# Patient Record
Sex: FEMALE | Race: BLACK OR AFRICAN AMERICAN | NOT HISPANIC OR LATINO | Employment: OTHER | URBAN - METROPOLITAN AREA
[De-identification: names, ages, dates, MRNs, and addresses within clinical notes are randomized per-mention and may not be internally consistent; named-entity substitution may affect disease eponyms.]

---

## 2017-01-03 RX ORDER — CLOPIDOGREL BISULFATE 75 MG/1
TABLET ORAL
Qty: 30 TABLET | Refills: 11 | Status: SHIPPED | OUTPATIENT
Start: 2017-01-03 | End: 2017-02-01 | Stop reason: SDUPTHER

## 2017-01-03 RX ORDER — LOVASTATIN 40 MG/1
TABLET ORAL
Qty: 90 TABLET | Refills: 3 | Status: SHIPPED | OUTPATIENT
Start: 2017-01-03 | End: 2018-01-05 | Stop reason: SDUPTHER

## 2017-01-06 ENCOUNTER — TELEPHONE (OUTPATIENT)
Dept: FAMILY MEDICINE | Facility: CLINIC | Age: 82
End: 2017-01-06

## 2017-01-06 NOTE — TELEPHONE ENCOUNTER
Phoned patient, she denies feeling bad, anxiety, lightheadedness, tightness/ pain in chest, neck, back or arms nor shortness of breath. Educated the patient if she experiences any of those symptoms she should go to ED, she verbalized understanding. patient states her blood pressure has not been high but is today because she did not sleep well last nigh, denies pain anywhere. Informed pt her last appt was 5/2016, she made an appt for next Friday 1/13/17.

## 2017-01-06 NOTE — TELEPHONE ENCOUNTER
----- Message from Kacy Vang sent at 1/6/2017 11:07 AM CST -----  Corry the NP with [facility] called to say is doing health assessment on this patient and her b/p is  201/102 ==initially    200/96 and hour later   Wants doctor to be aware  Reach patient [phone]   Cell   [phone]

## 2017-01-13 ENCOUNTER — OFFICE VISIT (OUTPATIENT)
Dept: FAMILY MEDICINE | Facility: CLINIC | Age: 82
End: 2017-01-13
Payer: MEDICARE

## 2017-01-13 ENCOUNTER — TELEPHONE (OUTPATIENT)
Dept: FAMILY MEDICINE | Facility: CLINIC | Age: 82
End: 2017-01-13

## 2017-01-13 ENCOUNTER — OFFICE VISIT (OUTPATIENT)
Dept: NEPHROLOGY | Facility: CLINIC | Age: 82
End: 2017-01-13
Payer: MEDICARE

## 2017-01-13 VITALS
WEIGHT: 175.25 LBS | BODY MASS INDEX: 32.25 KG/M2 | SYSTOLIC BLOOD PRESSURE: 130 MMHG | DIASTOLIC BLOOD PRESSURE: 80 MMHG | HEIGHT: 62 IN | HEART RATE: 64 BPM | OXYGEN SATURATION: 97 %

## 2017-01-13 VITALS
HEART RATE: 83 BPM | WEIGHT: 176.56 LBS | SYSTOLIC BLOOD PRESSURE: 140 MMHG | BODY MASS INDEX: 32.49 KG/M2 | HEIGHT: 62 IN | DIASTOLIC BLOOD PRESSURE: 80 MMHG | OXYGEN SATURATION: 99 %

## 2017-01-13 DIAGNOSIS — I25.10 CORONARY ARTERY DISEASE INVOLVING NATIVE CORONARY ARTERY WITHOUT ANGINA PECTORIS, UNSPECIFIED WHETHER NATIVE OR TRANSPLANTED HEART: ICD-10-CM

## 2017-01-13 DIAGNOSIS — E11.21 TYPE 2 DIABETES MELLITUS WITH DIABETIC NEPHROPATHY, WITHOUT LONG-TERM CURRENT USE OF INSULIN: ICD-10-CM

## 2017-01-13 DIAGNOSIS — M10.9 INTERVAL GOUT: ICD-10-CM

## 2017-01-13 DIAGNOSIS — E11.40 TYPE 2 DIABETES MELLITUS WITH DIABETIC NEUROPATHY, WITHOUT LONG-TERM CURRENT USE OF INSULIN: ICD-10-CM

## 2017-01-13 DIAGNOSIS — N18.30 CHRONIC KIDNEY DISEASE, STAGE III (MODERATE): Primary | ICD-10-CM

## 2017-01-13 DIAGNOSIS — I10 ESSENTIAL HYPERTENSION: ICD-10-CM

## 2017-01-13 DIAGNOSIS — I73.9 PAD (PERIPHERAL ARTERY DISEASE): ICD-10-CM

## 2017-01-13 DIAGNOSIS — Z29.9 PREVENTIVE MEASURE: Primary | ICD-10-CM

## 2017-01-13 DIAGNOSIS — N18.30 CHRONIC KIDNEY DISEASE, STAGE III (MODERATE): ICD-10-CM

## 2017-01-13 DIAGNOSIS — E78.5 HYPERLIPIDEMIA, UNSPECIFIED HYPERLIPIDEMIA TYPE: ICD-10-CM

## 2017-01-13 PROCEDURE — 3075F SYST BP GE 130 - 139MM HG: CPT | Mod: S$GLB,,,

## 2017-01-13 PROCEDURE — 1160F RVW MEDS BY RX/DR IN RCRD: CPT | Mod: S$GLB,,, | Performed by: INTERNAL MEDICINE

## 2017-01-13 PROCEDURE — 99999 PR PBB SHADOW E&M-EST. PATIENT-LVL IV: CPT | Mod: PBBFAC,,,

## 2017-01-13 PROCEDURE — 1159F MED LIST DOCD IN RCRD: CPT | Mod: S$GLB,,, | Performed by: INTERNAL MEDICINE

## 2017-01-13 PROCEDURE — 3079F DIAST BP 80-89 MM HG: CPT | Mod: S$GLB,,,

## 2017-01-13 PROCEDURE — 1126F AMNT PAIN NOTED NONE PRSNT: CPT | Mod: S$GLB,,, | Performed by: INTERNAL MEDICINE

## 2017-01-13 PROCEDURE — 99213 OFFICE O/P EST LOW 20 MIN: CPT | Mod: S$GLB,,,

## 2017-01-13 PROCEDURE — 99214 OFFICE O/P EST MOD 30 MIN: CPT | Mod: S$GLB,,, | Performed by: INTERNAL MEDICINE

## 2017-01-13 PROCEDURE — 3079F DIAST BP 80-89 MM HG: CPT | Mod: S$GLB,,, | Performed by: INTERNAL MEDICINE

## 2017-01-13 PROCEDURE — 90670 PCV13 VACCINE IM: CPT | Mod: S$GLB,,,

## 2017-01-13 PROCEDURE — 3074F SYST BP LT 130 MM HG: CPT | Mod: S$GLB,,, | Performed by: INTERNAL MEDICINE

## 2017-01-13 PROCEDURE — 99999 PR PBB SHADOW E&M-EST. PATIENT-LVL IV: CPT | Mod: PBBFAC,,, | Performed by: INTERNAL MEDICINE

## 2017-01-13 PROCEDURE — 1157F ADVNC CARE PLAN IN RCRD: CPT | Mod: S$GLB,,, | Performed by: INTERNAL MEDICINE

## 2017-01-13 PROCEDURE — G0009 ADMIN PNEUMOCOCCAL VACCINE: HCPCS | Mod: S$GLB,,,

## 2017-01-13 PROCEDURE — 1126F AMNT PAIN NOTED NONE PRSNT: CPT | Mod: S$GLB,,,

## 2017-01-13 PROCEDURE — 99499 UNLISTED E&M SERVICE: CPT | Mod: S$PBB,,,

## 2017-01-13 PROCEDURE — 1157F ADVNC CARE PLAN IN RCRD: CPT | Mod: S$GLB,,,

## 2017-01-13 PROCEDURE — 1160F RVW MEDS BY RX/DR IN RCRD: CPT | Mod: S$GLB,,,

## 2017-01-13 PROCEDURE — 90662 IIV NO PRSV INCREASED AG IM: CPT | Mod: S$GLB,,,

## 2017-01-13 PROCEDURE — G0008 ADMIN INFLUENZA VIRUS VAC: HCPCS | Mod: S$GLB,,,

## 2017-01-13 PROCEDURE — 1159F MED LIST DOCD IN RCRD: CPT | Mod: S$GLB,,,

## 2017-01-13 PROCEDURE — 99499 UNLISTED E&M SERVICE: CPT | Mod: S$PBB,,, | Performed by: INTERNAL MEDICINE

## 2017-01-13 RX ORDER — TRAZODONE HYDROCHLORIDE 150 MG/1
150 TABLET ORAL NIGHTLY
Qty: 90 TABLET | Refills: 3 | Status: SHIPPED | OUTPATIENT
Start: 2017-01-13 | End: 2017-06-08 | Stop reason: SINTOL

## 2017-01-13 RX ORDER — METOPROLOL SUCCINATE 100 MG/1
100 TABLET, EXTENDED RELEASE ORAL DAILY
Qty: 90 TABLET | Refills: 3 | Status: SHIPPED | OUTPATIENT
Start: 2017-01-13 | End: 2018-03-05 | Stop reason: SDUPTHER

## 2017-01-13 RX ORDER — LOSARTAN POTASSIUM 25 MG/1
25 TABLET ORAL DAILY
Qty: 30 TABLET | Refills: 8 | Status: SHIPPED | OUTPATIENT
Start: 2017-01-13 | End: 2017-02-01 | Stop reason: SDUPTHER

## 2017-01-13 NOTE — PROGRESS NOTES
Subjective:       Patient ID: Petra Zazueta is a 81 y.o. female.    Chief Complaint: Hypertension    HPI The patient presents for medical management of her chronic medical problems including hypertension, diabetes, CKD, gout and CAD. She claims compliance with her medications. She is not regularly monitoring her glucoses. She is due for an eye exam.     Review of Systems   Constitutional: Negative.    Respiratory: Negative.  Negative for shortness of breath.    Cardiovascular: Negative.  Negative for chest pain.   Psychiatric/Behavioral: Negative.    All other systems reviewed and are negative.      Objective:      Vitals:    01/13/17 0926   BP: 130/80   Pulse: 64     Physical Exam   Constitutional: She is oriented to person, place, and time. She appears well-developed. She is active.  Non-toxic appearance. She does not have a sickly appearance. She does not appear ill. No distress.   Obese   HENT:   Head: Normocephalic and atraumatic.   Right Ear: External ear normal.   Left Ear: External ear normal.   Nose: Nose normal.   Hearing normal.    Eyes: Conjunctivae are normal. Pupils are equal, round, and reactive to light.   Neck: Normal range of motion. Neck supple. No thyroid mass and no thyromegaly present.   Cardiovascular: Normal rate, regular rhythm, normal heart sounds and intact distal pulses.  Exam reveals no gallop and no friction rub.    No murmur heard.  Pulses:       Dorsalis pedis pulses are 2+ on the right side, and 2+ on the left side.        Posterior tibial pulses are 2+ on the right side, and 2+ on the left side.   Pulmonary/Chest: Effort normal and breath sounds normal. No respiratory distress. She exhibits no tenderness.   Musculoskeletal: Normal range of motion. She exhibits no edema.   Lymphadenopathy:     She has no cervical adenopathy.   Neurological: She is alert and oriented to person, place, and time. She has normal strength. Coordination and gait normal.   Skin: Skin is warm and dry. She is  not diaphoretic. No pallor.   Psychiatric: She has a normal mood and affect. Her speech is normal and behavior is normal. Judgment and thought content normal. Cognition and memory are normal.   Vitals reviewed.      Assessment:       1. Preventive measure    2. Essential hypertension    3. Type 2 diabetes mellitus with diabetic neuropathy, without long-term current use of insulin    4. Coronary artery disease involving native coronary artery without angina pectoris, unspecified whether native or transplanted heart    5. Chronic kidney disease, stage III (moderate)    6. Interval gout    7. Type 2 diabetes mellitus with diabetic nephropathy, without long-term current use of insulin        Plan:       Preventive measure  -     Pneumococcal Conjugate Vaccine (13 Valent) (IM)    Essential hypertension  -     CBC auto differential; Future; Expected date: 1/13/17  -     Comprehensive metabolic panel; Future; Expected date: 1/13/17  -     Lipid panel; Future; Expected date: 1/13/17  -     TSH; Future; Expected date: 1/13/17  -     Hemoglobin A1c; Future; Expected date: 1/13/17    Type 2 diabetes mellitus with diabetic neuropathy, without long-term current use of insulin    Coronary artery disease involving native coronary artery without angina pectoris, unspecified whether native or transplanted heart    Chronic kidney disease, stage III (moderate)    Interval gout    Type 2 diabetes mellitus with diabetic nephropathy, without long-term current use of insulin    Other orders  -     metoprolol succinate (TOPROL-XL) 100 MG 24 hr tablet; Take 1 tablet (100 mg total) by mouth once daily.  Dispense: 90 tablet; Refill: 3  -     trazodone (DESYREL) 150 MG tablet; Take 1 tablet (150 mg total) by mouth every evening.  Dispense: 90 tablet; Refill: 3  -     Influenza - High Dose (65+) (PF) (IM)      Return in about 6 months (around 7/13/2017).

## 2017-01-13 NOTE — MR AVS SNAPSHOT
[facility]  [address]  [facility] [address]  Phone: [phone]  Fax: [phone]                  Petra Zazueta   2017 2:30 PM   Office Visit    Description:  Female : 1935   Provider:  Gm Martinez MD   Department:  [facility]           Diagnoses this Visit        Comments    Chronic kidney disease, stage III (moderate)    -  Primary     Coronary artery disease involving native coronary artery without angina pectoris, unspecified whether native or transplanted heart         Essential hypertension         Hyperlipidemia, unspecified hyperlipidemia type         PAD (peripheral artery disease)                To Do List           Goals (5 Years of Data)     None       These Medications        Disp Refills Start End    losartan (COZAAR) 25 MG tablet 30 tablet 8 2017    Take 1 tablet (25 mg total) by mouth once daily. - Oral    Pharmacy: [facility]/pharmacy #[id number] - [address] AT [facility] Ph #: [phone]         [facility] On Call     [facility] On Call Nurse Care Line -  Assistance  Registered nurses in the [facility] On Call Center provide clinical advisement, health education, appointment booking, and other advisory services.  Call for this free service at [phone].             Medications           Message regarding Medications     Verify the changes and/or additions to your medication regime listed below are the same as discussed with your clinician today.  If any of these changes or additions are incorrect, please notify your healthcare provider.        START taking these NEW medications        Refills    losartan (COZAAR) 25 MG tablet 8    Sig: Take 1 tablet (25 mg total) by mouth once daily.    Class: Normal    Route: Oral           Verify that the below list of medications is an accurate representation of the medications you are currently taking.  If none reported, the list may be blank. If  "incorrect, please contact your healthcare provider. Carry this list with you in case of emergency.           Current Medications     allopurinol (ZYLOPRIM) 100 MG tablet TAKE 1 TABLET (100 MG TOTAL) BY MOUTH ONCE DAILY.    aspirin (ECOTRIN) 81 MG EC tablet Take 81 mg by mouth once daily.      cilostazol (PLETAL) 100 MG Tab Take 100 mg by mouth 2 (two) times daily.    clopidogrel (PLAVIX) 75 mg tablet TAKE 1 TABLET (75 MG TOTAL) BY MOUTH ONCE DAILY.    cyanocobalamin (VITAMIN B-12) 1000 MCG tablet Take 1 tablet (1,000 mcg total) by mouth once daily.    ferrous sulfate 325 mg (65 mg iron) Tab tablet Take 1 tablet (325 mg total) by mouth daily with breakfast.    furosemide (LASIX) 40 MG tablet 40 mg.    gabapentin (NEURONTIN) 300 MG capsule TAKE 2 CAPSULES (600 MG TOTAL) BY MOUTH EVERY EVENING.    glipiZIDE (GLUCOTROL) 2.5 MG TR24 Take 2 tablets (5 mg total) by mouth daily with breakfast.    glipizide-metformin (METAGLIP) 2.5-500 mg per tablet TAKE 1 TABLET BY MOUTH 2 (TWO) TIMES DAILY BEFORE MEALS.    JANUVIA 100 mg Tab TAKE 1 TABLET BY MOUTH EVERY DAY    latanoprost 0.005 % ophthalmic solution     lovastatin (MEVACOR) 40 MG tablet TAKE 1 TABLET BY MOUTH EVERY EVENING    metoprolol succinate (TOPROL-XL) 100 MG 24 hr tablet Take 1 tablet (100 mg total) by mouth once daily.    spironolactone (ALDACTONE) 25 MG tablet Take 25 mg by mouth 2 (two) times daily.    losartan (COZAAR) 25 MG tablet Take 1 tablet (25 mg total) by mouth once daily.    trazodone (DESYREL) 150 MG tablet Take 1 tablet (150 mg total) by mouth every evening.           Clinical Reference Information           Vital Signs - Last Recorded  Most recent update: 1/13/2017  2:14 PM by Alexa Velarde MA    BP Pulse Ht Wt SpO2 BMI    (!) 140/80 83 5' 2" (1.575 m) 80.1 kg (176 lb 9.4 oz) 99% 32.3 kg/m2      Blood Pressure          Most Recent Value    BP  (!)  140/80      Allergies as of 1/13/2017     Codeine      Immunizations Administered on Date of " Encounter - 1/13/2017     Name Date Dose VIS Date Route    Influenza - High Dose 1/13/2017 0.5 mL 8/7/2015 Intramuscular    Pneumococcal Conjugate - 13 Valent 1/13/2017 0.5 mL 11/5/2015 Intramuscular      Orders Placed During Today's Visit     Future Labs/Procedures Expected by Expires    CBC auto differential  1/13/2017 3/14/2018    Protein / creatinine ratio, urine  1/13/2017 1/13/2018    PTH, intact  1/13/2017 3/14/2018    Renal function panel  1/13/2017 3/14/2018    Renal function panel  1/13/2017 3/14/2018    Urinalysis  1/13/2017 1/13/2018      Instructions    Please see me back in my clinic in 5 month with blood work.   Please get lab check for high potassium 2 wks after starting the Losartan

## 2017-01-13 NOTE — PROGRESS NOTES
Subjective:       Patient ID: Petra Zazueta is a 81 y.o. Black or  female who presents for follow-up evaluation of CKD.     No chief complaint on file.    HPI   81 year old female presents today for follow up of CKD. Improved serum creatinine from 2.06 => 1.6. She did not take BP medications this am. CABG one year ago. Takes tylenol only for pain. Appears younger than stated age.   No complains    Review of Systems   Constitutional: Negative for activity change, appetite change, chills, diaphoresis, fatigue, fever and unexpected weight change.   HENT: Negative for congestion, facial swelling and trouble swallowing.    Eyes: Negative for pain, discharge, redness and visual disturbance.   Respiratory: Negative for cough, shortness of breath and wheezing.    Cardiovascular: Negative for chest pain, palpitations and leg swelling.   Gastrointestinal: Negative for abdominal distention, abdominal pain, constipation and diarrhea.   Endocrine: Negative for cold intolerance and heat intolerance.   Genitourinary: Negative for decreased urine volume, difficulty urinating, dysuria, flank pain, frequency and urgency.   Musculoskeletal: Positive for arthralgias. Negative for joint swelling and myalgias.   Skin: Negative for color change.   Allergic/Immunologic: Negative for immunocompromised state.   Neurological: Negative for dizziness, tremors, syncope, speech difficulty, weakness, light-headedness and numbness.   Hematological: Negative for adenopathy.   Psychiatric/Behavioral: Negative for agitation, confusion, decreased concentration and dysphoric mood.       Objective:      Physical Exam   Constitutional: She is oriented to person, place, and time. She appears well-developed and well-nourished.   HENT:   Head: Normocephalic and atraumatic.   Eyes: Conjunctivae and EOM are normal. Pupils are equal, round, and reactive to light.   Neck: Neck supple.   Cardiovascular: Normal rate, regular rhythm and intact  distal pulses.    Murmur (systolic) heard.  Pulmonary/Chest: Effort normal and breath sounds normal.   Abdominal: Soft. Bowel sounds are normal.   Musculoskeletal: Normal range of motion. She exhibits edema (left lower extremity).   Neurological: She is alert and oriented to person, place, and time. She has normal reflexes.   Skin: Skin is warm and dry.   Psychiatric: She has a normal mood and affect. Her behavior is normal.   Nursing note and vitals reviewed.      Assessment:       1. Chronic kidney disease, stage III (moderate)    2. Coronary artery disease involving native coronary artery without angina pectoris, unspecified whether native or transplanted heart    3. Essential hypertension    4. Hyperlipidemia, unspecified hyperlipidemia type    5. PAD (peripheral artery disease)        Plan:       1. CKD 3: Likely secondary to vascular disease, long standing HTN and diabetic nephropathy (no significant proteinuria).    Improved/stable.   Avoids NSAIDs.    - continue statin  - re-start ARB for renal protection with follow up of renal function in 2 wks.     Lab Results   Component Value Date    CREATININE 1.29 01/13/2017     Protein Creatinine Ratios:     Prot/Creat Ratio, Ur   Date Value Ref Range Status   06/17/2016 0.10 0.00 - 0.20 Final     ·   ·   Acid-Base:   Lab Results   Component Value Date     01/13/2017    K 3.9 01/13/2017    CO2 28 01/13/2017     2. HTN: Low salt diet. Continue medications as directed.     3. Renal osteodystrophy: Monitor. Repeat.  Lab Results   Component Value Date    .0 (H) 06/17/2016    CALCIUM 10.0 01/13/2017    PHOS 3.8 06/17/2016       4. Anemia: At goal for CKD.   Lab Results   Component Value Date    HGB 12.6 01/13/2017        5. DM: Well controlled but high glucose level today. Patient educated. Follow up with primary care.   Lab Results   Component Value Date    HGBA1C 6.4 (H) 06/07/2013       6. Lipid management: On Statin.         Follow up in 4 months with  labs.  Labcheck in 2 wks for hyperkalemia

## 2017-01-13 NOTE — PATIENT INSTRUCTIONS
Please see me back in my clinic in 5 month with blood work.   Please get lab check for high potassium 2 wks after starting the Losartan

## 2017-01-13 NOTE — MR AVS SNAPSHOT
[facility]  [address] [address]  Phone: [phone]  Fax: [phone]                  Petra Zazueta   2017 9:20 AM   Office Visit    Description:  Female : 1935   Provider:  Jose Juan Nath Jr., MD   Department:  [facility]           Reason for Visit     Hypertension           Diagnoses this Visit        Comments    Preventive measure    -  Primary     Essential hypertension         Type 2 diabetes mellitus with diabetic neuropathy, without long-term current use of insulin                To Do List           Future Appointments        Provider Department Dept Phone    2017 2:30 PM Gm Martinez MD [facility] - Nephrology [phone]      Goals (5 Years of Data)     None      Follow-Up and Disposition     Return in about 6 months (around 2017).       These Medications        Disp Refills Start End    metoprolol succinate (TOPROL-XL) 100 MG 24 hr tablet 90 tablet 3 2017     Take 1 tablet (100 mg total) by mouth once daily. - Oral    Pharmacy: [facility]/pharmacy #[id number] [address] AT [facility] Ph #: [phone]       trazodone (DESYREL) 150 MG tablet 90 tablet 3 2017    Take 1 tablet (150 mg total) by mouth every evening. - Oral    Pharmacy: [facility]/pharmacy #[id number] [address] AT [facility] Ph #: [phone]         [facility] On Call     [facility] On Call Nurse Care Line -  Assistance  Registered nurses in the [facility] On Call Center provide clinical advisement, health education, appointment booking, and other advisory services.  Call for this free service at [phone].             Medications           Message regarding Medications     Verify the changes and/or additions to your medication regime listed below are the same as discussed with your clinician today.  If any of these changes or additions are incorrect, please notify your  healthcare provider.        START taking these NEW medications        Refills    trazodone (DESYREL) 150 MG tablet 3    Sig: Take 1 tablet (150 mg total) by mouth every evening.    Class: Normal    Route: Oral      CHANGE how you are taking these medications     Start Taking Instead of    metoprolol succinate (TOPROL-XL) 100 MG 24 hr tablet metoprolol succinate (TOPROL-XL) 50 MG 24 hr tablet    Dosage:  Take 1 tablet (100 mg total) by mouth once daily. Dosage:  Take 1 tablet (50 mg total) by mouth once daily.    Reason for Change:  Reorder       STOP taking these medications     cloNIDine (CATAPRES) 0.1 MG tablet Take 1 tablet (0.1 mg total) by mouth every evening.    irbesartan (AVAPRO) 150 MG tablet            Verify that the below list of medications is an accurate representation of the medications you are currently taking.  If none reported, the list may be blank. If incorrect, please contact your healthcare provider. Carry this list with you in case of emergency.           Current Medications     allopurinol (ZYLOPRIM) 100 MG tablet TAKE 1 TABLET (100 MG TOTAL) BY MOUTH ONCE DAILY.    aspirin (ECOTRIN) 81 MG EC tablet Take 81 mg by mouth once daily.      cilostazol (PLETAL) 100 MG Tab Take 100 mg by mouth 2 (two) times daily.    clopidogrel (PLAVIX) 75 mg tablet TAKE 1 TABLET (75 MG TOTAL) BY MOUTH ONCE DAILY.    cyanocobalamin (VITAMIN B-12) 1000 MCG tablet Take 1 tablet (1,000 mcg total) by mouth once daily.    ferrous sulfate 325 mg (65 mg iron) Tab tablet Take 1 tablet (325 mg total) by mouth daily with breakfast.    furosemide (LASIX) 40 MG tablet 40 mg.    gabapentin (NEURONTIN) 300 MG capsule TAKE 2 CAPSULES (600 MG TOTAL) BY MOUTH EVERY EVENING.    glipiZIDE (GLUCOTROL) 2.5 MG TR24 Take 2 tablets (5 mg total) by mouth daily with breakfast.    glipizide-metformin (METAGLIP) 2.5-500 mg per tablet TAKE 1 TABLET BY MOUTH 2 (TWO) TIMES DAILY BEFORE MEALS.    JANUVIA 100 mg Tab TAKE 1 TABLET BY MOUTH EVERY  "DAY    latanoprost 0.005 % ophthalmic solution     lovastatin (MEVACOR) 40 MG tablet TAKE 1 TABLET BY MOUTH EVERY EVENING    metoprolol succinate (TOPROL-XL) 100 MG 24 hr tablet Take 1 tablet (100 mg total) by mouth once daily.    spironolactone (ALDACTONE) 25 MG tablet Take 25 mg by mouth 2 (two) times daily.    trazodone (DESYREL) 150 MG tablet Take 1 tablet (150 mg total) by mouth every evening.           Clinical Reference Information           Vital Signs - Last Recorded  Most recent update: 1/13/2017  9:30 AM by Majo Brady MA    BP Pulse Ht Wt SpO2 BMI    130/80 (BP Location: Left arm, Patient Position: Sitting, BP Method: Manual) 64 5' 2" (1.575 m) 79.5 kg (175 lb 4.3 oz) 97% 32.06 kg/m2      Blood Pressure          Most Recent Value    BP  130/80      Allergies as of 1/13/2017     Codeine      Immunizations Administered on Date of Encounter - 1/13/2017     Name Date Dose VIS Date Route    Pneumococcal Conjugate - 13 Valent  Incomplete 0.5 mL 11/5/2015 Intramuscular      Orders Placed During Today's Visit      Normal Orders This Visit    Pneumococcal Conjugate Vaccine (13 Valent) (IM)     Future Labs/Procedures Expected by Expires    CBC auto differential  1/13/2017 1/13/2018    Comprehensive metabolic panel  1/13/2017 1/13/2018    Hemoglobin A1c  1/13/2017 1/13/2018    Lipid panel  1/13/2017 1/13/2018    TSH  1/13/2017 1/13/2018      [facility] Sign-Up     Activating your [facility] account is as easy as 1-2-3!     1) Visit [email or web address], select Sign Up Now, enter this activation code and your date of birth, then select Next.  [email or web address]  Expires: 2/27/2017 10:20 AM      2) Create a username and password to use when you visit [facility] in the future and select a security question in case you lose your password and select Next.    3) Enter your e-mail address and click Sign Up!    Additional Information  If you have questions, please e-mail [email or web address] or call [phone] to talk to our " [facility] staff. Remember, [facility] is NOT to be used for urgent needs. For medical emergencies, dial 911.

## 2017-01-18 ENCOUNTER — OFFICE VISIT (OUTPATIENT)
Dept: FAMILY MEDICINE | Facility: CLINIC | Age: 82
End: 2017-01-18
Payer: MEDICARE

## 2017-01-18 VITALS
SYSTOLIC BLOOD PRESSURE: 122 MMHG | HEART RATE: 69 BPM | OXYGEN SATURATION: 94 % | HEIGHT: 62 IN | BODY MASS INDEX: 32.22 KG/M2 | DIASTOLIC BLOOD PRESSURE: 60 MMHG | WEIGHT: 175.06 LBS

## 2017-01-18 DIAGNOSIS — E11.65 TYPE 2 DIABETES MELLITUS WITH HYPERGLYCEMIA, WITHOUT LONG-TERM CURRENT USE OF INSULIN: Primary | ICD-10-CM

## 2017-01-18 PROCEDURE — 99499 UNLISTED E&M SERVICE: CPT | Mod: S$PBB,,,

## 2017-01-18 PROCEDURE — 99213 OFFICE O/P EST LOW 20 MIN: CPT | Mod: S$GLB,,,

## 2017-01-18 PROCEDURE — 1126F AMNT PAIN NOTED NONE PRSNT: CPT | Mod: S$GLB,,,

## 2017-01-18 PROCEDURE — 99999 PR PBB SHADOW E&M-EST. PATIENT-LVL III: CPT | Mod: PBBFAC,,,

## 2017-01-18 PROCEDURE — 1160F RVW MEDS BY RX/DR IN RCRD: CPT | Mod: S$GLB,,,

## 2017-01-18 PROCEDURE — 3078F DIAST BP <80 MM HG: CPT | Mod: S$GLB,,,

## 2017-01-18 PROCEDURE — 3074F SYST BP LT 130 MM HG: CPT | Mod: S$GLB,,,

## 2017-01-18 PROCEDURE — 1159F MED LIST DOCD IN RCRD: CPT | Mod: S$GLB,,,

## 2017-01-18 PROCEDURE — 1157F ADVNC CARE PLAN IN RCRD: CPT | Mod: S$GLB,,,

## 2017-01-18 RX ORDER — PIOGLITAZONEHYDROCHLORIDE 30 MG/1
30 TABLET ORAL DAILY
Qty: 90 TABLET | Refills: 3 | Status: SHIPPED | OUTPATIENT
Start: 2017-01-18 | End: 2018-03-24 | Stop reason: SDUPTHER

## 2017-01-18 NOTE — PROGRESS NOTES
Subjective:       Patient ID: Petra Zazueta is a 81 y.o. female.    Chief Complaint: Dizziness    HPI Her glucoses have been out of control based on a recent Hgb A1C of 11.6 and recent glucose of 388. She has always had good control. She was taken off of metformin due to CKD. She came to the ER at [facility] where she was administered insulin and released. She has been having excessive thirst and feels a little off balance.     Review of Systems   Constitutional: Negative.    Respiratory: Negative.  Negative for shortness of breath.    Cardiovascular: Negative.  Negative for chest pain.   Psychiatric/Behavioral: Negative.    All other systems reviewed and are negative.      Objective:      Vitals:    01/18/17 1335   BP: 122/60   Pulse: 69     Physical Exam   Constitutional: She is oriented to person, place, and time. She appears well-developed and well-nourished. She is active.  Non-toxic appearance. She does not have a sickly appearance. She does not appear ill. No distress.   HENT:   Head: Normocephalic and atraumatic.   Right Ear: External ear normal.   Left Ear: External ear normal.   Nose: Nose normal.   Hearing normal.    Eyes: Conjunctivae are normal. Pupils are equal, round, and reactive to light.   Neck: Normal range of motion. Neck supple. No thyroid mass and no thyromegaly present.   Cardiovascular: Normal rate, regular rhythm, normal heart sounds and intact distal pulses.  Exam reveals no gallop and no friction rub.    No murmur heard.  Pulses:       Dorsalis pedis pulses are 2+ on the right side, and 2+ on the left side.        Posterior tibial pulses are 2+ on the right side, and 2+ on the left side.   Pulmonary/Chest: Effort normal and breath sounds normal. No respiratory distress. She exhibits no tenderness.   Musculoskeletal: Normal range of motion. She exhibits no edema.   Lymphadenopathy:     She has no cervical adenopathy.   Neurological: She is alert and oriented to person,  place, and time. She has normal strength. Coordination and gait normal.   Skin: Skin is warm and dry. She is not diaphoretic. No pallor.   Psychiatric: She has a normal mood and affect. Her speech is normal and behavior is normal. Judgment and thought content normal. Cognition and memory are normal.   Vitals reviewed.      Assessment:       1. Type 2 diabetes mellitus with hyperglycemia, without long-term current use of insulin        Plan:       Type 2 diabetes mellitus with hyperglycemia, without long-term current use of insulin    Other orders  -     pioglitazone (ACTOS) 30 MG tablet; Take 1 tablet (30 mg total) by mouth once daily.  Dispense: 90 tablet; Refill: 3      Return in about 4 weeks (around 2/15/2017).

## 2017-01-18 NOTE — MR AVS SNAPSHOT
[facility]  [address] [place] [address]  Phone: [phone]  Fax: [phone]                  Petra Zazueta   2017 2:00 PM   Office Visit    Description:  Female : 1935   Provider:  Jose Juan Nath Jr., MD   Department:  [facility]           Reason for Visit     Dizziness           Diagnoses this Visit        Comments    Type 2 diabetes mellitus with hyperglycemia, without long-term current use of insulin    -  Primary            To Do List           Future Appointments        Provider Department Dept Phone    2017 2:00 PM Jose Juan Nath Jr., MD [facility] [phone]    2017 11:00 AM LAB, [facility] [phone]      Goals (5 Years of Data)     None      Follow-Up and Disposition     Return in about 4 weeks (around 2/15/2017).    Follow-up and Disposition History       These Medications        Disp Refills Start End    pioglitazone (ACTOS) 30 MG tablet 90 tablet 3 2017    Take 1 tablet (30 mg total) by mouth once daily. - Oral    Pharmacy: [facility]/pharmacy #[id number] - [address] AT [facility] Ph #: [phone]         [facility] On [facility]     [facility] On Call Nurse Care Line -  Assistance  Registered nurses in the [facility] Center provide clinical advisement, health education, appointment booking, and other advisory services.  Call for this free service at [phone].             Medications           Message regarding Medications     Verify the changes and/or additions to your medication regime listed below are the same as discussed with your clinician today.  If any of these changes or additions are incorrect, please notify your healthcare provider.        START taking these NEW medications        Refills    pioglitazone (ACTOS) 30 MG tablet 3    Sig: Take 1 tablet (30 mg total) by mouth once daily.    Class: Normal    Route:  Oral      STOP taking these medications     glipizide-metformin (METAGLIP) 2.5-500 mg per tablet TAKE 1 TABLET BY MOUTH 2 (TWO) TIMES DAILY BEFORE MEALS.           Verify that the below list of medications is an accurate representation of the medications you are currently taking.  If none reported, the list may be blank. If incorrect, please contact your healthcare provider. Carry this list with you in case of emergency.           Current Medications     allopurinol (ZYLOPRIM) 100 MG tablet TAKE 1 TABLET (100 MG TOTAL) BY MOUTH ONCE DAILY.    aspirin (ECOTRIN) 81 MG EC tablet Take 81 mg by mouth once daily.      cilostazol (PLETAL) 100 MG Tab Take 100 mg by mouth 2 (two) times daily.    clopidogrel (PLAVIX) 75 mg tablet TAKE 1 TABLET (75 MG TOTAL) BY MOUTH ONCE DAILY.    cyanocobalamin (VITAMIN B-12) 1000 MCG tablet Take 1 tablet (1,000 mcg total) by mouth once daily.    ferrous sulfate 325 mg (65 mg iron) Tab tablet Take 1 tablet (325 mg total) by mouth daily with breakfast.    furosemide (LASIX) 40 MG tablet 40 mg.    gabapentin (NEURONTIN) 300 MG capsule TAKE 2 CAPSULES (600 MG TOTAL) BY MOUTH EVERY EVENING.    glipiZIDE (GLUCOTROL) 2.5 MG TR24 Take 2 tablets (5 mg total) by mouth daily with breakfast.    JANUVIA 100 mg Tab TAKE 1 TABLET BY MOUTH EVERY DAY    latanoprost 0.005 % ophthalmic solution     losartan (COZAAR) 25 MG tablet Take 1 tablet (25 mg total) by mouth once daily.    lovastatin (MEVACOR) 40 MG tablet TAKE 1 TABLET BY MOUTH EVERY EVENING    metoprolol succinate (TOPROL-XL) 100 MG 24 hr tablet Take 1 tablet (100 mg total) by mouth once daily.    spironolactone (ALDACTONE) 25 MG tablet Take 25 mg by mouth 2 (two) times daily.    trazodone (DESYREL) 150 MG tablet Take 1 tablet (150 mg total) by mouth every evening.    pioglitazone (ACTOS) 30 MG tablet Take 1 tablet (30 mg total) by mouth once daily.           Clinical Reference Information           Vital Signs - Last Recorded  Most recent update:  "1/18/2017  1:40 PM by Majo Brady MA    BP Pulse Ht Wt SpO2 BMI    122/60 (BP Location: Right arm, Patient Position: Sitting, BP Method: Manual) 69 5' 2" (1.575 m) 79.4 kg (175 lb 0.7 oz) (!) 94% 32.02 kg/m2      Blood Pressure          Most Recent Value    BP  122/60      Allergies as of 1/18/2017     Codeine      Immunizations Administered on Date of Encounter - 1/18/2017     None      "

## 2017-02-01 DIAGNOSIS — I10 ESSENTIAL HYPERTENSION: Primary | ICD-10-CM

## 2017-02-01 RX ORDER — CLOPIDOGREL BISULFATE 75 MG/1
75 TABLET ORAL DAILY
Qty: 30 TABLET | Refills: 11 | Status: ON HOLD | OUTPATIENT
Start: 2017-02-01 | End: 2017-05-18 | Stop reason: HOSPADM

## 2017-02-01 NOTE — TELEPHONE ENCOUNTER
----- Message from Alexia Rodas sent at 2/1/2017 10:43 AM CST -----  [phone]  Need 90 day supply of clopidogrel 75 mg. [facility] .[phone]

## 2017-02-03 RX ORDER — LOSARTAN POTASSIUM 25 MG/1
25 TABLET ORAL DAILY
Qty: 30 TABLET | Refills: 8 | Status: SHIPPED | OUTPATIENT
Start: 2017-02-03 | End: 2017-03-03 | Stop reason: SDUPTHER

## 2017-02-16 ENCOUNTER — TELEPHONE (OUTPATIENT)
Dept: FAMILY MEDICINE | Facility: CLINIC | Age: 82
End: 2017-02-16

## 2017-02-16 NOTE — TELEPHONE ENCOUNTER
----- Message from Kacy Vang sent at 2/16/2017 11:45 AM CST -----  [employer] called     [place]    [phone]  Is taking 100 mg of januvia once a day and because of current kidney function it is recommended that she takes on more than 50 mg a day   And GFR - 34.8   Call on this matter

## 2017-02-20 ENCOUNTER — OFFICE VISIT (OUTPATIENT)
Dept: FAMILY MEDICINE | Facility: CLINIC | Age: 82
End: 2017-02-20
Payer: MEDICARE

## 2017-02-20 VITALS
OXYGEN SATURATION: 98 % | DIASTOLIC BLOOD PRESSURE: 64 MMHG | BODY MASS INDEX: 32.99 KG/M2 | HEIGHT: 62 IN | SYSTOLIC BLOOD PRESSURE: 120 MMHG | HEART RATE: 57 BPM | WEIGHT: 179.25 LBS

## 2017-02-20 DIAGNOSIS — E11.40 TYPE 2 DIABETES MELLITUS WITH DIABETIC NEUROPATHY, WITHOUT LONG-TERM CURRENT USE OF INSULIN: ICD-10-CM
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
## 2017-02-20 DIAGNOSIS — N18.30 CHRONIC KIDNEY DISEASE, STAGE III (MODERATE): ICD-10-CM

## 2017-02-20 PROCEDURE — 1157F ADVNC CARE PLAN IN RCRD: CPT | Mod: S$GLB,,,

## 2017-02-20 PROCEDURE — 99499 UNLISTED E&M SERVICE: CPT | Mod: S$PBB,,,

## 2017-02-20 PROCEDURE — 1125F AMNT PAIN NOTED PAIN PRSNT: CPT | Mod: S$GLB,,,

## 2017-02-20 PROCEDURE — 3078F DIAST BP <80 MM HG: CPT | Mod: S$GLB,,,

## 2017-02-20 PROCEDURE — 99999 PR PBB SHADOW E&M-EST. PATIENT-LVL IV: CPT | Mod: PBBFAC,,,

## 2017-02-20 PROCEDURE — 1159F MED LIST DOCD IN RCRD: CPT | Mod: S$GLB,,,

## 2017-02-20 PROCEDURE — 3074F SYST BP LT 130 MM HG: CPT | Mod: S$GLB,,,

## 2017-02-20 PROCEDURE — 99214 OFFICE O/P EST MOD 30 MIN: CPT | Mod: S$GLB,,,

## 2017-02-20 NOTE — PROGRESS NOTES
Subjective:       Patient ID: Petra Zazueta is a 82 y.o. female.    Chief Complaint: Hypertension; Diabetes; and Leg Pain    HPI The patient presents for medical management of her chronic medical problems including diabetes mellitus. Her glucoses have been poorly controlled based on a Hgb A1C of 11.6 a month ago. She has been monitoring her glucoses and it was 110 today. Drinks a lot of water. She is compliant with her medications. She denies hypoglycemia.       She fell in the tub 3 days ago. She soaked in the tub and her right leg gave out and she fell back. She didn't hit her head.     Review of Systems   Constitutional: Negative.    Respiratory: Negative.  Negative for shortness of breath.    Cardiovascular: Negative.  Negative for chest pain.   Psychiatric/Behavioral: Negative.    All other systems reviewed and are negative.      Objective:      Vitals:    02/20/17 1340   BP: 120/64   Pulse: (!) 57     Physical Exam   Constitutional: She is oriented to person, place, and time. She appears well-developed and well-nourished. She is active.  Non-toxic appearance. She does not have a sickly appearance. She does not appear ill. No distress.   Obese    HENT:   Head: Normocephalic and atraumatic.   Right Ear: External ear normal.   Left Ear: External ear normal.   Nose: Nose normal.   Hearing normal.    Eyes: Conjunctivae are normal. Pupils are equal, round, and reactive to light.   Neck: Normal range of motion. Neck supple. No thyroid mass and no thyromegaly present.   Cardiovascular: Normal rate, regular rhythm, normal heart sounds and intact distal pulses.  Exam reveals no gallop and no friction rub.    No murmur heard.  Pulses:       Dorsalis pedis pulses are 2+ on the right side, and 2+ on the left side.        Posterior tibial pulses are 2+ on the right side, and 2+ on the left side.   Pulmonary/Chest: Effort normal and breath sounds normal. No respiratory distress. She exhibits no tenderness.   Musculoskeletal:  Normal range of motion. She exhibits no edema.   Lymphadenopathy:     She has no cervical adenopathy.   Neurological: She is alert and oriented to person, place, and time. She has normal strength. Coordination and gait normal.   Skin: Skin is warm and dry. She is not diaphoretic. No pallor.   Psychiatric: She has a normal mood and affect. Her speech is normal and behavior is normal. Judgment and thought content normal. Cognition and memory are normal.   Vitals reviewed.      Assessment:       1. Fall, initial encounter    2. Type 2 diabetes mellitus with diabetic neuropathy, without long-term current use of insulin    3. Chronic kidney disease, stage III (moderate)        Plan:       Fall, initial encounter  -     Ambulatory Referral to Physical/Occupational Therapy    Type 2 diabetes mellitus with diabetic neuropathy, without long-term current use of insulin  -     Ambulatory referral to Podiatry  -     Ambulatory referral to Ophthalmology  -     Hemoglobin A1c; Future; Expected date: 3/20/17    Chronic kidney disease, stage III (moderate)      Return in about 3 months (around 5/20/2017).

## 2017-02-20 NOTE — MR AVS SNAPSHOT
[facility]  [address] [place] [address]  Phone: [phone]  Fax: [phone]                  Petra Zazueta   2017 1:40 PM   Office Visit    Description:  Female : 1935   Provider:  Jose Juan Nath Jr., MD   Department:  [facility]           Reason for Visit     Hypertension     Diabetes     Leg Pain           Diagnoses this Visit        Comments    Fall, initial encounter    -  Primary     Type 2 diabetes mellitus with diabetic neuropathy, without long-term current use of insulin         Chronic kidney disease, stage III (moderate)                To Do List           Goals (5 Years of Data)     None      Follow-Up and Disposition     Return in about 3 months (around 2017).    Follow-up and Disposition History      [facility] On Call     [facility] On Call Nurse Care Line -  Assistance  Registered nurses in the [facility] On Call Center provide clinical advisement, health education, appointment booking, and other advisory services.  Call for this free service at [phone].             Medications           Message regarding Medications     Verify the changes and/or additions to your medication regime listed below are the same as discussed with your clinician today.  If any of these changes or additions are incorrect, please notify your healthcare provider.             Verify that the below list of medications is an accurate representation of the medications you are currently taking.  If none reported, the list may be blank. If incorrect, please contact your healthcare provider. Carry this list with you in case of emergency.           Current Medications     allopurinol (ZYLOPRIM) 100 MG tablet TAKE 1 TABLET (100 MG TOTAL) BY MOUTH ONCE DAILY.    aspirin (ECOTRIN) 81 MG EC tablet Take 81 mg by mouth once daily.      cilostazol (PLETAL) 100 MG Tab Take 100 mg by mouth 2 (two) times daily.    clopidogrel (PLAVIX) 75 mg tablet Take 1 tablet (75 mg  "total) by mouth once daily.    cyanocobalamin (VITAMIN B-12) 1000 MCG tablet Take 1 tablet (1,000 mcg total) by mouth once daily.    ferrous sulfate 325 mg (65 mg iron) Tab tablet Take 1 tablet (325 mg total) by mouth daily with breakfast.    furosemide (LASIX) 40 MG tablet 40 mg.    gabapentin (NEURONTIN) 300 MG capsule TAKE 2 CAPSULES (600 MG TOTAL) BY MOUTH EVERY EVENING.    glipiZIDE (GLUCOTROL) 2.5 MG TR24 Take 2 tablets (5 mg total) by mouth daily with breakfast.    latanoprost 0.005 % ophthalmic solution     losartan (COZAAR) 25 MG tablet Take 1 tablet (25 mg total) by mouth once daily.    lovastatin (MEVACOR) 40 MG tablet TAKE 1 TABLET BY MOUTH EVERY EVENING    metoprolol succinate (TOPROL-XL) 100 MG 24 hr tablet Take 1 tablet (100 mg total) by mouth once daily.    pioglitazone (ACTOS) 30 MG tablet Take 1 tablet (30 mg total) by mouth once daily.    SITagliptan (JANUVIA) 50 MG Tab Take 1 tablet (50 mg total) by mouth once daily.    spironolactone (ALDACTONE) 25 MG tablet Take 25 mg by mouth 2 (two) times daily.    trazodone (DESYREL) 150 MG tablet Take 1 tablet (150 mg total) by mouth every evening.           Clinical Reference Information           Your Vitals Were     BP Pulse Height Weight SpO2 BMI    120/64 (BP Location: Right arm, Patient Position: Sitting, BP Method: Manual) 57 5' 2" (1.575 m) 81.3 kg (179 lb 3.7 oz) 98% 32.78 kg/m2      Blood Pressure          Most Recent Value    BP  120/64      Allergies as of 2/20/2017     Codeine      Immunizations Administered on Date of Encounter - 2/20/2017     None      Orders Placed During Today's Visit      Normal Orders This Visit    Ambulatory referral to Ophthalmology     Ambulatory Referral to Physical/Occupational Therapy     Ambulatory referral to Podiatry     Future Labs/Procedures Expected by Expires    Hemoglobin A1c  3/20/2017 2/20/2018      Language Assistance Services     ATTENTION: Language assistance services are available, free of charge. " Please call [phone].      ATENCIÓN: Si habla español, tiene a manuel disposición servicios gratuitos de asistencia lingüística. Llame al [phone].     CHÚ Ý: N?u b?n nói Ti?ng Vi?t, có các d?ch v? h? tr? ngôn ng? mi?n phí dành cho b?n. G?i s? [phone].         [facility] complies with applicable Federal civil rights laws and does not discriminate on the basis of race, color, national origin, age, disability, or sex.

## 2017-02-23 RX ORDER — TRAMADOL HYDROCHLORIDE 50 MG/1
TABLET ORAL
Qty: 60 TABLET | OUTPATIENT
Start: 2017-02-23

## 2017-03-03 ENCOUNTER — TELEPHONE (OUTPATIENT)
Dept: FAMILY MEDICINE | Facility: CLINIC | Age: 82
End: 2017-03-03

## 2017-03-03 DIAGNOSIS — I10 ESSENTIAL HYPERTENSION: ICD-10-CM

## 2017-03-03 RX ORDER — LOSARTAN POTASSIUM 25 MG/1
25 TABLET ORAL DAILY
Qty: 90 TABLET | Refills: 3 | Status: ON HOLD | OUTPATIENT
Start: 2017-03-03 | End: 2017-05-18 | Stop reason: HOSPADM

## 2017-03-03 NOTE — TELEPHONE ENCOUNTER
----- Message from Alexia Rodas sent at 3/3/2017  9:45 AM CST -----  Contact: [facility] [phone]  REFILLS:   Patient is requesting a medication refill.   RX name:90 day supply of losartan  Strength: 25 mg  Directions: 1 tablet a day  Pharmacy name: [facility].  Pharmacy phone #: [phone]

## 2017-03-06 ENCOUNTER — OFFICE VISIT (OUTPATIENT)
Dept: FAMILY MEDICINE | Facility: CLINIC | Age: 82
End: 2017-03-06
Payer: MEDICARE

## 2017-03-06 VITALS
RESPIRATION RATE: 16 BRPM | DIASTOLIC BLOOD PRESSURE: 86 MMHG | WEIGHT: 178.81 LBS | SYSTOLIC BLOOD PRESSURE: 180 MMHG | BODY MASS INDEX: 32.7 KG/M2 | OXYGEN SATURATION: 92 % | HEART RATE: 62 BPM

## 2017-03-06 DIAGNOSIS — Z91.81 HISTORY OF RECENT FALL: Primary | ICD-10-CM

## 2017-03-06 DIAGNOSIS — R07.81 RIB PAIN ON RIGHT SIDE: ICD-10-CM

## 2017-03-06 PROCEDURE — 1160F RVW MEDS BY RX/DR IN RCRD: CPT | Mod: S$GLB,,, | Performed by: FAMILY MEDICINE

## 2017-03-06 PROCEDURE — 1125F AMNT PAIN NOTED PAIN PRSNT: CPT | Mod: S$GLB,,, | Performed by: FAMILY MEDICINE

## 2017-03-06 PROCEDURE — 3079F DIAST BP 80-89 MM HG: CPT | Mod: S$GLB,,, | Performed by: FAMILY MEDICINE

## 2017-03-06 PROCEDURE — 99213 OFFICE O/P EST LOW 20 MIN: CPT | Mod: S$GLB,,, | Performed by: FAMILY MEDICINE

## 2017-03-06 PROCEDURE — 1159F MED LIST DOCD IN RCRD: CPT | Mod: S$GLB,,, | Performed by: FAMILY MEDICINE

## 2017-03-06 PROCEDURE — 3077F SYST BP >= 140 MM HG: CPT | Mod: S$GLB,,, | Performed by: FAMILY MEDICINE

## 2017-03-06 PROCEDURE — 1157F ADVNC CARE PLAN IN RCRD: CPT | Mod: S$GLB,,, | Performed by: FAMILY MEDICINE

## 2017-03-06 PROCEDURE — 99999 PR PBB SHADOW E&M-EST. PATIENT-LVL III: CPT | Mod: PBBFAC,,, | Performed by: FAMILY MEDICINE

## 2017-03-06 RX ORDER — CLONIDINE HYDROCHLORIDE 0.1 MG/1
TABLET ORAL
Refills: 11 | COMMUNITY
Start: 2017-02-16 | End: 2017-03-13

## 2017-03-06 RX ORDER — HYDROCODONE BITARTRATE AND ACETAMINOPHEN 7.5; 325 MG/1; MG/1
1 TABLET ORAL
Qty: 15 TABLET | Refills: 0 | Status: SHIPPED | OUTPATIENT
Start: 2017-03-06 | End: 2017-04-04

## 2017-03-06 RX ORDER — BENZONATATE 200 MG/1
200 CAPSULE ORAL 3 TIMES DAILY PRN
Qty: 30 CAPSULE | Refills: 0 | Status: SHIPPED | OUTPATIENT
Start: 2017-03-06 | End: 2017-03-16

## 2017-03-06 NOTE — MR AVS SNAPSHOT
[facility]  [address] [place] [address]  Phone: [phone]  Fax: [phone]                  Ptera Zazueta   3/6/2017 11:20 AM   Office Visit    Description:  Female : 1935   Provider:  Ambar Guevara MD   Department:  [facility]           Reason for Visit     Fall     Right Rib Pain     Chest Congestion           Diagnoses this Visit        Comments    History of recent fall    -  Primary     Rib pain on right side                To Do List           Future Appointments        Provider Department Dept Phone    3/21/2017 11:00 AM Riki Hussein Jr., DPM [facility] [phone]      Goals (5 Years of Data)     None      Follow-Up and Disposition     Return if symptoms worsen or fail to improve.       These Medications        Disp Refills Start End    hydrocodone-acetaminophen 7.5-325mg (NORCO) 7.5-325 mg per tablet 15 tablet 0 3/6/2017 2017    Take 1 tablet by mouth every 24 hours as needed for Pain. - Oral    Pharmacy: [facility]/pharmacy #[id number] - [address] AT [facility] Ph #: [phone]       benzonatate (TESSALON) 200 MG capsule 30 capsule 0 3/6/2017 3/16/2017    Take 1 capsule (200 mg total) by mouth 3 (three) times daily as needed for Cough. - Oral    Pharmacy: [facility]/pharmacy #[id number] [address] Ph #: [phone]         [facility]     [facility] On Call Nurse Care Line -  Assistance  Registered nurses in the [facility] On Call Center provide clinical advisement, health education, appointment booking, and other advisory services.  Call for this free service at [phone].             Medications           Message regarding Medications     Verify the changes and/or additions to your medication regime listed below are the same as discussed with your clinician today.  If any of these changes or additions are incorrect, please  notify your healthcare provider.        START taking these NEW medications        Refills    hydrocodone-acetaminophen 7.5-325mg (NORCO) 7.5-325 mg per tablet 0    Sig: Take 1 tablet by mouth every 24 hours as needed for Pain.    Class: Print    Route: Oral    benzonatate (TESSALON) 200 MG capsule 0    Sig: Take 1 capsule (200 mg total) by mouth 3 (three) times daily as needed for Cough.    Class: Normal    Route: Oral      STOP taking these medications     hydrocodone-acetaminophen 5-325mg (NORCO) 5-325 mg per tablet Take 1 tablet by mouth every 4 (four) hours as needed for Pain.           Verify that the below list of medications is an accurate representation of the medications you are currently taking.  If none reported, the list may be blank. If incorrect, please contact your healthcare provider. Carry this list with you in case of emergency.           Current Medications     allopurinol (ZYLOPRIM) 100 MG tablet TAKE 1 TABLET (100 MG TOTAL) BY MOUTH ONCE DAILY.    aspirin (ECOTRIN) 81 MG EC tablet Take 81 mg by mouth once daily.      benzonatate (TESSALON) 200 MG capsule Take 1 capsule (200 mg total) by mouth 3 (three) times daily as needed for Cough.    cilostazol (PLETAL) 100 MG Tab Take 100 mg by mouth 2 (two) times daily.    cloNIDine (CATAPRES) 0.1 MG tablet TAKE 1 TABLET (0.1 MG TOTAL) BY MOUTH EVERY EVENING.    clopidogrel (PLAVIX) 75 mg tablet Take 1 tablet (75 mg total) by mouth once daily.    cyanocobalamin (VITAMIN B-12) 1000 MCG tablet Take 1 tablet (1,000 mcg total) by mouth once daily.    ferrous sulfate 325 mg (65 mg iron) Tab tablet Take 1 tablet (325 mg total) by mouth daily with breakfast.    furosemide (LASIX) 40 MG tablet 40 mg.    gabapentin (NEURONTIN) 300 MG capsule TAKE 2 CAPSULES (600 MG TOTAL) BY MOUTH EVERY EVENING.    glipiZIDE (GLUCOTROL) 2.5 MG TR24 Take 2 tablets (5 mg total) by mouth daily with breakfast.    hydrocodone-acetaminophen 7.5-325mg (NORCO) 7.5-325 mg per tablet Take 1  tablet by mouth every 24 hours as needed for Pain.    latanoprost 0.005 % ophthalmic solution     losartan (COZAAR) 25 MG tablet Take 1 tablet (25 mg total) by mouth once daily.    lovastatin (MEVACOR) 40 MG tablet TAKE 1 TABLET BY MOUTH EVERY EVENING    metoprolol succinate (TOPROL-XL) 100 MG 24 hr tablet Take 1 tablet (100 mg total) by mouth once daily.    pioglitazone (ACTOS) 30 MG tablet Take 1 tablet (30 mg total) by mouth once daily.    SITagliptan (JANUVIA) 50 MG Tab Take 1 tablet (50 mg total) by mouth once daily.    spironolactone (ALDACTONE) 25 MG tablet Take 25 mg by mouth 2 (two) times daily.    trazodone (DESYREL) 150 MG tablet Take 1 tablet (150 mg total) by mouth every evening.           Clinical Reference Information           Your Vitals Were     BP Pulse Resp Weight SpO2 BMI    180/86 (BP Location: Left arm, Patient Position: Sitting, BP Method: Manual) 62 16 81.1 kg (178 lb 12.7 oz) 92% 32.7 kg/m2      Blood Pressure          Most Recent Value    BP  (!)  180/86      Allergies as of 3/6/2017     Codeine      Immunizations Administered on Date of Encounter - 3/6/2017     None      Language Assistance Services     ATTENTION: Language assistance services are available, free of charge. Please call [phone].      ATENCIÓN: Si braedenla brissa, tiene a manuel disposición servicios gratuitos de asistencia lingüística. Llame al [phone].     AMANDA Ý: N?u b?n nói Ti?ng Vi?t, có các d?ch v? h? tr? ngôn ng? mi?n phí dành cho b?n. G?i s? [phone].         [facility] complies with applicable Federal civil rights laws and does not discriminate on the basis of race, color, national origin, age, disability, or sex.

## 2017-03-06 NOTE — PROGRESS NOTES
Subjective:       Patient ID: Petra Zazueta is a 82 y.o. female.    Chief Complaint: Fall (last wednesday); Right Rib Pain; and Chest Congestion (since wednesday)    HPI 82 year old female here for f/u on fall causing right rib pain and cough with clear sputum. Patient went to ED and did not have evidence of fracture. She has been taking percocet without relief. She has been applying ice which has helped with pain. Patient has noticed coughing without wheezing/SOB. No fevers. No falls since ED visit.     Review of Systems   Constitutional: Negative for chills and fever.   Respiratory: Positive for cough. Negative for chest tightness and shortness of breath.    Cardiovascular: Positive for chest pain. Negative for leg swelling.   Gastrointestinal: Negative for abdominal pain, diarrhea, nausea and vomiting.   Psychiatric/Behavioral: Negative for agitation and behavioral problems.       Objective:      Vitals:    03/06/17 1130   BP: (!) 180/86   Pulse: 62   Resp: 16     Physical Exam   Constitutional: She is oriented to person, place, and time. She appears well-developed and well-nourished. No distress.   Cardiovascular: Normal rate and regular rhythm.    Pulmonary/Chest: She has no wheezes.   Below average effort due to right sided rib pain   Abdominal: Soft. There is no tenderness.   Neurological: She is alert and oriented to person, place, and time.   Psychiatric: She has a normal mood and affect. Her behavior is normal. Judgment and thought content normal.   Vitals reviewed.      Assessment:       1. History of recent fall    2. Rib pain on right side        Plan:         1. History of fall resulting in right sided rib pain: no fracture, xrays reviewed. Advised on applying ice to affected area and using incentive spirometer(patient has at home) regularly. Will increase norco to 7.5 mg, i have advised patient on side effects. Tessalon prn cough. Patient advised to take precautions around home as she is a fall risk.   BP likely elevated 2/2 to pain.   RTC if symptoms worsen.     History of recent fall    Rib pain on right side    Other orders  -     hydrocodone-acetaminophen 7.5-325mg (NORCO) 7.5-325 mg per tablet; Take 1 tablet by mouth every 24 hours as needed for Pain.  Dispense: 15 tablet; Refill: 0  -     benzonatate (TESSALON) 200 MG capsule; Take 1 capsule (200 mg total) by mouth 3 (three) times daily as needed for Cough.  Dispense: 30 capsule; Refill: 0      Return if symptoms worsen or fail to improve.

## 2017-03-13 RX ORDER — CLONIDINE HYDROCHLORIDE 0.1 MG/1
TABLET ORAL
Qty: 30 TABLET | Refills: 11 | Status: ON HOLD | OUTPATIENT
Start: 2017-03-13 | End: 2017-05-18 | Stop reason: HOSPADM

## 2017-04-05 RX ORDER — GABAPENTIN 300 MG/1
CAPSULE ORAL
Qty: 180 CAPSULE | Refills: 3 | Status: SHIPPED | OUTPATIENT
Start: 2017-04-05 | End: 2018-04-16 | Stop reason: SDUPTHER

## 2017-04-06 ENCOUNTER — OFFICE VISIT (OUTPATIENT)
Dept: PODIATRY | Facility: CLINIC | Age: 82
End: 2017-04-06
Payer: MEDICARE

## 2017-04-06 VITALS
RESPIRATION RATE: 18 BRPM | HEART RATE: 63 BPM | DIASTOLIC BLOOD PRESSURE: 76 MMHG | SYSTOLIC BLOOD PRESSURE: 153 MMHG | BODY MASS INDEX: 33.13 KG/M2 | HEIGHT: 62 IN | WEIGHT: 180 LBS

## 2017-04-06 DIAGNOSIS — B35.1 ONYCHOMYCOSIS DUE TO DERMATOPHYTE: ICD-10-CM

## 2017-04-06 DIAGNOSIS — L84 CORN OR CALLUS: ICD-10-CM

## 2017-04-06 DIAGNOSIS — E11.49 TYPE II DIABETES MELLITUS WITH NEUROLOGICAL MANIFESTATIONS: Primary | ICD-10-CM

## 2017-04-06 DIAGNOSIS — E11.9 COMPREHENSIVE DIABETIC FOOT EXAMINATION, TYPE 2 DM, ENCOUNTER FOR: ICD-10-CM

## 2017-04-06 PROCEDURE — 3078F DIAST BP <80 MM HG: CPT | Mod: S$GLB,,, | Performed by: PODIATRIST

## 2017-04-06 PROCEDURE — 99203 OFFICE O/P NEW LOW 30 MIN: CPT | Mod: 25,S$GLB,, | Performed by: PODIATRIST

## 2017-04-06 PROCEDURE — 1160F RVW MEDS BY RX/DR IN RCRD: CPT | Mod: S$GLB,,, | Performed by: PODIATRIST

## 2017-04-06 PROCEDURE — 11721 DEBRIDE NAIL 6 OR MORE: CPT | Mod: Q8,S$GLB,, | Performed by: PODIATRIST

## 2017-04-06 PROCEDURE — 1157F ADVNC CARE PLAN IN RCRD: CPT | Mod: S$GLB,,, | Performed by: PODIATRIST

## 2017-04-06 PROCEDURE — 1159F MED LIST DOCD IN RCRD: CPT | Mod: S$GLB,,, | Performed by: PODIATRIST

## 2017-04-06 PROCEDURE — 3077F SYST BP >= 140 MM HG: CPT | Mod: S$GLB,,, | Performed by: PODIATRIST

## 2017-04-06 PROCEDURE — 1126F AMNT PAIN NOTED NONE PRSNT: CPT | Mod: S$GLB,,, | Performed by: PODIATRIST

## 2017-04-06 NOTE — MR AVS SNAPSHOT
[facility]  [address], [address]   [place] [address]  Phone: [phone]  Fax: [phone]                  Petra Zazueta   2017 1:30 PM   Office Visit    Description:  Female : 1935   Provider:  Riki Hussein Jr., DPM   Department:  [facility]           Reason for Visit     Diabetes Mellitus     Diabetic Foot Exam           Diagnoses this Visit        Comments    Type II diabetes mellitus with neurological manifestations    -  Primary     Onychomycosis due to dermatophyte         Corn or callus         Comprehensive diabetic foot examination, type 2 DM, encounter for                To Do List           Future Appointments        Provider Department Dept Phone    2017 10:00 AM Riki Hussein Jr., DPM [facility] [phone]      Goals (5 Years of Data)     None      Follow-Up and Disposition     Return in about 6 months (around 10/6/2017).      [facility]     [facility] On Call Nurse Care Line -  Assistance  Unless otherwise directed by your provider, please contact [facility], our nurse care line that is available for  assistance.     Registered nurses in the [facility] provide: appointment scheduling, clinical advisement, health education, and other advisory services.  Call: [phone] (toll free)               Medications           Message regarding Medications     Verify the changes and/or additions to your medication regime listed below are the same as discussed with your clinician today.  If any of these changes or additions are incorrect, please notify your healthcare provider.             Verify that the below list of medications is an accurate representation of the medications you are currently taking.  If none reported, the list may be blank. If incorrect, please contact your healthcare provider. Carry this list with you in case of emergency.           Current Medications      "allopurinol (ZYLOPRIM) 100 MG tablet TAKE 1 TABLET (100 MG TOTAL) BY MOUTH ONCE DAILY.    aspirin (ECOTRIN) 81 MG EC tablet Take 81 mg by mouth once daily.      cilostazol (PLETAL) 100 MG Tab Take 100 mg by mouth 2 (two) times daily.    cloNIDine (CATAPRES) 0.1 MG tablet TAKE 1 TABLET (0.1 MG TOTAL) BY MOUTH EVERY EVENING.    clopidogrel (PLAVIX) 75 mg tablet Take 1 tablet (75 mg total) by mouth once daily.    cyanocobalamin (VITAMIN B-12) 1000 MCG tablet Take 1 tablet (1,000 mcg total) by mouth once daily.    ferrous sulfate 325 mg (65 mg iron) Tab tablet Take 1 tablet (325 mg total) by mouth daily with breakfast.    furosemide (LASIX) 40 MG tablet 40 mg.    gabapentin (NEURONTIN) 300 MG capsule TAKE 2 CAPSULES (600 MG TOTAL) BY MOUTH EVERY EVENING.    glipiZIDE (GLUCOTROL) 2.5 MG TR24 Take 2 tablets (5 mg total) by mouth daily with breakfast.    latanoprost 0.005 % ophthalmic solution     lovastatin (MEVACOR) 40 MG tablet TAKE 1 TABLET BY MOUTH EVERY EVENING    metoprolol succinate (TOPROL-XL) 100 MG 24 hr tablet Take 1 tablet (100 mg total) by mouth once daily.    pioglitazone (ACTOS) 30 MG tablet Take 1 tablet (30 mg total) by mouth once daily.    SITagliptan (JANUVIA) 50 MG Tab Take 1 tablet (50 mg total) by mouth once daily.    spironolactone (ALDACTONE) 25 MG tablet Take 25 mg by mouth 2 (two) times daily.    trazodone (DESYREL) 150 MG tablet Take 1 tablet (150 mg total) by mouth every evening.    losartan (COZAAR) 25 MG tablet Take 1 tablet (25 mg total) by mouth once daily.           Clinical Reference Information           Your Vitals Were     BP Pulse Resp Height Weight BMI    153/76 (BP Location: Right arm, Patient Position: Sitting, BP Method: Automatic) 63 18 5' 2" (1.575 m) 81.6 kg (180 lb) 32.92 kg/m2      Blood Pressure          Most Recent Value    BP  (!)  153/76      Allergies as of 4/6/2017     Codeine      Immunizations Administered on Date of Encounter - 4/6/2017     None      Instructions  "     Diabetes: Inspecting Your Feet    Diabetes increases your chances of developing foot problems. So inspect your feet every day. This helps you find small skin irritations before they become serious ulcers or infections. If you have trouble seeing the bottoms of your feet, use a mirror or ask a family member or friend to help.  How to check your feet  Below are tips to help you look for foot problems. Try to check your feet at the same time each day, such as when you get out of bed in the morning:  · Check the top of each foot. The tops of toes, back of the heel, and outer edge of the foot can get a lot of rubbing from poor-fitting shoes.  · Check the bottom of each foot. Daily wear and tear often leads to problems at pressure spots.  · Check the toes and nails. Fungal infections often occur between toes. Toenail problems can also be a sign of fungal infections or lead to breaks in the skin.  · Check your shoes, too. Loose objects inside a shoe can injure the foot. Use your hand to feel inside your shoes for things like laxmi, loose stitching, or rough areas that could irritate your skin.  Warning signs  Look for any color changes in the foot. Redness with streaks can signal a severe infection, which needs immediate medical attention. Tell your healthcare provider right away if you have any of these problems:  · Swelling, sometimes with color changes, may be a sign of poor blood flow or infection. Symptoms include tenderness and an increase in the size of your foot.  · Warm or hot areas on your feet may be signs of infection. A foot that is cold may not be getting enough blood.  · Sensations such as burning, tingling, or pins and needles can be signs of a problem. Also check for areas that may be numb.  · Hot spots are caused by friction or pressure. Look for hot spots in areas that get a lot of rubbing. Hot spots can turn into blisters, calluses, or sores.  · Cracks and sores are caused by dry or irritated  skin. They are a sign that the skin is breaking down, which can lead to infection.  · Toenail problems to watch for include nails growing into the skin (ingrown toenail) and causing redness or pain. Thick, yellow, or discolored nails can signal a fungal infection.  · Drainage and odor can develop from untreated sores and ulcers. Call your healthcare provider right away if you notice white or yellow drainage, bleeding, or unpleasant odor.   Date Last Reviewed: 6/1/2016 © 2000-2016 [employer]. [address] [place] [address]. All rights reserved. This information is not intended as a substitute for professional medical care. Always follow your healthcare professional's instructions.        Long-Term Complications of Diabetes    Diabetes can cause health problems over time. These are called complications. They are more likely to happen if your blood sugar is often too high. Over time, high blood sugar can damage blood vessels in your body. It is important to keep your blood sugar in your target range. This can help prevent or delay complications from diabetes.  Possible complications  Complications of diabetes include:  · Eye problems, including damage to the blood vessels in the eyes (retinopathy), pressure in the eye (glaucoma), and clouding of the eyes lens (a cataract). Eye problems can eventually lead to irreversible blindness.   · Tooth and gum problems (periodontal disease), causing loss of teeth and bone  · Blood vessel (vascular) disease leading to circulation problems, heart attack or stroke, or a need for amputation of a limb   · Problems with sexual function leading to erectile dysfunction in men and sexual discomfort in women   · Kidney disease (nephropathy) can eventually lead to kidney failure, which may require dialysis or kidney transplant   · Nerve problems (neuropathy), causing pain or loss of feeling in your feet and other parts of your body, potentially leading to an  amputation of a limb   · High blood pressure (hypertension), putting strain on your heart and blood vessels  · Serious infections, possibly leading to loss of toes, feet, or limbs  How to avoid complications  The serious consequences of these complications may be avoidable for most people with diabetes by managing your blood glucose, blood pressure, and cholesterol levels. This can help you feel better and stay healthy. You can manage diabetes by tracking your blood sugar. You can also eat healthy and exercise to avoid gaining weight. And you should take medicine if directed by your healthcare provider.  Date Last Reviewed: 5/1/2016 © 2000-2016 [employer]. [address] [place] [address], [place] [address]. All rights reserved. This information is not intended as a substitute for professional medical care. Always follow your healthcare professional's instructions.      Your Diabetes Foot Care Program    Every day you depend on your feet to keep you moving. But when you have diabetes, your feet need special care. Even a small foot problem can become very serious. So dont take your feet for granted. By working with your diabetes healthcare team, you can learn how to protect your feet and keep them healthy.  Evaluating your feet  An evaluation helps your healthcare provider check the condition of your feet. The evaluation includes a review of your diabetes history and overall health. It may also include a foot exam, X-rays, or other tests. These can help show problems beneath the skin that you cant see or feel.  Medical history  You will be asked about your overall health and any history of foot problems. Youll also discuss your diabetes history, such as whether your blood sugar level has changed over time. It also includes questions about sensations of pain, tingling, pins and needles, or numbness. Your healthcare provider will also want to know if you have high blood pressure and heart disease, or if you  smoke. Be sure to mention any medicines (including over-the-counter), supplements, or herbal remedies you take.  Foot exam  A foot exam checks the condition of different parts of your foot. First, your skin and nails are examined for any signs of infection. Blood flow is checked by feeling for the pulses in each foot. You may also have tests to study the nerves in the foot. These include using a small filament (wire) to see how sensitive your feet are. In certain cases, you will be asked to walk a short distance to check for bone, joint, and muscle problems.  Diagnostic tests  If needed, your healthcare provider will suggest certain tests to learn more about your feet. These include:  · Doppler tests to measure blood flow in the feet and lower leg.  · X-rays, which can show bone or joint problems.  · Other imaging tests, such as an MRI (magnetic resonance imaging), bone scan, and CT (computed tomography) scan. These can help show bone infections.  · Other tests, such as vascular tests, which study the blood flow in your feet and legs. You may also have nerve studies to learn how sensitive your feet are.  Creating a foot care program  Based on the evaluation, your healthcare provider will create a foot care program for you. Your program may be as simple as starting a daily self-care routine and changing the types of shoes your wear. It may also involve treating minor foot problems, such as a corn or blister. In some cases, surgery will be needed to treat an infection or mechanical problems, such as hammer toes.  Preventing problems  When you have diabetes, its easier to prevent problems than to treat them later on. So see your healthcare team for regular checkups and foot care. Your healthcare team can also help you learn more about caring for your feet at home. For example, you may be told to avoid walking barefoot. Or you may be told that special footwear is needed to protect your feet.  Have regular  checkups  Foot problems can develop quickly. So be sure to follow your healthcare teams schedule for regular checkups. During office visits, take off your shoes and socks as soon as you get in the exam room. Ask your healthcare provider to examine your feet for problems. This will make it easier to find and treat small skin irritations before they get worse. Regular checkups can also help keep track of the blood flow and feeling in your feet. If you have neuropathy (lack of feeling in your feet), you will need to have checkups more often.  Learn about self-care  The more you know about diabetes and your feet, the easier it will be to prevent problems. Members of your healthcare team can teach you how to inspect your feet and teach you to look for warning signs. They can also give you other foot care tips. During office visits, be sure to ask any questions you have.  Date Last Reviewed: 7/1/2016 © 2000-2016 [employer]. [address] [place] [address]. All rights reserved. This information is not intended as a substitute for professional medical care. Always follow your healthcare professional's instructions.               Language Assistance Services     ATTENTION: Language assistance services are available, free of charge. Please call [phone].      ATENCIÓN: Si braedenla brissa, tiene a manuel disposición servicios gratuitos de asistencia lingüística. Llame al [phone].     [facility] Ý: N?u b?n nói Ti?ng Vi?t, có các d?ch v? h? tr? ngôn ng? mi?n phí dành cho b?n. G?i s? [phone].         [facility] complies with applicable Federal civil rights laws and does not discriminate on the basis of race, color, national origin, age, disability, or sex.

## 2017-04-06 NOTE — PATIENT INSTRUCTIONS
Diabetes: Inspecting Your Feet    Diabetes increases your chances of developing foot problems. So inspect your feet every day. This helps you find small skin irritations before they become serious ulcers or infections. If you have trouble seeing the bottoms of your feet, use a mirror or ask a family member or friend to help.  How to check your feet  Below are tips to help you look for foot problems. Try to check your feet at the same time each day, such as when you get out of bed in the morning:  · Check the top of each foot. The tops of toes, back of the heel, and outer edge of the foot can get a lot of rubbing from poor-fitting shoes.  · Check the bottom of each foot. Daily wear and tear often leads to problems at pressure spots.  · Check the toes and nails. Fungal infections often occur between toes. Toenail problems can also be a sign of fungal infections or lead to breaks in the skin.  · Check your shoes, too. Loose objects inside a shoe can injure the foot. Use your hand to feel inside your shoes for things like laxmi, loose stitching, or rough areas that could irritate your skin.  Warning signs  Look for any color changes in the foot. Redness with streaks can signal a severe infection, which needs immediate medical attention. Tell your healthcare provider right away if you have any of these problems:  · Swelling, sometimes with color changes, may be a sign of poor blood flow or infection. Symptoms include tenderness and an increase in the size of your foot.  · Warm or hot areas on your feet may be signs of infection. A foot that is cold may not be getting enough blood.  · Sensations such as burning, tingling, or pins and needles can be signs of a problem. Also check for areas that may be numb.  · Hot spots are caused by friction or pressure. Look for hot spots in areas that get a lot of rubbing. Hot spots can turn into blisters, calluses, or sores.  · Cracks and sores are caused by dry or irritated  skin. They are a sign that the skin is breaking down, which can lead to infection.  · Toenail problems to watch for include nails growing into the skin (ingrown toenail) and causing redness or pain. Thick, yellow, or discolored nails can signal a fungal infection.  · Drainage and odor can develop from untreated sores and ulcers. Call your healthcare provider right away if you notice white or yellow drainage, bleeding, or unpleasant odor.   Date Last Reviewed: 6/1/2016 © 2000-2016 [employer]. [address] [place] [address]. All rights reserved. This information is not intended as a substitute for professional medical care. Always follow your healthcare professional's instructions.        Long-Term Complications of Diabetes    Diabetes can cause health problems over time. These are called complications. They are more likely to happen if your blood sugar is often too high. Over time, high blood sugar can damage blood vessels in your body. It is important to keep your blood sugar in your target range. This can help prevent or delay complications from diabetes.  Possible complications  Complications of diabetes include:  · Eye problems, including damage to the blood vessels in the eyes (retinopathy), pressure in the eye (glaucoma), and clouding of the eyes lens (a cataract). Eye problems can eventually lead to irreversible blindness.   · Tooth and gum problems (periodontal disease), causing loss of teeth and bone  · Blood vessel (vascular) disease leading to circulation problems, heart attack or stroke, or a need for amputation of a limb   · Problems with sexual function leading to erectile dysfunction in men and sexual discomfort in women   · Kidney disease (nephropathy) can eventually lead to kidney failure, which may require dialysis or kidney transplant   · Nerve problems (neuropathy), causing pain or loss of feeling in your feet and other parts of your body, potentially leading to an  amputation of a limb   · High blood pressure (hypertension), putting strain on your heart and blood vessels  · Serious infections, possibly leading to loss of toes, feet, or limbs  How to avoid complications  The serious consequences of these complications may be avoidable for most people with diabetes by managing your blood glucose, blood pressure, and cholesterol levels. This can help you feel better and stay healthy. You can manage diabetes by tracking your blood sugar. You can also eat healthy and exercise to avoid gaining weight. And you should take medicine if directed by your healthcare provider.  Date Last Reviewed: 5/1/2016 © 2000-2016 "[employer]". [address] [place] [address], [place] [address]. All rights reserved. This information is not intended as a substitute for professional medical care. Always follow your healthcare professional's instructions.      Your Diabetes Foot Care Program    Every day you depend on your feet to keep you moving. But when you have diabetes, your feet need special care. Even a small foot problem can become very serious. So dont take your feet for granted. By working with your diabetes healthcare team, you can learn how to protect your feet and keep them healthy.  Evaluating your feet  An evaluation helps your healthcare provider check the condition of your feet. The evaluation includes a review of your diabetes history and overall health. It may also include a foot exam, X-rays, or other tests. These can help show problems beneath the skin that you cant see or feel.  Medical history  You will be asked about your overall health and any history of foot problems. Youll also discuss your diabetes history, such as whether your blood sugar level has changed over time. It also includes questions about sensations of pain, tingling, pins and needles, or numbness. Your healthcare provider will also want to know if you have high blood pressure and heart disease, or if you  smoke. Be sure to mention any medicines (including over-the-counter), supplements, or herbal remedies you take.  Foot exam  A foot exam checks the condition of different parts of your foot. First, your skin and nails are examined for any signs of infection. Blood flow is checked by feeling for the pulses in each foot. You may also have tests to study the nerves in the foot. These include using a small filament (wire) to see how sensitive your feet are. In certain cases, you will be asked to walk a short distance to check for bone, joint, and muscle problems.  Diagnostic tests  If needed, your healthcare provider will suggest certain tests to learn more about your feet. These include:  · Doppler tests to measure blood flow in the feet and lower leg.  · X-rays, which can show bone or joint problems.  · Other imaging tests, such as an MRI (magnetic resonance imaging), bone scan, and CT (computed tomography) scan. These can help show bone infections.  · Other tests, such as vascular tests, which study the blood flow in your feet and legs. You may also have nerve studies to learn how sensitive your feet are.  Creating a foot care program  Based on the evaluation, your healthcare provider will create a foot care program for you. Your program may be as simple as starting a daily self-care routine and changing the types of shoes your wear. It may also involve treating minor foot problems, such as a corn or blister. In some cases, surgery will be needed to treat an infection or mechanical problems, such as hammer toes.  Preventing problems  When you have diabetes, its easier to prevent problems than to treat them later on. So see your healthcare team for regular checkups and foot care. Your healthcare team can also help you learn more about caring for your feet at home. For example, you may be told to avoid walking barefoot. Or you may be told that special footwear is needed to protect your feet.  Have regular  checkups  Foot problems can develop quickly. So be sure to follow your healthcare teams schedule for regular checkups. During office visits, take off your shoes and socks as soon as you get in the exam room. Ask your healthcare provider to examine your feet for problems. This will make it easier to find and treat small skin irritations before they get worse. Regular checkups can also help keep track of the blood flow and feeling in your feet. If you have neuropathy (lack of feeling in your feet), you will need to have checkups more often.  Learn about self-care  The more you know about diabetes and your feet, the easier it will be to prevent problems. Members of your healthcare team can teach you how to inspect your feet and teach you to look for warning signs. They can also give you other foot care tips. During office visits, be sure to ask any questions you have.  Date Last Reviewed: 7/1/2016  © 3691-3175 [employer]. [address] [place] [address], [place] [address]. All rights reserved. This information is not intended as a substitute for professional medical care. Always follow your healthcare professional's instructions.

## 2017-04-06 NOTE — PROGRESS NOTES
Subjective:    Patient ID: Petra Zazueta is a 82 y.o. female.    Chief Complaint: Diabetes Mellitus (pcp Dr. Nath last appt 3/6/17) and Diabetic Foot Exam      HPI:   HPI  Pt presents for DM foot exam  Pt reports painfull nails  Pt has no other complaints  This patient has documented high risk feet requiring routine maintenance secondary to diabetes mellitis and those secondary complications of diabetes, as mentioned.    PCP: Jose Juan Nath MD    Date Last Seen by PCP: 3/6/17  Current shoe gear:  Affected Foot: Tennis shoes  Last Podiatry Enc: Visit date not found  Last Enc w/ Me: Visit date not found     Hemoglobin A1C   Date Value Ref Range Status   01/13/2017 11.6 (H) 4.5 - 6.2 % Final     Comment:     According to ADA guidelines, hemoglobin A1C <7.0% represents  optimal control in non-pregnant diabetic patients.  Different  metrics may apply to specific populations.   Standards of Medical Care in Diabetes - 2016.  For the purpose of screening for the presence of diabetes:  <5.7%     Consistent with the absence of diabetes  5.7-6.4%  Consistent with increasing risk for diabetes   (prediabetes)  >or=6.5%  Consistent with diabetes  Currently no consensus exists for use of hemoglobin A1C  for diagnosis of diabetes for children.     06/07/2013 6.4 (H) 4.0 - 6.2 % Final     Past Medical History:   Diagnosis Date    Anemia     Arthritis     Diabetes mellitus     Diabetes mellitus type II     Dyslipidemia     Hypertension     PAD (peripheral artery disease)      Past Surgical History:   Procedure Laterality Date    CORONARY ANGIOPLASTY      RCA 4/2011 EJ    GALLBLADDER SURGERY      HYSTERECTOMY      PERIPHERAL ARTERIAL STENT GRAFT      Left lower extremity RCA      Social History     Social History    Marital status:      Spouse name: N/A    Number of children: N/A    Years of education: N/A     Occupational History    Not on file.     Social History Main Topics    Smoking status: Former  Smoker     Quit date: 12/26/2005    Smokeless tobacco: Not on file    Alcohol use No    Drug use: No    Sexual activity: Not on file     Other Topics Concern    Not on file     Social History Narrative    Lives alone in [place]. Has 2 sons. Quit drinking years ago. She cares for her houseplants.          Current Outpatient Prescriptions:     allopurinol (ZYLOPRIM) 100 MG tablet, TAKE 1 TABLET (100 MG TOTAL) BY MOUTH ONCE DAILY., Disp: 30 tablet, Rfl: 5    aspirin (ECOTRIN) 81 MG EC tablet, Take 81 mg by mouth once daily.  , Disp: , Rfl:     cilostazol (PLETAL) 100 MG Tab, Take 100 mg by mouth 2 (two) times daily., Disp: , Rfl: 3    cloNIDine (CATAPRES) 0.1 MG tablet, TAKE 1 TABLET (0.1 MG TOTAL) BY MOUTH EVERY EVENING., Disp: 30 tablet, Rfl: 11    clopidogrel (PLAVIX) 75 mg tablet, Take 1 tablet (75 mg total) by mouth once daily., Disp: 30 tablet, Rfl: 11    cyanocobalamin (VITAMIN B-12) 1000 MCG tablet, Take 1 tablet (1,000 mcg total) by mouth once daily., Disp: 90 tablet, Rfl: 4    ferrous sulfate 325 mg (65 mg iron) Tab tablet, Take 1 tablet (325 mg total) by mouth daily with breakfast., Disp: 30 tablet, Rfl: 11    furosemide (LASIX) 40 MG tablet, 40 mg., Disp: , Rfl:     gabapentin (NEURONTIN) 300 MG capsule, TAKE 2 CAPSULES (600 MG TOTAL) BY MOUTH EVERY EVENING., Disp: 180 capsule, Rfl: 3    glipiZIDE (GLUCOTROL) 2.5 MG TR24, Take 2 tablets (5 mg total) by mouth daily with breakfast., Disp: 180 tablet, Rfl: 3    latanoprost 0.005 % ophthalmic solution, , Disp: , Rfl:     lovastatin (MEVACOR) 40 MG tablet, TAKE 1 TABLET BY MOUTH EVERY EVENING, Disp: 90 tablet, Rfl: 3    metoprolol succinate (TOPROL-XL) 100 MG 24 hr tablet, Take 1 tablet (100 mg total) by mouth once daily., Disp: 90 tablet, Rfl: 3    pioglitazone (ACTOS) 30 MG tablet, Take 1 tablet (30 mg total) by mouth once daily., Disp: 90 tablet, Rfl: 3    SITagliptan (JANUVIA) 50 MG Tab, Take 1 tablet (50 mg total) by mouth once daily.,  "Disp: 90 tablet, Rfl: 3    spironolactone (ALDACTONE) 25 MG tablet, Take 25 mg by mouth 2 (two) times daily., Disp: , Rfl: 3    trazodone (DESYREL) 150 MG tablet, Take 1 tablet (150 mg total) by mouth every evening., Disp: 90 tablet, Rfl: 3    losartan (COZAAR) 25 MG tablet, Take 1 tablet (25 mg total) by mouth once daily., Disp: 90 tablet, Rfl: 3  Review of patient's allergies indicates:   Allergen Reactions    Codeine        BP (!) 153/76 (BP Location: Right arm, Patient Position: Sitting, BP Method: Automatic)  Pulse 63  Resp 18  Ht 5' 2" (1.575 m)  Wt 81.6 kg (180 lb)  BMI 32.92 kg/m2    ROS  ROS Constitutional:  General Appearance: well nourished  Vascular: negative for cramps, edema and bruising  Musculoskeletal: negative for joint paint and joint edema  Skin: negative for rashes and lesions  Neurological: positive for burning, tingling, numbness  Gastrointestinal: negative for stomach pain, nausea and vomiting        Objective:        General    Nursing note and vitals reviewed.  Constitutional: She is oriented to person, place, and time. She appears well-developed.   Neurological: She is alert and oriented to person, place, and time.   Psychiatric: She has a normal mood and affect. Her behavior is normal.             Physical Exam   Constitutional: She is oriented to person, place, and time. She appears well-developed.   Feet:   Right Foot:   Protective Sensation: 10 sites tested. 7 sites sensed.   Skin Integrity: Positive for callus.   Left Foot:   Protective Sensation: 10 sites tested. 7 sites sensed.   Skin Integrity: Positive for callus.   Neurological: She is alert and oriented to person, place, and time.   Skin: Skin is warm and dry.   Psychiatric: She has a normal mood and affect. Her behavior is normal.   Nursing note and vitals reviewed.    Ortho Exam  V: pt 1/4 dp 1/4 b/l. edema present bilaterally, varicosities present bilaterally  N:  JULIANO  [place] Neuropathy Screening:   Left Right "   Normal Appearance Yes-0 Yes-0   Ulceration no-0 no-0   Achilles Reflex normal-0 normal-0   Vibratory Sensation normal-0 normal-0   10 Gram MF reduced-0.5 reduced-0.5   Total .5 /5 .5 /5     1/10     O: lateral deviation of hallux b/l, no open lesions, POP of nails  D: elongated, thickened, dystrophic nails with subungal [id number]          Assessment:     Imaging / Labs:    No results found.        1. Type II diabetes mellitus with neurological manifestations    2. Onychomycosis due to dermatophyte    3. Corn or callus    4. Comprehensive diabetic foot examination, type 2 DM, encounter for          Plan:       Orders Placed This Encounter    Foot Care     Routine Foot Care  Date/Time: 4/6/2017 11:05 PM  Performed by: NAPOLEON POP JR.  Authorized by: NAPOLEON POP JR.     Consent Done?:  Yes (Verbal)  Hyperkeratotic Skin Lesions?: No      Nail Care Type:  Debride  Location(s): All  (Left 1st Toe, Left 3rd Toe, Left 2nd Toe, Left 4th Toe, Left 5th Toe, Right 1st Toe, Right 2nd Toe, Right 3rd Toe, Right 4th Toe and Right 5th Toe)  Patient tolerance:  Patient tolerated the procedure well with no immediate complications        Petra was seen today for diabetes mellitus and diabetic foot exam.    Diagnoses and all orders for this visit:    Type II diabetes mellitus with neurological manifestations  -     Foot Care    Onychomycosis due to dermatophyte    Corn or callus    Comprehensive diabetic foot examination, type 2 DM, encounter for        Petra Carlita is a 82 y.o. female presenting w/   1. Type II diabetes mellitus with neurological manifestations    2. Onychomycosis due to dermatophyte    3. Corn or callus    4. Comprehensive diabetic foot examination, type 2 DM, encounter for      ADA Risk Classification: 1 - LOPS with or without deformity: rtc 3-6 months       -pt seen, evaluated, and managed  -dx discussed in detail. All questions/concerns addressed  -all tx options discussed. All alternatives, risks,  benefits of all txs discussed  -The patient was educated regarding the above diagnosis. We discussed conservative care options regarding shoe wear and/or padding.  -rxs dispensed: none  -px as above  -educated pt on DM footcare    Pt to f/u in 3-6 months as scheduled      Return in about 6 months (around 10/6/2017).

## 2017-04-06 NOTE — LETTER
April 10, 2017      Jose Juan Nath Jr., MD  [address]  [facility] [address]           [facility]  [address]   [facility] [address]  Phone: [phone]  Fax: [phone]          Patient: Petra Zazueta   MR Number: [id number]   YOB: 1935   Date of Visit: 4/6/2017       Dear Dr. Jose Juan Nath Jr.:    Thank you for referring Petra Zazueta to me for evaluation. Attached you will find relevant portions of my assessment and plan of care.    If you have questions, please do not hesitate to call me. I look forward to following Petra Zazueta along with you.    Sincerely,    Riki Hussein Jr., DPJOHNNY    Enclosure  CC:  No Recipients    If you would like to receive this communication electronically, please contact [email or web address] or [phone] to request more information on [employer] access.    For providers and/or their staff who would like to refer a patient to [facility], please contact us through our one-stop-shop provider referral line, [facility], at [phone].    If you feel you have received this communication in error or would no longer like to receive these types of communications, please e-mail [email or web address]

## 2017-05-16 PROBLEM — I21.4 NSTEMI (NON-ST ELEVATED MYOCARDIAL INFARCTION): Status: ACTIVE | Noted: 2017-05-16

## 2017-05-17 PROBLEM — I21.4 NSTEMI (NON-ST ELEVATED MYOCARDIAL INFARCTION): Status: ACTIVE | Noted: 2017-05-17

## 2017-05-17 PROBLEM — I21.4 NSTEMI (NON-ST ELEVATED MYOCARDIAL INFARCTION): Status: RESOLVED | Noted: 2017-05-16 | Resolved: 2017-05-17

## 2017-05-17 PROBLEM — R79.89 ELEVATED TROPONIN: Status: ACTIVE | Noted: 2017-05-17

## 2017-05-18 PROBLEM — I21.4 NSTEMI (NON-ST ELEVATED MYOCARDIAL INFARCTION): Status: RESOLVED | Noted: 2017-05-17 | Resolved: 2017-05-18

## 2017-06-08 ENCOUNTER — OFFICE VISIT (OUTPATIENT)
Dept: FAMILY MEDICINE | Facility: CLINIC | Age: 82
End: 2017-06-08
Payer: MEDICARE

## 2017-06-08 VITALS
WEIGHT: 178.56 LBS | HEIGHT: 62 IN | HEART RATE: 50 BPM | OXYGEN SATURATION: 97 % | BODY MASS INDEX: 32.86 KG/M2 | SYSTOLIC BLOOD PRESSURE: 120 MMHG | DIASTOLIC BLOOD PRESSURE: 62 MMHG

## 2017-06-08 DIAGNOSIS — I25.10 CORONARY ARTERY DISEASE INVOLVING NATIVE CORONARY ARTERY OF NATIVE HEART WITHOUT ANGINA PECTORIS: ICD-10-CM

## 2017-06-08 DIAGNOSIS — E11.21 TYPE 2 DIABETES MELLITUS WITH DIABETIC NEPHROPATHY, WITHOUT LONG-TERM CURRENT USE OF INSULIN: Primary | ICD-10-CM

## 2017-06-08 DIAGNOSIS — N18.30 CHRONIC KIDNEY DISEASE, STAGE III (MODERATE): ICD-10-CM

## 2017-06-08 DIAGNOSIS — Z91.81 HISTORY OF RECENT FALL: ICD-10-CM

## 2017-06-08 PROBLEM — R79.89 ELEVATED TROPONIN: Status: RESOLVED | Noted: 2017-05-17 | Resolved: 2017-06-08

## 2017-06-08 PROBLEM — R07.81 RIB PAIN ON RIGHT SIDE: Status: RESOLVED | Noted: 2017-03-06 | Resolved: 2017-06-08

## 2017-06-08 PROCEDURE — 1159F MED LIST DOCD IN RCRD: CPT | Mod: S$GLB,,,

## 2017-06-08 PROCEDURE — 1125F AMNT PAIN NOTED PAIN PRSNT: CPT | Mod: S$GLB,,,

## 2017-06-08 PROCEDURE — 99499 UNLISTED E&M SERVICE: CPT | Mod: S$PBB,,,

## 2017-06-08 PROCEDURE — 99999 PR PBB SHADOW E&M-EST. PATIENT-LVL IV: CPT | Mod: PBBFAC,,,

## 2017-06-08 PROCEDURE — 99214 OFFICE O/P EST MOD 30 MIN: CPT | Mod: S$GLB,,,

## 2017-06-08 RX ORDER — LOSARTAN POTASSIUM 25 MG/1
TABLET ORAL
COMMUNITY
Start: 2017-05-31 | End: 2018-01-11 | Stop reason: SDUPTHER

## 2017-06-08 NOTE — PROGRESS NOTES
Subjective:       Patient ID: Petra Zazueta is a 82 y.o. female.    Chief Complaint: Follow-up    HPI The patient presents with complaints of pain in her left knee since falling down back step of her home. She went to the ER at [place] where she had X-Rays and wrapped her leg. She was diagnosed with a sprain. No pain in her knee prior to falling. It is improving. No head injury. She is aware that she is unable to take NSAID's and has been taking tylenol for the pain. Medial pain. Tender. Worse with weight bearing. Some swelling. Worse with flexion. Non-Radiating. No other injury. It is tolerable to moderate.       Since her last visit she was hospitalized at [facility] and had an angiogram that showed non-critical coronary stenosis.        Her glucose yesterday was in the 130's. She is compliant with her medication and testing.     Review of Systems   Constitutional: Negative.    Respiratory: Negative.  Negative for shortness of breath.    Cardiovascular: Negative.  Negative for chest pain.   Psychiatric/Behavioral: Negative.    All other systems reviewed and are negative.      Objective:      Vitals:    06/08/17 0826   BP: 120/62   Pulse: (!) 50     Physical Exam   Constitutional: She is oriented to person, place, and time. She appears well-developed and well-nourished. She is active.  Non-toxic appearance. She does not have a sickly appearance. She does not appear ill. No distress.   Obese    HENT:   Head: Normocephalic and atraumatic.   Right Ear: External ear normal.   Left Ear: External ear normal.   Nose: Nose normal.   Hearing normal.    Eyes: Conjunctivae are normal. Pupils are equal, round, and reactive to light.   Neck: Normal range of motion. Neck supple. No thyroid mass and no thyromegaly present.   Cardiovascular: Normal rate, regular rhythm, normal heart sounds and intact distal pulses.  Exam reveals no gallop and no friction rub.    No murmur heard.  Pulses:       Dorsalis pedis  pulses are 2+ on the right side, and 2+ on the left side.        Posterior tibial pulses are 2+ on the right side, and 2+ on the left side.   Pulmonary/Chest: Effort normal and breath sounds normal. No respiratory distress. She exhibits no tenderness.   Musculoskeletal: Normal range of motion. She exhibits no edema.   Lymphadenopathy:     She has no cervical adenopathy.   Neurological: She is alert and oriented to person, place, and time. She has normal strength. Coordination and gait normal.   Skin: Skin is warm and dry. She is not diaphoretic. No pallor.   Psychiatric: She has a normal mood and affect. Her speech is normal and behavior is normal. Judgment and thought content normal. Cognition and memory are normal.   Vitals reviewed.      Assessment:       1. Type 2 diabetes mellitus with diabetic nephropathy, without long-term current use of insulin    2. History of recent fall    3. Coronary artery disease involving native coronary artery of native heart without angina pectoris    4. Chronic kidney disease, stage III (moderate)        Plan:       Type 2 diabetes mellitus with diabetic nephropathy, without long-term current use of insulin  -     Hemoglobin A1c; Future; Expected date: 06/08/2017    History of recent fall    Coronary artery disease involving native coronary artery of native heart without angina pectoris    Chronic kidney disease, stage III (moderate)      Return if symptoms worsen or fail to improve.

## 2017-06-09 ENCOUNTER — TELEPHONE (OUTPATIENT)
Dept: FAMILY MEDICINE | Facility: CLINIC | Age: 82
End: 2017-06-09

## 2017-06-12 RX ORDER — ALLOPURINOL 100 MG/1
TABLET ORAL
Qty: 30 TABLET | Refills: 5 | Status: SHIPPED | OUTPATIENT
Start: 2017-06-12 | End: 2017-12-29 | Stop reason: SDUPTHER

## 2017-06-13 RX ORDER — FERROUS SULFATE 325(65) MG
325 TABLET ORAL
Qty: 30 TABLET | Refills: 11 | Status: SHIPPED | OUTPATIENT
Start: 2017-06-13 | End: 2018-06-26 | Stop reason: SDUPTHER

## 2017-09-20 RX ORDER — SITAGLIPTIN 100 MG/1
TABLET, FILM COATED ORAL
Qty: 90 TABLET | Refills: 3 | Status: SHIPPED | OUTPATIENT
Start: 2017-09-20 | End: 2018-05-30 | Stop reason: SDUPTHER

## 2017-09-21 ENCOUNTER — OFFICE VISIT (OUTPATIENT)
Dept: PODIATRY | Facility: CLINIC | Age: 82
End: 2017-09-21
Payer: MEDICARE

## 2017-09-21 VITALS
SYSTOLIC BLOOD PRESSURE: 146 MMHG | HEIGHT: 62 IN | HEART RATE: 58 BPM | WEIGHT: 176 LBS | BODY MASS INDEX: 32.39 KG/M2 | DIASTOLIC BLOOD PRESSURE: 73 MMHG | RESPIRATION RATE: 18 BRPM

## 2017-09-21 DIAGNOSIS — E11.49 TYPE II DIABETES MELLITUS WITH NEUROLOGICAL MANIFESTATIONS: Primary | ICD-10-CM

## 2017-09-21 DIAGNOSIS — M20.41 HAMMER TOES OF BOTH FEET: ICD-10-CM

## 2017-09-21 DIAGNOSIS — L84 CORN OR CALLUS: ICD-10-CM

## 2017-09-21 DIAGNOSIS — M20.42 HAMMER TOES OF BOTH FEET: ICD-10-CM

## 2017-09-21 DIAGNOSIS — N18.30 CHRONIC KIDNEY DISEASE, STAGE III (MODERATE): ICD-10-CM

## 2017-09-21 DIAGNOSIS — B35.1 ONYCHOMYCOSIS DUE TO DERMATOPHYTE: ICD-10-CM

## 2017-09-21 DIAGNOSIS — E66.9 OBESITY, UNSPECIFIED OBESITY SEVERITY, UNSPECIFIED OBESITY TYPE: ICD-10-CM

## 2017-09-21 PROCEDURE — 1126F AMNT PAIN NOTED NONE PRSNT: CPT | Mod: S$GLB,,, | Performed by: PODIATRIST

## 2017-09-21 PROCEDURE — 11721 DEBRIDE NAIL 6 OR MORE: CPT | Mod: Q9,S$GLB,, | Performed by: PODIATRIST

## 2017-09-21 PROCEDURE — 3008F BODY MASS INDEX DOCD: CPT | Mod: S$GLB,,, | Performed by: PODIATRIST

## 2017-09-21 PROCEDURE — 3077F SYST BP >= 140 MM HG: CPT | Mod: S$GLB,,, | Performed by: PODIATRIST

## 2017-09-21 PROCEDURE — 99213 OFFICE O/P EST LOW 20 MIN: CPT | Mod: 25,S$GLB,, | Performed by: PODIATRIST

## 2017-09-21 PROCEDURE — 1159F MED LIST DOCD IN RCRD: CPT | Mod: S$GLB,,, | Performed by: PODIATRIST

## 2017-09-21 PROCEDURE — 3078F DIAST BP <80 MM HG: CPT | Mod: S$GLB,,, | Performed by: PODIATRIST

## 2017-09-21 RX ORDER — BUMETANIDE 0.5 MG/1
TABLET ORAL
COMMUNITY
Start: 2017-08-23 | End: 2018-01-11 | Stop reason: SDUPTHER

## 2017-09-21 RX ORDER — CLONIDINE HYDROCHLORIDE 0.1 MG/1
TABLET ORAL
Refills: 8 | COMMUNITY
Start: 2017-06-27 | End: 2017-12-26

## 2017-09-21 NOTE — PATIENT INSTRUCTIONS
Diabetes: Inspecting Your Feet    Diabetes increases your chances of developing foot problems. So inspect your feet every day. This helps you find small skin irritations before they become serious ulcers or infections. If you have trouble seeing the bottoms of your feet, use a mirror or ask a family member or friend to help.  How to check your feet  Below are tips to help you look for foot problems. Try to check your feet at the same time each day, such as when you get out of bed in the morning:  · Check the top of each foot. The tops of toes, back of the heel, and outer edge of the foot can get a lot of rubbing from poor-fitting shoes.  · Check the bottom of each foot. Daily wear and tear often leads to problems at pressure spots.  · Check the toes and nails. Fungal infections often occur between toes. Toenail problems can also be a sign of fungal infections or lead to breaks in the skin.  · Check your shoes, too. Loose objects inside a shoe can injure the foot. Use your hand to feel inside your shoes for things like laxmi, loose stitching, or rough areas that could irritate your skin.  Warning signs  Look for any color changes in the foot. Redness with streaks can signal a severe infection, which needs immediate medical attention. Tell your healthcare provider right away if you have any of these problems:  · Swelling, sometimes with color changes, may be a sign of poor blood flow or infection. Symptoms include tenderness and an increase in the size of your foot.  · Warm or hot areas on your feet may be signs of infection. A foot that is cold may not be getting enough blood.  · Sensations such as burning, tingling, or pins and needles can be signs of a problem. Also check for areas that may be numb.  · Hot spots are caused by friction or pressure. Look for hot spots in areas that get a lot of rubbing. Hot spots can turn into blisters, calluses, or sores.  · Cracks and sores are caused by dry or irritated  skin. They are a sign that the skin is breaking down, which can lead to infection.  · Toenail problems to watch for include nails growing into the skin (ingrown toenail) and causing redness or pain. Thick, yellow, or discolored nails can signal a fungal infection.  · Drainage and odor can develop from untreated sores and ulcers. Call your healthcare provider right away if you notice white or yellow drainage, bleeding, or unpleasant odor.   Date Last Reviewed: 6/1/2016 © 2000-2016 [employer]. [address] [place] [address]. All rights reserved. This information is not intended as a substitute for professional medical care. Always follow your healthcare professional's instructions.        Long-Term Complications of Diabetes    Diabetes can cause health problems over time. These are called complications. They are more likely to happen if your blood sugar is often too high. Over time, high blood sugar can damage blood vessels in your body. It is important to keep your blood sugar in your target range. This can help prevent or delay complications from diabetes.  Possible complications  Complications of diabetes include:  · Eye problems, including damage to the blood vessels in the eyes (retinopathy), pressure in the eye (glaucoma), and clouding of the eyes lens (a cataract). Eye problems can eventually lead to irreversible blindness.   · Tooth and gum problems (periodontal disease), causing loss of teeth and bone  · Blood vessel (vascular) disease leading to circulation problems, heart attack or stroke, or a need for amputation of a limb   · Problems with sexual function leading to erectile dysfunction in men and sexual discomfort in women   · Kidney disease (nephropathy) can eventually lead to kidney failure, which may require dialysis or kidney transplant   · Nerve problems (neuropathy), causing pain or loss of feeling in your feet and other parts of your body, potentially leading to an  amputation of a limb   · High blood pressure (hypertension), putting strain on your heart and blood vessels  · Serious infections, possibly leading to loss of toes, feet, or limbs  How to avoid complications  The serious consequences of these complications may be avoidable for most people with diabetes by managing your blood glucose, blood pressure, and cholesterol levels. This can help you feel better and stay healthy. You can manage diabetes by tracking your blood sugar. You can also eat healthy and exercise to avoid gaining weight. And you should take medicine if directed by your healthcare provider.  Date Last Reviewed: 5/1/2016 © 2000-2016 [employer]. [address] [place] [address], [place] [address]. All rights reserved. This information is not intended as a substitute for professional medical care. Always follow your healthcare professional's instructions.      Your Diabetes Foot Care Program    Every day you depend on your feet to keep you moving. But when you have diabetes, your feet need special care. Even a small foot problem can become very serious. So dont take your feet for granted. By working with your diabetes healthcare team, you can learn how to protect your feet and keep them healthy.  Evaluating your feet  An evaluation helps your healthcare provider check the condition of your feet. The evaluation includes a review of your diabetes history and overall health. It may also include a foot exam, X-rays, or other tests. These can help show problems beneath the skin that you cant see or feel.  Medical history  You will be asked about your overall health and any history of foot problems. Youll also discuss your diabetes history, such as whether your blood sugar level has changed over time. It also includes questions about sensations of pain, tingling, pins and needles, or numbness. Your healthcare provider will also want to know if you have high blood pressure and heart disease, or if you  smoke. Be sure to mention any medicines (including over-the-counter), supplements, or herbal remedies you take.  Foot exam  A foot exam checks the condition of different parts of your foot. First, your skin and nails are examined for any signs of infection. Blood flow is checked by feeling for the pulses in each foot. You may also have tests to study the nerves in the foot. These include using a small filament (wire) to see how sensitive your feet are. In certain cases, you will be asked to walk a short distance to check for bone, joint, and muscle problems.  Diagnostic tests  If needed, your healthcare provider will suggest certain tests to learn more about your feet. These include:  · Doppler tests to measure blood flow in the feet and lower leg.  · X-rays, which can show bone or joint problems.  · Other imaging tests, such as an MRI (magnetic resonance imaging), bone scan, and CT (computed tomography) scan. These can help show bone infections.  · Other tests, such as vascular tests, which study the blood flow in your feet and legs. You may also have nerve studies to learn how sensitive your feet are.  Creating a foot care program  Based on the evaluation, your healthcare provider will create a foot care program for you. Your program may be as simple as starting a daily self-care routine and changing the types of shoes your wear. It may also involve treating minor foot problems, such as a corn or blister. In some cases, surgery will be needed to treat an infection or mechanical problems, such as hammer toes.  Preventing problems  When you have diabetes, its easier to prevent problems than to treat them later on. So see your healthcare team for regular checkups and foot care. Your healthcare team can also help you learn more about caring for your feet at home. For example, you may be told to avoid walking barefoot. Or you may be told that special footwear is needed to protect your feet.  Have regular  checkups  Foot problems can develop quickly. So be sure to follow your healthcare teams schedule for regular checkups. During office visits, take off your shoes and socks as soon as you get in the exam room. Ask your healthcare provider to examine your feet for problems. This will make it easier to find and treat small skin irritations before they get worse. Regular checkups can also help keep track of the blood flow and feeling in your feet. If you have neuropathy (lack of feeling in your feet), you will need to have checkups more often.  Learn about self-care  The more you know about diabetes and your feet, the easier it will be to prevent problems. Members of your healthcare team can teach you how to inspect your feet and teach you to look for warning signs. They can also give you other foot care tips. During office visits, be sure to ask any questions you have.  Date Last Reviewed: 7/1/2016 © 2000-2016 [employer]. [address] [place] [address]. All rights reserved. This information is not intended as a substitute for professional medical care. Always follow your healthcare professional's instructions.        Eating the Right Number of Calories (6100-6980 Guidelines)  Calories are a measure of the energy you get from food. If you eat more calories than you use, you will gain weight. If you eat fewer calories than you use, you will lose weight. Below are tables that give the number of calories needed each day. Look for your gender, age, and activity level. If you stick to this number, you should neither gain nor lose weight. Note that this is an estimated number of calories.* Your exact number may differ.  Women  Age in years Low activity level (calories/day) Moderate activity level (calories/day) High activity level (calories/day)   19 to 30 1,800-2,000 2,000-2,200 2,400   31 to 50 1,800 2,000 2,200   51 and older 1,600 1,800 2,000-2,200      Men  Age in years Low activity level   (calories/day) Moderate activity level (calories/day) High activity level (calories/day)   19 to 30 2,400-2,600 2,600-2,800 3,000   31 to 50 2,200-2,400 2,400-2,600 2,800-3,000   51 and older 2,000-2,200 2,200-2,400 2,400-2,800   Activity levels defined  · Low. Only light physical activity such as that done during typical daily life.  · Moderate. Light physical activity done during typical daily life AND physical activity equal to walking about 1.5 to 3 miles a day at 3 to 4 miles per hour.  · High. Light physical activity done during typical daily life AND physical activity equal to walking more than 3 miles a day at 3 to 4 miles per hour.  *From Dietary Guidelines for Americans, 5718-3772, U.S. Department of Health and Human Services.  Date Last Reviewed: 6/1/2015  © 5477-9744 [employer]. [address] [place] [address], [place] [address]. All rights reserved. This information is not intended as a substitute for professional medical care. Always follow your healthcare professional's instructions.

## 2017-09-21 NOTE — LETTER
September 21, 2017      Jose Juan Nath Jr., MD  [address]  [facility] [address]           [facility]  [address], [address]   [facility] [address]  Phone: [phone]  Fax: [phone]          Patient: Petra Zazueta   MR Number: [id number]   YOB: 1935   Date of Visit: 9/21/2017       Dear Dr. Jose Juan Nath Jr.:    Thank you for referring Petra Zazueta to me for evaluation. Attached you will find relevant portions of my assessment and plan of care.    If you have questions, please do not hesitate to call me. I look forward to following Petra Zazueta along with you.    Sincerely,    Riki Hussein Jr., DPJOHNNY    Enclosure  CC:  No Recipients    If you would like to receive this communication electronically, please contact [email or web address] or [phone] to request more information on [employer] Link access.    For providers and/or their staff who would like to refer a patient to [facility], please contact us through our one-stop-shop provider referral line, [facility], at [phone].    If you feel you have received this communication in error or would no longer like to receive these types of communications, please e-mail [email or web address]

## 2017-09-21 NOTE — PROGRESS NOTES
Subjective:    Patient ID: Petra Zazueta is a 82 y.o. female.    Chief Complaint: Diabetic Foot Exam (pcp Dr. Nath last appt 6/8/17)      HPI:   HPI  Pt presents for DM foot exam  Pt reports painfull nails  Pt has no other complaints  This patient has documented high risk feet requiring routine maintenance secondary to diabetes mellitis and those secondary complications of diabetes, as mentioned.    PCP: Jose Juan Nath MD    Date Last Seen by PCP: see above  Current shoe gear:  Affected Foot: Tennis shoes    Last Podiatry Enc: 4/6/2017  Last Enc w/ Me: 4/6/2017      Hemoglobin A1C   Date Value Ref Range Status   01/13/2017 11.6 (H) 4.5 - 6.2 % Final     Comment:     According to ADA guidelines, hemoglobin A1C <7.0% represents  optimal control in non-pregnant diabetic patients.  Different  metrics may apply to specific populations.   Standards of Medical Care in Diabetes - 2016.  For the purpose of screening for the presence of diabetes:  <5.7%     Consistent with the absence of diabetes  5.7-6.4%  Consistent with increasing risk for diabetes   (prediabetes)  >or=6.5%  Consistent with diabetes  Currently no consensus exists for use of hemoglobin A1C  for diagnosis of diabetes for children.     06/07/2013 6.4 (H) 4.0 - 6.2 % Final     Past Medical History:   Diagnosis Date    Anemia     Arthritis     Diabetes mellitus     Diabetes mellitus type II     Dyslipidemia     Hypertension     PAD (peripheral artery disease)      Past Surgical History:   Procedure Laterality Date    CORONARY ANGIOPLASTY      RCA 4/2011 EJ    GALLBLADDER SURGERY      HYSTERECTOMY      PERIPHERAL ARTERIAL STENT GRAFT      Left lower extremity RCA      Social History     Social History    Marital status:      Spouse name: N/A    Number of children: N/A    Years of education: N/A     Occupational History    Not on file.     Social History Main Topics    Smoking status: Former Smoker     Quit date: 12/26/2005     Smokeless tobacco: Not on file    Alcohol use No    Drug use: No    Sexual activity: Not on file     Other Topics Concern    Not on file     Social History Narrative    Lives alone in [place]. Has 2 sons. Quit drinking years ago. She cares for her houseplants.          Current Outpatient Prescriptions:     allopurinol (ZYLOPRIM) 100 MG tablet, TAKE 1 TABLET (100 MG TOTAL) BY MOUTH ONCE DAILY., Disp: 30 tablet, Rfl: 5    amlodipine (NORVASC) 10 MG tablet, Take 1 tablet (10 mg total) by mouth once daily., Disp: 30 tablet, Rfl: 6    aspirin (ECOTRIN) 81 MG EC tablet, Take 81 mg by mouth once daily.  , Disp: , Rfl:     bumetanide (BUMEX) 0.5 MG Tab, , Disp: , Rfl:     bumetanide (BUMEX) 1 MG tablet, Take 1 tablet (1 mg total) by mouth once daily., Disp: 30 tablet, Rfl: 6    cilostazol (PLETAL) 100 MG Tab, Take 100 mg by mouth 2 (two) times daily., Disp: , Rfl: 3    cloNIDine (CATAPRES) 0.1 MG tablet, TK 1 T PO QPM, Disp: , Rfl: 8    cyanocobalamin (VITAMIN B-12) 1000 MCG tablet, Take 1 tablet (1,000 mcg total) by mouth once daily., Disp: 90 tablet, Rfl: 4    ferrous sulfate 325 mg (65 mg iron) Tab tablet, TAKE 1 TABLET (325 MG TOTAL) BY MOUTH DAILY WITH BREAKFAST., Disp: 30 tablet, Rfl: 11    fish oil-omega-3 fatty acids 300-1,000 mg capsule, Take 500 mg by mouth 2 (two) times daily., Disp: , Rfl:     gabapentin (NEURONTIN) 300 MG capsule, TAKE 2 CAPSULES (600 MG TOTAL) BY MOUTH EVERY EVENING., Disp: 180 capsule, Rfl: 3    JANUVIA 100 mg Tab, TAKE 1 TABLET BY MOUTH EVERY DAY, Disp: 90 tablet, Rfl: 3    latanoprost 0.005 % ophthalmic solution, Place 1 drop into both eyes every evening. , Disp: , Rfl:     losartan (COZAAR) 25 MG tablet, , Disp: , Rfl:     lovastatin (MEVACOR) 40 MG tablet, TAKE 1 TABLET BY MOUTH EVERY EVENING, Disp: 90 tablet, Rfl: 3    metoprolol succinate (TOPROL-XL) 100 MG 24 hr tablet, Take 1 tablet (100 mg total) by mouth once daily., Disp: 90 tablet, Rfl: 3    pioglitazone  "(ACTOS) 30 MG tablet, Take 1 tablet (30 mg total) by mouth once daily., Disp: 90 tablet, Rfl: 3    SITagliptan (JANUVIA) 50 MG Tab, Take 1 tablet (50 mg total) by mouth once daily., Disp: 90 tablet, Rfl: 3    spironolactone (ALDACTONE) 25 MG tablet, Take 25 mg by mouth 2 (two) times daily., Disp: , Rfl: 3  Review of patient's allergies indicates:   Allergen Reactions    Codeine        BP (!) 146/73 (BP Location: Right arm, Patient Position: Sitting, BP Method: Large (Automatic))   Pulse (!) 58   Resp 18   Ht 5' 2" (1.575 m)   Wt 79.8 kg (176 lb)   LMP  (LMP Unknown)   BMI 32.19 kg/m²     ROS  ROS Constitutional:  General Appearance: well nourished  Vascular: negative for cramps, edema and bruising  Musculoskeletal: negative for joint paint and joint edema  Skin: negative for rashes and lesions  Neurological: positive for burning, tingling, numbness  Gastrointestinal: negative for stomach pain, nausea and vomiting        Objective:        General    Nursing note and vitals reviewed.  Constitutional: She is oriented to person, place, and time. She appears well-developed.   Neurological: She is alert and oriented to person, place, and time.   Psychiatric: She has a normal mood and affect. Her behavior is normal.             Physical Exam   Constitutional: She is oriented to person, place, and time. She appears well-developed.   Feet:   Right Foot:   Protective Sensation: 10 sites tested. 7 sites sensed.   Skin Integrity: Positive for callus.   Left Foot:   Protective Sensation: 10 sites tested. 7 sites sensed.   Skin Integrity: Positive for callus.   Neurological: She is alert and oriented to person, place, and time.   Skin: Skin is warm and dry.   Psychiatric: She has a normal mood and affect. Her behavior is normal.   Nursing note and vitals reviewed.    Ortho Exam  V: pt 1/4 dp 1/4 b/l. edema present bilaterally, varicosities present bilaterally  N:  JULIANO  [place] Neuropathy Screening:   Left Right " "  Normal Appearance Yes-0 Yes-0   Ulceration no-0 no-0   Achilles Reflex normal-0 normal-0   Vibratory Sensation normal-0 normal-0   10 Gram MF reduced-0.5 reduced-0.5   Total .5 /5 .5 /5     1/10     O: mild reducible hammertoe deformity 2-5 b/l, no open lesions, POP of nails  D: elongated, thickened, dystrophic nails with subungal [id number]          Assessment:     Imaging / Labs:    No results found.        1. Type II diabetes mellitus with neurological manifestations    2. Onychomycosis due to dermatophyte    3. Hammer toes of both feet    4. Corn or callus    5. Chronic kidney disease, stage III (moderate)    6. Obesity, unspecified obesity severity, unspecified obesity type          Plan:       Orders Placed This Encounter    Foot Care    DIABETIC SHOES FOR HOME USE     Routine Foot Care  Date/Time: 9/21/2017 8:11 AM  Performed by: NAPOLEON POP JR.  Authorized by: NAPOLEON POP JR.     Time out: Immediately prior to procedure a "time out" was called to verify the correct patient, procedure, equipment, support staff and site/side marked as required.    Consent Done?:  Yes (Verbal)  Hyperkeratotic Skin Lesions?: No      Nail Care Type:  Debride  Location(s): All  (Left 1st Toe, Left 3rd Toe, Left 2nd Toe, Left 4th Toe, Left 5th Toe, Right 1st Toe, Right 2nd Toe, Right 3rd Toe, Right 4th Toe and Right 5th Toe)  Patient tolerance:  Patient tolerated the procedure well with no immediate complications        Petra was seen today for diabetic foot exam.    Diagnoses and all orders for this visit:    Type II diabetes mellitus with neurological manifestations  -     DIABETIC SHOES FOR HOME USE  -     Foot Care    Onychomycosis due to dermatophyte    Hammer toes of both feet  -     DIABETIC SHOES FOR HOME USE  -     Foot Care    Corn or callus  -     DIABETIC SHOES FOR HOME USE  -     Foot Care    Chronic kidney disease, stage III (moderate)    Obesity, unspecified obesity severity, unspecified obesity " type        Petra Zazueta is a 82 y.o. female presenting w/   1. Type II diabetes mellitus with neurological manifestations    2. Onychomycosis due to dermatophyte    3. Hammer toes of both feet    4. Corn or callus    5. Chronic kidney disease, stage III (moderate)    6. Obesity, unspecified obesity severity, unspecified obesity type      ADA Risk Classification: 1 - LOPS with or without deformity: rtc 3-6 months       -pt seen, evaluated, and managed  -dx discussed in detail. All questions/concerns addressed  -all tx options discussed. All alternatives, risks, benefits of all txs discussed  -The patient was educated regarding the above diagnosis. We discussed conservative care options regarding shoe wear and/or padding.  -rxs dispensed: none  -px as above  -educated pt on DM footcare  -Shoe inspection. Diabetic Foot Education. Patient reminded of the importance of good nutrition and blood sugar control to help prevent podiatric complications of diabetes. Patient instructed on proper foot hygeine. We discussed wearing proper shoe gear, daily foot inspections, never walking without protective shoe gear, never putting sharp instruments to feet.      Pt to f/u in 3-6 months as scheduled      Return in about 6 months (around 3/21/2018).

## 2017-09-29 ENCOUNTER — TELEPHONE (OUTPATIENT)
Dept: PODIATRY | Facility: CLINIC | Age: 82
End: 2017-09-29

## 2017-09-29 NOTE — TELEPHONE ENCOUNTER
Received request from [facility] for notes, MNF and rx for diabetic shoes. Faxed today to Liliane tel# [phone] fax# [phone]

## 2017-10-16 ENCOUNTER — TELEPHONE (OUTPATIENT)
Dept: FAMILY MEDICINE | Facility: CLINIC | Age: 82
End: 2017-10-16

## 2017-10-16 DIAGNOSIS — Z12.39 SCREENING FOR BREAST CANCER: Primary | ICD-10-CM

## 2017-10-16 NOTE — TELEPHONE ENCOUNTER
----- Message from Fina Mendez sent at 10/16/2017 10:54 AM CDT -----  Contact: Patient  Pt is requesting a mammogram order. Pt can be reached @ [phone]

## 2017-10-26 ENCOUNTER — TELEPHONE (OUTPATIENT)
Dept: FAMILY MEDICINE | Facility: CLINIC | Age: 82
End: 2017-10-26

## 2017-10-26 NOTE — TELEPHONE ENCOUNTER
----- Message from Kacy Vang sent at 10/26/2017 10:57 AM CDT -----  Calling to say needs a mammogram  To be done [facility]    .please put order in for patient  Reach her  [phone]

## 2017-11-09 ENCOUNTER — TELEPHONE (OUTPATIENT)
Dept: FAMILY MEDICINE | Facility: CLINIC | Age: 82
End: 2017-11-09

## 2017-11-09 NOTE — TELEPHONE ENCOUNTER
----- Message from Fina Mendez sent at 11/9/2017 10:55 AM CST -----  Contact: Patient  Patient needs to set up a mammogram. Pt can be reached [phone]

## 2017-12-04 ENCOUNTER — IMMUNIZATION (OUTPATIENT)
Dept: FAMILY MEDICINE | Facility: CLINIC | Age: 82
End: 2017-12-04
Payer: MEDICARE

## 2017-12-04 ENCOUNTER — TELEPHONE (OUTPATIENT)
Dept: FAMILY MEDICINE | Facility: CLINIC | Age: 82
End: 2017-12-04

## 2017-12-04 DIAGNOSIS — N18.30 CHRONIC KIDNEY DISEASE, STAGE III (MODERATE): ICD-10-CM

## 2017-12-04 DIAGNOSIS — E11.21 TYPE 2 DIABETES MELLITUS WITH DIABETIC NEPHROPATHY, WITHOUT LONG-TERM CURRENT USE OF INSULIN: Primary | ICD-10-CM

## 2017-12-04 PROCEDURE — 99999 PR PBB SHADOW E&M-EST. PATIENT-LVL I: CPT | Mod: PBBFAC,,,

## 2017-12-04 PROCEDURE — 90662 IIV NO PRSV INCREASED AG IM: CPT | Mod: S$GLB,,,

## 2017-12-04 PROCEDURE — G0008 ADMIN INFLUENZA VIRUS VAC: HCPCS | Mod: S$GLB,,,

## 2017-12-04 NOTE — TELEPHONE ENCOUNTER
----- Message from Kacy Vang sent at 12/4/2017  9:36 AM CST -----  Came by to  Ask doctor to put In orders for some labs  States havent had done in some time and has upcoming kidney doctor appointment       Will have labs done at [facility]  gretchen at   [phone]

## 2017-12-23 ENCOUNTER — TELEPHONE (OUTPATIENT)
Dept: FAMILY MEDICINE | Facility: CLINIC | Age: 82
End: 2017-12-23

## 2017-12-23 DIAGNOSIS — R82.81 PYURIA: Primary | ICD-10-CM

## 2017-12-23 NOTE — TELEPHONE ENCOUNTER
Your urine micro-albumin test for diabetic kidney disease was normal. Your urine showed a possible infection.

## 2017-12-26 ENCOUNTER — OFFICE VISIT (OUTPATIENT)
Dept: NEPHROLOGY | Facility: CLINIC | Age: 82
End: 2017-12-26
Payer: MEDICARE

## 2017-12-26 ENCOUNTER — LAB VISIT (OUTPATIENT)
Dept: LAB | Facility: HOSPITAL | Age: 82
End: 2017-12-26
Attending: INTERNAL MEDICINE
Payer: MEDICARE

## 2017-12-26 ENCOUNTER — TELEPHONE (OUTPATIENT)
Dept: FAMILY MEDICINE | Facility: CLINIC | Age: 82
End: 2017-12-26

## 2017-12-26 VITALS
HEART RATE: 63 BPM | WEIGHT: 191.56 LBS | BODY MASS INDEX: 35.25 KG/M2 | SYSTOLIC BLOOD PRESSURE: 130 MMHG | HEIGHT: 62 IN | OXYGEN SATURATION: 97 % | DIASTOLIC BLOOD PRESSURE: 80 MMHG

## 2017-12-26 DIAGNOSIS — I25.10 CORONARY ARTERY DISEASE INVOLVING NATIVE CORONARY ARTERY OF NATIVE HEART WITHOUT ANGINA PECTORIS: ICD-10-CM

## 2017-12-26 DIAGNOSIS — N39.0 UTI (URINARY TRACT INFECTION), UNCOMPLICATED: ICD-10-CM

## 2017-12-26 DIAGNOSIS — N18.30 CHRONIC KIDNEY DISEASE, STAGE III (MODERATE): Primary | ICD-10-CM

## 2017-12-26 DIAGNOSIS — I10 ESSENTIAL HYPERTENSION: ICD-10-CM

## 2017-12-26 DIAGNOSIS — I73.9 PAD (PERIPHERAL ARTERY DISEASE): ICD-10-CM

## 2017-12-26 DIAGNOSIS — N18.30 CHRONIC KIDNEY DISEASE, STAGE III (MODERATE): ICD-10-CM

## 2017-12-26 LAB
BACTERIA #/AREA URNS AUTO: ABNORMAL /HPF
BILIRUB UR QL STRIP: NEGATIVE
CLARITY UR REFRACT.AUTO: ABNORMAL
COLOR UR AUTO: YELLOW
GLUCOSE UR QL STRIP: NEGATIVE
HGB UR QL STRIP: NEGATIVE
KETONES UR QL STRIP: NEGATIVE
LEUKOCYTE ESTERASE UR QL STRIP: ABNORMAL
MICROSCOPIC COMMENT: ABNORMAL
NITRITE UR QL STRIP: NEGATIVE
PH UR STRIP: 5 [PH] (ref 5–8)
PROT UR QL STRIP: NEGATIVE
RBC #/AREA URNS AUTO: 0 /HPF (ref 0–4)
SP GR UR STRIP: 1.01 (ref 1–1.03)
SQUAMOUS #/AREA URNS AUTO: 0 /HPF
URN SPEC COLLECT METH UR: ABNORMAL
UROBILINOGEN UR STRIP-ACNC: NEGATIVE EU/DL
WBC #/AREA URNS AUTO: 27 /HPF (ref 0–5)

## 2017-12-26 PROCEDURE — 99214 OFFICE O/P EST MOD 30 MIN: CPT | Mod: S$GLB,,, | Performed by: INTERNAL MEDICINE

## 2017-12-26 PROCEDURE — 99499 UNLISTED E&M SERVICE: CPT | Mod: S$GLB,,, | Performed by: INTERNAL MEDICINE

## 2017-12-26 PROCEDURE — 99999 PR PBB SHADOW E&M-EST. PATIENT-LVL III: CPT | Mod: PBBFAC,,, | Performed by: INTERNAL MEDICINE

## 2017-12-26 PROCEDURE — 81001 URINALYSIS AUTO W/SCOPE: CPT

## 2017-12-26 RX ORDER — TEMAZEPAM 7.5 MG/1
15 CAPSULE ORAL NIGHTLY PRN
Qty: 20 CAPSULE | Refills: 3 | Status: SHIPPED | OUTPATIENT
Start: 2017-12-26 | End: 2018-01-25

## 2017-12-26 NOTE — PROGRESS NOTES
Subjective:       Patient ID: Petra Zazueta is a 82 y.o. Black or  female who presents for follow-up evaluation of CKD.       HPI   82 y/o F with longstanding DM2, HTN, HLD, CAD (CABG 3 yrs ago), PAD and CKD3 with a baseline creatinine of 1.3-1.6 mg/dl and episodes of increased creatinine. The most recent one being 2.07 mg/dl. She reports she was taking Advil PRN almost daily for several years, but stopped last year. Takes tylenol only for pain. She was supposed to see me back in 4-5 month ago.   The patient complains about cramping pain in her calves on both sides now after 2 blocks (used to walk 3 miles twice a day a couple of yrs ago).  No symptoms of urinary tract infection (no dysuria).    Review of Systems   Constitutional: Negative for activity change, appetite change, chills, diaphoresis, fatigue, fever and unexpected weight change.   HENT: Negative for congestion, facial swelling and trouble swallowing.    Eyes: Negative for pain, discharge, redness and visual disturbance.   Respiratory: Positive for shortness of breath (half a block). Negative for cough and wheezing.    Cardiovascular: Positive for leg swelling. Negative for chest pain and palpitations.   Gastrointestinal: Negative for abdominal distention, abdominal pain, constipation and diarrhea.   Endocrine: Negative for cold intolerance and heat intolerance.   Genitourinary: Negative for decreased urine volume, difficulty urinating, dysuria, flank pain, frequency and urgency.   Musculoskeletal: Positive for arthralgias. Negative for joint swelling and myalgias.   Skin: Negative for color change.   Allergic/Immunologic: Negative for immunocompromised state.   Neurological: Negative for dizziness, tremors, syncope, speech difficulty, weakness, light-headedness and numbness.   Hematological: Negative for adenopathy.   Psychiatric/Behavioral: Negative for agitation, confusion, decreased concentration and dysphoric mood.       Objective:       Physical Exam   Constitutional: She is oriented to person, place, and time. She appears well-developed and well-nourished.   HENT:   Head: Normocephalic and atraumatic.   Eyes: Conjunctivae and EOM are normal. Pupils are equal, round, and reactive to light.   Neck: Neck supple.   Cardiovascular: Normal rate, regular rhythm and intact distal pulses.    Murmur (systolic) heard.  Pulmonary/Chest: Effort normal and breath sounds normal.   Abdominal: Soft. Bowel sounds are normal.   Musculoskeletal: Normal range of motion. She exhibits edema (left lower extremity).   Neurological: She is alert and oriented to person, place, and time. She has normal reflexes.   Skin: Skin is warm and dry.   Psychiatric: She has a normal mood and affect. Her behavior is normal.   Nursing note and vitals reviewed.      Assessment:       1. Chronic kidney disease, stage III (moderate)    2. Essential hypertension    3. Coronary artery disease involving native coronary artery of native heart without angina pectoris    4. PAD (peripheral artery disease)    5. UTI (urinary tract infection), uncomplicated        Plan:       1. CKD 3: Likely secondary to vascular disease, long standing HTN and diabetic nephropathy (no significant proteinuria).     Recently elevated creatinine - will repeat labs today  - UA and urine culture today (asymptomatic!)  Avoids NSAIDs.    - continue statin  - ARB for renal protection with follow up of renal function in 2 wks.   - will change her bumex to torsemide next visit if the edema does not improve.    Lab Results   Component Value Date    CREATININE 2.07 (H) 12/22/2017     Protein Creatinine Ratios:     Prot/Creat Ratio, Ur   Date Value Ref Range Status   12/22/2017 0.08 0.00 - 0.20 Final   06/17/2016 0.10 0.00 - 0.20 Final     ·   ·   Acid-Base:   Lab Results   Component Value Date     12/22/2017    K 4.7 12/22/2017    CO2 26 12/22/2017     2. HTN: Low salt diet. Continue medications as directed.     3.  Renal osteodystrophy: Monitor. Repeat.  Lab Results   Component Value Date    PTH 59.0 12/22/2017    CALCIUM 10.4 12/22/2017    PHOS 4.0 01/23/2017       4. Anemia: At goal for CKD.   Lab Results   Component Value Date    HGB 11.6 (L) 12/22/2017        5. DM: Well controlled - Follow up with primary care.   Lab Results   Component Value Date    HGBA1C 6.9 (H) 12/22/2017       6. Lipid management: On Statin.   Has problems sleeping - advised in sleep hygiene and will prescribe temazepam low dose.      Has appointment with cardiology in January    Follow up in 4 months with labs.   Labs today

## 2017-12-26 NOTE — TELEPHONE ENCOUNTER
Your Hgb A1C or diabetes test of 6.9 is below the goal of less than 7.0. I suggest you continue your current treatment and repeat a Hgb A1C in 6 months. Your HDL cholesterol or good cholesterol of 48 is below your goal of greater than 50. Exercise can help to raise your HDL cholesterol. Your creatinine or kidney function test is elevated. She is due for an appointment with her nephrologist.

## 2017-12-27 NOTE — PROGRESS NOTES
Patient, Petra Zazueta (MRN #[id number]), presented with a recent Estimated Glumerular Filtration Rate (EGFR) between 15 and 29 consistent with the definition of chronic kidney disease stage 4 (ICD10 - N18.4).    eGFR if    Date Value Ref Range Status   12/26/2017 26.4 (A) >60 mL/min/1.73 m^2 Final       The patient's chronic kidney disease stage 4 was monitored, evaluated, addressed and/or treated. This addendum to the medical record is made on 12/27/2017.

## 2018-01-01 RX ORDER — ALLOPURINOL 100 MG/1
100 TABLET ORAL DAILY
Qty: 30 TABLET | Refills: 5 | Status: SHIPPED | OUTPATIENT
Start: 2018-01-01 | End: 2018-08-03

## 2018-01-06 RX ORDER — LOVASTATIN 40 MG/1
TABLET ORAL
Qty: 90 TABLET | Refills: 3 | Status: ON HOLD | OUTPATIENT
Start: 2018-01-06 | End: 2018-09-01 | Stop reason: HOSPADM

## 2018-01-11 ENCOUNTER — OFFICE VISIT (OUTPATIENT)
Dept: FAMILY MEDICINE | Facility: CLINIC | Age: 83
End: 2018-01-11
Payer: MEDICARE

## 2018-01-11 VITALS
TEMPERATURE: 99 F | WEIGHT: 186.13 LBS | DIASTOLIC BLOOD PRESSURE: 64 MMHG | HEIGHT: 62 IN | BODY MASS INDEX: 34.25 KG/M2 | OXYGEN SATURATION: 95 % | SYSTOLIC BLOOD PRESSURE: 130 MMHG | HEART RATE: 60 BPM

## 2018-01-11 DIAGNOSIS — I73.9 PAD (PERIPHERAL ARTERY DISEASE): ICD-10-CM

## 2018-01-11 DIAGNOSIS — E11.40 TYPE 2 DIABETES MELLITUS WITH DIABETIC NEUROPATHY, WITHOUT LONG-TERM CURRENT USE OF INSULIN: ICD-10-CM

## 2018-01-11 DIAGNOSIS — Z78.0 ASYMPTOMATIC POSTMENOPAUSAL STATE: ICD-10-CM

## 2018-01-11 DIAGNOSIS — J06.9 ACUTE URI: ICD-10-CM

## 2018-01-11 DIAGNOSIS — N39.0 UTI (URINARY TRACT INFECTION), UNCOMPLICATED: Primary | ICD-10-CM

## 2018-01-11 DIAGNOSIS — N18.30 CHRONIC KIDNEY DISEASE, STAGE III (MODERATE): ICD-10-CM

## 2018-01-11 DIAGNOSIS — E11.21 TYPE 2 DIABETES MELLITUS WITH DIABETIC NEPHROPATHY, WITHOUT LONG-TERM CURRENT USE OF INSULIN: ICD-10-CM

## 2018-01-11 PROBLEM — Z91.81 HISTORY OF RECENT FALL: Status: RESOLVED | Noted: 2017-03-06 | Resolved: 2018-01-11

## 2018-01-11 PROCEDURE — 99999 PR PBB SHADOW E&M-EST. PATIENT-LVL III: CPT | Mod: PBBFAC,,,

## 2018-01-11 PROCEDURE — 99214 OFFICE O/P EST MOD 30 MIN: CPT | Mod: 25,S$GLB,,

## 2018-01-11 PROCEDURE — 96372 THER/PROPH/DIAG INJ SC/IM: CPT | Mod: S$GLB,,,

## 2018-01-11 RX ORDER — CIPROFLOXACIN 500 MG/1
500 TABLET ORAL 2 TIMES DAILY
Qty: 14 TABLET | Refills: 0 | Status: SHIPPED | OUTPATIENT
Start: 2018-01-11 | End: 2018-05-25

## 2018-01-11 RX ORDER — CLONIDINE HYDROCHLORIDE 0.1 MG/1
TABLET ORAL
Refills: 8 | COMMUNITY
Start: 2017-12-30 | End: 2018-01-11

## 2018-01-11 RX ORDER — LOSARTAN POTASSIUM 25 MG/1
25 TABLET ORAL DAILY
Qty: 90 TABLET | Refills: 3 | Status: SHIPPED | OUTPATIENT
Start: 2018-01-11 | End: 2018-08-03 | Stop reason: SDUPTHER

## 2018-01-11 RX ORDER — TRIAMCINOLONE ACETONIDE 40 MG/ML
40 INJECTION, SUSPENSION INTRA-ARTICULAR; INTRAMUSCULAR
Status: COMPLETED | OUTPATIENT
Start: 2018-01-11 | End: 2018-01-11

## 2018-01-11 RX ADMIN — TRIAMCINOLONE ACETONIDE 40 MG: 40 INJECTION, SUSPENSION INTRA-ARTICULAR; INTRAMUSCULAR at 03:01

## 2018-01-11 NOTE — PROGRESS NOTES
Subjective:       Patient ID: Petra Zazueta is a 82 y.o. female.    Chief Complaint: Cough; Nasal Congestion; Sore Throat; and Generalized Body Aches    HPI Cold symptoms starting 2 days ago. She attended a  3 days before. She was probably exposed then. Also her last U/A showed a probable UTI. This was noted when she went to her nephrologist for monitoring of CKD.     Review of Systems   Constitutional: Negative.  Negative for activity change, appetite change, chills, diaphoresis, fatigue and fever.   HENT: Positive for congestion, sore throat and tinnitus. Negative for sinus pressure, sneezing and voice change.    Respiratory: Positive for cough.    Cardiovascular: Negative.  Negative for chest pain.   Gastrointestinal: Negative.    Genitourinary: Negative for difficulty urinating, dysuria, enuresis and urgency.   Musculoskeletal: Negative.    Neurological: Negative for headaches.   Psychiatric/Behavioral: Negative.    All other systems reviewed and are negative.      Objective:      Vitals:    18 1433   BP: 130/64   Pulse: 60   Temp: 98.6 °F (37 °C)     Physical Exam   Constitutional: She is oriented to person, place, and time. She appears well-developed and well-nourished. She is active.  Non-toxic appearance. She does not have a sickly appearance. She does not appear ill. No distress.   Obese    HENT:   Head: Normocephalic and atraumatic.   Right Ear: External ear normal.   Left Ear: External ear normal.   Nose: Nose normal.   Hearing normal.    Eyes: Conjunctivae are normal. Pupils are equal, round, and reactive to light.   Neck: Normal range of motion. Neck supple. No thyroid mass and no thyromegaly present.   Cardiovascular: Normal rate, regular rhythm, normal heart sounds and intact distal pulses.  Exam reveals no gallop and no friction rub.    No murmur heard.  Pulses:       Dorsalis pedis pulses are 2+ on the right side, and 2+ on the left side.        Posterior tibial pulses are 2+ on the  right side, and 2+ on the left side.   Pulmonary/Chest: Effort normal and breath sounds normal. No respiratory distress. She exhibits no tenderness.   Musculoskeletal: Normal range of motion. She exhibits no edema.   Lymphadenopathy:     She has no cervical adenopathy.   Neurological: She is alert and oriented to person, place, and time. She has normal strength. Coordination and gait normal.   Skin: Skin is warm and dry. She is not diaphoretic. No pallor.   Psychiatric: She has a normal mood and affect. Her speech is normal and behavior is normal. Judgment and thought content normal. Cognition and memory are normal.   Vitals reviewed.      Assessment:       1. UTI (urinary tract infection), uncomplicated    2. Chronic kidney disease, stage III (moderate)    3. PAD (peripheral artery disease)    4. Type 2 diabetes mellitus with diabetic neuropathy, without long-term current use of insulin    5. Type 2 diabetes mellitus with diabetic nephropathy, without long-term current use of insulin    6. Acute URI    7. Asymptomatic postmenopausal state        Plan:       UTI (urinary tract infection), uncomplicated  -     Urine culture; Future; Expected date: 01/11/2018    Chronic kidney disease, stage III (moderate)    PAD (peripheral artery disease)    Type 2 diabetes mellitus with diabetic neuropathy, without long-term current use of insulin    Type 2 diabetes mellitus with diabetic nephropathy, without long-term current use of insulin    Acute URI  -     triamcinolone acetonide injection 40 mg; Inject 1 mL (40 mg total) into the muscle one time.    Asymptomatic postmenopausal state  -     DXA Bone Density Spine And Hip; Future; Expected date: 01/11/2018    Other orders  -     ciprofloxacin HCl (CIPRO) 500 MG tablet; Take 1 tablet (500 mg total) by mouth 2 (two) times daily.  Dispense: 14 tablet; Refill: 0  -     losartan (COZAAR) 25 MG tablet; Take 1 tablet (25 mg total) by mouth once daily.  Dispense: 90 tablet; Refill:  3      Return if symptoms worsen or fail to improve.

## 2018-01-11 NOTE — PROGRESS NOTES
Patient, Petra Zazueta (MRN #[id number]), presented with a recent Estimated Glumerular Filtration Rate (EGFR) between 15 and 29 consistent with the definition of chronic kidney disease stage 4 (ICD10 - N18.4).    eGFR if    Date Value Ref Range Status   12/26/2017 26.4 (A) >60 mL/min/1.73 m^2 Final       The patient's chronic kidney disease stage 4 was monitored, evaluated, addressed and/or treated. This addendum to the medical record is made on 01/11/2018.

## 2018-01-15 ENCOUNTER — TELEPHONE (OUTPATIENT)
Dept: FAMILY MEDICINE | Facility: CLINIC | Age: 83
End: 2018-01-15

## 2018-02-01 ENCOUNTER — TELEPHONE (OUTPATIENT)
Dept: FAMILY MEDICINE | Facility: CLINIC | Age: 83
End: 2018-02-01

## 2018-02-01 NOTE — TELEPHONE ENCOUNTER
I spoke to Lubna at [facility] and told her patient should continue take 600 mg of gabapentin and carefully monitor her liver function.

## 2018-02-01 NOTE — TELEPHONE ENCOUNTER
She is taking 600 mg a day which is in the recommended range considering her kidney function. She can continue her current dosage with careful monitoring.    CrCl >15 to 29 mL/minute: 200 to 700 mg once daily      ----- Message from Tonya Francois sent at 1/31/2018  5:19 PM CST -----  Contact: [facility]/[phone]  They need to see about decreasing the gabapentin (NEURONTIN) 300 MG capsule due to the patients kidney function; her last TFR was 26.

## 2018-02-12 ENCOUNTER — OFFICE VISIT (OUTPATIENT)
Dept: PODIATRY | Facility: CLINIC | Age: 83
End: 2018-02-12
Payer: MEDICARE

## 2018-02-12 VITALS
SYSTOLIC BLOOD PRESSURE: 135 MMHG | WEIGHT: 189 LBS | HEIGHT: 62 IN | DIASTOLIC BLOOD PRESSURE: 71 MMHG | BODY MASS INDEX: 34.78 KG/M2 | RESPIRATION RATE: 18 BRPM | HEART RATE: 62 BPM

## 2018-02-12 DIAGNOSIS — M20.42 HAMMER TOES OF BOTH FEET: ICD-10-CM

## 2018-02-12 DIAGNOSIS — E66.9 OBESITY, UNSPECIFIED CLASSIFICATION, UNSPECIFIED OBESITY TYPE, UNSPECIFIED WHETHER SERIOUS COMORBIDITY PRESENT: ICD-10-CM

## 2018-02-12 DIAGNOSIS — M20.41 HAMMER TOES OF BOTH FEET: ICD-10-CM

## 2018-02-12 DIAGNOSIS — L84 CORN OR CALLUS: ICD-10-CM

## 2018-02-12 DIAGNOSIS — E11.40 TYPE 2 DIABETES MELLITUS WITH DIABETIC NEUROPATHY, WITHOUT LONG-TERM CURRENT USE OF INSULIN: Primary | ICD-10-CM

## 2018-02-12 DIAGNOSIS — B35.1 ONYCHOMYCOSIS DUE TO DERMATOPHYTE: ICD-10-CM

## 2018-02-12 DIAGNOSIS — N18.30 CHRONIC KIDNEY DISEASE, STAGE III (MODERATE): ICD-10-CM

## 2018-02-12 DIAGNOSIS — E11.21 TYPE 2 DIABETES MELLITUS WITH DIABETIC NEPHROPATHY, WITHOUT LONG-TERM CURRENT USE OF INSULIN: ICD-10-CM

## 2018-02-12 PROCEDURE — 3008F BODY MASS INDEX DOCD: CPT | Mod: S$GLB,,, | Performed by: PODIATRIST

## 2018-02-12 PROCEDURE — 1126F AMNT PAIN NOTED NONE PRSNT: CPT | Mod: S$GLB,,, | Performed by: PODIATRIST

## 2018-02-12 PROCEDURE — 11721 DEBRIDE NAIL 6 OR MORE: CPT | Mod: Q9,S$GLB,, | Performed by: PODIATRIST

## 2018-02-12 PROCEDURE — 1159F MED LIST DOCD IN RCRD: CPT | Mod: S$GLB,,, | Performed by: PODIATRIST

## 2018-02-12 PROCEDURE — 99213 OFFICE O/P EST LOW 20 MIN: CPT | Mod: 25,S$GLB,, | Performed by: PODIATRIST

## 2018-02-12 NOTE — PROGRESS NOTES
Subjective:    Patient ID: Petra Zazueta is a 83 y.o. female.    Chief Complaint: Diabetic Foot Exam (Pcp Dr Nath last seen 1/11/2018)      HPI:   HPI  Pt presents for DM foot exam  Pt reports painfull nails  Pt has no other complaints  This patient has documented high risk feet requiring routine maintenance secondary to diabetes mellitis and those secondary complications of diabetes, as mentioned.    PCP: Jose Juan Nath MD    Date Last Seen by PCP: see above  Current shoe gear:  Affected Foot: Tennis shoes    Last Podiatry Enc: 4/6/2017  Last Enc w/ Me: 4/6/2017      Hemoglobin A1C   Date Value Ref Range Status   01/13/2017 11.6 (H) 4.5 - 6.2 % Final     Comment:     According to ADA guidelines, hemoglobin A1C <7.0% represents  optimal control in non-pregnant diabetic patients.  Different  metrics may apply to specific populations.   Standards of Medical Care in Diabetes - 2016.  For the purpose of screening for the presence of diabetes:  <5.7%     Consistent with the absence of diabetes  5.7-6.4%  Consistent with increasing risk for diabetes   (prediabetes)  >or=6.5%  Consistent with diabetes  Currently no consensus exists for use of hemoglobin A1C  for diagnosis of diabetes for children.     06/07/2013 6.4 (H) 4.0 - 6.2 % Final     Past Medical History:   Diagnosis Date    Anemia     Arthritis     Diabetes mellitus     Diabetes mellitus type II     Dyslipidemia     Hypertension     PAD (peripheral artery disease)      Past Surgical History:   Procedure Laterality Date    CORONARY ANGIOPLASTY      RCA 4/2011 EJ    GALLBLADDER SURGERY      HYSTERECTOMY      PERIPHERAL ARTERIAL STENT GRAFT      Left lower extremity RCA      Social History     Social History    Marital status:      Spouse name: N/A    Number of children: N/A    Years of education: N/A     Occupational History    Not on file.     Social History Main Topics    Smoking status: Former Smoker     Quit date: 12/26/2005     Smokeless tobacco: Not on file    Alcohol use No    Drug use: No    Sexual activity: Not on file     Other Topics Concern    Not on file     Social History Narrative    Lives alone in [place]. Has 2 sons. Quit drinking years ago. She cares for her houseplants.          Current Outpatient Prescriptions:     allopurinol (ZYLOPRIM) 100 MG tablet, Take 1 tablet (100 mg total) by mouth once daily., Disp: 30 tablet, Rfl: 5    amlodipine (NORVASC) 10 MG tablet, Take 1 tablet (10 mg total) by mouth once daily., Disp: 30 tablet, Rfl: 6    aspirin (ECOTRIN) 81 MG EC tablet, Take 81 mg by mouth once daily.  , Disp: , Rfl:     bumetanide (BUMEX) 1 MG tablet, Take 1 tablet (1 mg total) by mouth once daily., Disp: 30 tablet, Rfl: 6    cilostazol (PLETAL) 100 MG Tab, Take 100 mg by mouth 2 (two) times daily., Disp: , Rfl: 3    ciprofloxacin HCl (CIPRO) 500 MG tablet, Take 1 tablet (500 mg total) by mouth 2 (two) times daily., Disp: 14 tablet, Rfl: 0    cyanocobalamin (VITAMIN B-12) 1000 MCG tablet, Take 1 tablet (1,000 mcg total) by mouth once daily., Disp: 90 tablet, Rfl: 4    ferrous sulfate 325 mg (65 mg iron) Tab tablet, TAKE 1 TABLET (325 MG TOTAL) BY MOUTH DAILY WITH BREAKFAST., Disp: 30 tablet, Rfl: 11    fish oil-omega-3 fatty acids 300-1,000 mg capsule, Take 500 mg by mouth 2 (two) times daily., Disp: , Rfl:     gabapentin (NEURONTIN) 300 MG capsule, TAKE 2 CAPSULES (600 MG TOTAL) BY MOUTH EVERY EVENING., Disp: 180 capsule, Rfl: 3    JANUVIA 100 mg Tab, TAKE 1 TABLET BY MOUTH EVERY DAY, Disp: 90 tablet, Rfl: 3    latanoprost 0.005 % ophthalmic solution, Place 1 drop into both eyes every evening. , Disp: , Rfl:     losartan (COZAAR) 25 MG tablet, Take 1 tablet (25 mg total) by mouth once daily., Disp: 90 tablet, Rfl: 3    lovastatin (MEVACOR) 40 MG tablet, TAKE 1 TABLET BY MOUTH EVERY EVENING, Disp: 90 tablet, Rfl: 3    metoprolol succinate (TOPROL-XL) 100 MG 24 hr tablet, Take 1 tablet (100 mg total) by  "mouth once daily., Disp: 90 tablet, Rfl: 3    SITagliptan (JANUVIA) 50 MG Tab, Take 1 tablet (50 mg total) by mouth once daily., Disp: 90 tablet, Rfl: 3    spironolactone (ALDACTONE) 25 MG tablet, Take 25 mg by mouth 2 (two) times daily., Disp: , Rfl: 3    pioglitazone (ACTOS) 30 MG tablet, Take 1 tablet (30 mg total) by mouth once daily., Disp: 90 tablet, Rfl: 3  Review of patient's allergies indicates:   Allergen Reactions    Codeine        /71 (BP Location: Left arm, Patient Position: Sitting, BP Method: Large (Automatic))   Pulse 62   Resp 18   Ht 5' 2" (1.575 m)   Wt 85.7 kg (189 lb)   LMP  (LMP Unknown)   BMI 34.57 kg/m²     ROS  ROS Constitutional:  General Appearance: well nourished  Vascular: negative for cramps, edema and bruising  Musculoskeletal: negative for joint paint and joint edema  Skin: negative for rashes and lesions  Neurological: positive for burning, tingling, numbness  Gastrointestinal: negative for stomach pain, nausea and vomiting        Objective:        General    Nursing note and vitals reviewed.  Constitutional: She is oriented to person, place, and time. She appears well-developed.   Neurological: She is alert and oriented to person, place, and time.   Psychiatric: She has a normal mood and affect. Her behavior is normal.             Physical Exam   Constitutional: She is oriented to person, place, and time. She appears well-developed.   Feet:   Right Foot:   Protective Sensation: 10 sites tested. 7 sites sensed.   Skin Integrity: Positive for callus.   Left Foot:   Protective Sensation: 10 sites tested. 7 sites sensed.   Skin Integrity: Positive for callus.   Neurological: She is alert and oriented to person, place, and time.   Skin: Skin is warm and dry.   Psychiatric: She has a normal mood and affect. Her behavior is normal.   Nursing note and vitals reviewed.    Ortho Exam  V: pt 1/4 dp 1/4 b/l. edema present bilaterally, varicosities present bilaterally  N:  " "[facility] Neuropathy Screening:   Left Right   Normal Appearance Yes-0 Yes-0   Ulceration no-0 no-0   Achilles Reflex normal-0 normal-0   Vibratory Sensation normal-0 normal-0   10 Gram MF reduced-0.5 reduced-0.5   Total .5 /5 .5 /5     1/10     O: mild reducible hammertoe deformity 2-5 b/l, no open lesions, POP of nails  D: elongated, thickened, dystrophic nails with subungal [id number]          Assessment:     Imaging / Labs:    No results found.        1. Type 2 diabetes mellitus with diabetic neuropathy, without long-term current use of insulin    2. Type 2 diabetes mellitus with diabetic nephropathy, without long-term current use of insulin    3. Chronic kidney disease, stage III (moderate)    4. Obesity, unspecified classification, unspecified obesity type, unspecified whether serious comorbidity present    5. Onychomycosis due to dermatophyte    6. Hammer toes of both feet    7. Corn or callus          Plan:       Orders Placed This Encounter    Foot Care    DIABETIC SHOES FOR HOME USE     Routine Foot Care  Date/Time: 2/12/2018 2:52 PM  Performed by: NAPOLEON POP JR.  Authorized by: NAPOLEON POP JR.     Time out: Immediately prior to procedure a "time out" was called to verify the correct patient, procedure, equipment, support staff and site/side marked as required.    Consent Done?:  Yes (Verbal)  Hyperkeratotic Skin Lesions?: No      Nail Care Type:  Debride  Location(s): All  (Left 1st Toe, Left 3rd Toe, Left 2nd Toe, Left 4th Toe, Left 5th Toe, Right 1st Toe, Right 2nd Toe, Right 3rd Toe, Right 4th Toe and Right 5th Toe)  Patient tolerance:  Patient tolerated the procedure well with no immediate complications        Petra was seen today for diabetic foot exam.    Diagnoses and all orders for this visit:    Type 2 diabetes mellitus with diabetic neuropathy, without long-term current use of insulin  -     DIABETIC SHOES FOR HOME USE  -     Foot Care    Type 2 diabetes mellitus with " diabetic nephropathy, without long-term current use of insulin    Chronic kidney disease, stage III (moderate)    Obesity, unspecified classification, unspecified obesity type, unspecified whether serious comorbidity present    Onychomycosis due to dermatophyte    Hammer toes of both feet  -     DIABETIC SHOES FOR HOME USE    Corn or callus  -     DIABETIC SHOES FOR HOME USE        Petra Zazueta is a 83 y.o. female presenting w/   1. Type 2 diabetes mellitus with diabetic neuropathy, without long-term current use of insulin    2. Type 2 diabetes mellitus with diabetic nephropathy, without long-term current use of insulin    3. Chronic kidney disease, stage III (moderate)    4. Obesity, unspecified classification, unspecified obesity type, unspecified whether serious comorbidity present    5. Onychomycosis due to dermatophyte    6. Hammer toes of both feet    7. Corn or callus      ADA Risk Classification: 1 - LOPS with or without deformity: rtc 3-6 months       -pt seen, evaluated, and managed  -dx discussed in detail. All questions/concerns addressed  -all tx options discussed. All alternatives, risks, benefits of all txs discussed  -The patient was educated regarding the above diagnosis. We discussed conservative care options regarding shoe wear and/or padding.  -rxs dispensed: none  -px as above  -educated pt on DM footcare  -Shoe inspection. Diabetic Foot Education. Patient reminded of the importance of good nutrition and blood sugar control to help prevent podiatric complications of diabetes. Patient instructed on proper foot hygeine. We discussed wearing proper shoe gear, daily foot inspections, never walking without protective shoe gear, never putting sharp instruments to feet.      Pt to f/u in 3-6 months as scheduled      Follow-up in about 6 months (around 8/12/2018).

## 2018-02-12 NOTE — PATIENT INSTRUCTIONS
Diabetes: Inspecting Your Feet    Diabetes increases your chances of developing foot problems. So inspect your feet every day. This helps you find small skin irritations before they become serious ulcers or infections. If you have trouble seeing the bottoms of your feet, use a mirror or ask a family member or friend to help.  How to check your feet  Below are tips to help you look for foot problems. Try to check your feet at the same time each day, such as when you get out of bed in the morning:  · Check the top of each foot. The tops of toes, back of the heel, and outer edge of the foot can get a lot of rubbing from poor-fitting shoes.  · Check the bottom of each foot. Daily wear and tear often leads to problems at pressure spots.  · Check the toes and nails. Fungal infections often occur between toes. Toenail problems can also be a sign of fungal infections or lead to breaks in the skin.  · Check your shoes, too. Loose objects inside a shoe can injure the foot. Use your hand to feel inside your shoes for things like laxmi, loose stitching, or rough areas that could irritate your skin.  Warning signs  Look for any color changes in the foot. Redness with streaks can signal a severe infection, which needs immediate medical attention. Tell your healthcare provider right away if you have any of these problems:  · Swelling, sometimes with color changes, may be a sign of poor blood flow or infection. Symptoms include tenderness and an increase in the size of your foot.  · Warm or hot areas on your feet may be signs of infection. A foot that is cold may not be getting enough blood.  · Sensations such as burning, tingling, or pins and needles can be signs of a problem. Also check for areas that may be numb.  · Hot spots are caused by friction or pressure. Look for hot spots in areas that get a lot of rubbing. Hot spots can turn into blisters, calluses, or sores.  · Cracks and sores are caused by dry or irritated  skin. They are a sign that the skin is breaking down, which can lead to infection.  · Toenail problems to watch for include nails growing into the skin (ingrown toenail) and causing redness or pain. Thick, yellow, or discolored nails can signal a fungal infection.  · Drainage and odor can develop from untreated sores and ulcers. Call your healthcare provider right away if you notice white or yellow drainage, bleeding, or unpleasant odor.   Date Last Reviewed: 6/1/2016 © 2000-2016 [employer]. [address] [place] [id number]. All rights reserved. This information is not intended as a substitute for professional medical care. Always follow your healthcare professional's instructions.        Long-Term Complications of Diabetes    Diabetes can cause health problems over time. These are called complications. They are more likely to happen if your blood sugar is often too high. Over time, high blood sugar can damage blood vessels in your body. It is important to keep your blood sugar in your target range. This can help prevent or delay complications from diabetes.  Possible complications  Complications of diabetes include:  · Eye problems, including damage to the blood vessels in the eyes (retinopathy), pressure in the eye (glaucoma), and clouding of the eyes lens (a cataract). Eye problems can eventually lead to irreversible blindness.   · Tooth and gum problems (periodontal disease), causing loss of teeth and bone  · Blood vessel (vascular) disease leading to circulation problems, heart attack or stroke, or a need for amputation of a limb   · Problems with sexual function leading to erectile dysfunction in men and sexual discomfort in women   · Kidney disease (nephropathy) can eventually lead to kidney failure, which may require dialysis or kidney transplant   · Nerve problems (neuropathy), causing pain or loss of feeling in your feet and other parts of your body, potentially leading to an  amputation of a limb   · High blood pressure (hypertension), putting strain on your heart and blood vessels  · Serious infections, possibly leading to loss of toes, feet, or limbs  How to avoid complications  The serious consequences of these complications may be avoidable for most people with diabetes by managing your blood glucose, blood pressure, and cholesterol levels. This can help you feel better and stay healthy. You can manage diabetes by tracking your blood sugar. You can also eat healthy and exercise to avoid gaining weight. And you should take medicine if directed by your healthcare provider.  Date Last Reviewed: 5/1/2016 © 2000-2016 [employer]. [address] [place] [address], [place] [address]. All rights reserved. This information is not intended as a substitute for professional medical care. Always follow your healthcare professional's instructions.      Your Diabetes Foot Care Program    Every day you depend on your feet to keep you moving. But when you have diabetes, your feet need special care. Even a small foot problem can become very serious. So dont take your feet for granted. By working with your diabetes healthcare team, you can learn how to protect your feet and keep them healthy.  Evaluating your feet  An evaluation helps your healthcare provider check the condition of your feet. The evaluation includes a review of your diabetes history and overall health. It may also include a foot exam, X-rays, or other tests. These can help show problems beneath the skin that you cant see or feel.  Medical history  You will be asked about your overall health and any history of foot problems. Youll also discuss your diabetes history, such as whether your blood sugar level has changed over time. It also includes questions about sensations of pain, tingling, pins and needles, or numbness. Your healthcare provider will also want to know if you have high blood pressure and heart disease, or if you  smoke. Be sure to mention any medicines (including over-the-counter), supplements, or herbal remedies you take.  Foot exam  A foot exam checks the condition of different parts of your foot. First, your skin and nails are examined for any signs of infection. Blood flow is checked by feeling for the pulses in each foot. You may also have tests to study the nerves in the foot. These include using a small filament (wire) to see how sensitive your feet are. In certain cases, you will be asked to walk a short distance to check for bone, joint, and muscle problems.  Diagnostic tests  If needed, your healthcare provider will suggest certain tests to learn more about your feet. These include:  · Doppler tests to measure blood flow in the feet and lower leg.  · X-rays, which can show bone or joint problems.  · Other imaging tests, such as an MRI (magnetic resonance imaging), bone scan, and CT (computed tomography) scan. These can help show bone infections.  · Other tests, such as vascular tests, which study the blood flow in your feet and legs. You may also have nerve studies to learn how sensitive your feet are.  Creating a foot care program  Based on the evaluation, your healthcare provider will create a foot care program for you. Your program may be as simple as starting a daily self-care routine and changing the types of shoes your wear. It may also involve treating minor foot problems, such as a corn or blister. In some cases, surgery will be needed to treat an infection or mechanical problems, such as hammer toes.  Preventing problems  When you have diabetes, its easier to prevent problems than to treat them later on. So see your healthcare team for regular checkups and foot care. Your healthcare team can also help you learn more about caring for your feet at home. For example, you may be told to avoid walking barefoot. Or you may be told that special footwear is needed to protect your feet.  Have regular  checkups  Foot problems can develop quickly. So be sure to follow your healthcare teams schedule for regular checkups. During office visits, take off your shoes and socks as soon as you get in the exam room. Ask your healthcare provider to examine your feet for problems. This will make it easier to find and treat small skin irritations before they get worse. Regular checkups can also help keep track of the blood flow and feeling in your feet. If you have neuropathy (lack of feeling in your feet), you will need to have checkups more often.  Learn about self-care  The more you know about diabetes and your feet, the easier it will be to prevent problems. Members of your healthcare team can teach you how to inspect your feet and teach you to look for warning signs. They can also give you other foot care tips. During office visits, be sure to ask any questions you have.  Date Last Reviewed: 7/1/2016 © 2000-2016 [employer]. [address] [place] [address]. All rights reserved. This information is not intended as a substitute for professional medical care. Always follow your healthcare professional's instructions.        Eating the Right Number of Calories (8786-7890 Guidelines)  Calories are a measure of the energy you get from food. If you eat more calories than you use, you will gain weight. If you eat fewer calories than you use, you will lose weight. Below are tables that give the number of calories needed each day. Look for your gender, age, and activity level. If you stick to this number, you should neither gain nor lose weight. Note that this is an estimated number of calories.* Your exact number may differ.  Women  Age in years Low activity level (calories/day) Moderate activity level (calories/day) High activity level (calories/day)   19 to 30 1,800-2,000 2,000-2,200 2,400   31 to 50 1,800 2,000 2,200   51 and older 1,600 1,800 2,000-2,200      Men  Age in years Low activity level   (calories/day) Moderate activity level (calories/day) High activity level (calories/day)   19 to 30 2,400-2,600 2,600-2,800 3,000   31 to 50 2,200-2,400 2,400-2,600 2,800-3,000   51 and older 2,000-2,200 2,200-2,400 2,400-2,800   Activity levels defined  · Low. Only light physical activity such as that done during typical daily life.  · Moderate. Light physical activity done during typical daily life AND physical activity equal to walking about 1.5 to 3 miles a day at 3 to 4 miles per hour.  · High. Light physical activity done during typical daily life AND physical activity equal to walking more than 3 miles a day at 3 to 4 miles per hour.  *From Dietary Guidelines for Americans, 5606-3997, U.S. Department of Health and Human Services.  Date Last Reviewed: 6/1/2015  © 5695-8254 [employer]. [address] [place] [address], [place] [address]. All rights reserved. This information is not intended as a substitute for professional medical care. Always follow your healthcare professional's instructions.

## 2018-02-19 ENCOUNTER — TELEPHONE (OUTPATIENT)
Dept: FAMILY MEDICINE | Facility: CLINIC | Age: 83
End: 2018-02-19

## 2018-02-19 NOTE — TELEPHONE ENCOUNTER
Your bone density showed osteopenia or thinning of the bones but not osteoporosis. Calcium and Vitamin D is recommended for the prevention of osteoporosis.

## 2018-03-05 RX ORDER — METOPROLOL SUCCINATE 100 MG/1
TABLET, EXTENDED RELEASE ORAL
Qty: 90 TABLET | Refills: 3 | Status: ON HOLD | OUTPATIENT
Start: 2018-03-05 | End: 2018-08-24 | Stop reason: HOSPADM

## 2018-03-25 RX ORDER — PIOGLITAZONEHYDROCHLORIDE 30 MG/1
TABLET ORAL
Qty: 90 TABLET | Refills: 1 | Status: ON HOLD | OUTPATIENT
Start: 2018-03-25 | End: 2018-08-24 | Stop reason: HOSPADM

## 2018-03-26 RX ORDER — CLONIDINE HYDROCHLORIDE 0.1 MG/1
0.1 TABLET ORAL ONCE
Qty: 1 TABLET | Refills: 0 | Status: SHIPPED | OUTPATIENT
Start: 2018-03-26 | End: 2018-03-28 | Stop reason: SDUPTHER

## 2018-03-28 RX ORDER — CLONIDINE HYDROCHLORIDE 0.1 MG/1
0.1 TABLET ORAL ONCE
Qty: 30 TABLET | Refills: 11 | Status: SHIPPED | OUTPATIENT
Start: 2018-03-28 | End: 2018-05-25

## 2018-03-28 NOTE — TELEPHONE ENCOUNTER
----- Message from Erlinda Portillo sent at 3/28/2018  9:34 AM CDT -----  Contact: Self/ [phone]  Patient called in to get prescription refill. Please call and advise.     cloNIDine (CATAPRES) 0.1 MG tablet    [facility]

## 2018-04-16 RX ORDER — GABAPENTIN 300 MG/1
CAPSULE ORAL
Qty: 180 CAPSULE | Refills: 3 | Status: ON HOLD | OUTPATIENT
Start: 2018-04-16 | End: 2018-09-01 | Stop reason: HOSPADM

## 2018-04-18 ENCOUNTER — TELEPHONE (OUTPATIENT)
Dept: FAMILY MEDICINE | Facility: CLINIC | Age: 83
End: 2018-04-18

## 2018-04-19 ENCOUNTER — LAB VISIT (OUTPATIENT)
Dept: LAB | Facility: HOSPITAL | Age: 83
End: 2018-04-19
Attending: INTERNAL MEDICINE
Payer: MEDICARE

## 2018-04-19 ENCOUNTER — OFFICE VISIT (OUTPATIENT)
Dept: NEPHROLOGY | Facility: CLINIC | Age: 83
End: 2018-04-19
Payer: MEDICARE

## 2018-04-19 VITALS
HEIGHT: 62 IN | HEART RATE: 58 BPM | DIASTOLIC BLOOD PRESSURE: 70 MMHG | BODY MASS INDEX: 34.53 KG/M2 | OXYGEN SATURATION: 99 % | WEIGHT: 187.63 LBS | SYSTOLIC BLOOD PRESSURE: 132 MMHG

## 2018-04-19 DIAGNOSIS — I10 ESSENTIAL HYPERTENSION: ICD-10-CM

## 2018-04-19 DIAGNOSIS — E11.21 TYPE 2 DIABETES MELLITUS WITH DIABETIC NEPHROPATHY, WITHOUT LONG-TERM CURRENT USE OF INSULIN: ICD-10-CM

## 2018-04-19 DIAGNOSIS — N18.30 CHRONIC KIDNEY DISEASE, STAGE III (MODERATE): ICD-10-CM

## 2018-04-19 DIAGNOSIS — N18.4 CKD (CHRONIC KIDNEY DISEASE), STAGE IV: ICD-10-CM

## 2018-04-19 DIAGNOSIS — I73.9 PAD (PERIPHERAL ARTERY DISEASE): ICD-10-CM

## 2018-04-19 DIAGNOSIS — N18.4 CKD (CHRONIC KIDNEY DISEASE), STAGE IV: Primary | ICD-10-CM

## 2018-04-19 LAB
ALBUMIN SERPL BCP-MCNC: 3.9 G/DL
ANION GAP SERPL CALC-SCNC: 11 MMOL/L
BASOPHILS # BLD AUTO: 0.03 K/UL
BASOPHILS NFR BLD: 0.4 %
BUN SERPL-MCNC: 29 MG/DL
CALCIUM SERPL-MCNC: 10.4 MG/DL
CHLORIDE SERPL-SCNC: 108 MMOL/L
CO2 SERPL-SCNC: 24 MMOL/L
CREAT SERPL-MCNC: 1.7 MG/DL
DIFFERENTIAL METHOD: ABNORMAL
EOSINOPHIL # BLD AUTO: 0.1 K/UL
EOSINOPHIL NFR BLD: 1.8 %
ERYTHROCYTE [DISTWIDTH] IN BLOOD BY AUTOMATED COUNT: 14.6 %
EST. GFR  (AFRICAN AMERICAN): 31.9 ML/MIN/1.73 M^2
EST. GFR  (NON AFRICAN AMERICAN): 27.7 ML/MIN/1.73 M^2
GLUCOSE SERPL-MCNC: 134 MG/DL
HCT VFR BLD AUTO: 38.4 %
HGB BLD-MCNC: 12.1 G/DL
IMM GRANULOCYTES # BLD AUTO: 0.02 K/UL
IMM GRANULOCYTES NFR BLD AUTO: 0.3 %
LYMPHOCYTES # BLD AUTO: 1.8 K/UL
LYMPHOCYTES NFR BLD: 22.8 %
MCH RBC QN AUTO: 31.4 PG
MCHC RBC AUTO-ENTMCNC: 31.5 G/DL
MCV RBC AUTO: 100 FL
MONOCYTES # BLD AUTO: 0.5 K/UL
MONOCYTES NFR BLD: 6.9 %
NEUTROPHILS # BLD AUTO: 5.3 K/UL
NEUTROPHILS NFR BLD: 67.8 %
NRBC BLD-RTO: 0 /100 WBC
PHOSPHATE SERPL-MCNC: 3.2 MG/DL
PLATELET # BLD AUTO: 166 K/UL
PMV BLD AUTO: 12.1 FL
POTASSIUM SERPL-SCNC: 4 MMOL/L
PTH-INTACT SERPL-MCNC: 68 PG/ML
RBC # BLD AUTO: 3.85 M/UL
SODIUM SERPL-SCNC: 143 MMOL/L
WBC # BLD AUTO: 7.82 K/UL

## 2018-04-19 PROCEDURE — 80069 RENAL FUNCTION PANEL: CPT

## 2018-04-19 PROCEDURE — 99214 OFFICE O/P EST MOD 30 MIN: CPT | Mod: S$GLB,,, | Performed by: INTERNAL MEDICINE

## 2018-04-19 PROCEDURE — 3078F DIAST BP <80 MM HG: CPT | Mod: CPTII,S$GLB,, | Performed by: INTERNAL MEDICINE

## 2018-04-19 PROCEDURE — 86334 IMMUNOFIX E-PHORESIS SERUM: CPT | Mod: 26,,, | Performed by: PATHOLOGY

## 2018-04-19 PROCEDURE — 85025 COMPLETE CBC W/AUTO DIFF WBC: CPT

## 2018-04-19 PROCEDURE — 86334 IMMUNOFIX E-PHORESIS SERUM: CPT

## 2018-04-19 PROCEDURE — 99999 PR PBB SHADOW E&M-EST. PATIENT-LVL IV: CPT | Mod: PBBFAC,,, | Performed by: INTERNAL MEDICINE

## 2018-04-19 PROCEDURE — 83970 ASSAY OF PARATHORMONE: CPT

## 2018-04-19 PROCEDURE — 99499 UNLISTED E&M SERVICE: CPT | Mod: S$GLB,,, | Performed by: INTERNAL MEDICINE

## 2018-04-19 PROCEDURE — 36415 COLL VENOUS BLD VENIPUNCTURE: CPT

## 2018-04-19 PROCEDURE — 83520 IMMUNOASSAY QUANT NOS NONAB: CPT | Mod: 59

## 2018-04-19 PROCEDURE — 3075F SYST BP GE 130 - 139MM HG: CPT | Mod: CPTII,S$GLB,, | Performed by: INTERNAL MEDICINE

## 2018-04-20 ENCOUNTER — TELEPHONE (OUTPATIENT)
Dept: NEPHROLOGY | Facility: CLINIC | Age: 83
End: 2018-04-20

## 2018-04-20 LAB
INTERPRETATION SERPL IFE-IMP: NORMAL
KAPPA LC SER QL IA: 3.17 MG/DL
KAPPA LC/LAMBDA SER IA: 1.66
LAMBDA LC SER QL IA: 1.91 MG/DL
PATHOLOGIST INTERPRETATION IFE: NORMAL

## 2018-04-20 NOTE — TELEPHONE ENCOUNTER
MD Corry Mas LPN    Please call patient and tell her that her kidney function is stable.       Called pt and informed her of results per Dr. Martinez /pt verbalized  understanding

## 2018-05-03 ENCOUNTER — TELEPHONE (OUTPATIENT)
Dept: PODIATRY | Facility: CLINIC | Age: 83
End: 2018-05-03

## 2018-05-03 NOTE — TELEPHONE ENCOUNTER
----- Message from Rosaura Dudley sent at 5/2/2018  4:10 PM CDT -----  Contact: [phone]/ [facility]  [facility] is requesting orders and clinical notes to give patient diabetic shoes. Please advise.

## 2018-05-03 NOTE — TELEPHONE ENCOUNTER
----- Message from Enma Fournier MA sent at 5/2/2018  4:58 PM CDT -----  Contact: [phone]/ [employer]      ----- Message -----  From: Rosarua Dudley  Sent: 5/2/2018   4:10 PM  To: Keenan Lyn Staff ([place])    [employer] [facility] is requesting orders and clinical notes to give patient diabetic shoes. Please advise.

## 2018-05-25 ENCOUNTER — OFFICE VISIT (OUTPATIENT)
Dept: INTERNAL MEDICINE | Facility: CLINIC | Age: 83
End: 2018-05-25
Payer: MEDICARE

## 2018-05-25 VITALS
RESPIRATION RATE: 16 BRPM | BODY MASS INDEX: 35.17 KG/M2 | DIASTOLIC BLOOD PRESSURE: 70 MMHG | WEIGHT: 191.13 LBS | HEART RATE: 68 BPM | HEIGHT: 62 IN | OXYGEN SATURATION: 96 % | SYSTOLIC BLOOD PRESSURE: 132 MMHG

## 2018-05-25 DIAGNOSIS — N18.4 CKD (CHRONIC KIDNEY DISEASE), STAGE IV: Primary | ICD-10-CM

## 2018-05-25 DIAGNOSIS — I10 ESSENTIAL HYPERTENSION: ICD-10-CM

## 2018-05-25 DIAGNOSIS — Z23 NEED FOR PNEUMOCOCCAL VACCINATION: ICD-10-CM

## 2018-05-25 DIAGNOSIS — I25.10 CORONARY ARTERY DISEASE INVOLVING NATIVE CORONARY ARTERY OF NATIVE HEART WITHOUT ANGINA PECTORIS: ICD-10-CM

## 2018-05-25 DIAGNOSIS — E78.5 HYPERLIPIDEMIA, UNSPECIFIED HYPERLIPIDEMIA TYPE: ICD-10-CM

## 2018-05-25 DIAGNOSIS — E11.21 TYPE 2 DIABETES MELLITUS WITH DIABETIC NEPHROPATHY, WITHOUT LONG-TERM CURRENT USE OF INSULIN: ICD-10-CM

## 2018-05-25 PROCEDURE — 3078F DIAST BP <80 MM HG: CPT | Mod: CPTII,S$GLB,, | Performed by: INTERNAL MEDICINE

## 2018-05-25 PROCEDURE — 99999 PR PBB SHADOW E&M-EST. PATIENT-LVL IV: CPT | Mod: PBBFAC,,, | Performed by: INTERNAL MEDICINE

## 2018-05-25 PROCEDURE — 90732 PPSV23 VACC 2 YRS+ SUBQ/IM: CPT | Mod: S$GLB,,, | Performed by: INTERNAL MEDICINE

## 2018-05-25 PROCEDURE — G0009 ADMIN PNEUMOCOCCAL VACCINE: HCPCS | Mod: S$GLB,,, | Performed by: INTERNAL MEDICINE

## 2018-05-25 PROCEDURE — 99215 OFFICE O/P EST HI 40 MIN: CPT | Mod: S$GLB,,, | Performed by: INTERNAL MEDICINE

## 2018-05-25 PROCEDURE — 3075F SYST BP GE 130 - 139MM HG: CPT | Mod: CPTII,S$GLB,, | Performed by: INTERNAL MEDICINE

## 2018-05-25 RX ORDER — CILOSTAZOL 100 MG/1
100 TABLET ORAL 2 TIMES DAILY
COMMUNITY
End: 2018-08-23

## 2018-05-25 RX ORDER — FUROSEMIDE 40 MG/1
40 TABLET ORAL DAILY
COMMUNITY
End: 2018-08-03

## 2018-05-25 NOTE — PROGRESS NOTES
"Subjective:      Patient ID: Petra Zazueta is a 83 y.o. female.    Chief Complaint: Establish Care; Shortness of Breath (x 1 month); and Obesity (x 1 month)    HPI: 83 y.o. Black or  female, previous pt of Dr. Nath.  Her last labs were 12/17.  She does go to see her Nephrologist, but admits she like sweets.     Has gained 4# since her visit with him.  States she does have 2 pillow orthopnea, does have DUKE ( medium effort), but no CP,palpitations.  No cough.  Does not use added salt.    I have reviewed her chart,labs.    Review of Systems   Constitutional: Positive for unexpected weight change. Negative for activity change and appetite change.   HENT: Negative.    Eyes: Positive for visual disturbance.   Respiratory: Positive for chest tightness and shortness of breath. Negative for apnea, cough, choking and wheezing.    Cardiovascular: Negative for chest pain, palpitations and leg swelling.   Gastrointestinal: Negative.    Endocrine: Positive for polydipsia, polyphagia and polyuria.   Genitourinary: Positive for frequency and urgency.   Musculoskeletal: Negative.    Skin: Negative.    Neurological: Negative for dizziness, weakness and headaches.   Hematological: Negative.    Psychiatric/Behavioral: Negative.        Objective:   /70 (BP Location: Left arm, Patient Position: Sitting, BP Method: Large (Manual))   Pulse 68   Resp 16   Ht 5' 2" (1.575 m)   Wt 86.7 kg (191 lb 2.2 oz)   LMP  (LMP Unknown)   SpO2 96%   BMI 34.96 kg/m²     Physical Exam   Constitutional: She is oriented to person, place, and time. She appears well-developed and well-nourished.   HENT:   Head: Normocephalic and atraumatic.   Right Ear: External ear normal.   Left Ear: External ear normal.   Nose: Nose normal.   Mouth/Throat: Oropharynx is clear and moist.   Eyes: Conjunctivae and EOM are normal.   Neck: Neck supple. No JVD present.   Cardiovascular: Normal rate, regular rhythm and normal heart sounds.  "   Pulmonary/Chest: Effort normal and breath sounds normal.   Abdominal: Soft. Bowel sounds are normal.   Musculoskeletal: She exhibits no edema.   Neurological: She is alert and oriented to person, place, and time.   Skin: Skin is warm and dry.   Psychiatric: She has a normal mood and affect. Her behavior is normal.   Nursing note and vitals reviewed.      Assessment:     1. CKD (chronic kidney disease), stage IV    2. Essential hypertension    3. Hyperlipidemia, unspecified hyperlipidemia type    4. Type 2 diabetes mellitus with diabetic nephropathy, without long-term current use of insulin    5. Coronary artery disease involving native coronary artery of native heart without angina pectoris    6. Need for pneumococcal vaccination      Plan:     CKD (chronic kidney disease), stage IV  -     Comprehensive metabolic panel; Future; Expected date: 08/25/2018    Essential hypertension  -     CBC auto differential; Future; Expected date: 08/25/2018    Hyperlipidemia, unspecified hyperlipidemia type  -     Lipid panel; Future; Expected date: 08/23/2018    Type 2 diabetes mellitus with diabetic nephropathy, without long-term current use of insulin  -     POCT glucose  -     blood sugar diagnostic Strp; 1 strip by Misc.(Non-Drug; Combo Route) route 2 (two) times daily before meals.  Dispense: 100 strip; Refill: 11  -     Hemoglobin A1c; Future; Expected date: 08/23/2018  -     Microalbumin/creatinine urine ratio; Future; Expected date: 08/23/2018    Coronary artery disease involving native coronary artery of native heart without angina pectoris    Need for pneumococcal vaccination  -     Pneumococcal Polysaccharide Vaccine (23 Valent) (SQ/IM)

## 2018-05-26 LAB
ALBUMIN SERPL-MCNC: 4.1 G/DL (ref 3.6–5.1)
ALBUMIN/CREAT UR: 18 MCG/MG CREAT
ALBUMIN/GLOB SERPL: 1.4 (CALC) (ref 1–2.5)
ALP SERPL-CCNC: 65 U/L (ref 33–130)
ALT SERPL-CCNC: 8 U/L (ref 6–29)
AST SERPL-CCNC: 15 U/L (ref 10–35)
BASOPHILS # BLD AUTO: 20 CELLS/UL (ref 0–200)
BASOPHILS NFR BLD AUTO: 0.2 %
BILIRUB SERPL-MCNC: 0.5 MG/DL (ref 0.2–1.2)
BUN SERPL-MCNC: 33 MG/DL (ref 7–25)
BUN/CREAT SERPL: 19 (CALC) (ref 6–22)
CALCIUM SERPL-MCNC: 10 MG/DL (ref 8.6–10.4)
CHLORIDE SERPL-SCNC: 103 MMOL/L (ref 98–110)
CHOLEST SERPL-MCNC: 146 MG/DL
CHOLEST/HDLC SERPL: 2.4 (CALC)
CO2 SERPL-SCNC: 26 MMOL/L (ref 20–31)
COMMENT: NORMAL
CREAT SERPL-MCNC: 1.73 MG/DL (ref 0.6–0.88)
CREAT UR-MCNC: 90 MG/DL (ref 20–320)
EOSINOPHIL # BLD AUTO: 109 CELLS/UL (ref 15–500)
EOSINOPHIL NFR BLD AUTO: 1.1 %
ERYTHROCYTE [DISTWIDTH] IN BLOOD BY AUTOMATED COUNT: 12.6 % (ref 11–15)
EXTRA URINE SPECIMEN: NORMAL
GFR SERPL CREATININE-BSD FRML MDRD: 27 ML/MIN/1.73M2
GLOBULIN SER CALC-MCNC: 2.9 G/DL (CALC) (ref 1.9–3.7)
GLUCOSE SERPL-MCNC: 188 MG/DL (ref 65–99)
HBA1C MFR BLD: 7.2 % OF TOTAL HGB
HCT VFR BLD AUTO: 32.7 % (ref 35–45)
HDLC SERPL-MCNC: 61 MG/DL
HGB BLD-MCNC: 10.5 G/DL (ref 11.7–15.5)
LDLC SERPL CALC-MCNC: 66 MG/DL (CALC)
LYMPHOCYTES # BLD AUTO: 1624 CELLS/UL (ref 850–3900)
LYMPHOCYTES NFR BLD AUTO: 16.4 %
MCH RBC QN AUTO: 31.2 PG (ref 27–33)
MCHC RBC AUTO-ENTMCNC: 32.1 G/DL (ref 32–36)
MCV RBC AUTO: 97 FL (ref 80–100)
MICROALBUMIN UR-MCNC: 1.6 MG/DL
MONOCYTES # BLD AUTO: 564 CELLS/UL (ref 200–950)
MONOCYTES NFR BLD AUTO: 5.7 %
NEUTROPHILS # BLD AUTO: 7583 CELLS/UL (ref 1500–7800)
NEUTROPHILS NFR BLD AUTO: 76.6 %
NONHDLC SERPL-MCNC: 85 MG/DL (CALC)
PLATELET # BLD AUTO: 154 THOUSAND/UL (ref 140–400)
PMV BLD REES-ECKER: 11.6 FL (ref 7.5–12.5)
POTASSIUM SERPL-SCNC: 4.2 MMOL/L (ref 3.5–5.3)
PROT SERPL-MCNC: 7 G/DL (ref 6.1–8.1)
RBC # BLD AUTO: 3.37 MILLION/UL (ref 3.8–5.1)
SODIUM SERPL-SCNC: 137 MMOL/L (ref 135–146)
TRIGL SERPL-MCNC: 105 MG/DL
WBC # BLD AUTO: 9.9 THOUSAND/UL (ref 3.8–10.8)

## 2018-06-26 RX ORDER — FERROUS SULFATE 325(65) MG
325 TABLET ORAL
Qty: 30 TABLET | Refills: 0 | Status: SHIPPED | OUTPATIENT
Start: 2018-06-26 | End: 2018-06-27 | Stop reason: SDUPTHER

## 2018-06-27 RX ORDER — FERROUS SULFATE 325(65) MG
325 TABLET ORAL
Qty: 30 TABLET | Refills: 6 | Status: SHIPPED | OUTPATIENT
Start: 2018-06-27 | End: 2018-10-02 | Stop reason: SDUPTHER

## 2018-07-23 ENCOUNTER — TELEPHONE (OUTPATIENT)
Dept: FAMILY MEDICINE | Facility: CLINIC | Age: 83
End: 2018-07-23

## 2018-07-23 NOTE — TELEPHONE ENCOUNTER
Spoke with Dr. Menezes, Cardiologist @ [facility]   BP today 200/80; He is adding hydralazine to get BP done, read back correctly. He would like pt to see her Nephrologist as well, notified pt, she stated she will call [facility] office when she returns home for her 4 month f/u, last visit was 4/2018

## 2018-07-23 NOTE — TELEPHONE ENCOUNTER
----- Message from Mesha Toledo sent at 7/23/2018  1:08 PM CDT -----  Contact: patient   Please call patient in reference to her blood pressure medication. [phone]

## 2018-07-23 NOTE — TELEPHONE ENCOUNTER
----- Message from Lazaro Valdovinos sent at 7/23/2018 10:25 AM CDT -----  Contact: Brittney avina/Dr. Jose Menezes office  [phone]  Brittney Garces would like to speak with the nurse concerning the above patient.  Please call and advise

## 2018-07-26 ENCOUNTER — TELEPHONE (OUTPATIENT)
Dept: FAMILY MEDICINE | Facility: CLINIC | Age: 83
End: 2018-07-26

## 2018-07-26 NOTE — TELEPHONE ENCOUNTER
Dr. Minaya's ([facility] podiatrist) office called for Dr. David's NPI to bill people's health. Number provided.

## 2018-08-02 NOTE — TELEPHONE ENCOUNTER
----- Message from Kacy Vang sent at 8/2/2018  9:56 AM CDT -----  Patient came to office  needs  Supplies needed for her blood sugar       Patient needs  Contour strips to test blood sugar    Pharmacy is [facility] in [facility]        Reach patient at     [phone]

## 2018-08-03 ENCOUNTER — OFFICE VISIT (OUTPATIENT)
Dept: NEPHROLOGY | Facility: CLINIC | Age: 83
End: 2018-08-03
Payer: MEDICARE

## 2018-08-03 VITALS
OXYGEN SATURATION: 98 % | BODY MASS INDEX: 35.25 KG/M2 | HEART RATE: 70 BPM | HEIGHT: 62 IN | WEIGHT: 191.56 LBS | SYSTOLIC BLOOD PRESSURE: 136 MMHG | DIASTOLIC BLOOD PRESSURE: 70 MMHG

## 2018-08-03 DIAGNOSIS — I10 ESSENTIAL HYPERTENSION: ICD-10-CM

## 2018-08-03 DIAGNOSIS — N18.4 CKD (CHRONIC KIDNEY DISEASE), STAGE IV: Primary | ICD-10-CM

## 2018-08-03 DIAGNOSIS — E21.3 HYPERPARATHYROIDISM: ICD-10-CM

## 2018-08-03 DIAGNOSIS — I73.9 PAD (PERIPHERAL ARTERY DISEASE): ICD-10-CM

## 2018-08-03 PROCEDURE — 3078F DIAST BP <80 MM HG: CPT | Mod: CPTII,S$GLB,, | Performed by: INTERNAL MEDICINE

## 2018-08-03 PROCEDURE — 3075F SYST BP GE 130 - 139MM HG: CPT | Mod: CPTII,S$GLB,, | Performed by: INTERNAL MEDICINE

## 2018-08-03 PROCEDURE — 99999 PR PBB SHADOW E&M-EST. PATIENT-LVL III: CPT | Mod: PBBFAC,,, | Performed by: INTERNAL MEDICINE

## 2018-08-03 PROCEDURE — 99214 OFFICE O/P EST MOD 30 MIN: CPT | Mod: S$GLB,,, | Performed by: INTERNAL MEDICINE

## 2018-08-03 RX ORDER — LOSARTAN POTASSIUM 25 MG/1
25 TABLET ORAL DAILY
Qty: 90 TABLET | Refills: 3 | Status: ON HOLD | OUTPATIENT
Start: 2018-08-03 | End: 2018-08-24 | Stop reason: SDUPTHER

## 2018-08-03 RX ORDER — HYDRALAZINE HYDROCHLORIDE 50 MG/1
TABLET, FILM COATED ORAL
Status: ON HOLD | COMMUNITY
Start: 2018-07-23 | End: 2018-08-24 | Stop reason: SDUPTHER

## 2018-08-03 RX ORDER — CLONIDINE HYDROCHLORIDE 0.1 MG/1
TABLET ORAL
Refills: 11 | Status: ON HOLD | COMMUNITY
Start: 2018-06-21 | End: 2018-09-01 | Stop reason: HOSPADM

## 2018-08-03 RX ORDER — BUMETANIDE 0.5 MG/1
TABLET ORAL
Refills: 3 | COMMUNITY
Start: 2018-05-30 | End: 2018-08-03

## 2018-08-03 NOTE — PROGRESS NOTES
Subjective:       Patient ID: Petra Zazueta is a 83 y.o. Black or  female who presents for follow-up evaluation of CKD.       HPI   82 y/o F with longstanding DM2, HTN, HLD, CAD (CABG 3 yrs ago), PAD and CKD3 with a baseline creatinine of 1.3-1.6 mg/dl and episodes of increased creatinine up to 2.07 mg/dl. Her most recent one was 1.8 mg/dl. She reports she was taking Advil PRN almost daily for several years an still takes it for sleep. Takes tylenol only for pain. The patient still complains about cramping pain in her calves on both sides now after 2 blocks (used to walk 3 miles twice a day a couple of yrs ago) and dyspnea on exertion. Stable swelling of her left leg for many yrs.     Review of Systems   Constitutional: Negative for activity change, appetite change, chills, diaphoresis, fatigue, fever and unexpected weight change.   HENT: Negative for congestion, facial swelling and trouble swallowing.    Eyes: Negative for pain, discharge, redness and visual disturbance.   Respiratory: Positive for shortness of breath (half a block). Negative for cough and wheezing.    Cardiovascular: Positive for leg swelling. Negative for chest pain and palpitations.   Gastrointestinal: Negative for abdominal distention, abdominal pain, constipation and diarrhea.   Endocrine: Negative for cold intolerance and heat intolerance.   Genitourinary: Negative for decreased urine volume, difficulty urinating, dysuria, flank pain, frequency and urgency.   Musculoskeletal: Positive for arthralgias. Negative for joint swelling and myalgias.   Skin: Negative for color change.   Allergic/Immunologic: Negative for immunocompromised state.   Neurological: Negative for dizziness, tremors, syncope, speech difficulty, weakness, light-headedness and numbness.   Hematological: Negative for adenopathy.   Psychiatric/Behavioral: Negative for agitation, confusion, decreased concentration and dysphoric mood.       Objective:       Physical Exam   Constitutional: She is oriented to person, place, and time. She appears well-developed and well-nourished.   HENT:   Head: Normocephalic and atraumatic.   Eyes: Conjunctivae and EOM are normal. Pupils are equal, round, and reactive to light.   Neck: Neck supple.   Cardiovascular: Normal rate, regular rhythm and intact distal pulses.    Murmur (systolic) heard.  Pulmonary/Chest: Effort normal and breath sounds normal.   Abdominal: Soft. Bowel sounds are normal.   Musculoskeletal: Normal range of motion. She exhibits edema (left lower extremity worse than right).   Neurological: She is alert and oriented to person, place, and time. She has normal reflexes.   Skin: Skin is warm and dry.   Psychiatric: She has a normal mood and affect. Her behavior is normal.   Nursing note and vitals reviewed.      Assessment:       1. CKD (chronic kidney disease), stage IV    2. Essential hypertension    3. PAD (peripheral artery disease)        Plan:       1. CKD 3: Likely secondary to vascular disease, long standing HTN and diabetic nephropathy (no significant proteinuria).     - UA today    Avoids NSAIDs.    - continue statin  - re-started low dose ARB for renal protection   - will change her bumex to torsemide next visit if the edema does not improve.    Lab Results   Component Value Date    CREATININE 1.82 (H) 08/02/2018     Protein Creatinine Ratios:     Prot/Creat Ratio, Ur   Date Value Ref Range Status   04/19/2018 0.17 0.00 - 0.20 Final   12/22/2017 0.08 0.00 - 0.20 Final   06/17/2016 0.10 0.00 - 0.20 Final     ·   Acid-Base:   Lab Results   Component Value Date     08/02/2018    K 4.2 08/02/2018    CO2 24 08/02/2018     2. HTN: decently controlled. Low salt diet. There is a confusion about the medication she takes. She is not taken the Losartan, spironolactone or amlodipine but   her cardiologist started her on Hydralazine.     The patient was educated in practicing a low salt diet.  Avoid high salt  foods (olives, pickles, smoked meats, salted potato chips, etc.).   Do not add salt to your food at the table.   Use only small amounts of salt when cooking.      3. Renal osteodystrophy: Monitor, supposedly take vitamin D over the counter - will advise again to take and recheck.   Lab Results   Component Value Date    .0 (H) 08/02/2018    CALCIUM 10.0 08/02/2018    PHOS 4.1 08/02/2018       4. Anemia: At goal for CKD.   Lab Results   Component Value Date    HGB 11.9 (L) 08/02/2018        5. DM: Well controlled - Follow up with primary care.   Lab Results   Component Value Date    HGBA1C 6.9 (H) 12/22/2017       6. Lipid management: On Statin.   Has problems sleeping - advised in sleep hygiene           Follow up in 4 months with labs. Labs in 2 wks

## 2018-08-03 NOTE — PATIENT INSTRUCTIONS
Please do not take NSAIDS anymore (Advil). Please see me back in my clinic in 5-6 month with blood work.   Please take vitamin D 2000 units over the counter daily.

## 2018-08-23 ENCOUNTER — OFFICE VISIT (OUTPATIENT)
Dept: INTERNAL MEDICINE | Facility: CLINIC | Age: 83
End: 2018-08-23
Payer: MEDICARE

## 2018-08-23 VITALS
HEIGHT: 62 IN | SYSTOLIC BLOOD PRESSURE: 133 MMHG | OXYGEN SATURATION: 97 % | BODY MASS INDEX: 36.31 KG/M2 | DIASTOLIC BLOOD PRESSURE: 78 MMHG | WEIGHT: 197.31 LBS | HEART RATE: 68 BPM | TEMPERATURE: 98 F | RESPIRATION RATE: 16 BRPM

## 2018-08-23 DIAGNOSIS — N18.4 CKD (CHRONIC KIDNEY DISEASE), STAGE IV: Primary | ICD-10-CM

## 2018-08-23 DIAGNOSIS — I25.10 CORONARY ARTERY DISEASE INVOLVING NATIVE CORONARY ARTERY OF NATIVE HEART WITHOUT ANGINA PECTORIS: ICD-10-CM

## 2018-08-23 DIAGNOSIS — R07.9 ACUTE CHEST PAIN: ICD-10-CM

## 2018-08-23 DIAGNOSIS — R06.02 SOB (SHORTNESS OF BREATH): ICD-10-CM

## 2018-08-23 PROBLEM — N39.0 UTI (URINARY TRACT INFECTION), UNCOMPLICATED: Status: RESOLVED | Noted: 2017-12-26 | Resolved: 2018-08-23

## 2018-08-23 PROBLEM — N39.0 URINARY TRACT INFECTION WITHOUT HEMATURIA: Status: ACTIVE | Noted: 2018-08-23

## 2018-08-23 PROBLEM — I50.33 ACUTE ON CHRONIC DIASTOLIC HEART FAILURE: Status: ACTIVE | Noted: 2018-08-23

## 2018-08-23 PROCEDURE — 3078F DIAST BP <80 MM HG: CPT | Mod: CPTII,S$GLB,, | Performed by: INTERNAL MEDICINE

## 2018-08-23 PROCEDURE — 99213 OFFICE O/P EST LOW 20 MIN: CPT | Mod: S$GLB,,, | Performed by: INTERNAL MEDICINE

## 2018-08-23 PROCEDURE — 3075F SYST BP GE 130 - 139MM HG: CPT | Mod: CPTII,S$GLB,, | Performed by: INTERNAL MEDICINE

## 2018-08-23 PROCEDURE — 99999 PR PBB SHADOW E&M-EST. PATIENT-LVL IV: CPT | Mod: PBBFAC,,, | Performed by: INTERNAL MEDICINE

## 2018-08-23 NOTE — PROGRESS NOTES
"Subjective:      Patient ID: Petra Zazueta is a 83 y.o. female.    Chief Complaint: Shortness of Breath (x 3 weeks) and Medication Refill (Contour test strips)    HPI: 83 y.o. Black or  female, has had progressive SOB over the last 3 weeks.  She denies fever,cough,hemoptesis, ,palpitations. However, has PND, and orthopnea. States her legs are slightly  More swollen than usual ( left leg always swollen).   She cannot walk > 20 feet without getting winded. Upon further questioning she does state that she has been  Having a chest heaviness, that does not radiate, mostly at night. She can no longer lay flat.  She does have:  -CKD Stage 4  -CAD, RCA 4/2011  -PAD, Left leg stent  -T2DM, neuropathy,nephropathy  -m obesity  -gout      Review of Systems   Constitutional: Positive for activity change and fatigue. Negative for diaphoresis.   HENT: Negative.    Respiratory: Positive for chest tightness and shortness of breath. Negative for wheezing and stridor.    Cardiovascular: Positive for chest pain, palpitations and leg swelling.   Gastrointestinal: Negative.    Genitourinary: Positive for frequency. Negative for dysuria, hematuria and urgency.   Musculoskeletal: Negative.    Skin: Negative.    Neurological: Positive for weakness.   Psychiatric/Behavioral: Positive for sleep disturbance.       Objective:   /78 (BP Location: Left arm, Patient Position: Sitting, BP Method: Medium (Manual))   Pulse 68   Temp 98.3 °F (36.8 °C) (Oral)   Resp 16   Ht 5' 2" (1.575 m)   Wt 89.5 kg (197 lb 4.8 oz)   LMP  (LMP Unknown)   SpO2 97%   BMI 36.09 kg/m²     Physical Exam   Constitutional: She is oriented to person, place, and time. She appears well-developed and well-nourished. She appears ill.   HENT:   Head: Normocephalic and atraumatic.   Right Ear: External ear normal.   Left Ear: External ear normal.   Nose: Nose normal.   Mouth/Throat: Oropharynx is clear and moist.   Eyes: Conjunctivae and EOM are " normal.   Neck: Normal range of motion. Neck supple. JVD present.   Cardiovascular: Normal rate and regular rhythm.   Pulmonary/Chest: Effort normal. She has wheezes. She has rales.           Abdominal: Soft. Bowel sounds are normal.   Musculoskeletal: She exhibits edema.   Neurological: She is alert and oriented to person, place, and time.   Skin: Skin is warm and dry.   Psychiatric: She has a normal mood and affect. Her behavior is normal.   Nursing note and vitals reviewed.      Assessment:     1. CKD (chronic kidney disease), stage IV    2. Acute chest pain    3. SOB (shortness of breath)    4. Coronary artery disease involving native coronary artery of native heart without angina pectoris    Unfortunately, because of renal function, cannot do CT chest to r/o PE.  Her SOB may be cardiac,PE,pneumonia. Body habitus might make CXR difficult to interpret.  She has never had asthma. Was a former smoker.  Plan:     CKD (chronic kidney disease), stage IV  -     Basic metabolic panel; Future; Expected date: 08/23/2018    Acute chest pain  -     Cancel: CTA Chest Non-Coronary; Future; Expected date: 08/23/2018  -     Brain natriuretic peptide; Future; Expected date: 08/23/2018  -     Troponin I; Future; Expected date: 08/23/2018    SOB (shortness of breath)  -     Brain natriuretic peptide; Future; Expected date: 08/23/2018  -     Basic metabolic panel; Future; Expected date: 08/23/2018    Coronary artery disease involving native coronary artery of native heart without angina pectoris    I have accompanied pt to the ER, and personally gave hand off to ER physician.

## 2018-08-27 ENCOUNTER — TELEPHONE (OUTPATIENT)
Dept: NEPHROLOGY | Facility: CLINIC | Age: 83
End: 2018-08-27

## 2018-08-27 NOTE — TELEPHONE ENCOUNTER
----- Message from Jessica Menezes sent at 8/27/2018 10:46 AM CDT -----  Contact: [phone]  hosp     Discharge  8 /25/ 49    Needs a work in   -  hsop discharge  f;u      Please call  pt or pt 's son  [phone]   Thanks    Called pt no answer in regards to getting fit in the see Dr. Martinez /

## 2018-08-28 ENCOUNTER — OFFICE VISIT (OUTPATIENT)
Dept: INTERNAL MEDICINE | Facility: CLINIC | Age: 83
End: 2018-08-28
Payer: MEDICARE

## 2018-08-28 VITALS
HEIGHT: 62 IN | SYSTOLIC BLOOD PRESSURE: 138 MMHG | BODY MASS INDEX: 35.02 KG/M2 | RESPIRATION RATE: 18 BRPM | OXYGEN SATURATION: 98 % | WEIGHT: 190.31 LBS | HEART RATE: 92 BPM | DIASTOLIC BLOOD PRESSURE: 78 MMHG

## 2018-08-28 DIAGNOSIS — N18.4 CKD (CHRONIC KIDNEY DISEASE), STAGE IV: ICD-10-CM

## 2018-08-28 DIAGNOSIS — E11.21 TYPE 2 DIABETES MELLITUS WITH DIABETIC NEPHROPATHY, WITHOUT LONG-TERM CURRENT USE OF INSULIN: ICD-10-CM

## 2018-08-28 DIAGNOSIS — I10 ESSENTIAL HYPERTENSION: ICD-10-CM

## 2018-08-28 DIAGNOSIS — I50.33 ACUTE ON CHRONIC DIASTOLIC HEART FAILURE: Primary | ICD-10-CM

## 2018-08-28 PROCEDURE — 99214 OFFICE O/P EST MOD 30 MIN: CPT | Mod: S$GLB,,, | Performed by: INTERNAL MEDICINE

## 2018-08-28 PROCEDURE — 99499 UNLISTED E&M SERVICE: CPT | Mod: S$GLB,,, | Performed by: INTERNAL MEDICINE

## 2018-08-28 PROCEDURE — 99999 PR PBB SHADOW E&M-EST. PATIENT-LVL III: CPT | Mod: PBBFAC,,, | Performed by: INTERNAL MEDICINE

## 2018-08-28 NOTE — PROGRESS NOTES
"Subjective:      Patient ID: Petra Zazueta is a 83 y.o. female.    Chief Complaint: Hospital Follow Up    HPI: 83 y.o. Black or  female        Review of Systems    Objective:   /78 (BP Location: Left arm, Patient Position: Sitting, BP Method: Large (Manual))   Pulse 92   Resp 18   Ht 5' 2" (1.575 m)   Wt 86.3 kg (190 lb 4.8 oz)   LMP  (LMP Unknown)   SpO2 98%   BMI 34.81 kg/m²     Physical Exam    Assessment:     No diagnosis found.  Plan:     There are no diagnoses linked to this encounter.    "

## 2018-08-28 NOTE — PROGRESS NOTES
Transitional Care Note  Subjective:       Patient ID: Petra Zazueta is a 83 y.o. female.  Chief Complaint: Hospital Follow Up    Family and/or Caretaker present at visit?  Yes.  Diagnostic tests reviewed/disposition: No diagnosic tests pending after this hospitalization.  Disease/illness education:YES  Home health/community services discussion/referrals: Patient does not have home health established from hospital visit.  They do not need home health.  If needed, we will set up home health for the patient.   Establishment or re-establishment of referral orders for community resources: No other necessary community resources.   Discussion with other health care providers: No discussion with other health care providers necessary.   HPI   83y/oAAF, admited to hospital in flash pulmonary edema. Due to her CKD Stage 4, needed to be diuresed gingerly.  She did well and was discharged.  Lost 7# since then.  Feels much better now.    Review of Systems   Constitutional: Positive for activity change. Negative for appetite change.   Eyes: Negative.    Respiratory: Negative for cough, shortness of breath and wheezing.    Cardiovascular: Negative for chest pain, palpitations and leg swelling.   Gastrointestinal: Negative.    Endocrine: Negative for polydipsia, polyphagia and polyuria.   Genitourinary: Positive for frequency and urgency.   Musculoskeletal: Negative for arthralgias and gait problem.   Neurological: Negative for dizziness and headaches.       Objective:      Physical Exam   Constitutional: She is oriented to person, place, and time. She appears well-developed and well-nourished.   HENT:   Head: Normocephalic and atraumatic.   Mouth/Throat: Oropharynx is clear and moist.   Eyes: Conjunctivae are normal.   Neck: No JVD present.   Cardiovascular: Normal rate and regular rhythm.   Murmur heard.  Pulmonary/Chest: Effort normal and breath sounds normal.   Musculoskeletal: She exhibits no edema.   Neurological: She is alert  and oriented to person, place, and time.   Skin: Skin is warm and dry.   Psychiatric: She has a normal mood and affect. Her behavior is normal.   Nursing note and vitals reviewed.      Assessment:       1. Acute on chronic diastolic heart failure    2. CKD (chronic kidney disease), stage IV    3. Essential hypertension    4. Type 2 diabetes mellitus with diabetic nephropathy, without long-term current use of insulin        Plan:       Same meds.  Low salt diet.

## 2018-08-30 ENCOUNTER — HOSPITAL ENCOUNTER (OUTPATIENT)
Facility: HOSPITAL | Age: 83
Discharge: HOME | End: 2018-09-01
Attending: EMERGENCY MEDICINE | Admitting: INTERNAL MEDICINE
Payer: MEDICARE

## 2018-08-30 DIAGNOSIS — R79.89 ELEVATED TROPONIN: Primary | ICD-10-CM

## 2018-08-30 DIAGNOSIS — I10 ESSENTIAL (PRIMARY) HYPERTENSION: ICD-10-CM

## 2018-08-30 DIAGNOSIS — R07.9 CHEST PAIN: ICD-10-CM

## 2018-08-30 DIAGNOSIS — I21.4 NSTEMI (NON-ST ELEVATED MYOCARDIAL INFARCTION): ICD-10-CM

## 2018-08-30 LAB
ALBUMIN SERPL BCP-MCNC: 4.2 G/DL
ALP SERPL-CCNC: 96 U/L
ALT SERPL W/O P-5'-P-CCNC: 12 U/L
ANION GAP SERPL CALC-SCNC: 12 MMOL/L
APTT BLDCRRT: 28.8 SEC
AST SERPL-CCNC: 21 U/L
BASOPHILS # BLD AUTO: 0.02 K/UL
BASOPHILS NFR BLD: 0.2 %
BILIRUB SERPL-MCNC: 0.4 MG/DL
BNP SERPL-MCNC: 138 PG/ML
BUN SERPL-MCNC: 41 MG/DL
CALCIUM SERPL-MCNC: 10.5 MG/DL
CHLORIDE SERPL-SCNC: 105 MMOL/L
CO2 SERPL-SCNC: 22 MMOL/L
CREAT SERPL-MCNC: 2.2 MG/DL
DIFFERENTIAL METHOD: ABNORMAL
EOSINOPHIL # BLD AUTO: 0.1 K/UL
EOSINOPHIL NFR BLD: 0.6 %
ERYTHROCYTE [DISTWIDTH] IN BLOOD BY AUTOMATED COUNT: 13.7 %
EST. GFR  (AFRICAN AMERICAN): 23 ML/MIN/1.73 M^2
EST. GFR  (NON AFRICAN AMERICAN): 20 ML/MIN/1.73 M^2
ESTIMATED AVG GLUCOSE: 177 MG/DL
GLUCOSE SERPL-MCNC: 234 MG/DL
HBA1C MFR BLD HPLC: 7.8 %
HCT VFR BLD AUTO: 36.4 %
HGB BLD-MCNC: 11.4 G/DL
INR PPP: 1
INR PPP: 1
LYMPHOCYTES # BLD AUTO: 1.5 K/UL
LYMPHOCYTES NFR BLD: 18.4 %
MCH RBC QN AUTO: 30.2 PG
MCHC RBC AUTO-ENTMCNC: 31.3 G/DL
MCV RBC AUTO: 96 FL
MONOCYTES # BLD AUTO: 0.3 K/UL
MONOCYTES NFR BLD: 3.9 %
NEUTROPHILS # BLD AUTO: 6.4 K/UL
NEUTROPHILS NFR BLD: 76.7 %
PLATELET # BLD AUTO: 197 K/UL
PMV BLD AUTO: 11.8 FL
POCT GLUCOSE: 136 MG/DL (ref 70–110)
POTASSIUM SERPL-SCNC: 4 MMOL/L
PROT SERPL-MCNC: 7.7 G/DL
PROTHROMBIN TIME: 10.3 SEC
PROTHROMBIN TIME: 10.5 SEC
RBC # BLD AUTO: 3.78 M/UL
SODIUM SERPL-SCNC: 139 MMOL/L
TROPONIN I SERPL DL<=0.01 NG/ML-MCNC: 0.21 NG/ML
TROPONIN I SERPL DL<=0.01 NG/ML-MCNC: 0.92 NG/ML
WBC # BLD AUTO: 8.36 K/UL

## 2018-08-30 PROCEDURE — 83880 ASSAY OF NATRIURETIC PEPTIDE: CPT

## 2018-08-30 PROCEDURE — 84484 ASSAY OF TROPONIN QUANT: CPT

## 2018-08-30 PROCEDURE — 82962 GLUCOSE BLOOD TEST: CPT

## 2018-08-30 PROCEDURE — 84484 ASSAY OF TROPONIN QUANT: CPT | Mod: 91

## 2018-08-30 PROCEDURE — 83036 HEMOGLOBIN GLYCOSYLATED A1C: CPT

## 2018-08-30 PROCEDURE — 63600175 PHARM REV CODE 636 W HCPCS: Performed by: STUDENT IN AN ORGANIZED HEALTH CARE EDUCATION/TRAINING PROGRAM

## 2018-08-30 PROCEDURE — 36415 COLL VENOUS BLD VENIPUNCTURE: CPT

## 2018-08-30 PROCEDURE — G0378 HOSPITAL OBSERVATION PER HR: HCPCS

## 2018-08-30 PROCEDURE — 93010 ELECTROCARDIOGRAM REPORT: CPT | Mod: ,,, | Performed by: INTERNAL MEDICINE

## 2018-08-30 PROCEDURE — 85025 COMPLETE CBC W/AUTO DIFF WBC: CPT

## 2018-08-30 PROCEDURE — 93010 ELECTROCARDIOGRAM REPORT: CPT | Mod: 76,,, | Performed by: INTERNAL MEDICINE

## 2018-08-30 PROCEDURE — 85730 THROMBOPLASTIN TIME PARTIAL: CPT

## 2018-08-30 PROCEDURE — 93005 ELECTROCARDIOGRAM TRACING: CPT

## 2018-08-30 PROCEDURE — 25000003 PHARM REV CODE 250: Performed by: STUDENT IN AN ORGANIZED HEALTH CARE EDUCATION/TRAINING PROGRAM

## 2018-08-30 PROCEDURE — 99285 EMERGENCY DEPT VISIT HI MDM: CPT | Mod: 25

## 2018-08-30 PROCEDURE — 85610 PROTHROMBIN TIME: CPT | Mod: 91

## 2018-08-30 PROCEDURE — 93005 ELECTROCARDIOGRAM TRACING: CPT | Mod: 59

## 2018-08-30 PROCEDURE — 80053 COMPREHEN METABOLIC PANEL: CPT

## 2018-08-30 PROCEDURE — 85610 PROTHROMBIN TIME: CPT

## 2018-08-30 RX ORDER — HEPARIN SODIUM,PORCINE/D5W 25000/250
12 INTRAVENOUS SOLUTION INTRAVENOUS CONTINUOUS
Status: DISCONTINUED | OUTPATIENT
Start: 2018-08-30 | End: 2018-08-31

## 2018-08-30 RX ORDER — FUROSEMIDE 40 MG/1
40 TABLET ORAL DAILY
Status: DISCONTINUED | OUTPATIENT
Start: 2018-08-31 | End: 2018-08-31

## 2018-08-30 RX ORDER — ASPIRIN 81 MG/1
81 TABLET ORAL DAILY
Status: DISCONTINUED | OUTPATIENT
Start: 2018-08-31 | End: 2018-09-01 | Stop reason: HOSPADM

## 2018-08-30 RX ORDER — IBUPROFEN 200 MG
16 TABLET ORAL
Status: DISCONTINUED | OUTPATIENT
Start: 2018-08-30 | End: 2018-09-01 | Stop reason: HOSPADM

## 2018-08-30 RX ORDER — RAMELTEON 8 MG/1
8 TABLET ORAL NIGHTLY PRN
Status: DISCONTINUED | OUTPATIENT
Start: 2018-08-30 | End: 2018-09-01 | Stop reason: HOSPADM

## 2018-08-30 RX ORDER — IBUPROFEN 200 MG
24 TABLET ORAL
Status: DISCONTINUED | OUTPATIENT
Start: 2018-08-30 | End: 2018-09-01 | Stop reason: HOSPADM

## 2018-08-30 RX ORDER — HEPARIN SODIUM 5000 [USP'U]/ML
5000 INJECTION, SOLUTION INTRAVENOUS; SUBCUTANEOUS EVERY 8 HOURS
Status: DISCONTINUED | OUTPATIENT
Start: 2018-08-30 | End: 2018-08-30

## 2018-08-30 RX ORDER — ACETAMINOPHEN 500 MG
500 TABLET ORAL EVERY 6 HOURS PRN
Status: DISCONTINUED | OUTPATIENT
Start: 2018-08-30 | End: 2018-09-01 | Stop reason: HOSPADM

## 2018-08-30 RX ORDER — INSULIN ASPART 100 [IU]/ML
0-5 INJECTION, SOLUTION INTRAVENOUS; SUBCUTANEOUS
Status: DISCONTINUED | OUTPATIENT
Start: 2018-08-30 | End: 2018-09-01 | Stop reason: HOSPADM

## 2018-08-30 RX ORDER — METOPROLOL TARTRATE 25 MG/1
12.5 TABLET ORAL 2 TIMES DAILY
Status: DISCONTINUED | OUTPATIENT
Start: 2018-08-30 | End: 2018-09-01 | Stop reason: HOSPADM

## 2018-08-30 RX ORDER — SIMETHICONE 125 MG
125 TABLET,CHEWABLE ORAL EVERY 6 HOURS PRN
Status: DISCONTINUED | OUTPATIENT
Start: 2018-08-30 | End: 2018-09-01 | Stop reason: HOSPADM

## 2018-08-30 RX ORDER — GABAPENTIN 300 MG/1
300 CAPSULE ORAL 2 TIMES DAILY
Status: DISCONTINUED | OUTPATIENT
Start: 2018-08-30 | End: 2018-09-01 | Stop reason: HOSPADM

## 2018-08-30 RX ORDER — ATORVASTATIN CALCIUM 40 MG/1
40 TABLET, FILM COATED ORAL NIGHTLY
Status: DISCONTINUED | OUTPATIENT
Start: 2018-08-30 | End: 2018-08-31

## 2018-08-30 RX ORDER — GLUCAGON 1 MG
1 KIT INJECTION
Status: DISCONTINUED | OUTPATIENT
Start: 2018-08-30 | End: 2018-09-01 | Stop reason: HOSPADM

## 2018-08-30 RX ORDER — SODIUM CHLORIDE 0.9 % (FLUSH) 0.9 %
5 SYRINGE (ML) INJECTION
Status: DISCONTINUED | OUTPATIENT
Start: 2018-08-30 | End: 2018-09-01 | Stop reason: HOSPADM

## 2018-08-30 RX ORDER — HYDRALAZINE HYDROCHLORIDE 25 MG/1
50 TABLET, FILM COATED ORAL EVERY 8 HOURS
Status: DISCONTINUED | OUTPATIENT
Start: 2018-08-30 | End: 2018-09-01 | Stop reason: HOSPADM

## 2018-08-30 RX ORDER — FERROUS SULFATE 325(65) MG
TABLET ORAL
Qty: 30 TABLET | Refills: 0 | Status: ON HOLD | OUTPATIENT
Start: 2018-08-30 | End: 2018-08-30 | Stop reason: CLARIF

## 2018-08-30 RX ORDER — LANOLIN ALCOHOL/MO/W.PET/CERES
1000 CREAM (GRAM) TOPICAL DAILY
Status: DISCONTINUED | OUTPATIENT
Start: 2018-08-31 | End: 2018-09-01 | Stop reason: HOSPADM

## 2018-08-30 RX ADMIN — HEPARIN SODIUM AND DEXTROSE 12 UNITS/KG/HR: 10000; 5 INJECTION INTRAVENOUS at 10:08

## 2018-08-30 RX ADMIN — HYDRALAZINE HYDROCHLORIDE 50 MG: 25 TABLET, FILM COATED ORAL at 09:08

## 2018-08-30 RX ADMIN — METOPROLOL TARTRATE 12.5 MG: 25 TABLET, FILM COATED ORAL at 08:08

## 2018-08-30 RX ADMIN — GABAPENTIN 300 MG: 300 CAPSULE ORAL at 08:08

## 2018-08-30 RX ADMIN — HEPARIN SODIUM 5000 UNITS: 5000 INJECTION, SOLUTION INTRAVENOUS; SUBCUTANEOUS at 08:08

## 2018-08-30 RX ADMIN — ATORVASTATIN CALCIUM 40 MG: 40 TABLET, FILM COATED ORAL at 08:08

## 2018-08-30 NOTE — ED NOTES
Pt states she began having midsternal chest pressure that radiated under her left breast last Thursday. Was admitted to [facility] for one day for this complaint and returned home. Pt states pain returned again yesterday, so her son called 911 this afternoon. Took 325mg of aspirin at home and then received 3 sprays of nitro from [facility]. Pt reports total relief of pain at this time, but states she feels like her heart is racing. Also c/o SOB for the past few days. Sinus rhythm noted on monitor. Skin is warm, dry. Respirations even, unlabored.

## 2018-08-30 NOTE — ED PROVIDER NOTES
Encounter Date: 8/30/2018    SCRIBE #1 NOTE: I, Rohan Antoine, am scribing for, and in the presence of,  Dr. Nicole De Anda. I have scribed the entire note.       History     Chief Complaint   Patient presents with    Chest Pain     midsternal chest pain that began this morning, took 325 ASA at home, NTG SL x 3 per EMS which helped her CP, recent admission to [facility] for CHF     Petra Zazueta is a 83 y.o. female who  has a past medical history of Anemia, Arthritis, Diabetes mellitus, Diabetes mellitus type II, Dyslipidemia, Hypertension, Insomnia, and PAD (peripheral artery disease).    Patient presents to the ED due to chest pain and SOB with palpitations this morning at 0830.  Pain described as heavy and stabbing. Nothing seemed to alleviate, patient tried to go to bed to avoid pain. Symptoms lasted until EMS arrived and gave NTG, which greatly alleviated symptoms. She had some associated diaphoresis last night continuing through this morning, denies nausea or vomiting. She has a cardiologist at , has hx of stent placement. She came in with similar symptoms last Thursday and was admitted for observation, discharged. She has no hx of MI.         The history is provided by the patient and the spouse.     Review of patient's allergies indicates:   Allergen Reactions    Codeine Other (See Comments)     Jittery, lightheadedness, nausea     Past Medical History:   Diagnosis Date    Anemia     Arthritis     Coronary artery disease     Diabetes mellitus     Diabetes mellitus type II     Dyslipidemia     Hypertension     Insomnia     PAD (peripheral artery disease)      Past Surgical History:   Procedure Laterality Date    CORONARY ANGIOPLASTY      RCA 4/2011     GALLBLADDER SURGERY      HYSTERECTOMY      PERIPHERAL ARTERIAL STENT GRAFT      Left lower extremity RCA      Family History   Problem Relation Age of Onset    Heart attack Mother      Social History     Tobacco Use    Smoking  status: Former Smoker     Last attempt to quit: 2005     Years since quittin.6    Smokeless tobacco: Never Used   Substance Use Topics    Alcohol use: No    Drug use: No     Review of Systems   Constitutional: Positive for diaphoresis.   Respiratory: Positive for shortness of breath.    Cardiovascular: Positive for chest pain and palpitations.   All other systems reviewed and are negative.      Physical Exam     Initial Vitals [18 1334]   BP Pulse Resp Temp SpO2   (!) 148/57 96 18 98.3 °F (36.8 °C) 99 %      MAP       --         Physical Exam    Nursing note and vitals reviewed.  Constitutional: She appears well-developed and well-nourished. She is not diaphoretic. No distress.   HENT:   Head: Normocephalic and atraumatic.   Right Ear: External ear normal.   Left Ear: External ear normal.   Nose: Nose normal.   Eyes: Conjunctivae are normal.   Neck: Normal range of motion.   Cardiovascular: Normal rate, regular rhythm and intact distal pulses. Exam reveals no friction rub.    Murmur heard.  2+ radial pulses  no peripheral edema   Pulmonary/Chest: Breath sounds normal. No respiratory distress. She has no wheezes. She has no rhonchi. She has no rales.   Abdominal: Bowel sounds are normal. She exhibits no distension. There is no tenderness. There is no rebound and no guarding.   Musculoskeletal: Normal range of motion. She exhibits no edema.   Neurological: She is alert and oriented to person, place, and time.   Skin: Skin is warm and dry. Capillary refill takes less than 2 seconds.   Psychiatric: She has a normal mood and affect.         ED Course   Procedures  Labs Reviewed   CBC W/ AUTO DIFFERENTIAL - Abnormal; Notable for the following components:       Result Value    RBC 3.78 (*)     Hemoglobin 11.4 (*)     Hematocrit 36.4 (*)     MCHC 31.3 (*)     Gran% 76.7 (*)     Mono% 3.9 (*)     All other components within normal limits   COMPREHENSIVE METABOLIC PANEL - Abnormal; Notable for the  following components:    CO2 22 (*)     Glucose 234 (*)     BUN, Bld 41 (*)     Creatinine 2.2 (*)     eGFR if  23 (*)     eGFR if non  20 (*)     All other components within normal limits   B-TYPE NATRIURETIC PEPTIDE - Abnormal; Notable for the following components:     (*)     All other components within normal limits   TROPONIN I - Abnormal; Notable for the following components:    Troponin I 0.207 (*)     All other components within normal limits   HEMOGLOBIN A1C - Abnormal; Notable for the following components:    Hemoglobin A1C 7.8 (*)     Estimated Avg Glucose 177 (*)     All other components within normal limits   POCT GLUCOSE - Abnormal; Notable for the following components:    POCT Glucose 136 (*)     All other components within normal limits   PROTIME-INR          Imaging Results          X-Ray Chest PA And Lateral (Final result)  Result time 08/30/18 14:54:25    Final result by Renato Jiménez DO (08/30/18 14:54:25)                 Impression:      See above      Electronically signed by: Renato Jiménez DO  Date:    08/30/2018  Time:    14:54             Narrative:    EXAMINATION:  XR CHEST PA AND LATERAL    CLINICAL HISTORY:  Chest Pain;    TECHNIQUE:  PA and lateral views of the chest were performed.    COMPARISON:  08/23/2018    FINDINGS:  Previous ill-defined right mid to lower lobe lung opacity less apparent there is continued prominence of the hilar vasculature cannot exclude component of vascular congestion.  There is no increased lung opacity.  No large pleural effusion or pneumothorax.  Tortuous atherosclerotic aorta.  Clinical correlation and follow-up advised                                 Medical Decision Making:   Initial Assessment:   Patient is a 83 y.o. female presenting to ED with chest pain, SOB, diaphoresis onset this morning at 0830.   Exam benign, heart murmur noted. No peripheral edema.   Plan to obtain cardiac workup.  Will continue to  monitor closely and reassess.    Differential Diagnosis:   Myocardial ischemia, pericarditis, pulmonary embolus, chest wall pain, pleural inflammation and pulmonary infectious causes.    Independently Interpreted Test(s):   I have ordered and independently interpreted EKG Reading(s) - see prior notes  Clinical Tests:   Lab Tests: Ordered and Reviewed  The following lab test(s) were unremarkable: CBC and CMP  Radiological Study: Ordered and Reviewed  Medical Tests: Ordered and Reviewed  ED Management:  Patient with elevated troponin.  No ischemic changed on EKG.  Given ASA and will be admitted for further management                   ED Course as of Sep 02 0011   Thu Aug 30, 2018   1541 [facility] IM consulted for admission  [LD]   1725 EKG with sinus rhythm, rate 99 bpm.  +LVH.  No STEMI  [LD]      ED Course User Index  [LD] Nicole De Anda MD     Clinical Impression:   The primary encounter diagnosis was Elevated troponin. Diagnoses of Chest pain, NSTEMI (non-ST elevated myocardial infarction), and Essential (primary) hypertension were also pertinent to this visit.      Disposition:   Disposition: Admitted  Condition: Stable       I, Nicole De Anda,  personally performed the services described in this documentation. All medical record entries made by the scribe were at my direction and in my presence.  I have reviewed the chart and agree that the record reflects my personal performance and is accurate and complete. Nicole De Anda M.D. 12:11 AM09/02/2018                   Nicole De Anda MD  09/02/18 0011

## 2018-08-31 PROBLEM — R79.89 ELEVATED TROPONIN: Status: ACTIVE | Noted: 2018-08-31

## 2018-08-31 LAB
ALBUMIN SERPL BCP-MCNC: 3.8 G/DL
ALP SERPL-CCNC: 89 U/L
ALT SERPL W/O P-5'-P-CCNC: 10 U/L
ANION GAP SERPL CALC-SCNC: 10 MMOL/L
APTT BLDCRRT: 59.4 SEC
APTT BLDCRRT: 67.6 SEC
APTT BLDCRRT: 67.6 SEC
AST SERPL-CCNC: 23 U/L
BASOPHILS # BLD AUTO: 0.02 K/UL
BASOPHILS NFR BLD: 0.3 %
BILIRUB SERPL-MCNC: 0.5 MG/DL
BUN SERPL-MCNC: 34 MG/DL
CALCIUM SERPL-MCNC: 10.3 MG/DL
CHLORIDE SERPL-SCNC: 107 MMOL/L
CHOLEST SERPL-MCNC: 155 MG/DL
CHOLEST/HDLC SERPL: 3.4 {RATIO}
CO2 SERPL-SCNC: 23 MMOL/L
CREAT SERPL-MCNC: 1.7 MG/DL
DIFFERENTIAL METHOD: ABNORMAL
EOSINOPHIL # BLD AUTO: 0.1 K/UL
EOSINOPHIL NFR BLD: 1.8 %
ERYTHROCYTE [DISTWIDTH] IN BLOOD BY AUTOMATED COUNT: 13.7 %
EST. GFR  (AFRICAN AMERICAN): 32 ML/MIN/1.73 M^2
EST. GFR  (NON AFRICAN AMERICAN): 28 ML/MIN/1.73 M^2
GLUCOSE SERPL-MCNC: 170 MG/DL
HCT VFR BLD AUTO: 36.8 %
HDLC SERPL-MCNC: 46 MG/DL
HDLC SERPL: 29.7 %
HGB BLD-MCNC: 11.9 G/DL
LDLC SERPL CALC-MCNC: 87.8 MG/DL
LYMPHOCYTES # BLD AUTO: 1.9 K/UL
LYMPHOCYTES NFR BLD: 26.7 %
MCH RBC QN AUTO: 31.1 PG
MCHC RBC AUTO-ENTMCNC: 32.3 G/DL
MCV RBC AUTO: 96 FL
MONOCYTES # BLD AUTO: 0.4 K/UL
MONOCYTES NFR BLD: 5.8 %
NEUTROPHILS # BLD AUTO: 4.6 K/UL
NEUTROPHILS NFR BLD: 65.1 %
NONHDLC SERPL-MCNC: 109 MG/DL
PLATELET # BLD AUTO: 198 K/UL
PMV BLD AUTO: 11.6 FL
POCT GLUCOSE: 132 MG/DL (ref 70–110)
POCT GLUCOSE: 133 MG/DL (ref 70–110)
POCT GLUCOSE: 137 MG/DL (ref 70–110)
POCT GLUCOSE: 159 MG/DL (ref 70–110)
POCT GLUCOSE: 163 MG/DL (ref 70–110)
POTASSIUM SERPL-SCNC: 4 MMOL/L
PROT SERPL-MCNC: 7.4 G/DL
RBC # BLD AUTO: 3.83 M/UL
SODIUM SERPL-SCNC: 140 MMOL/L
TRIGL SERPL-MCNC: 106 MG/DL
TROPONIN I SERPL DL<=0.01 NG/ML-MCNC: 0.94 NG/ML
TROPONIN I SERPL DL<=0.01 NG/ML-MCNC: 1.37 NG/ML
WBC # BLD AUTO: 7.07 K/UL

## 2018-08-31 PROCEDURE — 36415 COLL VENOUS BLD VENIPUNCTURE: CPT

## 2018-08-31 PROCEDURE — 94761 N-INVAS EAR/PLS OXIMETRY MLT: CPT

## 2018-08-31 PROCEDURE — 63600175 PHARM REV CODE 636 W HCPCS: Performed by: STUDENT IN AN ORGANIZED HEALTH CARE EDUCATION/TRAINING PROGRAM

## 2018-08-31 PROCEDURE — 63600175 PHARM REV CODE 636 W HCPCS

## 2018-08-31 PROCEDURE — 25000003 PHARM REV CODE 250: Performed by: STUDENT IN AN ORGANIZED HEALTH CARE EDUCATION/TRAINING PROGRAM

## 2018-08-31 PROCEDURE — 93010 ELECTROCARDIOGRAM REPORT: CPT | Mod: ,,, | Performed by: INTERNAL MEDICINE

## 2018-08-31 PROCEDURE — 85730 THROMBOPLASTIN TIME PARTIAL: CPT | Mod: 91

## 2018-08-31 PROCEDURE — 85730 THROMBOPLASTIN TIME PARTIAL: CPT

## 2018-08-31 PROCEDURE — 25500020 PHARM REV CODE 255

## 2018-08-31 PROCEDURE — 84484 ASSAY OF TROPONIN QUANT: CPT | Mod: 91

## 2018-08-31 PROCEDURE — 85025 COMPLETE CBC W/AUTO DIFF WBC: CPT

## 2018-08-31 PROCEDURE — C1894 INTRO/SHEATH, NON-LASER: HCPCS

## 2018-08-31 PROCEDURE — 93005 ELECTROCARDIOGRAM TRACING: CPT | Mod: 59

## 2018-08-31 PROCEDURE — G0378 HOSPITAL OBSERVATION PER HR: HCPCS

## 2018-08-31 PROCEDURE — 99152 MOD SED SAME PHYS/QHP 5/>YRS: CPT | Mod: ,,, | Performed by: INTERNAL MEDICINE

## 2018-08-31 PROCEDURE — 80053 COMPREHEN METABOLIC PANEL: CPT

## 2018-08-31 PROCEDURE — 25000003 PHARM REV CODE 250

## 2018-08-31 PROCEDURE — 80061 LIPID PANEL: CPT

## 2018-08-31 PROCEDURE — 93458 L HRT ARTERY/VENTRICLE ANGIO: CPT | Mod: 26,,, | Performed by: INTERNAL MEDICINE

## 2018-08-31 PROCEDURE — C1769 GUIDE WIRE: HCPCS

## 2018-08-31 PROCEDURE — 84484 ASSAY OF TROPONIN QUANT: CPT

## 2018-08-31 RX ORDER — ASPIRIN 325 MG
325 TABLET ORAL ONCE
Status: COMPLETED | OUTPATIENT
Start: 2018-08-31 | End: 2018-08-31

## 2018-08-31 RX ORDER — ATORVASTATIN CALCIUM 40 MG/1
80 TABLET, FILM COATED ORAL NIGHTLY
Status: DISCONTINUED | OUTPATIENT
Start: 2018-08-31 | End: 2018-09-01 | Stop reason: HOSPADM

## 2018-08-31 RX ORDER — HYDRALAZINE HYDROCHLORIDE 20 MG/ML
10 INJECTION INTRAMUSCULAR; INTRAVENOUS EVERY 6 HOURS PRN
Status: DISCONTINUED | OUTPATIENT
Start: 2018-08-31 | End: 2018-09-01 | Stop reason: HOSPADM

## 2018-08-31 RX ORDER — PIOGLITAZONEHYDROCHLORIDE 30 MG/1
TABLET ORAL
Qty: 90 TABLET | Refills: 3 | Status: SHIPPED | OUTPATIENT
Start: 2018-08-31 | End: 2019-10-03 | Stop reason: SDUPTHER

## 2018-08-31 RX ORDER — LOSARTAN POTASSIUM 50 MG/1
50 TABLET ORAL DAILY
Status: DISCONTINUED | OUTPATIENT
Start: 2018-08-31 | End: 2018-09-01 | Stop reason: HOSPADM

## 2018-08-31 RX ORDER — SODIUM CHLORIDE 9 MG/ML
INJECTION, SOLUTION INTRAVENOUS CONTINUOUS
Status: ACTIVE | OUTPATIENT
Start: 2018-08-31 | End: 2018-08-31

## 2018-08-31 RX ADMIN — METOPROLOL TARTRATE 12.5 MG: 25 TABLET, FILM COATED ORAL at 09:08

## 2018-08-31 RX ADMIN — METOPROLOL TARTRATE 12.5 MG: 25 TABLET, FILM COATED ORAL at 08:08

## 2018-08-31 RX ADMIN — ASPIRIN 325 MG ORAL TABLET 325 MG: 325 PILL ORAL at 09:08

## 2018-08-31 RX ADMIN — HYDRALAZINE HYDROCHLORIDE 50 MG: 25 TABLET, FILM COATED ORAL at 09:08

## 2018-08-31 RX ADMIN — FUROSEMIDE 40 MG: 40 TABLET ORAL at 08:08

## 2018-08-31 RX ADMIN — HYDRALAZINE HYDROCHLORIDE 10 MG: 20 INJECTION INTRAMUSCULAR; INTRAVENOUS at 04:08

## 2018-08-31 RX ADMIN — ASPIRIN 81 MG: 81 TABLET, COATED ORAL at 08:08

## 2018-08-31 RX ADMIN — HYDRALAZINE HYDROCHLORIDE 50 MG: 25 TABLET, FILM COATED ORAL at 04:08

## 2018-08-31 RX ADMIN — LOSARTAN POTASSIUM 50 MG: 50 TABLET, FILM COATED ORAL at 08:08

## 2018-08-31 RX ADMIN — ATORVASTATIN CALCIUM 80 MG: 40 TABLET, FILM COATED ORAL at 08:08

## 2018-08-31 RX ADMIN — SODIUM CHLORIDE: 0.9 INJECTION, SOLUTION INTRAVENOUS at 09:08

## 2018-08-31 RX ADMIN — GABAPENTIN 300 MG: 300 CAPSULE ORAL at 08:08

## 2018-08-31 RX ADMIN — CYANOCOBALAMIN TAB 1000 MCG 1000 MCG: 1000 TAB at 08:08

## 2018-08-31 RX ADMIN — SODIUM CHLORIDE: 0.9 INJECTION, SOLUTION INTRAVENOUS at 03:08

## 2018-08-31 RX ADMIN — ACETAMINOPHEN 500 MG: 500 TABLET ORAL at 08:08

## 2018-08-31 NOTE — NURSING
Pt bilateral wrists and groin areas clipped and prepped. Bath given; clean gown on. Pt instructed to remove all jewelry and dentures. Heparin and continuous IV fluids infusing to bilateral AC PIVs. Consent signed and placed in chart.

## 2018-08-31 NOTE — PLAN OF CARE
16:00 3 mL of air removed from radial vasc band.  No hematoma or bleeding noted.  +2 francesco radial pulses palpated. Skin normal in color, warm to touch, < 3 sec cap refill.   Will continue to monitor pt.

## 2018-08-31 NOTE — BRIEF OP NOTE
Procedure Note    Preop Dx: NSTEMI    Post-op Dx:   NSTEMI: no acute findings.  Widely patent RCA stent  Volume overload    Complications: None    Access: Right radial initially; couldn't advance catheter given tortuous aortic arch. Transitioned to right femoral    Findings:    LM: Normal caliber; no obstruction. Bifurcates into LAD and LcX  LAD: Normal, tortuous, to apex,  no obstructive lesions present.   LcX: Normal caliber, tortuous, gives off 2 large OMs, nonobstructive   RCA: Dominant vessel, normal caliber, mRCA stent patent without any obstruction    LVEDP: 28mmHg      Impression:  Volume overload  Nonobstructive coronaries    Recommendations:  Stop heparin drip  Cont ASA and lipitor 80mg daily  Routine post op care  GDMT for CAD  IVF @75hr x 6 hours given CKD  Monitor overnight.

## 2018-08-31 NOTE — PLAN OF CARE
Patient AAOx3  Lives at home alone  Son at bedside  Has Sergio NAVARRETE (uses prn)    Future Appointments   Date Time Provider Department Center   9/10/2018  1:00 PM Olena Jack NP [facility]rie     On heparin gtt- to cath lab today       08/31/18 1111   Discharge Assessment   Assessment Type Discharge Planning Assessment   Confirmed/corrected address and phone number on facesheet? Yes   Assessment information obtained from? Patient   Communicated expected length of stay with patient/caregiver yes   Prior to hospitilization cognitive status: Alert/Oriented   Prior to hospitalization functional status: Independent   Current cognitive status: Alert/Oriented   Current Functional Status: Independent   Lives With alone   Able to Return to Prior Arrangements yes   Is patient able to care for self after discharge? Yes   Patient's perception of discharge disposition home or selfcare   Readmission Within The Last 30 Days no previous admission in last 30 days   Patient currently being followed by outpatient case management? No   Patient currently receives any other outside agency services? No   Equipment Currently Used at Home walker, rolling;cane, straight   Do you have any problems affording any of your prescribed medications? No   Is the patient taking medications as prescribed? yes   Does the patient have transportation home? Yes   Transportation Available family or friend will provide   Does the patient receive services at the [facility]? No   Patient/Family In Agreement With Plan yes     Enma Taylor RN, CCM, CMSRN  RN Transition Navigator  [phone]

## 2018-08-31 NOTE — NURSING
Aptt 59.4 (second consecutive therapeutic aptt). Per nomogram, no change in infusion rate. Aptt to be redrawn with AM labs.

## 2018-08-31 NOTE — NURSING
Right wrist and right groin dressing CDI; soft, no hematoma or bleeding noted. Vitals documented per flowsheet. Bilateral radial pulses palpable. Bilateral pedal pulses weak; checked w/ doppler. Pt verbalized to remain supine until recovery end time.

## 2018-08-31 NOTE — PLAN OF CARE
16:30Remaining air removed from right radial vasc band. Gauze and tegaderm dressing applied c.d.i.   No drainage or shadowing noted. No bleeding or hematoma noted around site. +2 francesco radial pulses palpated. Skin normal in color, warm to touch, < 3 sec cap refill.   Pt tolerated well.  Will continue to monitor pt.

## 2018-08-31 NOTE — NURSING
Assumed care of patient from CALLI Bowens. Patient is AAOx4. On cardiac monitoring, no true red alarms noted. Denies chest pain at this time. Continuous heparin going at 14u.  Blood glucose monitored. NPO diet maintained. Fall precautions explained and maintained. Advised patient to use call light for assistance, will continue to monitor.

## 2018-08-31 NOTE — H&P
"[facility] Internal Medicine History and Physical - Resident Note    Admitting Team: Medicine Team [place]  Attending Physician: Gloria  Resident: Heron  Interns: Navin    Date of Admit: 8/30/2018    Chief Complaint     Chest Pain (midsternal chest pain that began this morning, took 325 ASA at home, NTG SL x 3 per EMS which helped her CP, recent admission to [facility] for CHF)   for 1 day    Subjective:      History of Present Illness:  Petra Zazueta is a 83 y.o. female who  has a past medical history of Anemia, Arthritis, Diabetes mellitus, Diabetes mellitus type II, Dyslipidemia, Hypertension, Insomnia, and PAD (peripheral artery disease).. The patient presented to [facility] on 8/30/2018 with a primary complaint of Chest Pain (midsternal chest pain that began this morning, took 325 ASA at home, NTG SL x 3 per EMS which helped her CP, recent admission to [facility] for CHF)      Patient reports she was in her usual state of health until this morning when she started to have chest pain. Pain is described as "tightness", located midsternum, radiated to left breast and shoulder, did not radiate to back or neck. Patient was sitting down "doing nothing" when the pain came on all of a sudden, lasted for 1.5 hours and got progressively worse. Pain was associated with SOB and sweating. She denies N/V. Pain not worse with deep inspiration. Denies recent illness, heaving lifting or chest trauma. Denies recent stressors or anxiety.   Patient took christi seltzer which had no effect on the pain. She also chewed 3 aspirins which helped the pain. EMS was called, and patient was given NG in route and pain resolved.     Of note, patient was admitted last week for "fluid buildup". She has had prior stent placement on her LLE, so LLE is larger at baseline than right. She endorses mild pedal edema at baseline and reports swelling is not more than her baseline currently. Patient can walk about 2 " blocks, limited by leg cramping. Sleeps on 2 pillows at night. Denies prior MI or stroke. Endorses cardiac stent placed by [facility].     Past Medical History:  Past Medical History:   Diagnosis Date    Anemia     Arthritis     Diabetes mellitus     Diabetes mellitus type II     Dyslipidemia     Hypertension     Insomnia     PAD (peripheral artery disease)        Past Surgical History:  Past Surgical History:   Procedure Laterality Date    CORONARY ANGIOPLASTY      RCA 4/2011 E[facility]    GALLBLADDER SURGERY      HYSTERECTOMY      PERIPHERAL ARTERIAL STENT GRAFT      Left lower extremity RCA        Allergies:  Review of patient's allergies indicates:   Allergen Reactions    Codeine Other (See Comments)     Jittery, lightheadedness, nausea       Home Medications:  Prior to Admission medications    Medication Sig Start Date End Date Taking? Authorizing Provider   aspirin (ECOTRIN) 81 MG EC tablet Take 81 mg by mouth once daily.      Historical Provider, MD   blood sugar diagnostic Strp Disp whichever is covered by insurance/Contour by name 8/28/18   Jasmin David MD   cephALEXin (KEFLEX) 250 MG capsule Take 1 capsule (250 mg total) by mouth every 12 (twelve) hours. 8/24/18   Pallavi Sunkara, MD   cilostazol (PLETAL) 100 MG Tab Take 100 mg by mouth 2 (two) times daily. 2/27/16   Historical Provider, MD   cloNIDine (CATAPRES) 0.1 MG tablet  6/21/18   Historical Provider, MD   cyanocobalamin (VITAMIN B-12) 1000 MCG tablet Take 1 tablet (1,000 mcg total) by mouth once daily. 8/15/13   Jose Juan Nath Jr., MD   ferrous sulfate 325 mg (65 mg iron) Tab tablet Take 1 tablet (325 mg total) by mouth daily with breakfast. 6/27/18   Polly Greer DO   ferrous sulfate 325 mg (65 mg iron) Tab tablet TAKE 1 TABLET( 325 MG TOTAL) BY MOUTH DAILY WITH BREAKFAST 8/30/18   Jasmin David MD   fish oil-omega-3 fatty acids 300-1,000 mg capsule Take 500 mg by mouth 2 (two) times daily.    Historical Provider, MD    furosemide (LASIX) 40 MG tablet Take 1 tablet (40 mg total) by mouth once daily. 18  Pallavi Sunkara, MD   gabapentin (NEURONTIN) 300 MG capsule TAKE 2 CAPSULES (600 MG TOTAL) BY MOUTH EVERY EVENING. 18   Jose Juan Nath Jr., MD   glimepiride (AMARYL) 2 MG tablet Take 1 tablet (2 mg total) by mouth before breakfast. 18   Pallavi Sunkara, MD   hydrALAZINE (APRESOLINE) 50 MG tablet Take 1 tablet (50 mg total) by mouth every 8 (eight) hours. 18   Pallavi Sunkara, MD   latanoprost 0.005 % ophthalmic solution Place 1 drop into both eyes every evening.  14   Historical Provider, MD   losartan (COZAAR) 100 MG tablet Take 1 tablet (100 mg total) by mouth once daily. 18   Pallavi Sunkara, MD   lovastatin (MEVACOR) 40 MG tablet TAKE 1 TABLET BY MOUTH EVERY EVENING 18   Jose Juan Nath Jr., MD   SITagliptin (JANUVIA) 50 MG Tab Take 1 tablet (50 mg total) by mouth once daily. 18   [facility] JYOTI David MD       Family History:  Family History   Problem Relation Age of Onset    Heart attack Mother        Social History:  Social History     Tobacco Use    Smoking status: Former Smoker     Last attempt to quit: 2005     Years since quittin.6    Smokeless tobacco: Never Used   Substance Use Topics    Alcohol use: No    Drug use: No       Review of Systems:  Pertinent positives and negatives are listed in HPI.  All other systems are reviewed and are negative.    Health Maintaince :   Primary Care Physician: Joann  Immunizations:   TDap unknown.  Influenza is up to date.  Pneumovax is up to date.  Cancer Screening:  PAP: is up to date, patient aged out  Mammogram: is up to date.   Colonoscopy: is not up to date.      Objective:   Last 24 Hour Vital Signs:  BP  Min: 90/46  Max: 148/57  Temp  Av.3 °F (36.8 °C)  Min: 98.3 °F (36.8 °C)  Max: 98.3 °F (36.8 °C)  Pulse  Av.2  Min: 78  Max: 96  Resp  Av.9  Min: 14  Max: 25  SpO2  Av.1 %  Min: 96 %  Max:  "100 %  Height  Av' 2" (157.5 cm)  Min: 5' 2" (157.5 cm)  Max: 5' 2" (157.5 cm)  Weight  Av.2 kg (190 lb)  Min: 86.2 kg (190 lb)  Max: 86.2 kg (190 lb)  Body mass index is 34.75 kg/m².  No intake/output data recorded.    Physical Examination:  General: Alert and awake in NAD  Head:  Normocephalic and atraumatic  Eyes:  PERRL; EOMi with anicteric sclera and clear conjunctivae  Mouth:  Oropharynx clear and without exudate; moist mucous membranes, dentures  Cardio:  Regular rate and rhythm, systolic ejection murmur heard best at LUSB, no rubs appreciated  Resp:  CTAB and unlabored; no wheezes, crackles or rhonchi  Abdom: Soft, NTND with normoactive bowel sounds  Extrem: WWP, +1 pitting edema L>R  Skin:  No rashes, lesions, or color changes  Pulses: 2+ and symmetric distally  Neuro:  AAOx3    Laboratory:  Most Recent Data:  CBC:   Lab Results   Component Value Date    WBC 8.36 2018    HGB 11.4 (L) 2018    HCT 36.4 (L) 2018     2018    MCV 96 2018    RDW 13.7 2018     BMP:   Lab Results   Component Value Date     2018    K 4.0 2018     2018    CO2 22 (L) 2018    BUN 41 (H) 2018    CREATININE 2.2 (H) 2018     (H) 2018    CALCIUM 10.5 2018                 LFTs:   Lab Results   Component Value Date    PROT 7.7 2018    ALBUMIN 4.2 2018    BILITOT 0.4 2018    AST 21 2018    ALKPHOS 96 2018    ALT 12 2018     Coags:   Lab Results   Component Value Date    INR 1.0 2018     Trended Lab Data:  Recent Labs   Lab  18   0520  18   1348   WBC  6.49  8.36   HGB  11.0*  11.4*   HCT  34.8*  36.4*   PLT  171  197   MCV  97  96   RDW  14.2  13.7   NA  142  139   K  3.7  4.0   CL  106  105   CO2  27  22*   BUN  28*  41*   CREATININE  1.68*  2.2*   GLU  184*  234*   PROT  6.8  7.7   ALBUMIN  3.8  4.2   BILITOT  0.3  0.4   AST  17  21   ALKPHOS  69  96   ALT  14  12 "       Trended Cardiac Data:  Recent Labs   Lab  08/24/18   0521  08/30/18   1348   TROPONINI  <0.012  0.207*   BNP   --   138*       Microbiology Data:  none    Other Laboratory Data:  none    Other Results:  EKG (my interpretation):   Sinus with PACs, no ST elevations    Radiology:  Imaging Results          X-Ray Chest PA And Lateral (Final result)  Result time 08/30/18 14:54:25    Final result by Renato Jiménez DO (08/30/18 14:54:25)                 Impression:      See above      Electronically signed by: Renato Jiménez DO  Date:    08/30/2018  Time:    14:54             Narrative:    EXAMINATION:  XR CHEST PA AND LATERAL    CLINICAL HISTORY:  Chest Pain;    TECHNIQUE:  PA and lateral views of the chest were performed.    COMPARISON:  08/23/2018    FINDINGS:  Previous ill-defined right mid to lower lobe lung opacity less apparent there is continued prominence of the hilar vasculature cannot exclude component of vascular congestion.  There is no increased lung opacity.  No large pleural effusion or pneumothorax.  Tortuous atherosclerotic aorta.  Clinical correlation and follow-up advised                              8/24/18 ECHO: 1 - Normal left ventricular systolic function (EF 60-65%).     2 - Concentric hypertrophy.     3 - Impaired LV relaxation, increased LVEDP.     4 - Normal right ventricular systolic function .       Assessment:     Petra Zazueta is a 83 y.o. female with:    Anemia     Arthritis     Diabetes mellitus     Diabetes mellitus type II     Dyslipidemia     Hypertension     Insomnia     PAD (peripheral artery disease)      presenting with chest pain, found to have elevated troponin.     Plan:     1. Chest pain with elevated troponin  - EKG- non-STEMI and no appreciable T wave changes, will trend with trop  - ECHO from 8/24 showed normal EF, hypertrophy and impaired relaxation  - trop 0.2, will trend to peak  - , lasix 40 qd  - CXR- read above  - cards consulted, appreciate recs  -  asa, bb    2. DMII   - A1c 7.8  - sliding scale during admission  - gabapentin for neuropathy     3. CKDIV  - GFR 23, BUN 41  - creat 2.2- baseline ~1.7  - will cont to monitor    4. HLD  - statin    5. HTN  - metoprolol 12.5 bid  - holding home meds clonidine, hydralazine, losartan, will add back as needed step wise fashion        Code Status:     Full    Diet: NPO  DVT ppx: heparin  Dispo: cardiology recs, trending trop    Maria Luz Holden  [facility] Internal Medicine HO-II  U Medicine Service    [facility] Medicine Hospitalist Pager numbers:   [facility] Hospitalist Medicine Team A (Danitza/Tasha): [phone]  [facility] Hospitalist Medicine Team B (Gloria/Laron):  [phone]

## 2018-08-31 NOTE — CONSULTS
[facility] Consult - Resident Note    Primary Attending Physician: Gloria  Primary Team: [facility]  Consultant Attending: Danny  Consultant Resident: Ceasar    Reason for Consult:     Chest pain, trop elevation    Subjective:      History of Present Illness:  Petra Zazueta is a 83 y.o. AA female who  has a past medical history of Anemia, Arthritis, Coronary artery disease, Diabetes mellitus, Diabetes mellitus type II, Dyslipidemia, Hypertension, Insomnia, and PAD (peripheral artery disease).. The patient presented to the [facility] on 8/30/2018 with a primary complaint of chest pain.     Recently admitted 8/23/18 to [facility] for worsening SOB for 2-3 weeks and chset prsesure.Found to be hypertensive, pulm edema on CXR, UTI. Diuresed. Trop negative x 3. Resumed BP meds. Abx for UTI.  This admission c/o chest pain and pressure to her L chest and L side, diaphoresis. Denies nausea/vomiting/diarrhea. Feeling well prior to last admission. Had to continue sitting down, too much pain to get up. Not relieved by christi seltzer, relieved by ASA and SL nitro. Brought to ED via EMS.  Trop elevation in ED 0.2,   CXR with prominent hilar vasculature continued from prev.   Admitted for concern of ACS- trop trending and EKG.     Past Medical History:  As above    Past Surgical History:  Past Surgical History:   Procedure Laterality Date    CORONARY ANGIOPLASTY      RCA 4/2011 EJ    GALLBLADDER SURGERY      HYSTERECTOMY      PERIPHERAL ARTERIAL STENT GRAFT      Left lower extremity RCA        Allergies:  Review of patient's allergies indicates:   Allergen Reactions    Codeine Other (See Comments)     Jittery, lightheadedness, nausea       Medications:   In-Hospital Scheduled Medications:   aspirin  81 mg Oral Daily    atorvastatin  40 mg Oral QHS    cyanocobalamin  1,000 mcg Oral Daily    furosemide  40 mg Oral Daily    gabapentin  300 mg Oral BID    heparin (PORCINE)  60  Units/kg (Adjusted) Intravenous Once    hydrALAZINE  50 mg Oral Q8H    losartan  50 mg Oral Daily    metoprolol tartrate  12.5 mg Oral BID      In-Hospital PRN Medications:  acetaminophen, dextrose 50%, dextrose 50%, glucagon (human recombinant), glucose, glucose, heparin (PORCINE), heparin (PORCINE), insulin aspart U-100, ramelteon, simethicone, sodium chloride 0.9%   In-Hospital IV Infusion Medications:   heparin (porcine) in D5W 12 Units/kg/hr (08/30/18 2208)      Home Medications:  Prior to Admission medications    Medication Sig Start Date End Date Taking? Authorizing Provider   aspirin (ECOTRIN) 81 MG EC tablet Take 81 mg by mouth once daily.     Yes Historical Provider, MD   blood sugar diagnostic Strp Disp whichever is covered by insurance/Contour by name 8/28/18  Yes Jasmin David MD   cephALEXin (KEFLEX) 250 MG capsule Take 1 capsule (250 mg total) by mouth every 12 (twelve) hours. 8/24/18  Yes Pallavi Sunkara, MD   cilostazol (PLETAL) 100 MG Tab Take 100 mg by mouth 2 (two) times daily. 2/27/16  Yes Historical Provider, MD   cloNIDine (CATAPRES) 0.1 MG tablet  6/21/18  Yes Historical Provider, MD   cyanocobalamin (VITAMIN B-12) 1000 MCG tablet Take 1 tablet (1,000 mcg total) by mouth once daily. 8/15/13  Yes Jose Juan Nath Jr., MD   ferrous sulfate 325 mg (65 mg iron) Tab tablet Take 1 tablet (325 mg total) by mouth daily with breakfast. 6/27/18  Yes Polly Greer,    fish oil-omega-3 fatty acids 300-1,000 mg capsule Take 500 mg by mouth 2 (two) times daily.   Yes Historical Provider, MD   furosemide (LASIX) 40 MG tablet Take 1 tablet (40 mg total) by mouth once daily. 8/25/18 8/25/19 Yes Pallavi Sunkara, MD   gabapentin (NEURONTIN) 300 MG capsule TAKE 2 CAPSULES (600 MG TOTAL) BY MOUTH EVERY EVENING. 4/16/18  Yes Jose Juan Nath Jr., MD   glimepiride (AMARYL) 2 MG tablet Take 1 tablet (2 mg total) by mouth before breakfast. 8/24/18  Yes Pallavi Sunkara, MD   hydrALAZINE (APRESOLINE) 50  "MG tablet Take 1 tablet (50 mg total) by mouth every 8 (eight) hours. 18  Yes Pallavi Sunkara, MD   latanoprost 0.005 % ophthalmic solution Place 1 drop into both eyes every evening.  14  Yes Historical Provider, MD   losartan (COZAAR) 100 MG tablet Take 1 tablet (100 mg total) by mouth once daily. 18  Yes Pallavi Sunkara, MD   lovastatin (MEVACOR) 40 MG tablet TAKE 1 TABLET BY MOUTH EVERY EVENING 18  Yes Jose Juan Nath Jr., MD   SITagliptin (JANUVIA) 50 MG Tab Take 1 tablet (50 mg total) by mouth once daily. 18  Yes Jasmin David MD       Family History:  Family History   Problem Relation Age of Onset    Heart attack Mother        Social History:  Social History     Tobacco Use    Smoking status: Former Smoker     Last attempt to quit: 2005     Years since quittin.6    Smokeless tobacco: Never Used   Substance Use Topics    Alcohol use: No    Drug use: No       Review of Systems:  Pertinent items are noted in HPI. All other systems are reviewed and are negative.    Health Maintenance:      Objective:   Last 24 Hour Vital Signs:  BP  Min: 90/46  Max: 178/84  Temp  Av.2 °F (36.2 °C)  Min: 96.4 °F (35.8 °C)  Max: 98.3 °F (36.8 °C)  Pulse  Av.2  Min: 62  Max: 96  Resp  Av.8  Min: 14  Max: 25  SpO2  Av %  Min: 92 %  Max: 100 %  Height  Av' 2" (157.5 cm)  Min: 5' 2" (157.5 cm)  Max: 5' 2" (157.5 cm)  Weight  Av.8 kg (191 lb 7.2 oz)  Min: 86.2 kg (190 lb)  Max: 87.5 kg (192 lb 14.4 oz)  No intake/output data recorded.    Physical Examination:  Gen: well-appearing, comfortable in no distress, pleasant  HEENT: NCAT, MMM, EOMI  CV: heart sounds distant, RRR, pedal pulses 2+ RLE, unable to palpate LLE 2/2 chronic edema  Resp: good air movement, some dry crackles to inspiration to LLE, RLE  GI: soft, NTND, + bowel sounds  Neuro: conversant, moves all extremities, strength grossly normal    Laboratory Results:  Most Recent Data:  CBC:   Lab Results "   Component Value Date    WBC 7.07 08/31/2018    HGB 11.9 (L) 08/31/2018    HCT 36.8 (L) 08/31/2018     08/31/2018    MCV 96 08/31/2018    RDW 13.7 08/31/2018     BMP:   Lab Results   Component Value Date     08/31/2018    K 4.0 08/31/2018     08/31/2018    CO2 23 08/31/2018    BUN 34 (H) 08/31/2018     (H) 08/31/2018    CALCIUM 10.3 08/31/2018    MG 2.0 05/20/2016    PHOS 4.1 08/02/2018     LFTs:   Lab Results   Component Value Date    PROT 7.4 08/31/2018    ALBUMIN 3.8 08/31/2018    BILITOT 0.5 08/31/2018    AST 23 08/31/2018    ALKPHOS 89 08/31/2018    ALT 10 08/31/2018     Coags:   Lab Results   Component Value Date    INR 1.0 08/30/2018     FLP:   Lab Results   Component Value Date    CHOL 155 08/31/2018    HDL 46 08/31/2018    LDLCALC 87.8 08/31/2018    TRIG 106 08/31/2018    CHOLHDL 29.7 08/31/2018     DM:   Hgb A1c 7.8 8/30/18    Cardiac:   Lab Results   Component Value Date    TROPONINI 1.370 (H) 08/31/2018     (H) 08/30/2018       Trended Lab Data:  Recent Labs   Lab  08/30/18   1348  08/31/18   0449   WBC  8.36  7.07   HGB  11.4*  11.9*   HCT  36.4*  36.8*   PLT  197  198   MCV  96  96   RDW  13.7  13.7   NA  139  140   K  4.0  4.0   CL  105  107   CO2  22*  23   BUN  41*  34*   GLU  234*  170*   PROT  7.7  7.4   ALBUMIN  4.2  3.8   BILITOT  0.4  0.5   AST  21  23   ALKPHOS  96  89   ALT  12  10       Trended Cardiac Data:  Recent Labs   Lab  08/30/18   1348  08/30/18   1941  08/31/18   0200   TROPONINI  0.207*  0.922*  1.370*   BNP  138*   --    --        Microbiology Data:  None this admission      Other Results:  EKG (my interpretation)/Cardiology:   8/30/18 1pm: sinus tachycardia with PACs vs sinus arrhythmia, up-sloping ST depressions lateral leads, QTc 428  8/30/18 7:47pm: sinus rhythm, T wave changes in lateral leads less prominent, T wave flattening in anterior leads, QTc 495    Angiogram likely performed 5/17/17 however cath report not evident in EMR; discharge  "summary from that admission "patent stent"     Angiogram performed 11/11/2013       Patient has a right dominant coronary artery.      - Left Main Coronary Artery:             The LM has luminal irregularities. There is JACK 3 flow.                     Lesion Details: None to mild calcification.     - Left Anterior Descending Artery:             The LAD has luminal irregularities. There is JACK 3 flow.                     Lesion Details: Mild to moderate calcification.     - Left Circumflex Artery:             The LCX has luminal irregularities. There is JACK 3 flow.                     Lesion Details: Mild to moderate calcification.     - Right Coronary Artery:             The RCA has luminal irregularities. There is JACK 3 flow.                     Lesion Details: None to mild calcification. Patent stent in mid RCA     - Innominate Artery:             The Innominate Artery is normal.    Unclear when stent was placed- possible 2011 per notes review, however not evident in EMR      Radiology:  X-ray Chest Pa And Lateral: 8/30/2018   Previous ill-defined right mid to lower lobe lung opacity less apparent there is continued prominence of the hilar vasculature cannot exclude component of vascular congestion.  There is no increased lung opacity.  No large pleural effusion or pneumothorax.  Tortuous atherosclerotic aorta.        Assessment:     Petra Zazueta is a 83 y.o. female with:  Patient Active Problem List    Diagnosis Date Noted    Chest pain 08/30/2018    Urinary tract infection without hematuria 08/23/2018    Acute on chronic diastolic heart failure 08/23/2018    Hyperparathyroidism 08/03/2018    CKD (chronic kidney disease), stage IV 05/20/2016    Type 2 diabetes mellitus with diabetic nephropathy 06/25/2015    Interval gout 06/25/2015    Coronary artery disease     HTN (hypertension) 12/26/2012    Hyperlipidemia 12/26/2012    PAD (peripheral artery disease) 12/26/2012        Plan:     Elevated " troponin with ST depressions, concerning for ischemia  - Cath indicated in setting of typical chest pain with increasing troponin (0.2-> 1.307); EKG changes with lateral depressions and anterior flattening  - discussed with patient, risks concerning worsening renal fx on admission, however improved to near baseline this morning   - patient aware of risks to kidneys with contrast, would like to proceed with intervention  - discussed with interventionist on call, will perform procedure today  - ordered case   - ordered pre-hydration (fluids at low rate)  - continue heparin drip, ASA, statin  - hold plavix  - continue NPO, sips with meds  - repeat EKG after procedure      Thank you for allowing us to participate in the care of this patient. Please contact me if you have any questions regarding this consult.    Jackeline Mcdonough  [facility]

## 2018-08-31 NOTE — PROGRESS NOTES
"[facility] Resident [facility]-II Progress Note    Subjective:      Petra Zazueta is a 83 y.o. female who is being followed by the [facility] service at [facility] for chest pain.     Patient awake and alert this morning. No acute events overnight. NPO. Normal UOP per patient, but not recorded. Denies chest pain, SOB, N/V/D.      Objective:   Last 24 Hour Vital Signs:  BP  Min: 90/46  Max: 178/84  Temp  Av.2 °F (36.2 °C)  Min: 96.4 °F (35.8 °C)  Max: 98.3 °F (36.8 °C)  Pulse  Av.2  Min: 62  Max: 96  Resp  Av.8  Min: 14  Max: 25  SpO2  Av %  Min: 92 %  Max: 100 %  Height  Av' 2" (157.5 cm)  Min: 5' 2" (157.5 cm)  Max: 5' 2" (157.5 cm)  Weight  Av.8 kg (191 lb 7.2 oz)  Min: 86.2 kg (190 lb)  Max: 87.5 kg (192 lb 14.4 oz)  No intake/output data recorded.    Physical Examination:  General:          Alert and awake in NAD  Head:               Normocephalic and atraumatic  Eyes:               PERRL; EOMi with anicteric sclera and clear conjunctivae  Mouth:             Oropharynx clear and without exudate; moist mucous membranes, dentures  Cardio:             Regular rate and rhythm, systolic ejection murmur heard best at LUSB, no rubs appreciated  Resp:               CTAB and unlabored; no wheezes, crackles or rhonchi  Abdom:            Soft, NTND with normoactive bowel sounds  Extrem:            WWP, +1 pitting edema L>R  Skin:                No rashes, lesions, or color changes  Pulses:            2+ and symmetric distally  Neuro:             AAOx3        Laboratory:  Laboratory Data Reviewed: yes  Pertinent Findings:  Trop 1.3  Creat 1.7, BUN 37  Glu 170  Lipid panel normal   APTT 67  H/H 11.9/36    Microbiology Data Reviewed: yes  Pertinent Findings:  none    Other Results:  EKG (my interpretation): NSR, QT prolongation. Twave abnormality     Radiology Data Reviewed: yes  Pertinent Findings:  None new    Current Medications:     Infusions:   heparin (porcine) in D5W 12 " Units/kg/hr (08/30/18 2202)        Scheduled:   aspirin  81 mg Oral Daily    atorvastatin  40 mg Oral QHS    cyanocobalamin  1,000 mcg Oral Daily    furosemide  40 mg Oral Daily    gabapentin  300 mg Oral BID    heparin (PORCINE)  60 Units/kg (Adjusted) Intravenous Once    hydrALAZINE  50 mg Oral Q8H    losartan  50 mg Oral Daily    metoprolol tartrate  12.5 mg Oral BID        PRN:  acetaminophen, dextrose 50%, dextrose 50%, glucagon (human recombinant), glucose, glucose, heparin (PORCINE), heparin (PORCINE), insulin aspart U-100, ramelteon, simethicone, sodium chloride 0.9%    Antibiotics and Day Number of Therapy:  none    Lines and Day Number of Therapy:  PIV <1d    Assessment:     Petra Zazueta is a 83 y.o.female with  Patient Active Problem List    Diagnosis Date Noted    Chest pain 08/30/2018    Urinary tract infection without hematuria 08/23/2018    Acute on chronic diastolic heart failure 08/23/2018    Hyperparathyroidism 08/03/2018    CKD (chronic kidney disease), stage IV 05/20/2016    Type 2 diabetes mellitus with diabetic nephropathy 06/25/2015    Interval gout 06/25/2015    Coronary artery disease     HTN (hypertension) 12/26/2012    Hyperlipidemia 12/26/2012    PAD (peripheral artery disease) 12/26/2012        Plan:     1. Chest pain with elevated troponin  - EKG- non-STEMI and no appreciable T wave changes-> now with T wave abnormalities, will trend with trop  - ECHO from 8/24 showed normal EF, hypertrophy and impaired relaxation  - trop 0.2 -> 1.3, will trend to peak  - , lasix 40 qd  - CXR- read above  - cards consulted, appreciate recs- likely cath today  - asa, bb     2. DMII   - A1c 7.8  - sliding scale during admission  - gabapentin for neuropathy      3. CKDIV  - GFR 23, BUN 41  - creat 2.2- baseline ~1.7  - will cont to monitor, improving     4. HLD  - statin     5. HTN  - metoprolol 12.5 bid  - holding home meds clonidine, hydralazine, losartan, will add back as  needed step wise fashion    Code: Full  Diet: NPO  DVT ppx: heparin  Dispo: cardiology recs, likely cath today        Maria Luz Holden  \[facility]\"" Internal Medicine [facility]  \[facility]\"" Medicine  Service Team B    \[facility]\"" Medicine Hospitalist Pager numbers:   \[facility]\"" Hospitalist Medicine Team A (Danitza/Tasha): [phone]  \[facility]\"" Hospitalist Medicine Team B (Gloria/Laron):  [phone]

## 2018-08-31 NOTE — PLAN OF CARE
14:30 Patient transferred to recovery cath lab slot 4 via stretcher with side rails up x2 .  Pt drowsy but able to follow commands.  Pt is stable when connecting to cardiac monitors.  VSS.  Right groin site with gauze, tegaderm in place is CDI no drainage or shadowing noted. No redness, bruising, or hematoma noted around site. Dopplered francesco pedal pulses. Palpated +2 francesco radial pulses.    Right radial vasc band in place with 13ml of air in band. Skin is normal in color, warm to touch, < 3 sec cap refill.   Fall risk precautions given and patient acknowledges.  AIDET completed to pt.  Will continue to monitor patient.    Updated pt's son in sx waiting room.

## 2018-08-31 NOTE — NURSING
Pt arrived to room 470. AAOx3. Denies pain. Doppler francesco pedal pulses. Palpable bilateral radial pulses. Gauze and occlusive dressing to right wrist CDI; no hematoma or bleeding noted. Dressing to right groin CDI; no hematoma or bleeding noted. Pt voided post cath lab via bedpan. Pt verbalized understanding to remain flat until recovery end time 8:30pm. Will continue to monitor vitals and sites per orders.

## 2018-08-31 NOTE — NURSING
Right wrist and right groin dressing CDI; soft, no hematoma or bleeding noted. Vitals documented per flowsheet. Bilateral radial pulses palpable. Bilateral pedal pulses weak; checked w/ doppler.

## 2018-08-31 NOTE — TELEPHONE ENCOUNTER
----- Message from Ruth Shannon sent at 8/31/2018  3:51 PM CDT -----  Contact: Enma [phone]  Patient needs to be seen sooner than the next available appointment for a hospital f/u - within 1 week from today. Please call patient and advise.

## 2018-08-31 NOTE — PLAN OF CARE
16:30 Patient transferred to floor on cardiac  monitor.  VSS. No c/o pain.   Patient attached to tele monitor upon arrival in room 470.  Right groin and Right radial gauze/tegaderm dressing both CDI, no bleeding or hematoma noted and reviewed/palpated with CALLI Lambert at bedside.  +2 francesco radial pulses palpated.  Bilat pedal pulses dopplered.  All questions answered. Updated pt's at bedside.

## 2018-09-01 VITALS
BODY MASS INDEX: 35.38 KG/M2 | HEIGHT: 62 IN | WEIGHT: 192.25 LBS | DIASTOLIC BLOOD PRESSURE: 64 MMHG | HEART RATE: 71 BPM | OXYGEN SATURATION: 96 % | RESPIRATION RATE: 18 BRPM | SYSTOLIC BLOOD PRESSURE: 135 MMHG | TEMPERATURE: 97 F

## 2018-09-01 PROBLEM — R07.9 CHEST PAIN: Status: RESOLVED | Noted: 2018-08-30 | Resolved: 2018-09-01

## 2018-09-01 PROBLEM — R79.89 ELEVATED TROPONIN: Status: RESOLVED | Noted: 2018-08-31 | Resolved: 2018-09-01

## 2018-09-01 PROBLEM — N39.0 URINARY TRACT INFECTION WITHOUT HEMATURIA: Status: RESOLVED | Noted: 2018-08-23 | Resolved: 2018-09-01

## 2018-09-01 LAB
ALBUMIN SERPL BCP-MCNC: 3.5 G/DL
ALP SERPL-CCNC: 85 U/L
ALT SERPL W/O P-5'-P-CCNC: 11 U/L
ANION GAP SERPL CALC-SCNC: 11 MMOL/L
AST SERPL-CCNC: 19 U/L
BASOPHILS # BLD AUTO: 0.02 K/UL
BASOPHILS NFR BLD: 0.3 %
BILIRUB SERPL-MCNC: 0.4 MG/DL
BUN SERPL-MCNC: 29 MG/DL
CALCIUM SERPL-MCNC: 9.8 MG/DL
CHLORIDE SERPL-SCNC: 110 MMOL/L
CO2 SERPL-SCNC: 19 MMOL/L
CREAT SERPL-MCNC: 1.5 MG/DL
DIFFERENTIAL METHOD: ABNORMAL
EOSINOPHIL # BLD AUTO: 0.1 K/UL
EOSINOPHIL NFR BLD: 1.8 %
ERYTHROCYTE [DISTWIDTH] IN BLOOD BY AUTOMATED COUNT: 13.9 %
EST. GFR  (AFRICAN AMERICAN): 37 ML/MIN/1.73 M^2
EST. GFR  (NON AFRICAN AMERICAN): 32 ML/MIN/1.73 M^2
GLUCOSE SERPL-MCNC: 148 MG/DL
HCT VFR BLD AUTO: 38 %
HGB BLD-MCNC: 12 G/DL
LYMPHOCYTES # BLD AUTO: 1.4 K/UL
LYMPHOCYTES NFR BLD: 22.6 %
MCH RBC QN AUTO: 30.4 PG
MCHC RBC AUTO-ENTMCNC: 31.6 G/DL
MCV RBC AUTO: 96 FL
MONOCYTES # BLD AUTO: 0.5 K/UL
MONOCYTES NFR BLD: 8.3 %
NEUTROPHILS # BLD AUTO: 4.2 K/UL
NEUTROPHILS NFR BLD: 66.7 %
PLATELET # BLD AUTO: 181 K/UL
PMV BLD AUTO: 11.8 FL
POCT GLUCOSE: 152 MG/DL (ref 70–110)
POCT GLUCOSE: 242 MG/DL (ref 70–110)
POTASSIUM SERPL-SCNC: 4.2 MMOL/L
PROT SERPL-MCNC: 6.9 G/DL
RBC # BLD AUTO: 3.95 M/UL
SODIUM SERPL-SCNC: 140 MMOL/L
WBC # BLD AUTO: 6.24 K/UL

## 2018-09-01 PROCEDURE — 63600175 PHARM REV CODE 636 W HCPCS: Performed by: STUDENT IN AN ORGANIZED HEALTH CARE EDUCATION/TRAINING PROGRAM

## 2018-09-01 PROCEDURE — 94761 N-INVAS EAR/PLS OXIMETRY MLT: CPT

## 2018-09-01 PROCEDURE — 25000003 PHARM REV CODE 250: Performed by: STUDENT IN AN ORGANIZED HEALTH CARE EDUCATION/TRAINING PROGRAM

## 2018-09-01 PROCEDURE — 36415 COLL VENOUS BLD VENIPUNCTURE: CPT

## 2018-09-01 PROCEDURE — G0378 HOSPITAL OBSERVATION PER HR: HCPCS

## 2018-09-01 PROCEDURE — 80053 COMPREHEN METABOLIC PANEL: CPT

## 2018-09-01 PROCEDURE — 85025 COMPLETE CBC W/AUTO DIFF WBC: CPT

## 2018-09-01 RX ORDER — LOSARTAN POTASSIUM 50 MG/1
50 TABLET ORAL DAILY
Qty: 90 TABLET | Refills: 3 | Status: SHIPPED | OUTPATIENT
Start: 2018-09-02 | End: 2019-01-11

## 2018-09-01 RX ORDER — FUROSEMIDE 10 MG/ML
60 INJECTION INTRAMUSCULAR; INTRAVENOUS ONCE
Status: COMPLETED | OUTPATIENT
Start: 2018-09-01 | End: 2018-09-01

## 2018-09-01 RX ORDER — GABAPENTIN 300 MG/1
300 CAPSULE ORAL 2 TIMES DAILY
Qty: 60 CAPSULE | Refills: 11 | Status: SHIPPED | OUTPATIENT
Start: 2018-09-01 | End: 2019-09-26 | Stop reason: SDUPTHER

## 2018-09-01 RX ORDER — METOPROLOL TARTRATE 25 MG/1
12.5 TABLET, FILM COATED ORAL 2 TIMES DAILY
Qty: 30 TABLET | Refills: 11 | Status: SHIPPED | OUTPATIENT
Start: 2018-09-01 | End: 2019-08-27 | Stop reason: SDUPTHER

## 2018-09-01 RX ORDER — ATORVASTATIN CALCIUM 80 MG/1
80 TABLET, FILM COATED ORAL NIGHTLY
Qty: 90 TABLET | Refills: 3 | Status: SHIPPED | OUTPATIENT
Start: 2018-09-01 | End: 2019-11-04 | Stop reason: SDUPTHER

## 2018-09-01 RX ORDER — AMLODIPINE BESYLATE 5 MG/1
5 TABLET ORAL DAILY
Status: DISCONTINUED | OUTPATIENT
Start: 2018-09-01 | End: 2018-09-01 | Stop reason: HOSPADM

## 2018-09-01 RX ORDER — ASPIRIN 81 MG/1
81 TABLET ORAL DAILY
Refills: 0 | COMMUNITY
Start: 2018-09-02 | End: 2020-02-18 | Stop reason: SDUPTHER

## 2018-09-01 RX ORDER — HYDRALAZINE HYDROCHLORIDE 50 MG/1
50 TABLET, FILM COATED ORAL EVERY 8 HOURS
Qty: 90 TABLET | Refills: 11 | Status: SHIPPED | OUTPATIENT
Start: 2018-09-01 | End: 2020-01-10 | Stop reason: SDUPTHER

## 2018-09-01 RX ORDER — FUROSEMIDE 40 MG/1
40 TABLET ORAL 2 TIMES DAILY
Status: DISCONTINUED | OUTPATIENT
Start: 2018-09-01 | End: 2018-09-01 | Stop reason: HOSPADM

## 2018-09-01 RX ORDER — FUROSEMIDE 40 MG/1
40 TABLET ORAL 2 TIMES DAILY
Qty: 60 TABLET | Refills: 11 | Status: SHIPPED | OUTPATIENT
Start: 2018-09-01 | End: 2020-01-20

## 2018-09-01 RX ORDER — AMLODIPINE BESYLATE 5 MG/1
5 TABLET ORAL DAILY
Qty: 30 TABLET | Refills: 11 | Status: SHIPPED | OUTPATIENT
Start: 2018-09-02 | End: 2019-10-15 | Stop reason: SDUPTHER

## 2018-09-01 RX ADMIN — HYDRALAZINE HYDROCHLORIDE 50 MG: 25 TABLET, FILM COATED ORAL at 05:09

## 2018-09-01 RX ADMIN — INSULIN ASPART 2 UNITS: 100 INJECTION, SOLUTION INTRAVENOUS; SUBCUTANEOUS at 12:09

## 2018-09-01 RX ADMIN — GABAPENTIN 300 MG: 300 CAPSULE ORAL at 08:09

## 2018-09-01 RX ADMIN — LOSARTAN POTASSIUM 50 MG: 50 TABLET, FILM COATED ORAL at 08:09

## 2018-09-01 RX ADMIN — FUROSEMIDE 60 MG: 10 INJECTION, SOLUTION INTRAMUSCULAR; INTRAVENOUS at 09:09

## 2018-09-01 RX ADMIN — ASPIRIN 81 MG: 81 TABLET, COATED ORAL at 08:09

## 2018-09-01 RX ADMIN — CYANOCOBALAMIN TAB 1000 MCG 1000 MCG: 1000 TAB at 08:09

## 2018-09-01 RX ADMIN — AMLODIPINE BESYLATE 5 MG: 5 TABLET ORAL at 10:09

## 2018-09-01 RX ADMIN — METOPROLOL TARTRATE 12.5 MG: 25 TABLET, FILM COATED ORAL at 08:09

## 2018-09-01 NOTE — PROGRESS NOTES
[facility] Medicine Resident JANNETHI Progress Note    Subjective:      Patient without problems or complaints overnight. Denies chest pain, shortness of breath, palpitations, abdominal pain, diaphoresis, oozing/bleeding from femoral and radial sites.     Reports she is urinating well.      Objective:   Last 24 Hour Vital Signs:  BP  Min: 113/55  Max: 176/77  Temp  Av.4 °F (36.3 °C)  Min: 96.2 °F (35.7 °C)  Max: 98.9 °F (37.2 °C)  Pulse  Av.4  Min: 54  Max: 75  Resp  Av.3  Min: 13  Max: 20  SpO2  Av.1 %  Min: 93 %  Max: 98 %  Weight  Av.2 kg (192 lb 3.9 oz)  Min: 87.2 kg (192 lb 3.9 oz)  Max: 87.2 kg (192 lb 3.9 oz)  I/O last 3 completed shifts:  In: 305 [P.O.:305]  Out: 1200 [Urine:1200]    Physical Examination:  Gen - alert and awake in NAD, well appearing, well-nourished, cooperative on interview  HEENT - normocephalic, atraumatic, EOM intact, PERRLA, no scleral icterus, moist mucus membranes  CV - regular rhythm, normal rate, systolic ejection murmur best heard at LUSB, no rubs or gallops appreciated  Resp - clear to auscultation bilaterally, slight inspiratory wheezes appreciated in bilateral lower lung zones   Abd - soft, nontender, nondistended, +BS   Ext - no cyanosis, trace pitting edema LLE, no pitting edema of RLE, no bruises/rashes/lesions, no hematoma/oozing/swelling at radial and femoral sites   Pulses - 2+ radial and 1+ DP symmetrically   Skin - normal color and texture, no tenting  Neuro - AAOx3, no focal neurologic deficits appreciated     Laboratory:  Laboratory Data Reviewed: yes  Pertinent Findings:  CBC with Automated Differential   Order: [id number]   Status:  Final result   Visible to patient:  No (Not Released) Next appt:  2018 at 11:00 AM [facility] Dx:  Elevated troponin    Ref Range & Units 05:59   WBC 3.90 - 12.70 K/uL 6.24    RBC 4.00 - 5.40 M/uL 3.95 Abnormally low     Hemoglobin 12.0 - 16.0 g/dL 12.0    Hematocrit 37.0 - 48.5 % 38.0     MCV 82 - 98 fL 96    MCH 27.0 - 31.0 pg 30.4    MCHC 32.0 - 36.0 g/dL 31.6 Abnormally low     RDW 11.5 - 14.5 % 13.9    Platelets 150 - 350 K/uL 181    MPV 9.2 - 12.9 fL 11.8    Gran # (ANC) 1.8 - 7.7 K/uL 4.2    Lymph # 1.0 - 4.8 K/uL 1.4    Mono # 0.3 - 1.0 K/uL 0.5    Eos # 0.0 - 0.5 K/uL 0.1    Baso # 0.00 - 0.20 K/uL 0.02    Gran% 38.0 - 73.0 % 66.7    Lymph% 18.0 - 48.0 % 22.6    Mono% 4.0 - 15.0 % 8.3    Eosinophil% 0.0 - 8.0 % 1.8    Basophil% 0.0 - 1.9 % 0.3    Differential Method  Automated    Resulting Agency  [facility]           Comprehensive Metabolic Panel (CMP)    Ref Range & Units 05:59 1d ago 2d ago 8d ago 9d ago   Sodium 136 - 145 mmol/L 140  140  139  142  144    Potassium 3.5 - 5.1 mmol/L 4.2  4.0  4.0  3.7  3.9    Chloride 95 - 110 mmol/L 110  107  105  106  108    CO2 23 - 29 mmol/L 19 Abnormally low   23  22 Abnormally low   27  26    Glucose 70 - 110 mg/dL 148 Abnormally high   170 Abnormally high   234 Abnormally high   184 Abnormally high   222 Abnormally high     BUN, Bld 8 - 23 mg/dL 29 Abnormally high   34 Abnormally high   41 Abnormally high   28 Abnormally high  R 27 Abnormally high  R   Creatinine 0.5 - 1.4 mg/dL 1.5 Abnormally high   1.7 Abnormally high   2.2 Abnormally high   1.68 Abnormally high  R 1.65 Abnormally high  R   Calcium 8.7 - 10.5 mg/dL 9.8  10.3  10.5  9.8  9.8    Total Protein 6.0 - 8.4 g/dL 6.9  7.4  7.7  6.8     Albumin 3.5 - 5.2 g/dL 3.5  3.8  4.2  3.8     Total Bilirubin 0.1 - 1.0 mg/dL 0.4  0.5 CM 0.4 CM 0.3 CM    Comment: For infants and newborns, interpretation of results should be based   on gestational age, weight and in agreement with clinical   observations.   Premature Infant recommended reference ranges:   Up to 24 hours.............<8.0 mg/dL   Up to 48 hours............<12.0 mg/dL   3-5 days..................<15.0 mg/dL   6-29 days.................<15.0 mg/dL    Alkaline Phosphatase 55 - 135 U/L 85  89  96  69 R    AST 10 - 40 U/L 19  23  21  17 R    ALT  10 - 44 U/L 11  10  12  14     Anion Gap 8 - 16 mmol/L 11  10  12  9  10    eGFR if African American >60 mL/min/1.73 m^2 37 Abnormal   32 Abnormal   23 Abnormal   32.4 Abnormal   33.1 Abnormal     eGFR if non African American >60 mL/min/1.73 m^2 32 Abnormal   28 Abnormal  CM 20 Abnormal  CM 28.1 Abnormal  CM 28.7 Abnormal  CM   Comment: Calculation used to obtain the estimated glomerular filtration   rate (eGFR) is the CKD-EPI equation.            Troponin: 0.207 --> 0.922 --> 1.370 --> 0.940       Microbiology Data Reviewed: yes  Pertinent Findings:  None     Other Results:  8/31/18 EKG (my interpretation): sinus rhythm, T wave flattening anterior leads unchanged from prior     Radiology Data Reviewed: yes  Pertinent Findings:    X-ray Chest Pa And Lateral: 8/30/2018   Previous ill-defined right mid to lower lobe lung opacity less apparent there is continued prominence of the hilar vasculature cannot exclude component of vascular congestion.  There is no increased lung opacity.  No large pleural effusion or pneumothorax.  Tortuous atherosclerotic aorta.     Current Medications:     Infusions:       Scheduled:   aspirin  81 mg Oral Daily    atorvastatin  80 mg Oral QHS    cyanocobalamin  1,000 mcg Oral Daily    gabapentin  300 mg Oral BID    hydrALAZINE  50 mg Oral Q8H    losartan  50 mg Oral Daily    metoprolol tartrate  12.5 mg Oral BID        PRN:  acetaminophen, dextrose 50%, dextrose 50%, glucagon (human recombinant), glucose, glucose, hydrALAZINE, insulin aspart U-100, ramelteon, simethicone, sodium chloride 0.9%    Antibiotics and Day Number of Therapy:  None     Lines and Day Number of Therapy:  PIV     Assessment:     ePtra Zazueta is a 83 y.o.female with  Patient Active Problem List    Diagnosis Date Noted    Elevated troponin 08/31/2018    Chest pain 08/30/2018    Urinary tract infection without hematuria 08/23/2018    Acute on chronic diastolic heart failure 08/23/2018    Hyperparathyroidism  08/03/2018    CKD (chronic kidney disease), stage IV 05/20/2016    Type 2 diabetes mellitus with diabetic nephropathy 06/25/2015    Interval gout 06/25/2015    Coronary artery disease     HTN (hypertension) 12/26/2012    Hyperlipidemia 12/26/2012    PAD (peripheral artery disease) 12/26/2012        Plan:     Chest pain with elevated troponin, resolved   - pt with complaint of SOB and chest pressure x2-3 weeks, admitted to [facility] 8/23/18. Found to be hypertensive with pulmonary edema on CXR, was diuresed and restarted on BP meds. Pain returned one day prior to current admit, relieved with chewing 3 ASA at home and SL nitro given en route to hospital.  - ED EKG initially showing NSTEMI without appreciable T wave changes-> then with lateral depressions and anterior flattening of T waves  - 8/24/18 ECHO: showed normal EF, hypertrophy and impaired relaxation  - trop peaked at 1.370 8/31, now improving   - , started on Lasix 40 qd  - CXR: pulmonary edema noted, no pleural effusion or consolidation  - 8/31 Cath with IR: no occlusions, stents patent   - Per cards, likely volume overload, recommend continue ASA and statin, Lasix 60mg IV x1 prior to discharge. Start Lasix 40mg bid to continue on discharge. Ok to discontinue heparin drip.  - Will need to follow up with [facility] cardiologist as outpatient within 1 month     DMII   - A1c 7.8  - sliding scale during admission  - gabapentin for neuropathy     CKDIV  - GFR 23, BUN 41  - creat 2.2- baseline ~1.7  - given IVF 75cc/hr post cath, 9/1 Cr 1.5     HLD  - continue statin     HTN  - started on metoprolol 12.5 bid  - 8/30 restarted home hydralazine 50mg tid, losartan 50mg qd  - per cards, add Norvasc 5mg qd on discharge    Code: Full  Diet: cardiac diabetic   DVT ppx: heparin  Dispo: discharge to home today with cards follow up in 1 month and PCP (Joann) follow up in 1 week        Angelina Luque M.D.   [facility] Internal Medicine  HO-I  [facility] Medicine Service Team B     [facility] Medicine Hospitalist Pager numbers:   [facility] Hospitalist Medicine Team A (Danitza/Tasha): [phone]  [facility] Hospitalist Medicine Team B (Gloria/Laron):  [phone]

## 2018-09-01 NOTE — PROGRESS NOTES
Cardiology Progress Note    Admit Date: 8/30/2018   LOS: 0 days     Subjective     Pt doing great.   No cp or dyspnea  No events on tele; had some nocturnal sinus bradycardia   No bleeding from access sites  Ready to go home    Objective     Vital Signs (Most Recent)  Temp: 98.9 °F (37.2 °C) (09/01/18 0739)  Pulse: 73 (09/01/18 0739)  Resp: 20 (09/01/18 0739)  BP: (!) 158/74 (09/01/18 0739)  SpO2: (!) 94 % (09/01/18 0739)    I & O (Last 24H):    Intake/Output Summary (Last 24 hours) at 9/1/2018 0859  Last data filed at 9/1/2018 0519  Gross per 24 hour   Intake 305 ml   Output 1200 ml   Net -895 ml       Physical Exam:  GEN: Awake, alert, sitting up in bed eating, no distress  HEENT: EOMI, Mild JVD elevation, MMM  Heart: RRR, 3/6 SEJ @ RUSB, No gallops or rubs  Lungs: CTA BL, NO WRR  Abdomen: Soft non tender, ND, BS+  Ext: Good radial and faint DP pulses, No hematoma or oozing or swelling or tenderness at right radial and right femoral access sites, RLE more swollen >L as at baseline.     Laboratory:  Recent Labs   Lab  08/30/18 1348 08/31/18 0449 09/01/18   0559   WBC  8.36  7.07  6.24   RBC  3.78*  3.83*  3.95*   HGB  11.4*  11.9*  12.0   HCT  36.4*  36.8*  38.0   PLT  197  198  181   MCV  96  96  96   MCH  30.2  31.1*  30.4   MCHC  31.3*  32.3  31.6*     Recent Labs   Lab  08/30/18   1348  08/31/18 0449 09/01/18   0559   NA  139  140  140   K  4.0  4.0  4.2   CL  105  107  110   CO2  22*  23  19*   BUN  41*  34*  29*   CREATININE  2.2*  1.7*  1.5*   GLU  234*  170*  148*     Recent Labs   Lab  08/30/18   1348  08/31/18 0449 09/01/18   0559   CALCIUM  10.5  10.3  9.8     Recent Labs   Lab  08/30/18   1348  08/31/18   0449  09/01/18   0559   PROT  7.7  7.4  6.9   ALBUMIN  4.2  3.8  3.5   BILITOT  0.4  0.5  0.4   AST  21  23  19   ALKPHOS  96  89  85   ALT  12  10  11     Recent Labs   Lab  08/30/18   1348  08/30/18   2121  08/31/18   0449  08/31/18   0740   INR  1.0  1.0   --    --    APTT   --   28.8   67.6*  67.6*  59.4*     Recent Labs   Lab  08/30/18   1941  08/31/18   0200  08/31/18   1232   TROPONINI  0.922*  1.370*  0.940*     Recent Labs   Lab  08/30/18   1348   BNP  138*       Scheduled Meds:   aspirin  81 mg Oral Daily    atorvastatin  80 mg Oral QHS    cyanocobalamin  1,000 mcg Oral Daily    gabapentin  300 mg Oral BID    hydrALAZINE  50 mg Oral Q8H    losartan  50 mg Oral Daily    metoprolol tartrate  12.5 mg Oral BID     Continuous Infusions:  PRN Meds:acetaminophen, dextrose 50%, dextrose 50%, glucagon (human recombinant), glucose, glucose, hydrALAZINE, insulin aspart U-100, ramelteon, simethicone, sodium chloride 0.9%    Assessment/Plan     #Typical chest pain 2/2 diastolic heart failure   #Type 2 NSTEMI 2/2 acute on chronic diastolic heart failure exacerbation 2/2 volume overload  #D-CHF  #Mild AS     - HD stable, asymp.   - Needs some volume removal; rec Lasix 60mg IV x1 today  - Start lasix po 40mg BID and on discharge  - Cont asa and lipitor 80 daily  - Cont lopressor 12.5mg BID and losartan 50mg daily for CAD  - Cont hydralazine 50mg q8   - Consider addition of norvasc 5mg daily if bp's still not controlled; can add as outpt with pcp or cards   - Cont close fu with [facility] cardiologist as outpt within 1 month  - No further recs at this time  - Stable for discharge from cardiology perspective.   - Will sign off. Please call with any further questions or concerns.       Sulaiman Marie  [facility] Cardiology Fellow   Cell: [phone]

## 2018-09-01 NOTE — NURSING
Patient d/c home. D/c instructions given to the patient and family. Verbalized understanding. Education given and reviewed. Refer to clinical reference for education. Iv removed tip intact. Telemetry d/c. Waiting for transport by patients son

## 2018-09-01 NOTE — PLAN OF CARE
Discharge orders noted, no HH or HME ordered.    Future Appointments   Date Time Provider Department Center   9/4/2018 11:00 AM Jasmin David MD [facility] ELOY De Guzman   9/10/2018  1:00 PM Olena Jack NP [facility] NEPHRO Edu Currie       Pt's nurse will go over medications/signs and symptoms prior to discharge       09/01/18 1230   Final Note   Assessment Type Final Discharge Note   Discharge Disposition Home   What phone number can be called within the next 1-3 days to see how you are doing after discharge? [phone]   Hospital Follow Up  Appt(s) scheduled? No  (Offices closed for weekend. Patient to schedule own follow up appointment.)   Right Care Referral Info   Post Acute Recommendation No Care     Janell Marr, RN Transitional Navigator  [phone]

## 2018-09-01 NOTE — PLAN OF CARE
Problem: Patient Care Overview  Goal: Plan of Care Review  Outcome: Ongoing (interventions implemented as appropriate)  Plan of care discussed with patient. Safety measures maintained call bell in reach and instructed to call for assistance. No complaint of chest pain or shortness of breath. VSS. NSR on tele.  Right groin and right wrist dressings remain c/d/i with no bleeding, bruising or hematoma.

## 2018-09-04 ENCOUNTER — OFFICE VISIT (OUTPATIENT)
Dept: INTERNAL MEDICINE | Facility: CLINIC | Age: 83
End: 2018-09-04
Payer: MEDICARE

## 2018-09-04 VITALS
DIASTOLIC BLOOD PRESSURE: 60 MMHG | HEART RATE: 81 BPM | HEIGHT: 62 IN | RESPIRATION RATE: 16 BRPM | TEMPERATURE: 98 F | SYSTOLIC BLOOD PRESSURE: 132 MMHG | BODY MASS INDEX: 34.84 KG/M2 | OXYGEN SATURATION: 96 % | WEIGHT: 189.31 LBS

## 2018-09-04 DIAGNOSIS — E11.21 TYPE 2 DIABETES MELLITUS WITH DIABETIC NEPHROPATHY, WITHOUT LONG-TERM CURRENT USE OF INSULIN: ICD-10-CM

## 2018-09-04 DIAGNOSIS — N18.30 CHRONIC KIDNEY DISEASE, STAGE III (MODERATE): ICD-10-CM

## 2018-09-04 DIAGNOSIS — I50.33 ACUTE ON CHRONIC DIASTOLIC HEART FAILURE: ICD-10-CM

## 2018-09-04 DIAGNOSIS — I25.10 CORONARY ARTERY DISEASE INVOLVING NATIVE CORONARY ARTERY OF NATIVE HEART WITHOUT ANGINA PECTORIS: Primary | ICD-10-CM

## 2018-09-04 PROCEDURE — 99495 TRANSJ CARE MGMT MOD F2F 14D: CPT | Mod: PBBFAC,PN | Performed by: INTERNAL MEDICINE

## 2018-09-04 PROCEDURE — 99499 UNLISTED E&M SERVICE: CPT | Mod: S$GLB,,, | Performed by: INTERNAL MEDICINE

## 2018-09-04 PROCEDURE — 99214 OFFICE O/P EST MOD 30 MIN: CPT | Mod: S$PBB,,, | Performed by: INTERNAL MEDICINE

## 2018-09-04 PROCEDURE — 99999 PR PBB SHADOW E&M-EST. PATIENT-LVL III: CPT | Mod: PBBFAC,,, | Performed by: INTERNAL MEDICINE

## 2018-09-04 PROCEDURE — 99213 OFFICE O/P EST LOW 20 MIN: CPT | Mod: PBBFAC,PN | Performed by: INTERNAL MEDICINE

## 2018-09-05 NOTE — DISCHARGE SUMMARY
[facility] Medicine Discharge Summary    Primary Team: [facility] Medicine Firm [facility]   Attending Physician: No att. providers found  Resident: Dr. Chirinos/Dr. Hall  Intern: Dr. Holdne/Dr. Hurt    Date of Admit: 8/30/2018  Date of Discharge: 9/1/2018    Discharge to: home   Condition: stable    Discharge Diagnoses     Patient Active Problem List   Diagnosis    HTN (hypertension)    Hyperlipidemia    PAD (peripheral artery disease)    Coronary artery disease    Type 2 diabetes mellitus with diabetic nephropathy    Interval gout    CKD (chronic kidney disease), stage IV    Hyperparathyroidism    Acute on chronic diastolic heart failure       Consultants and Procedures     Consultants:  [facility] Cardiology    Procedures:   CXR  Cath Lab    Brief History of Present Illness      Petra Zazueta is a 83 y.o. AA female who has a past medical history of Anemia, Arthritis, Coronary artery disease, Diabetes mellitus, Diabetes mellitus type II, Dyslipidemia, Hypertension, Insomnia, and PAD (peripheral artery disease). The patient presented to the [facility] on 8/30/2018 with a primary complaint of chest pain.     Patient was recently admitted 8/23/18 to [facility] for worsening SOB for 2-3 weeks and chest prsesure.Found to be hypertensive, with pulm edema on CXR and with UTI. Diuresed. Trop negative x 3. Resumed BP meds and received abx for UTI.    This admission patient c/o chest pain and pressure to her L chest and L side with associated diaphoresis. Denies nausea/vomiting/diarrhea. Feeling well prior to last admission. Had to continue sitting down, too much pain to get up. Not relieved by christi seltzer, relieved by ASAx3 and SL nitro. Brought to ED via EMS.    ED Trop elevation to 0.2, . CXR consistent with prominent hilar vasculature continued from previous imaging.       For the full HPI please refer to the History & Physical from this admission.    Hospital Course By  Problem with Pertinent Findings     Chest pain with elevated troponin, resolved   - 8/24/18 ECHO: showed normal EF, hypertrophy and impaired relaxation  - ED EKG initially showing NSTEMI without appreciable T wave changes-> then with lateral depressions and anterior flattening of T waves  - trop peaked at 1.370 8/31, now improving   - , started on Lasix 40 qd  - CXR: pulmonary edema noted, no pleural effusion or consolidation  - 8/31 Cath with IR: no occlusions, stents patent   - Per cards, likely volume overload, recommend continue ASA and statin, Lasix 60mg IV x1 prior to discharge. Start Lasix 40mg bid to continue on discharge. Ok to discontinue heparin drip.  - Will need to follow up with [facility] cardiologist as outpatient within 1 month      DMII   - A1c 7.8  - sliding scale during admission  - gabapentin for neuropathy      CKDIV  - GFR 23, BUN 41  - creat 2.2- baseline ~1.7  - given IVF 75cc/hr post cath, 9/1 Cr 1.5     HLD  - continue statin     HTN  - started on metoprolol 12.5 bid  - 8/30 restarted home hydralazine 50mg tid, losartan 50mg qd  - per cards, add Norvasc 5mg qd on discharge      Discharge Medications        Medication List      START taking these medications    amLODIPine 5 MG tablet  Commonly known as:  NORVASC  Take 1 tablet (5 mg total) by mouth once daily.     atorvastatin 80 MG tablet  Commonly known as:  LIPITOR  Take 1 tablet (80 mg total) by mouth every evening.  Replaces:  lovastatin 40 MG tablet     metoprolol tartrate 25 MG tablet  Commonly known as:  LOPRESSOR  Take 0.5 tablets (12.5 mg total) by mouth 2 (two) times daily.     pioglitazone 30 MG tablet  Commonly known as:  ACTOS  TAKE 1 TABLET BY MOUTH EVERY DAY        CHANGE how you take these medications    blood sugar diagnostic Strp  Test blood sugar twice daily  What changed:  additional instructions     furosemide 40 MG tablet  Commonly known as:  LASIX  Take 1 tablet (40 mg total) by mouth 2 (two) times  daily.  What changed:  when to take this     gabapentin 300 MG capsule  Commonly known as:  NEURONTIN  Take 1 capsule (300 mg total) by mouth 2 (two) times daily.  What changed:    · how much to take  · how to take this  · when to take this  · additional instructions     losartan 50 MG tablet  Commonly known as:  COZAAR  Take 1 tablet (50 mg total) by mouth once daily.  What changed:    · medication strength  · how much to take        CONTINUE taking these medications    aspirin 81 MG EC tablet  Commonly known as:  ECOTRIN  Take 1 tablet (81 mg total) by mouth once daily.     cilostazol 100 MG Tab  Commonly known as:  PLETAL     cyanocobalamin 1000 MCG tablet  Commonly known as:  VITAMIN B-12  Take 1 tablet (1,000 mcg total) by mouth once daily.     ferrous sulfate 325 mg (65 mg iron) Tab tablet  Take 1 tablet (325 mg total) by mouth daily with breakfast.     fish oil-omega-3 fatty acids 300-1,000 mg capsule     glimepiride 2 MG tablet  Commonly known as:  AMARYL  Take 1 tablet (2 mg total) by mouth before breakfast.     hydrALAZINE 50 MG tablet  Commonly known as:  APRESOLINE  Take 1 tablet (50 mg total) by mouth every 8 (eight) hours.     SITagliptin 50 MG Tab  Commonly known as:  JANUVIA  Take 1 tablet (50 mg total) by mouth once daily.        STOP taking these medications    cephALEXin 250 MG capsule  Commonly known as:  KEFLEX     cloNIDine 0.1 MG tablet  Commonly known as:  CATAPRES     latanoprost 0.005 % ophthalmic solution     lovastatin 40 MG tablet  Commonly known as:  MEVACOR  Replaced by:  atorvastatin 80 MG tablet           Where to Get Your Medications      These medications were sent to [facility]/pharmacy #[id number] - [address] AT CORNER OF [address] [address]    Phone:  [phone]   · amLODIPine 5 MG tablet  · atorvastatin 80 MG tablet  · blood sugar diagnostic Strp  · furosemide 40 MG tablet  · gabapentin 300 MG capsule  · hydrALAZINE 50 MG  tablet  · losartan 50 MG tablet  · metoprolol tartrate 25 MG tablet     These medications were sent to [id number] - [place], [place] - [address] AT [facility] [address]  [address] [place] [facility] [address]    Hours:  24-hours Phone:  [phone]   · pioglitazone 30 MG tablet     You can get these medications from any pharmacy    You don't need a prescription for these medications  · aspirin 81 MG EC tablet         Discharge Information:   Diet:  Cardiac, diabetic, low-salt     Physical Activity:  As tolerated    Instructions:  1. Take all medications as prescribed  2. Keep all follow-up appointments  3. Return to the hospital or call your primary care physicians if any worsening symptoms such as chest pain with or without radiation, diaphoresis, shortness of breath occur.    Follow-Up Appointments:      Angelina Luque M.D.  [facility] Internal Medicine, [facility] Medicine Firm [facility]

## 2018-09-10 ENCOUNTER — OFFICE VISIT (OUTPATIENT)
Dept: NEPHROLOGY | Facility: CLINIC | Age: 83
End: 2018-09-10
Payer: MEDICARE

## 2018-09-10 VITALS
DIASTOLIC BLOOD PRESSURE: 60 MMHG | HEIGHT: 62 IN | OXYGEN SATURATION: 97 % | WEIGHT: 192.25 LBS | HEART RATE: 76 BPM | BODY MASS INDEX: 35.38 KG/M2 | SYSTOLIC BLOOD PRESSURE: 130 MMHG

## 2018-09-10 DIAGNOSIS — N18.30 CHRONIC KIDNEY DISEASE, STAGE III (MODERATE): ICD-10-CM

## 2018-09-10 PROCEDURE — 3078F DIAST BP <80 MM HG: CPT | Mod: CPTII,,, | Performed by: NURSE PRACTITIONER

## 2018-09-10 PROCEDURE — 99999 PR PBB SHADOW E&M-EST. PATIENT-LVL III: CPT | Mod: PBBFAC,,, | Performed by: NURSE PRACTITIONER

## 2018-09-10 PROCEDURE — 99213 OFFICE O/P EST LOW 20 MIN: CPT | Mod: PBBFAC | Performed by: NURSE PRACTITIONER

## 2018-09-10 PROCEDURE — 1101F PT FALLS ASSESS-DOCD LE1/YR: CPT | Mod: CPTII,,, | Performed by: NURSE PRACTITIONER

## 2018-09-10 PROCEDURE — 3075F SYST BP GE 130 - 139MM HG: CPT | Mod: CPTII,,, | Performed by: NURSE PRACTITIONER

## 2018-09-10 PROCEDURE — 99213 OFFICE O/P EST LOW 20 MIN: CPT | Mod: S$PBB,,, | Performed by: NURSE PRACTITIONER

## 2018-09-10 NOTE — PROGRESS NOTES
Subjective:       Patient ID: Petra Zazueta is a 83 y.o. Black or  female who presents for follow-up evaluation of CKD.       HPI   82 y/o F with longstanding DM2, HTN, HLD, CAD (CABG 3 yrs ago), PAD and CKD3 with a baseline creatinine of 1.3-1.6 mg/dl and episodes of increased creatinine up to 2.07 mg/dl. Her most recent one was 1.5 mg/dl. CO2 unusually low for her, will recheck. She was recently admitted to hospital for MI.  Since hospitalization, reports SOB and palpitations. Takes tylenol only for pain. and dyspnea on exertion. Stable swelling of her left leg for many yrs.     Review of Systems   Constitutional: Negative for activity change, appetite change, chills, diaphoresis, fatigue, fever and unexpected weight change.   HENT: Negative for congestion, facial swelling and trouble swallowing.    Eyes: Negative for pain, discharge, redness and visual disturbance.   Respiratory: Positive for shortness of breath (half a block). Negative for cough and wheezing.    Cardiovascular: Positive for leg swelling. Negative for chest pain and palpitations.   Gastrointestinal: Negative for abdominal distention, abdominal pain, constipation and diarrhea.   Endocrine: Negative for cold intolerance and heat intolerance.   Genitourinary: Negative for decreased urine volume, difficulty urinating, dysuria, flank pain, frequency and urgency.   Musculoskeletal: Positive for arthralgias. Negative for joint swelling and myalgias.   Skin: Negative for color change.   Allergic/Immunologic: Negative for immunocompromised state.   Neurological: Negative for dizziness, tremors, syncope, speech difficulty, weakness, light-headedness and numbness.   Hematological: Negative for adenopathy.   Psychiatric/Behavioral: Negative for agitation, confusion, decreased concentration and dysphoric mood.       Objective:      Physical Exam   Constitutional: She is oriented to person, place, and time. She appears well-developed and  well-nourished.   HENT:   Head: Normocephalic and atraumatic.   Eyes: Conjunctivae and EOM are normal. Pupils are equal, round, and reactive to light.   Neck: Neck supple.   Cardiovascular: Normal rate, regular rhythm and intact distal pulses.   Murmur (systolic) heard.  Pulmonary/Chest: Effort normal and breath sounds normal.   Abdominal: Soft. Bowel sounds are normal.   Musculoskeletal: Normal range of motion. She exhibits edema (left lower extremity worse than right).   Neurological: She is alert and oriented to person, place, and time. She has normal reflexes.   Skin: Skin is warm and dry.   Psychiatric: She has a normal mood and affect. Her behavior is normal.   Nursing note and vitals reviewed.      Assessment:       1. Chronic kidney disease, stage III (moderate)        Plan:       1. CKD 3: Likely secondary to vascular disease, long standing HTN and diabetic nephropathy (no significant proteinuria).    Avoids NSAIDs.    - continue statin  - continue low dose ARB for renal protection   - continue Lasix.  -renal panel 1 month   -RTC 6 months with CBC, CMP, renal panel, U/A    Lab Results   Component Value Date    CREATININE 1.5 (H) 09/01/2018     Protein Creatinine Ratios:     Prot/Creat Ratio, Ur   Date Value Ref Range Status   04/19/2018 0.17 0.00 - 0.20 Final   12/22/2017 0.08 0.00 - 0.20 Final   06/17/2016 0.10 0.00 - 0.20 Final     ·   Acid-Base:   Lab Results   Component Value Date     09/01/2018    K 4.2 09/01/2018    CO2 19 (L) 09/01/2018     2. HTN: decently controlled. Low salt diet. There is a confusion about the medication she takes. She is not taken the Losartan, spironolactone or amlodipine but   her cardiologist started her on Hydralazine.     The patient was educated in practicing a low salt diet.  Avoid high salt foods (olives, pickles, smoked meats, salted potato chips, etc.).   Do not add salt to your food at the table.   Use only small amounts of salt when cooking.      3. Renal  osteodystrophy: Monitor,takes vitamin D over the counter  Lab Results   Component Value Date    .0 (H) 08/02/2018    CALCIUM 9.8 09/01/2018    PHOS 4.1 08/02/2018       4. Anemia: At goal for CKD.   Lab Results   Component Value Date    HGB 12.0 09/01/2018        5. DM: Well controlled - Follow up with primary care.   Lab Results   Component Value Date    HGBA1C 7.8 (H) 08/30/2018       6. Lipid management: On Statin.   Has problems sleeping - advised in sleep hygiene           Follow up in 6 months with labs. Labs in 1 month and 6 months

## 2018-09-10 NOTE — PATIENT INSTRUCTIONS
Repeat renal panal in 1 month.  Repeat. CBC, CMP, renal panel, U/A 6 months.  RTC 6 months.  F/U with cardiology for palpitations.

## 2018-09-17 NOTE — PROGRESS NOTES
Transitional Care Note  Subjective:       Patient ID: Petra Zazueta is a 83 y.o. female.  Chief Complaint: Hospital Follow Up (TCC)    Family and/or Caretaker present at visit?  No.  Diagnostic tests reviewed/disposition: No diagnosic tests pending after this hospitalization.  Disease/illness education:  YES  Home health/community services discussion/referrals: Patient does not have home health established from hospital visit.  They do not need home health.  If needed, we will set up home health for the patient.   Establishment or re-establishment of referral orders for community resources: No other necessary community resources.   Discussion with other health care providers: No discussion with other health care providers necessary.   HPI   83y/oAAF, initially admitted to [facility] ( 8/23) for CHF,pulmonary edema and a UTI. Diuresed, and discharged with antibiotics.  Then she went to [place] ( 8/30), with a NonSTEMI, chest pain, unrelieved by NTG, and underwent a cardiac cath.  All stents were open. Slowly diuresed, again encouraged on her proper diet and discharged.  Her diabetes also was addressed.  Currently feels much better.  No CP,palpitations,SOB,DUKE,PND,orthopnea.  No edema.    Review of Systems    Objective:      Physical Exam   Constitutional: She is oriented to person, place, and time. She appears well-developed and well-nourished.   HENT:   Head: Normocephalic and atraumatic.   Eyes: Conjunctivae are normal. Pupils are equal, round, and reactive to light.   Neck: Normal range of motion. No JVD present.   Cardiovascular: Normal rate, regular rhythm and normal heart sounds.   Pulmonary/Chest: Effort normal and breath sounds normal.   Abdominal: Soft. Bowel sounds are normal.   Musculoskeletal: She exhibits no edema.   Neurological: She is alert and oriented to person, place, and time.   Skin: Skin is warm and dry.   Psychiatric: She has a normal mood and affect. Her behavior is normal.   Nursing note and vitals  reviewed.      Assessment:       1. Coronary artery disease involving native coronary artery of native heart without angina pectoris    2. Chronic kidney disease, stage III (moderate)    3. Acute on chronic diastolic heart failure    4. Type 2 diabetes mellitus with diabetic nephropathy, without long-term current use of insulin       Must watch her very closely, because of her CKD,DM and little margin with CHF/pulmonary edema.  Must Plan:       Same as discharge.

## 2018-09-17 NOTE — PROGRESS NOTES
"Subjective:      Patient ID: Petra Zazueta is a 83 y.o. female.    Chief Complaint: Hospital Follow Up (TCC)    HPI: 83 y.o. Black or  female        Review of Systems    Objective:   /60 (BP Location: Left arm, Patient Position: Sitting, BP Method: Medium (Manual))   Pulse 81   Temp 98.4 °F (36.9 °C) (Oral)   Resp 16   Ht 5' 2" (1.575 m)   Wt 85.9 kg (189 lb 4.8 oz)   LMP  (LMP Unknown)   SpO2 96%   BMI 34.62 kg/m²     Physical Exam    Assessment:     No diagnosis found.  Plan:     There are no diagnoses linked to this encounter.    "

## 2018-09-19 DIAGNOSIS — N39.0 UTI (URINARY TRACT INFECTION), UNCOMPLICATED: Primary | ICD-10-CM

## 2018-09-19 DIAGNOSIS — N18.1 CKD (CHRONIC KIDNEY DISEASE) STAGE 1, GFR 90 ML/MIN OR GREATER: ICD-10-CM

## 2018-10-02 NOTE — TELEPHONE ENCOUNTER
----- Message from Mandie Balderas sent at 10/2/2018 10:03 AM CDT -----  Contact: [phone]/son/Abhishek  Patient is requesting to have a PA refill of  glimepiride (AMARYL) 2 MG tablet. Take 1 tablet (2 mg total) by mouth before breakfast. - Oral  ferrous sulfate 325 mg (65 mg iron) Tab tablet. Take 1 tablet (325 mg total) by mouth daily with breakfast. - Oral     sent [facility] [id number] - [place], [place] [id number] AT [facility]  . Please advise.

## 2018-10-03 RX ORDER — FERROUS SULFATE 325(65) MG
325 TABLET ORAL
Qty: 90 TABLET | Refills: 0 | Status: SHIPPED | OUTPATIENT
Start: 2018-10-03 | End: 2019-01-02 | Stop reason: SDUPTHER

## 2018-10-03 RX ORDER — GLIMEPIRIDE 2 MG/1
2 TABLET ORAL
Qty: 90 TABLET | Refills: 0 | Status: SHIPPED | OUTPATIENT
Start: 2018-10-03 | End: 2019-01-02 | Stop reason: SDUPTHER

## 2018-10-03 NOTE — TELEPHONE ENCOUNTER
----- Message from Valery Verma sent at 10/3/2018  9:53 AM CDT -----  Contact: Son [phone]  Patient would like a refill for glimepiride (AMARYL) 2 MG tablet and ferrous sulfate 325 mg (65 mg iron) Tab tablet [facility] [id number] [place] [address], [place] - [address] AT [facility] Patient states she is out of medication and needs it today. Please advise.

## 2018-10-16 ENCOUNTER — OFFICE VISIT (OUTPATIENT)
Dept: INTERNAL MEDICINE | Facility: CLINIC | Age: 83
End: 2018-10-16
Payer: MEDICARE

## 2018-10-16 VITALS
DIASTOLIC BLOOD PRESSURE: 78 MMHG | SYSTOLIC BLOOD PRESSURE: 138 MMHG | WEIGHT: 191.88 LBS | HEIGHT: 62 IN | RESPIRATION RATE: 16 BRPM | TEMPERATURE: 99 F | HEART RATE: 78 BPM | OXYGEN SATURATION: 97 % | BODY MASS INDEX: 35.31 KG/M2

## 2018-10-16 DIAGNOSIS — I10 ESSENTIAL HYPERTENSION: ICD-10-CM

## 2018-10-16 DIAGNOSIS — E78.5 HYPERLIPIDEMIA, UNSPECIFIED HYPERLIPIDEMIA TYPE: ICD-10-CM

## 2018-10-16 DIAGNOSIS — Z23 NEED FOR INFLUENZA VACCINATION: ICD-10-CM

## 2018-10-16 DIAGNOSIS — E11.21 TYPE 2 DIABETES MELLITUS WITH DIABETIC NEPHROPATHY, WITHOUT LONG-TERM CURRENT USE OF INSULIN: Primary | ICD-10-CM

## 2018-10-16 DIAGNOSIS — N18.30 CHRONIC KIDNEY DISEASE, STAGE III (MODERATE): ICD-10-CM

## 2018-10-16 PROCEDURE — 3075F SYST BP GE 130 - 139MM HG: CPT | Mod: CPTII,,, | Performed by: INTERNAL MEDICINE

## 2018-10-16 PROCEDURE — 3078F DIAST BP <80 MM HG: CPT | Mod: CPTII,,, | Performed by: INTERNAL MEDICINE

## 2018-10-16 PROCEDURE — 99999 PR PBB SHADOW E&M-EST. PATIENT-LVL IV: CPT | Mod: PBBFAC,,, | Performed by: INTERNAL MEDICINE

## 2018-10-16 PROCEDURE — 90662 IIV NO PRSV INCREASED AG IM: CPT | Mod: PBBFAC,PN

## 2018-10-16 PROCEDURE — 99214 OFFICE O/P EST MOD 30 MIN: CPT | Mod: PBBFAC,PN,25 | Performed by: INTERNAL MEDICINE

## 2018-10-16 PROCEDURE — 99214 OFFICE O/P EST MOD 30 MIN: CPT | Mod: S$PBB,,, | Performed by: INTERNAL MEDICINE

## 2018-10-16 PROCEDURE — 1101F PT FALLS ASSESS-DOCD LE1/YR: CPT | Mod: CPTII,,, | Performed by: INTERNAL MEDICINE

## 2018-10-16 PROCEDURE — 99499 UNLISTED E&M SERVICE: CPT | Mod: S$GLB,,, | Performed by: INTERNAL MEDICINE

## 2018-10-16 NOTE — PROGRESS NOTES
Subjective:      Patient ID: Petra Zazueta is a 83 y.o. female.    Chief Complaint: Follow-up (6 week follow up) and Flu Vaccine    HPI: 83 y.o. Black or  female , here to review her medications.  Has done slightly better since her admission to the hospital for CHF.  Most recent labs:  09/17/18 0903 WBC 9.22 - Final result   09/17/18 0903 RBC 3.64 Abnormal  Low Final result   09/17/18 0903 HGB 11.3 Abnormal  Low Final result   09/17/18 0903 HCT 35.9 Abnormal  Low Final result   09/17/18 0903 MCH 31.0 - Final result   09/17/18 0903 RDW 13.7 - Final result   09/17/18 0903  - Final result   09/17/18 0903 MPV 11.1 - Final result   09/17/18 0903  Abnormal  High Final result   09/17/18 0903  Abnormal  High Final result   09/17/18 0903 BUN 33 Abnormal  High Final result   09/17/18 0903 BUN 33 Abnormal  High Final result   09/17/18 0903 CREATININE 1.80 Abnormal  High Final result   09/17/18 0903 CREATININE 1.80 Abnormal  High Final result   09/17/18 0903 CALCIUM 10.1 - Final result   09/17/18 0903 CALCIUM 10.1 - Final result   09/17/18 0903  - Final result   09/17/18 0903  - Final result   09/17/18 0903 K 4.2 - Final result   09/17/18 0903 K 4.2 - Final result   09/17/18 0903  - Final result   09/17/18 0903  - Final result   09/01/18 0559 PROT 6.9 - Final result   09/17/18 0903 ALBUMIN 4.3 - Final result   09/17/18 0903 ALBUMIN 4.3 - Final result   09/01/18 0559 BILITOT 0.4 - Final result   09/01/18 0559 AST 19 - Final result   09/01/18 0559 ALKPHOS 85 - Final result   09/17/18 0903 CO2 24 - Final result   09/17/18 0903 CO2 24 - Final result   09/01/18 0559 ALT 11 - Final result   09/17/18 0903 ANIONGAP 8 - Final result   09/17/18 0903 ANIONGAP 8 - Final result   09/17/18 0903 EGFRNONAA 25.8 Abnormal  Abnormal Final result   09/17/18 0903 EGFRNONAA 25.8 Abnormal  Abnormal Final result   09/17/18 0903 ESTGFRAFRICA 29.8 Abnormal  Abnormal Final result   09/17/18  "0903 ESTGFRAFRICA 29.8 Abnormal  Abnormal Final result   09/17/18 0903 MG 2.0 - Final result   09/17/18 0903 MCV 99 Abnormal        09/20/18 1000 COLORU Yellow - Final result   09/20/18 1000 APPEARANCEUA Clear - Final result   09/20/18 1000 SPECGRAV 1.020 - Final result   09/20/18 1000 PHUR 6.0 - Final result   09/20/18 1000 KETONESU Negative - Final result   09/20/18 1000 OCCULTUA Trace Abnormal  Abnormal Final result   09/20/18 1000 NITRITE Negative - Final result   09/20/18 1000 UROBILINOGEN Negative -         Wants to know if she can have a pill " to get some of this weight off".  Does not follow a diabetic,low salt diet. Weight is exactly the same as last visit.  Does not check her BS, but states it is "ok"?        Review of Systems   Constitutional: Negative for activity change, appetite change and unexpected weight change.   HENT: Negative.    Eyes: Negative.    Respiratory: Negative for chest tightness, shortness of breath and wheezing.    Cardiovascular: Negative for chest pain and palpitations.   Gastrointestinal: Negative.    Genitourinary: Negative.    Musculoskeletal: Negative.    Skin: Negative.    Neurological: Negative for dizziness, weakness and headaches.   Hematological: Negative.    Psychiatric/Behavioral: Negative.        Objective:   /78 (BP Location: Left arm, Patient Position: Sitting, BP Method: Medium (Manual))   Pulse 78   Temp 98.5 °F (36.9 °C) (Oral)   Resp 16   Ht 5' 2" (1.575 m)   Wt 87 kg (191 lb 14.4 oz)   LMP  (LMP Unknown)   SpO2 97%   BMI 35.10 kg/m²     Physical Exam   Constitutional: She is oriented to person, place, and time. She appears well-developed and well-nourished.   HENT:   Head: Normocephalic and atraumatic.   Nose: Nose normal.   Mouth/Throat: Oropharynx is clear and moist.   Eyes: Conjunctivae are normal. Pupils are equal, round, and reactive to light.   Neck: Normal range of motion.   Cardiovascular: Normal rate, regular rhythm and normal heart sounds. "   Pulmonary/Chest: Effort normal and breath sounds normal.   Abdominal: Soft. Bowel sounds are normal.   Musculoskeletal: She exhibits no edema.   Neurological: She is alert and oriented to person, place, and time.   Skin: Skin is warm and dry.   Psychiatric: She has a normal mood and affect. Her behavior is normal.   Nursing note and vitals reviewed.      Assessment:     1. Type 2 diabetes mellitus with diabetic nephropathy, without long-term current use of insulin    2. Hyperlipidemia, unspecified hyperlipidemia type    3. Essential hypertension    4. Need for influenza vaccination    5. Chronic kidney disease, stage III (moderate)    6. BMI 35.0-35.9,adult    Pt lives alone. Eats what,when she wants....  Plan:     Type 2 diabetes mellitus with diabetic nephropathy, without long-term current use of insulin  -     Hemoglobin A1c; Future; Expected date: 02/13/2019  -     Microalbumin/creatinine urine ratio; Future; Expected date: 02/13/2019    Hyperlipidemia, unspecified hyperlipidemia type  -     Lipid panel; Future; Expected date: 02/13/2019    Essential hypertension  -     CBC auto differential; Future; Expected date: 02/13/2019  -     Comprehensive metabolic panel; Future; Expected date: 02/13/2019    Need for influenza vaccination  -     Influenza - High Dose (65+) (PF) (IM)    Chronic kidney disease, stage III (moderate)    BMI 35.0-35.9,adult

## 2018-11-09 NOTE — PLAN OF CARE
Bed rest up. Patient allowed to sit up at this time. Groin site monitored. Right groin site dressing remains c/d/i with no bleeding/bruising/hematoma to the site. Right wrist dressing remains c/d/i with no bleeding/bruising/hematoma to the site.  Patient with complaints of lower back pain from laying flat. Prn tylenol provided. Instructed patient to call for assitance or before getting out of bed.    cognitive limitations

## 2018-12-26 ENCOUNTER — TELEPHONE (OUTPATIENT)
Dept: FAMILY MEDICINE | Facility: CLINIC | Age: 83
End: 2018-12-26

## 2018-12-26 DIAGNOSIS — Z12.39 BREAST CANCER SCREENING: Primary | ICD-10-CM

## 2018-12-26 NOTE — TELEPHONE ENCOUNTER
Left vm to schedule mammogram    ----- Message from Valrey Verma sent at 12/26/2018 11:26 AM CST -----  Contact: Son [phone] or Self [phone]  Patient would like orders put in the system for her mammogram.  Please advise

## 2018-12-27 ENCOUNTER — TELEPHONE (OUTPATIENT)
Dept: FAMILY MEDICINE | Facility: CLINIC | Age: 83
End: 2018-12-27

## 2018-12-27 NOTE — TELEPHONE ENCOUNTER
----- Message from Dl Martínez sent at 12/27/2018  8:50 AM CST -----  Contact: self/[phone]  Patient is returning your call.  Please advise

## 2019-01-03 RX ORDER — FERROUS SULFATE 325(65) MG
TABLET ORAL
Qty: 90 TABLET | Refills: 0 | Status: SHIPPED | OUTPATIENT
Start: 2019-01-03 | End: 2019-04-13 | Stop reason: SDUPTHER

## 2019-01-03 RX ORDER — GLIMEPIRIDE 2 MG/1
TABLET ORAL
Qty: 90 TABLET | Refills: 0 | Status: SHIPPED | OUTPATIENT
Start: 2019-01-03 | End: 2019-04-13 | Stop reason: SDUPTHER

## 2019-01-09 RX ORDER — GLIMEPIRIDE 2 MG/1
TABLET ORAL
Qty: 90 TABLET | Refills: 0 | Status: SHIPPED | OUTPATIENT
Start: 2019-01-09 | End: 2019-04-01 | Stop reason: SDUPTHER

## 2019-01-09 RX ORDER — FERROUS SULFATE 325(65) MG
TABLET ORAL
Qty: 90 TABLET | Refills: 0 | Status: SHIPPED | OUTPATIENT
Start: 2019-01-09 | End: 2019-04-01 | Stop reason: SDUPTHER

## 2019-01-11 ENCOUNTER — LAB VISIT (OUTPATIENT)
Dept: LAB | Facility: HOSPITAL | Age: 84
End: 2019-01-11
Attending: INTERNAL MEDICINE
Payer: MEDICARE

## 2019-01-11 ENCOUNTER — OFFICE VISIT (OUTPATIENT)
Dept: NEPHROLOGY | Facility: CLINIC | Age: 84
End: 2019-01-11
Payer: MEDICARE

## 2019-01-11 VITALS
BODY MASS INDEX: 34.78 KG/M2 | HEART RATE: 69 BPM | SYSTOLIC BLOOD PRESSURE: 144 MMHG | WEIGHT: 189 LBS | OXYGEN SATURATION: 96 % | DIASTOLIC BLOOD PRESSURE: 80 MMHG | HEIGHT: 62 IN

## 2019-01-11 DIAGNOSIS — N18.30 CHRONIC KIDNEY DISEASE, STAGE III (MODERATE): Primary | ICD-10-CM

## 2019-01-11 DIAGNOSIS — E11.21 TYPE 2 DIABETES MELLITUS WITH DIABETIC NEPHROPATHY, WITHOUT LONG-TERM CURRENT USE OF INSULIN: ICD-10-CM

## 2019-01-11 DIAGNOSIS — I10 ESSENTIAL HYPERTENSION: ICD-10-CM

## 2019-01-11 DIAGNOSIS — N18.30 CHRONIC KIDNEY DISEASE, STAGE III (MODERATE): ICD-10-CM

## 2019-01-11 LAB
BILIRUB UR QL STRIP: NEGATIVE
CLARITY UR REFRACT.AUTO: CLEAR
COLOR UR AUTO: YELLOW
CREAT UR-MCNC: 186 MG/DL
GLUCOSE UR QL STRIP: NEGATIVE
HGB UR QL STRIP: NEGATIVE
KETONES UR QL STRIP: NEGATIVE
LEUKOCYTE ESTERASE UR QL STRIP: NEGATIVE
NITRITE UR QL STRIP: NEGATIVE
PH UR STRIP: 5 [PH] (ref 5–8)
PROT UR QL STRIP: NEGATIVE
PROT UR-MCNC: 17 MG/DL
PROT/CREAT UR: 0.09 MG/G{CREAT}
SP GR UR STRIP: 1.02 (ref 1–1.03)
URN SPEC COLLECT METH UR: NORMAL

## 2019-01-11 PROCEDURE — 81003 URINALYSIS AUTO W/O SCOPE: CPT

## 2019-01-11 PROCEDURE — 84156 ASSAY OF PROTEIN URINE: CPT

## 2019-01-11 PROCEDURE — 99499 UNLISTED E&M SERVICE: CPT | Mod: S$GLB,,, | Performed by: INTERNAL MEDICINE

## 2019-01-11 PROCEDURE — 3079F PR MOST RECENT DIASTOLIC BLOOD PRESSURE 80-89 MM HG: ICD-10-PCS | Mod: CPTII,S$GLB,, | Performed by: INTERNAL MEDICINE

## 2019-01-11 PROCEDURE — 99999 PR PBB SHADOW E&M-EST. PATIENT-LVL III: ICD-10-PCS | Mod: PBBFAC,,, | Performed by: INTERNAL MEDICINE

## 2019-01-11 PROCEDURE — 3077F SYST BP >= 140 MM HG: CPT | Mod: CPTII,S$GLB,, | Performed by: INTERNAL MEDICINE

## 2019-01-11 PROCEDURE — 1101F PR PT FALLS ASSESS DOC 0-1 FALLS W/OUT INJ PAST YR: ICD-10-PCS | Mod: CPTII,S$GLB,, | Performed by: INTERNAL MEDICINE

## 2019-01-11 PROCEDURE — 1101F PT FALLS ASSESS-DOCD LE1/YR: CPT | Mod: CPTII,S$GLB,, | Performed by: INTERNAL MEDICINE

## 2019-01-11 PROCEDURE — 99999 PR PBB SHADOW E&M-EST. PATIENT-LVL III: CPT | Mod: PBBFAC,,, | Performed by: INTERNAL MEDICINE

## 2019-01-11 PROCEDURE — 3079F DIAST BP 80-89 MM HG: CPT | Mod: CPTII,S$GLB,, | Performed by: INTERNAL MEDICINE

## 2019-01-11 PROCEDURE — 99213 OFFICE O/P EST LOW 20 MIN: CPT | Mod: S$GLB,,, | Performed by: INTERNAL MEDICINE

## 2019-01-11 PROCEDURE — 99213 PR OFFICE/OUTPT VISIT, EST, LEVL III, 20-29 MIN: ICD-10-PCS | Mod: S$GLB,,, | Performed by: INTERNAL MEDICINE

## 2019-01-11 PROCEDURE — 3077F PR MOST RECENT SYSTOLIC BLOOD PRESSURE >= 140 MM HG: ICD-10-PCS | Mod: CPTII,S$GLB,, | Performed by: INTERNAL MEDICINE

## 2019-01-11 PROCEDURE — 99499 RISK ADDL DX/OHS AUDIT: ICD-10-PCS | Mod: S$GLB,,, | Performed by: INTERNAL MEDICINE

## 2019-01-11 RX ORDER — LOSARTAN POTASSIUM 25 MG/1
TABLET ORAL
Refills: 3 | COMMUNITY
Start: 2018-11-13 | End: 2019-04-01 | Stop reason: DRUGHIGH

## 2019-01-14 DIAGNOSIS — E11.21 TYPE 2 DIABETES MELLITUS WITH DIABETIC NEPHROPATHY, WITHOUT LONG-TERM CURRENT USE OF INSULIN: Primary | ICD-10-CM

## 2019-01-14 NOTE — TELEPHONE ENCOUNTER
----- Message from Mesha Toledo sent at 1/14/2019 12:44 PM CST -----  Patient request a prescription for contour strips from [facility] , pharmacy is having some kind of issue getting this prescription filled. Please contact pharmacy at [phone].

## 2019-01-20 NOTE — PROGRESS NOTES
Subjective:       Patient ID: Petra Zazueta is a 83 y.o. Black or  female who presents for follow-up evaluation of CKD.    HPI   82 y/o F with longstanding DM2, HTN, HLD, CAD (CABG 3 yrs ago), PAD and CKD3 with a baseline creatinine of 1.3-1.6 mg/dl and episodes of increased creatinine up to 2.07 mg/dl. Her most recent one was 1.5 mg/dl. CO2 unusually low for her, will recheck. She was recently admitted to hospital for MI.  Since hospitalization, reports SOB and palpitations. Takes tylenol only for pain. No dyspnea on exertion. Stable swelling of her left leg for many yrs.     Review of Systems   Constitutional: Negative for activity change, appetite change, chills, diaphoresis, fatigue, fever and unexpected weight change.   HENT: Negative for congestion, facial swelling and trouble swallowing.    Eyes: Negative for pain, discharge, redness and visual disturbance.   Respiratory: Positive for shortness of breath (half a block). Negative for cough and wheezing.    Cardiovascular: Positive for leg swelling. Negative for chest pain and palpitations.   Gastrointestinal: Negative for abdominal distention, abdominal pain, constipation and diarrhea.   Endocrine: Negative for cold intolerance and heat intolerance.   Genitourinary: Negative for decreased urine volume, difficulty urinating, dysuria, flank pain, frequency and urgency.   Musculoskeletal: Positive for arthralgias. Negative for joint swelling and myalgias.   Skin: Negative for color change.   Allergic/Immunologic: Negative for immunocompromised state.   Neurological: Negative for dizziness, tremors, syncope, speech difficulty, weakness, light-headedness and numbness.   Hematological: Negative for adenopathy.   Psychiatric/Behavioral: Negative for agitation, confusion, decreased concentration and dysphoric mood.       Objective:       /80 mmHg  Physical Exam   Constitutional: She is oriented to person, place, and time. She appears  well-developed and well-nourished.   HENT:   Head: Normocephalic and atraumatic.   Eyes: Conjunctivae and EOM are normal. Pupils are equal, round, and reactive to light.   Neck: Neck supple.   Cardiovascular: Normal rate, regular rhythm and intact distal pulses.   Murmur (systolic) heard.  Pulmonary/Chest: Effort normal and breath sounds normal.   Abdominal: Soft. Bowel sounds are normal.   Musculoskeletal: Normal range of motion. She exhibits edema (left lower extremity worse than right).   Neurological: She is alert and oriented to person, place, and time. She has normal reflexes.   Skin: Skin is warm and dry.   Psychiatric: She has a normal mood and affect. Her behavior is normal.   Nursing note and vitals reviewed.      Assessment:       1. Chronic kidney disease, stage III (moderate)    2. Type 2 diabetes mellitus with diabetic nephropathy, without long-term current use of insulin    3. Essential hypertension        Plan:       1. CKD 3: Likely secondary to vascular disease, long standing HTN and diabetic nephropathy (no significant proteinuria).    - Avoids NSAIDs.    - continue statin  - continue low dose ARB for renoprotection   - continue Lasix for edema  - no overt proteinuria, low risk for progression to ESRD    Lab Results   Component Value Date    CREATININE 2.0 (H) 01/11/2019     Prot/Creat Ratio, Ur   Date Value Ref Range Status   01/11/2019 0.09 0.00 - 0.20 Final   09/17/2018 0.07 0.00 - 0.20 Final   09/17/2018 0.07 0.00 - 0.20 Final     Lab Results   Component Value Date     01/11/2019    K 4.2 01/11/2019    CO2 24 01/11/2019     2. HTN: decently controlled. Low salt diet. Continue losartan, metoprolol, amlodipine, hydralazine and Lasix     3. Renal osteodystrophy: Monitor,takes vitamin D over the counter    4. Anemia: At goal for CKD.   Lab Results   Component Value Date    HGB 10.0 (L) 01/11/2019      5. DM: Well controlled - Follow up with primary care.   Lab Results   Component Value Date     HGBA1C 7.8 (H) 08/30/2018       Follow up in 6 months with RONALD

## 2019-01-20 NOTE — PROGRESS NOTES
Patient, Petra Zazueta (MRN #[id number]), presented with a recent Estimated Glumerular Filtration Rate (EGFR) between 15 and 29 consistent with the definition of chronic kidney disease stage 4 (ICD10 - N18.4).    eGFR if    Date Value Ref Range Status   01/11/2019 26.2 (A) >60 mL/min/1.73 m^2 Final       The patient's chronic kidney disease stage 4 was monitored, evaluated, addressed and/or treated. This addendum to the medical record is made on 01/20/2019.

## 2019-01-22 DIAGNOSIS — E11.21 TYPE 2 DIABETES MELLITUS WITH DIABETIC NEPHROPATHY, WITHOUT LONG-TERM CURRENT USE OF INSULIN: Primary | ICD-10-CM

## 2019-01-22 RX ORDER — LANCETS 33 GAUGE
1 EACH MISCELLANEOUS 2 TIMES DAILY
Qty: 200 EACH | Refills: 1 | Status: SHIPPED | OUTPATIENT
Start: 2019-01-22

## 2019-01-22 RX ORDER — DEXTROSE 4 G
TABLET,CHEWABLE ORAL
Qty: 1 EACH | Refills: 0 | Status: SHIPPED | OUTPATIENT
Start: 2019-01-22

## 2019-01-22 NOTE — TELEPHONE ENCOUNTER
Can you sign off on these? Need written scripts to send to [facility] DME department.

## 2019-01-22 NOTE — TELEPHONE ENCOUNTER
----- Message from Abby Guerrero sent at 1/22/2019  1:40 PM CST -----  Ale with [employer] [facility] called.   No. [phone]   Patient needs meter, strips, and lancets.   Please send a medical necessity form, diagnosis, and how many times a day patient tests.    Fax no. [phone]   Attention:  DME

## 2019-02-01 ENCOUNTER — TELEPHONE (OUTPATIENT)
Dept: FAMILY MEDICINE | Facility: CLINIC | Age: 84
End: 2019-02-01

## 2019-02-01 NOTE — TELEPHONE ENCOUNTER
Notified patient of normal mammogram results and to repeat in 1 year. Pt verbalized understanding.

## 2019-02-01 NOTE — TELEPHONE ENCOUNTER
----- Message from Eden King MD sent at 1/31/2019  4:49 PM CST -----  The mammogram was read as normal. Please repeat study in 1 year.

## 2019-04-01 ENCOUNTER — OFFICE VISIT (OUTPATIENT)
Dept: INTERNAL MEDICINE | Facility: CLINIC | Age: 84
End: 2019-04-01
Payer: MEDICARE

## 2019-04-01 VITALS
TEMPERATURE: 98 F | HEIGHT: 62 IN | DIASTOLIC BLOOD PRESSURE: 58 MMHG | BODY MASS INDEX: 35.57 KG/M2 | WEIGHT: 193.31 LBS | SYSTOLIC BLOOD PRESSURE: 130 MMHG | HEART RATE: 72 BPM

## 2019-04-01 DIAGNOSIS — I50.33 ACUTE ON CHRONIC DIASTOLIC HEART FAILURE: ICD-10-CM

## 2019-04-01 DIAGNOSIS — N18.4 CKD (CHRONIC KIDNEY DISEASE), STAGE IV: ICD-10-CM

## 2019-04-01 DIAGNOSIS — E11.21 TYPE 2 DIABETES MELLITUS WITH DIABETIC NEPHROPATHY, WITHOUT LONG-TERM CURRENT USE OF INSULIN: Primary | ICD-10-CM

## 2019-04-01 DIAGNOSIS — E21.3 HYPERPARATHYROIDISM: ICD-10-CM

## 2019-04-01 DIAGNOSIS — I73.9 PAD (PERIPHERAL ARTERY DISEASE): ICD-10-CM

## 2019-04-01 DIAGNOSIS — I10 ESSENTIAL HYPERTENSION: ICD-10-CM

## 2019-04-01 DIAGNOSIS — E66.01 MORBID (SEVERE) OBESITY DUE TO EXCESS CALORIES: ICD-10-CM

## 2019-04-01 PROBLEM — N18.30 CHRONIC KIDNEY DISEASE, STAGE III (MODERATE): Status: RESOLVED | Noted: 2018-09-10 | Resolved: 2019-04-01

## 2019-04-01 PROCEDURE — 99999 PR PBB SHADOW E&M-EST. PATIENT-LVL III: CPT | Mod: PBBFAC,,, | Performed by: INTERNAL MEDICINE

## 2019-04-01 PROCEDURE — 99499 UNLISTED E&M SERVICE: CPT | Mod: S$GLB,,, | Performed by: INTERNAL MEDICINE

## 2019-04-01 PROCEDURE — 3075F PR MOST RECENT SYSTOLIC BLOOD PRESS GE 130-139MM HG: ICD-10-PCS | Mod: CPTII,S$GLB,, | Performed by: INTERNAL MEDICINE

## 2019-04-01 PROCEDURE — 99999 PR PBB SHADOW E&M-EST. PATIENT-LVL III: ICD-10-PCS | Mod: PBBFAC,,, | Performed by: INTERNAL MEDICINE

## 2019-04-01 PROCEDURE — 99214 PR OFFICE/OUTPT VISIT, EST, LEVL IV, 30-39 MIN: ICD-10-PCS | Mod: S$GLB,,, | Performed by: INTERNAL MEDICINE

## 2019-04-01 PROCEDURE — 1101F PR PT FALLS ASSESS DOC 0-1 FALLS W/OUT INJ PAST YR: ICD-10-PCS | Mod: CPTII,S$GLB,, | Performed by: INTERNAL MEDICINE

## 2019-04-01 PROCEDURE — 3078F PR MOST RECENT DIASTOLIC BLOOD PRESSURE < 80 MM HG: ICD-10-PCS | Mod: CPTII,S$GLB,, | Performed by: INTERNAL MEDICINE

## 2019-04-01 PROCEDURE — 1101F PT FALLS ASSESS-DOCD LE1/YR: CPT | Mod: CPTII,S$GLB,, | Performed by: INTERNAL MEDICINE

## 2019-04-01 PROCEDURE — 99214 OFFICE O/P EST MOD 30 MIN: CPT | Mod: S$GLB,,, | Performed by: INTERNAL MEDICINE

## 2019-04-01 PROCEDURE — 99499 RISK ADDL DX/OHS AUDIT: ICD-10-PCS | Mod: S$GLB,,, | Performed by: INTERNAL MEDICINE

## 2019-04-01 PROCEDURE — 3078F DIAST BP <80 MM HG: CPT | Mod: CPTII,S$GLB,, | Performed by: INTERNAL MEDICINE

## 2019-04-01 PROCEDURE — 3075F SYST BP GE 130 - 139MM HG: CPT | Mod: CPTII,S$GLB,, | Performed by: INTERNAL MEDICINE

## 2019-04-01 RX ORDER — ALLOPURINOL 300 MG/1
TABLET ORAL
COMMUNITY
Start: 2015-05-27 | End: 2020-09-17

## 2019-04-01 RX ORDER — GABAPENTIN 300 MG/1
CAPSULE ORAL
COMMUNITY
Start: 2015-03-27 | End: 2019-04-01 | Stop reason: SDUPTHER

## 2019-04-01 RX ORDER — GLIPIZIDE AND METFORMIN HCL 2.5; 5 MG/1; MG/1
TABLET, FILM COATED ORAL
COMMUNITY
Start: 2015-03-27 | End: 2019-11-14

## 2019-04-01 RX ORDER — PNV NO.95/FERROUS FUM/FOLIC AC 28MG-0.8MG
TABLET ORAL
COMMUNITY
Start: 2015-03-24 | End: 2020-06-17

## 2019-04-01 RX ORDER — LATANOPROST 50 UG/ML
1 SOLUTION/ DROPS OPHTHALMIC NIGHTLY
Refills: 4 | COMMUNITY
Start: 2019-02-13 | End: 2022-10-03

## 2019-04-01 RX ORDER — LOSARTAN POTASSIUM 50 MG/1
50 TABLET ORAL DAILY
Refills: 3 | COMMUNITY
Start: 2019-03-01 | End: 2019-04-01

## 2019-04-01 RX ORDER — FUROSEMIDE 20 MG/1
TABLET ORAL
COMMUNITY
Start: 2015-03-24 | End: 2019-04-01 | Stop reason: SDUPTHER

## 2019-04-01 RX ORDER — RAMELTEON 8 MG/1
TABLET ORAL
COMMUNITY
Start: 2015-03-27 | End: 2020-06-17

## 2019-04-01 RX ORDER — CLONIDINE HYDROCHLORIDE 0.1 MG/1
TABLET ORAL
COMMUNITY
Start: 2011-04-05 | End: 2020-01-20 | Stop reason: SDUPTHER

## 2019-04-01 RX ORDER — HYDROMORPHONE HYDROCHLORIDE 2 MG/1
TABLET ORAL
COMMUNITY
Start: 2015-06-05 | End: 2020-01-20

## 2019-04-01 RX ORDER — IRBESARTAN 150 MG/1
TABLET ORAL
COMMUNITY
Start: 2015-03-27 | End: 2020-06-17

## 2019-04-01 RX ORDER — LOVASTATIN 40 MG/1
TABLET ORAL
COMMUNITY
Start: 2012-04-17 | End: 2020-01-20

## 2019-04-01 RX ORDER — CLOPIDOGREL BISULFATE 75 MG/1
TABLET ORAL
COMMUNITY
Start: 2012-04-17 | End: 2020-06-17

## 2019-04-01 NOTE — PROGRESS NOTES
"Subjective:      Patient ID: Petra Zazueta is a 84 y.o. female.    Chief Complaint: Diabetes    HPI: 84 y.o. Black or  female, brings in her losartan that had been recalled. She is on irbesartan already.  Also, brings in her BS am log : 60-89 in morning.  States she has not had any hypoglycemic symptoms.  No CP,palpitations,DUKE,PND,orthopnea.  No dysuria,hematuria,polaquiria.  No peripheral numbness.    Asking about weight loss pills... Up 2# since laast visit.  States she cannot diet due to pain in her left knee.  Has a treadmill and wants to use that.  Otherwise, have discussed limited exercise,carbs...        Review of Systems   Constitutional: Negative.    HENT: Negative.    Eyes: Negative.    Respiratory: Negative.    Cardiovascular: Negative.    Gastrointestinal: Negative.    Endocrine: Negative.    Genitourinary: Negative.    Musculoskeletal: Positive for arthralgias.   Allergic/Immunologic: Negative.    Neurological: Negative.    Hematological: Negative.    Psychiatric/Behavioral: Negative.        Objective:   BP (!) 130/58 (BP Location: Left arm, Patient Position: Sitting, BP Method: Large (Manual))   Pulse 72   Temp 97.8 °F (36.6 °C) (Oral)   Ht 5' 2" (1.575 m)   Wt 87.7 kg (193 lb 4.8 oz)   LMP  (LMP Unknown)   BMI 35.36 kg/m²     Physical Exam   Constitutional: She is oriented to person, place, and time. She appears well-developed and well-nourished.   HENT:   Head: Normocephalic and atraumatic.   Right Ear: External ear normal.   Left Ear: External ear normal.   Nose: Nose normal.   Mouth/Throat: Oropharynx is clear and moist.   Eyes: Conjunctivae and EOM are normal.   Neck: Normal range of motion. Neck supple.   Cardiovascular: Normal rate, regular rhythm and normal heart sounds.   Pulmonary/Chest: Effort normal and breath sounds normal.   Abdominal: Soft. Bowel sounds are normal.   Musculoskeletal: Normal range of motion.   Neurological: She is alert and oriented to person, " place, and time.   Skin: Skin is warm and dry.   Psychiatric: She has a normal mood and affect. Her behavior is normal.   Nursing note and vitals reviewed.      Assessment:     1. Type 2 diabetes mellitus with diabetic nephropathy, without long-term current use of insulin    2. Morbid (severe) obesity due to excess calories    3. Hyperparathyroidism    4. Acute on chronic diastolic heart failure    5. PAD (peripheral artery disease)    6. CKD (chronic kidney disease), stage IV    7. Essential hypertension    Asked to get the labs ordered in Oct of last year...  Plan:     Type 2 diabetes mellitus with diabetic nephropathy, without long-term current use of insulin    Morbid (severe) obesity due to excess calories    Hyperparathyroidism    Acute on chronic diastolic heart failure    PAD (peripheral artery disease)    CKD (chronic kidney disease), stage IV    Essential hypertension

## 2019-04-15 RX ORDER — FERROUS SULFATE 325(65) MG
TABLET ORAL
Qty: 90 TABLET | Refills: 0 | Status: SHIPPED | OUTPATIENT
Start: 2019-04-15 | End: 2019-08-15

## 2019-04-15 RX ORDER — GLIMEPIRIDE 2 MG/1
TABLET ORAL
Qty: 90 TABLET | Refills: 0 | Status: SHIPPED | OUTPATIENT
Start: 2019-04-15 | End: 2019-11-14

## 2019-04-23 ENCOUNTER — TELEPHONE (OUTPATIENT)
Dept: NEPHROLOGY | Facility: CLINIC | Age: 84
End: 2019-04-23

## 2019-05-01 ENCOUNTER — LAB VISIT (OUTPATIENT)
Dept: LAB | Facility: HOSPITAL | Age: 84
End: 2019-05-01
Attending: INTERNAL MEDICINE
Payer: MEDICARE

## 2019-05-01 DIAGNOSIS — N18.4 STAGE 4 CHRONIC KIDNEY DISEASE: Primary | ICD-10-CM

## 2019-05-01 DIAGNOSIS — N18.30 STAGE 3 CHRONIC KIDNEY DISEASE: Primary | ICD-10-CM

## 2019-05-01 DIAGNOSIS — N18.30 CHRONIC KIDNEY DISEASE, STAGE III (MODERATE): ICD-10-CM

## 2019-05-01 DIAGNOSIS — N18.4 STAGE 4 CHRONIC KIDNEY DISEASE: ICD-10-CM

## 2019-05-01 LAB
ANION GAP SERPL CALC-SCNC: 10 MMOL/L (ref 8–16)
BASOPHILS # BLD AUTO: 0.02 K/UL (ref 0–0.2)
BASOPHILS NFR BLD: 0.2 % (ref 0–1.9)
BUN SERPL-MCNC: 41 MG/DL (ref 8–23)
CALCIUM SERPL-MCNC: 10.4 MG/DL (ref 8.7–10.5)
CHLORIDE SERPL-SCNC: 109 MMOL/L (ref 95–110)
CO2 SERPL-SCNC: 23 MMOL/L (ref 23–29)
CREAT SERPL-MCNC: 1.9 MG/DL (ref 0.5–1.4)
DIFFERENTIAL METHOD: ABNORMAL
EOSINOPHIL # BLD AUTO: 0.1 K/UL (ref 0–0.5)
EOSINOPHIL NFR BLD: 1 % (ref 0–8)
ERYTHROCYTE [DISTWIDTH] IN BLOOD BY AUTOMATED COUNT: 14 % (ref 11.5–14.5)
EST. GFR  (AFRICAN AMERICAN): 27.7 ML/MIN/1.73 M^2
EST. GFR  (NON AFRICAN AMERICAN): 24 ML/MIN/1.73 M^2
GLUCOSE SERPL-MCNC: 109 MG/DL (ref 70–110)
HCT VFR BLD AUTO: 35.7 % (ref 37–48.5)
HGB BLD-MCNC: 11.1 G/DL (ref 12–16)
IMM GRANULOCYTES # BLD AUTO: 0.03 K/UL (ref 0–0.04)
IMM GRANULOCYTES NFR BLD AUTO: 0.3 % (ref 0–0.5)
LYMPHOCYTES # BLD AUTO: 1.7 K/UL (ref 1–4.8)
LYMPHOCYTES NFR BLD: 19.2 % (ref 18–48)
MCH RBC QN AUTO: 31.2 PG (ref 27–31)
MCHC RBC AUTO-ENTMCNC: 31.1 G/DL (ref 32–36)
MCV RBC AUTO: 100 FL (ref 82–98)
MONOCYTES # BLD AUTO: 0.7 K/UL (ref 0.3–1)
MONOCYTES NFR BLD: 7.8 % (ref 4–15)
NEUTROPHILS # BLD AUTO: 6.2 K/UL (ref 1.8–7.7)
NEUTROPHILS NFR BLD: 71.5 % (ref 38–73)
NRBC BLD-RTO: 0 /100 WBC
PLATELET # BLD AUTO: 215 K/UL (ref 150–350)
PMV BLD AUTO: 11 FL (ref 9.2–12.9)
POTASSIUM SERPL-SCNC: 4.1 MMOL/L (ref 3.5–5.1)
RBC # BLD AUTO: 3.56 M/UL (ref 4–5.4)
SODIUM SERPL-SCNC: 142 MMOL/L (ref 136–145)
WBC # BLD AUTO: 8.61 K/UL (ref 3.9–12.7)

## 2019-05-01 PROCEDURE — 36415 COLL VENOUS BLD VENIPUNCTURE: CPT

## 2019-05-01 PROCEDURE — 85025 COMPLETE CBC W/AUTO DIFF WBC: CPT

## 2019-05-01 PROCEDURE — 80048 BASIC METABOLIC PNL TOTAL CA: CPT

## 2019-07-03 ENCOUNTER — TELEPHONE (OUTPATIENT)
Dept: NEPHROLOGY | Facility: CLINIC | Age: 84
End: 2019-07-03

## 2019-07-05 RX ORDER — SITAGLIPTIN 50 MG/1
TABLET, FILM COATED ORAL
Qty: 90 TABLET | Refills: 0 | Status: SHIPPED | OUTPATIENT
Start: 2019-07-05 | End: 2020-01-20

## 2019-08-15 ENCOUNTER — OFFICE VISIT (OUTPATIENT)
Dept: INTERNAL MEDICINE | Facility: CLINIC | Age: 84
End: 2019-08-15
Payer: MEDICARE

## 2019-08-15 ENCOUNTER — DOCUMENTATION ONLY (OUTPATIENT)
Dept: INTERNAL MEDICINE | Facility: CLINIC | Age: 84
End: 2019-08-15

## 2019-08-15 VITALS
TEMPERATURE: 98 F | OXYGEN SATURATION: 96 % | RESPIRATION RATE: 16 BRPM | WEIGHT: 188.81 LBS | BODY MASS INDEX: 34.74 KG/M2 | DIASTOLIC BLOOD PRESSURE: 60 MMHG | SYSTOLIC BLOOD PRESSURE: 128 MMHG | HEIGHT: 62 IN | HEART RATE: 61 BPM

## 2019-08-15 DIAGNOSIS — E11.21 TYPE 2 DIABETES MELLITUS WITH DIABETIC NEPHROPATHY, WITHOUT LONG-TERM CURRENT USE OF INSULIN: ICD-10-CM

## 2019-08-15 DIAGNOSIS — G89.29 CHRONIC PAIN OF LEFT KNEE: ICD-10-CM

## 2019-08-15 DIAGNOSIS — E78.5 HYPERLIPIDEMIA, UNSPECIFIED HYPERLIPIDEMIA TYPE: ICD-10-CM

## 2019-08-15 DIAGNOSIS — E21.3 HYPERPARATHYROIDISM: ICD-10-CM

## 2019-08-15 DIAGNOSIS — I10 ESSENTIAL HYPERTENSION: Primary | ICD-10-CM

## 2019-08-15 DIAGNOSIS — N18.4 CKD (CHRONIC KIDNEY DISEASE) STAGE 4, GFR 15-29 ML/MIN: ICD-10-CM

## 2019-08-15 DIAGNOSIS — E66.01 MORBID (SEVERE) OBESITY DUE TO EXCESS CALORIES: ICD-10-CM

## 2019-08-15 DIAGNOSIS — I25.10 CORONARY ARTERY DISEASE INVOLVING NATIVE CORONARY ARTERY OF NATIVE HEART WITHOUT ANGINA PECTORIS: ICD-10-CM

## 2019-08-15 DIAGNOSIS — M54.2 NECK PAIN ON RIGHT SIDE: ICD-10-CM

## 2019-08-15 DIAGNOSIS — I73.9 PAD (PERIPHERAL ARTERY DISEASE): ICD-10-CM

## 2019-08-15 DIAGNOSIS — M25.562 CHRONIC PAIN OF LEFT KNEE: ICD-10-CM

## 2019-08-15 PROCEDURE — 99999 PR PBB SHADOW E&M-EST. PATIENT-LVL IV: CPT | Mod: PBBFAC,,, | Performed by: INTERNAL MEDICINE

## 2019-08-15 PROCEDURE — 99214 OFFICE O/P EST MOD 30 MIN: CPT | Mod: S$GLB,,, | Performed by: INTERNAL MEDICINE

## 2019-08-15 PROCEDURE — 99499 UNLISTED E&M SERVICE: CPT | Mod: S$GLB,,, | Performed by: INTERNAL MEDICINE

## 2019-08-15 PROCEDURE — 99214 PR OFFICE/OUTPT VISIT, EST, LEVL IV, 30-39 MIN: ICD-10-PCS | Mod: S$GLB,,, | Performed by: INTERNAL MEDICINE

## 2019-08-15 PROCEDURE — 3074F SYST BP LT 130 MM HG: CPT | Mod: CPTII,S$GLB,, | Performed by: INTERNAL MEDICINE

## 2019-08-15 PROCEDURE — 99999 PR PBB SHADOW E&M-EST. PATIENT-LVL IV: ICD-10-PCS | Mod: PBBFAC,,, | Performed by: INTERNAL MEDICINE

## 2019-08-15 PROCEDURE — 1101F PR PT FALLS ASSESS DOC 0-1 FALLS W/OUT INJ PAST YR: ICD-10-PCS | Mod: CPTII,S$GLB,, | Performed by: INTERNAL MEDICINE

## 2019-08-15 PROCEDURE — 3078F DIAST BP <80 MM HG: CPT | Mod: CPTII,S$GLB,, | Performed by: INTERNAL MEDICINE

## 2019-08-15 PROCEDURE — 3078F PR MOST RECENT DIASTOLIC BLOOD PRESSURE < 80 MM HG: ICD-10-PCS | Mod: CPTII,S$GLB,, | Performed by: INTERNAL MEDICINE

## 2019-08-15 PROCEDURE — 1101F PT FALLS ASSESS-DOCD LE1/YR: CPT | Mod: CPTII,S$GLB,, | Performed by: INTERNAL MEDICINE

## 2019-08-15 PROCEDURE — 3074F PR MOST RECENT SYSTOLIC BLOOD PRESSURE < 130 MM HG: ICD-10-PCS | Mod: CPTII,S$GLB,, | Performed by: INTERNAL MEDICINE

## 2019-08-15 PROCEDURE — 99499 RISK ADDL DX/OHS AUDIT: ICD-10-PCS | Mod: S$GLB,,, | Performed by: INTERNAL MEDICINE

## 2019-08-15 RX ORDER — LOSARTAN POTASSIUM 25 MG/1
1 TABLET ORAL DAILY
Refills: 3 | COMMUNITY
Start: 2019-07-23 | End: 2020-02-19

## 2019-08-15 RX ORDER — GLIMEPIRIDE 2 MG/1
TABLET ORAL
Qty: 90 TABLET | Refills: 0 | Status: SHIPPED | OUTPATIENT
Start: 2019-08-15 | End: 2019-10-03 | Stop reason: SDUPTHER

## 2019-08-15 RX ORDER — FERROUS SULFATE 325(65) MG
TABLET ORAL
Qty: 90 TABLET | Refills: 0 | Status: SHIPPED | OUTPATIENT
Start: 2019-08-15 | End: 2019-10-03 | Stop reason: SDUPTHER

## 2019-08-15 NOTE — PROGRESS NOTES
INTERNAL MEDICINE    Patient Active Problem List   Diagnosis    HTN (hypertension)    Hyperlipidemia    PAD (peripheral artery disease)    Coronary artery disease    Type 2 diabetes mellitus with diabetic nephropathy    Interval gout    Hyperparathyroidism    Acute on chronic diastolic heart failure    Morbid (severe) obesity due to excess calories       CC:   Chief Complaint   Patient presents with    Knee Pain     left knee pain x 2 weeks    Shoulder Pain     right shoulder pain x 1 week    Neck Pain     right side neck pain x 1 week    Ear Pain     right ear pain x 1 week    Medication Refill    Diabetes     3 month follow up    Hypertension    Hyperlipidemia       SUBJECTIVE:  Petra Zazueta   is a 84 y.o. female  HPI  84y/oAAF has had chronic pain on the right side of her neck, right ear and right shoulder. Also has pain in her left knee.  She does not remember any trauma to her knee.  The pain goes from her right ear/neck down to right shoulder along her sternocleidomastoid area.      ROS: Review of Systems   Constitutional: Positive for activity change.   HENT: Negative.    Eyes: Negative.    Respiratory: Negative.    Cardiovascular: Negative.    Gastrointestinal: Negative.    Musculoskeletal: Positive for arthralgias and gait problem.   Neurological: Negative for weakness and headaches.   Hematological: Negative.    Psychiatric/Behavioral: Negative.        Past Medical History:   Diagnosis Date    Anemia     Arthritis     Coronary artery disease     Diabetes mellitus     Diabetes mellitus type II     Dyslipidemia     Hypertension     Insomnia     PAD (peripheral artery disease)        Past Surgical History:   Procedure Laterality Date    angiagram  08/31/2018    CORONARY ANGIOPLASTY      RCA 4/2011 EJ    GALLBLADDER SURGERY      HEART CATH-LEFT Right 5/17/2017    Performed by [facility] at [facility]ATH LAB    HYSTERECTOMY      PERIPHERAL ARTERIAL STENT GRAFT       Left lower extremity RCA        Family History   Problem Relation Age of Onset    Heart attack Mother        Social History     Tobacco Use    Smoking status: Former Smoker     Last attempt to quit: 2005     Years since quittin.6    Smokeless tobacco: Never Used   Substance Use Topics    Alcohol use: No    Drug use: No       Social History     Social History Narrative    Lives alone in [place]. Has 2 sons. Quit drinking years ago. She cares for her houseplants.        ALLERGIES:   Review of patient's allergies indicates:   Allergen Reactions    Codeine Other (See Comments)     Jittery, lightheadedness, nausea  Other reaction(s): NAUSEA       MEDS:   Current Outpatient Medications:     allopurinol (ZYLOPRIM) 300 MG tablet, Take by mouth., Disp: , Rfl:     amLODIPine (NORVASC) 5 MG tablet, Take 1 tablet (5 mg total) by mouth once daily., Disp: 30 tablet, Rfl: 11    aspirin (ECOTRIN) 81 MG EC tablet, Take 1 tablet (81 mg total) by mouth once daily., Disp: , Rfl: 0    atorvastatin (LIPITOR) 80 MG tablet, Take 1 tablet (80 mg total) by mouth every evening., Disp: 90 tablet, Rfl: 3    blood sugar diagnostic (TRUE METRIX GLUCOSE TEST STRIP) Strp, 1 strip by Misc.(Non-Drug; Combo Route) route 2 (two) times daily., Disp: 200 each, Rfl: 1    cilostazol (PLETAL) 100 MG Tab, Take 100 mg by mouth 2 (two) times daily., Disp: , Rfl: 3    clopidogrel (PLAVIX) 75 mg tablet, Take by mouth., Disp: , Rfl:     cyanocobalamin (VITAMIN B-12) 1000 MCG tablet, Take 1 tablet (1,000 mcg total) by mouth once daily., Disp: 90 tablet, Rfl: 4    fish oil-omega-3 fatty acids 300-1,000 mg capsule, Take 500 mg by mouth 2 (two) times daily., Disp: , Rfl:     furosemide (LASIX) 40 MG tablet, Take 1 tablet (40 mg total) by mouth 2 (two) times daily., Disp: 60 tablet, Rfl: 11    gabapentin (NEURONTIN) 300 MG capsule, Take 1 capsule (300 mg total) by mouth 2 (two) times daily., Disp: 60 capsule, Rfl: 11    glimepiride (AMARYL)  2 MG tablet, TAKE 1 TABLET(2 MG) BY MOUTH BEFORE BREAKFAST, Disp: 90 tablet, Rfl: 0    JANUVIA 50 mg Tab, TAKE 1 TABLET BY MOUTH ONCE DAILY, Disp: 90 tablet, Rfl: 0    lancets (BD ULTRA FINE LANCETS) 33 gauge Misc, 1 lancet by Misc.(Non-Drug; Combo Route) route 2 (two) times daily., Disp: 200 each, Rfl: 1    latanoprost 0.005 % ophthalmic solution, INT 1 GTT INTO OU HS, Disp: , Rfl: 4    losartan (COZAAR) 25 MG tablet, Take 1 tablet by mouth once daily., Disp: , Rfl: 3    lovastatin (MEVACOR) 40 MG tablet, Take by mouth., Disp: , Rfl:     metoprolol tartrate (LOPRESSOR) 25 MG tablet, Take 0.5 tablets (12.5 mg total) by mouth 2 (two) times daily., Disp: 30 tablet, Rfl: 11    pioglitazone (ACTOS) 30 MG tablet, TAKE 1 TABLET BY MOUTH EVERY DAY, Disp: 90 tablet, Rfl: 3    aspirin-calcium carbonate 81 mg-300 mg calcium(777 mg) Tab, Take by mouth., Disp: , Rfl:     blood-glucose meter (TRUE METRIX GLUCOSE METER) Misc, Test blood sugars twice daily, Disp: 1 each, Rfl: 0    cloNIDine (CATAPRES) 0.1 MG tablet, Take by mouth., Disp: , Rfl:     cyanocobalamin (VITAMIN B-12) 100 MCG tablet, Take by mouth., Disp: , Rfl:     ferrous sulfate (FEOSOL) 325 mg (65 mg iron) Tab tablet, TAKE 1 TABLET BY MOUTH DAILY WITH BREAKFAST, Disp: 90 tablet, Rfl: 0    glimepiride (AMARYL) 2 MG tablet, TAKE 1 TABLET(2 MG) BY MOUTH BEFORE BREAKFAST, Disp: 90 tablet, Rfl: 0    glipizide-metformin (METAGLIP) 2.5-500 mg per tablet, Take by mouth., Disp: , Rfl:     hydrALAZINE (APRESOLINE) 50 MG tablet, Take 1 tablet (50 mg total) by mouth every 8 (eight) hours., Disp: 90 tablet, Rfl: 11    HYDROmorphone (DILAUDID) 2 MG tablet, Take by mouth., Disp: , Rfl:     irbesartan (AVAPRO) 150 MG tablet, Take by mouth., Disp: , Rfl:     ramelteon (ROZEREM) 8 mg tablet, Take by mouth., Disp: , Rfl:     OBJECTIVE:   Vitals:    08/15/19 1308   BP: 128/60   BP Location: Left arm   Patient Position: Sitting   BP Method: X-Large (Manual)   Pulse: 61  "  Resp: 16   Temp: 98.3 °F (36.8 °C)   TempSrc: Oral   SpO2: 96%   Weight: 85.6 kg (188 lb 12.8 oz)   Height: 5' 2" (1.575 m)     Body mass index is 34.53 kg/m².    Physical Exam   Constitutional: She is oriented to person, place, and time. She appears well-developed and well-nourished.   HENT:   Head: Normocephalic and atraumatic.   Right Ear: Tympanic membrane, external ear and ear canal normal.   Left Ear: Tympanic membrane, external ear and ear canal normal.   Nose: Nose normal.   Mouth/Throat: Uvula is midline, oropharynx is clear and moist and mucous membranes are normal.   Eyes: Conjunctivae and EOM are normal.   Neck: Trachea normal. Carotid bruit is not present. Decreased range of motion present. No thyroid mass present.       Cardiovascular: Normal rate, regular rhythm and normal heart sounds.   Pulmonary/Chest: Effort normal and breath sounds normal.   Abdominal: Bowel sounds are normal.   Musculoskeletal:        Left knee: She exhibits decreased range of motion and deformity. Tenderness found.        Legs:  Neurological: She is alert and oriented to person, place, and time.   Skin: Skin is warm and dry.   Psychiatric: She has a normal mood and affect. Her behavior is normal.   Nursing note and vitals reviewed.        PERTINENT RESULTS:   CBC:  Recent Labs   Lab Result Units 08/16/19  0944   WBC K/uL 5.12   RBC M/uL 3.56*   Hemoglobin g/dL 11.0*   Hematocrit % 34.6*   Platelets K/uL 170   Mean Corpuscular Volume fL 97   Mean Corpuscular Hemoglobin pg 30.9   Mean Corpuscular Hemoglobin Conc g/dL 31.8*     CMP:  Recent Labs   Lab Result Units 08/16/19  0944   Glucose mg/dL 121*   Calcium mg/dL 9.7   Albumin g/dL 4.2   Total Protein g/dL 7.4   Sodium mmol/L 142   Potassium mmol/L 3.9   CO2 mmol/L 25   Chloride mmol/L 111*   BUN, Bld mg/dL 29*   Alkaline Phosphatase U/L 78   ALT U/L 14   AST U/L 28   Total Bilirubin mg/dL 0.7     URINALYSIS:  Recent Labs   Lab Result Units 08/16/19  1053   Color, UA  Yellow "   Specific Gravity, UA  1.020   pH, UA  6.0   Protein, UA  1+*   Bacteria /hpf Occasional   Nitrite, UA  Negative   Leukocytes, UA  Negative   Urobilinogen, UA EU/dL Negative   Hyaline Casts, UA /lpf 0      LIPIDS:  Recent Labs   Lab Result Units 08/16/19  0944   HDL mg/dL 46   Cholesterol mg/dL 121   Triglycerides mg/dL 87   LDL Cholesterol mg/dL 57.6*   Hdl/Cholesterol Ratio % 38.0   Non-HDL Cholesterol mg/dL 75   Total Cholesterol/HDL Ratio  2.6             ASSESSMENT:  Problem List Items Addressed This Visit        Cardiac/Vascular    HTN (hypertension) - Primary    Relevant Orders    CBC auto differential (Completed)    Comprehensive metabolic panel (Completed)    Urinalysis (Completed)    Hyperlipidemia    Relevant Orders    Lipid panel (Completed)    PAD (peripheral artery disease)    Coronary artery disease    Overview     berlin 4/2011 rca            Endocrine    Type 2 diabetes mellitus with diabetic nephropathy    Relevant Orders    Hemoglobin A1c (Completed)    Hyperparathyroidism    Relevant Orders    PTH, intact (Completed)    Morbid (severe) obesity due to excess calories      Other Visit Diagnoses     CKD (chronic kidney disease) stage 4, GFR 15-29 ml/min        Relevant Orders    Comprehensive metabolic panel (Completed)    PHOSPHORUS (Completed)      May use tylenol for pain.  Stretches, exercises for neck given to pt.      PLAN:   Orders Placed This Encounter    Hemoglobin A1c    CBC auto differential    Comprehensive metabolic panel    Lipid panel    Urinalysis    PHOSPHORUS    PTH, intact     Orders Placed This Encounter   Procedures    Hemoglobin A1c     Standing Status:   Future     Number of Occurrences:   1     Standing Expiration Date:   10/13/2020    CBC auto differential     Standing Status:   Future     Number of Occurrences:   1     Standing Expiration Date:   8/14/2020    Comprehensive metabolic panel     Standing Status:   Future     Number of Occurrences:   1     Standing  Expiration Date:   8/14/2020    Lipid panel     Standing Status:   Future     Number of Occurrences:   1     Standing Expiration Date:   8/14/2020    Urinalysis     Standing Status:   Future     Number of Occurrences:   1     Standing Expiration Date:   8/14/2020    PHOSPHORUS     Standing Status:   Future     Number of Occurrences:   1     Standing Expiration Date:   10/13/2020    PTH, intact     Standing Status:   Future     Number of Occurrences:   1     Standing Expiration Date:   11/13/2019       Follow-up with me in 3months  Dr. Jasmin David  Internal Medicine

## 2019-08-15 NOTE — PROGRESS NOTES
Patient has an appointment with her eye doctor on 8/22/19 MARLO signed so we can get records after this appt.

## 2019-08-23 ENCOUNTER — TELEPHONE (OUTPATIENT)
Dept: ADMINISTRATIVE | Facility: HOSPITAL | Age: 84
End: 2019-08-23

## 2019-08-27 RX ORDER — METOPROLOL TARTRATE 25 MG/1
12.5 TABLET, FILM COATED ORAL 2 TIMES DAILY
Qty: 90 TABLET | Refills: 3 | Status: SHIPPED | OUTPATIENT
Start: 2019-08-27 | End: 2020-06-17 | Stop reason: SDUPTHER

## 2019-08-27 NOTE — TELEPHONE ENCOUNTER
----- Message from Namrata Keller sent at 8/27/2019  1:13 PM CDT -----  Contact: Pt's Son Jamey Zazueta [phone]  Pt's Son Jamey Zazueta called to speak to the nurse regarding the Rx refill for the pt for Metoprolol and would like a call back today at [phone]

## 2019-08-29 ENCOUNTER — TELEPHONE (OUTPATIENT)
Dept: ADMINISTRATIVE | Facility: HOSPITAL | Age: 84
End: 2019-08-29

## 2019-08-29 ENCOUNTER — PATIENT OUTREACH (OUTPATIENT)
Dept: ADMINISTRATIVE | Facility: HOSPITAL | Age: 84
End: 2019-08-29

## 2019-09-16 ENCOUNTER — TELEPHONE (OUTPATIENT)
Dept: FAMILY MEDICINE | Facility: CLINIC | Age: 84
End: 2019-09-16

## 2019-09-16 NOTE — TELEPHONE ENCOUNTER
----- Message from Jennifer Roger sent at 9/16/2019  1:26 PM CDT -----  Contact: Waqas, [facility]  Ms. Alan is calling because the pt has not take her Statin, the last fill day was 05/25/2019.  Pt is unsure it needs to be sure and Ms. Alan is requesting that information.    She can be reached at [phone].    Thank you.

## 2019-09-17 ENCOUNTER — TELEPHONE (OUTPATIENT)
Dept: FAMILY MEDICINE | Facility: CLINIC | Age: 84
End: 2019-09-17

## 2019-09-17 NOTE — TELEPHONE ENCOUNTER
Patient was concerned about not receiving atorvastatin. Medication was sent e-scribe but never received. Medication called in for pickup.

## 2019-09-17 NOTE — TELEPHONE ENCOUNTER
----- Message from Abby Guerrero sent at 9/17/2019 10:16 AM CDT -----  [employer] returned your call.   No. [phone]

## 2019-09-23 RX ORDER — SITAGLIPTIN 50 MG/1
TABLET, FILM COATED ORAL
Qty: 90 TABLET | Refills: 0 | Status: SHIPPED | OUTPATIENT
Start: 2019-09-23 | End: 2020-01-20 | Stop reason: SINTOL

## 2019-09-26 RX ORDER — GABAPENTIN 300 MG/1
300 CAPSULE ORAL 2 TIMES DAILY
Qty: 60 CAPSULE | Refills: 0 | Status: SHIPPED | OUTPATIENT
Start: 2019-09-26 | End: 2019-10-24 | Stop reason: SDUPTHER

## 2019-09-26 NOTE — TELEPHONE ENCOUNTER
----- Message from Abby Guerrero sent at 9/26/2019  9:07 AM CDT -----  Patient's son, john Weiss.   No. [phone]   [facility] in [place] requested refill on Gabapentin 300mg. Please authorize.

## 2019-10-03 RX ORDER — GLIMEPIRIDE 2 MG/1
TABLET ORAL
Qty: 90 TABLET | Refills: 0 | Status: SHIPPED | OUTPATIENT
Start: 2019-10-03 | End: 2019-11-14

## 2019-10-03 RX ORDER — PIOGLITAZONEHYDROCHLORIDE 30 MG/1
TABLET ORAL
Qty: 90 TABLET | Refills: 0 | Status: SHIPPED | OUTPATIENT
Start: 2019-10-03 | End: 2020-01-02

## 2019-10-03 RX ORDER — FERROUS SULFATE 325(65) MG
TABLET ORAL
Qty: 90 TABLET | Refills: 0 | Status: SHIPPED | OUTPATIENT
Start: 2019-10-03 | End: 2020-01-02

## 2019-10-14 ENCOUNTER — TELEPHONE (OUTPATIENT)
Dept: NEPHROLOGY | Facility: CLINIC | Age: 84
End: 2019-10-14

## 2019-10-15 RX ORDER — AMLODIPINE BESYLATE 5 MG/1
5 TABLET ORAL DAILY
Qty: 30 TABLET | Refills: 3 | Status: SHIPPED | OUTPATIENT
Start: 2019-10-15 | End: 2019-12-09 | Stop reason: SDUPTHER

## 2019-10-15 NOTE — TELEPHONE ENCOUNTER
----- Message from Citlalli Francois sent at 10/15/2019 11:55 AM CDT -----  Contact: [phone]-Jamey (son)  Pt son called stating pharm is still waiting on a PA needed for her amLODIPine (NORVASC) 5 MG tablet, states he believes it is because old providers name is on order. Please advise.     [facility][id number] - [place], [place] - [address] [facility]

## 2019-10-16 NOTE — TELEPHONE ENCOUNTER
Spoke to pts son, Dr. David refilled the amlodipine so they should be able to fill it now. We have not received any communication from [facility] stating a PA was needed. He says he will check with [facility].

## 2019-10-22 ENCOUNTER — TELEPHONE (OUTPATIENT)
Dept: CARDIOLOGY | Facility: CLINIC | Age: 84
End: 2019-10-22

## 2019-10-22 NOTE — TELEPHONE ENCOUNTER
Spoke with [facility] on the phone. Notified staff that pt is supposed to be taking medication atorvastatin. [facility] staff stated they will fill prescription for pt.

## 2019-10-22 NOTE — TELEPHONE ENCOUNTER
----- Message from [facility] JYOTI David MD sent at 10/22/2019 12:29 PM CDT -----  [facility] [phone]  Yes.  TSA  ----- Message -----  From: Juancarlos Johns LPN  Sent: 10/22/2019  11:24 AM CDT  To: ketty David MD    Please advise      ----- Message -----  From: Lorna Villalobos  Sent: 10/22/2019   9:59 AM CDT  To: Joann [facility] Staff    [facility] is calling to see if she should still be taking her atorvastatin. Please call and advise.

## 2019-10-24 RX ORDER — GABAPENTIN 300 MG/1
CAPSULE ORAL
Qty: 60 CAPSULE | Refills: 0 | Status: SHIPPED | OUTPATIENT
Start: 2019-10-24 | End: 2020-01-20

## 2019-10-29 RX ORDER — GABAPENTIN 300 MG/1
CAPSULE ORAL
Qty: 60 CAPSULE | Refills: 5 | Status: SHIPPED | OUTPATIENT
Start: 2019-10-29 | End: 2020-01-20 | Stop reason: SDUPTHER

## 2019-11-01 ENCOUNTER — TELEPHONE (OUTPATIENT)
Dept: NEPHROLOGY | Facility: CLINIC | Age: 84
End: 2019-11-01

## 2019-11-01 NOTE — TELEPHONE ENCOUNTER
----- Message from Jennifer Roger sent at 11/1/2019  9:53 AM CDT -----  Contact: Jamey, Son  Mr. Concepcion is calling to speak with Staff regarding a prescription refill that needs authorization for losartan (COZAAR) 25 MG tablet.  Please send to [facility] in [facility].    He can be reached at [phone].    Thank you.

## 2019-11-04 RX ORDER — ATORVASTATIN CALCIUM 80 MG/1
80 TABLET, FILM COATED ORAL NIGHTLY
Qty: 90 TABLET | Refills: 3 | Status: SHIPPED | OUTPATIENT
Start: 2019-11-04 | End: 2020-01-20 | Stop reason: SDUPTHER

## 2019-11-04 NOTE — TELEPHONE ENCOUNTER
----- Message from Lazaro Valdovinos sent at 11/4/2019  1:06 PM CST -----  Contact: [phone]/self  Refill request for atorvastatin (LIPITOR) 80 MG tablet  PHARMACY: [facility]/PH[facility]ACY #[id number] - [address] AT [facility]

## 2019-11-05 ENCOUNTER — TELEPHONE (OUTPATIENT)
Dept: INTERNAL MEDICINE | Facility: CLINIC | Age: 84
End: 2019-11-05

## 2019-11-05 ENCOUNTER — TELEPHONE (OUTPATIENT)
Dept: NEPHROLOGY | Facility: CLINIC | Age: 84
End: 2019-11-05

## 2019-11-05 NOTE — TELEPHONE ENCOUNTER
----- Message from Dl Martínez sent at 11/5/2019  8:28 AM CST -----  Contact: Abhishek (son)  Abhishek called to check the status of the refill authorization on the medication listed below for his mother.  He stated he has called your office previously but no one is responding.    Please call [phone] to discuss immediately as she has been out for 2 weeks now.    losartan (COZAAR) 25 MG tablet

## 2019-11-05 NOTE — TELEPHONE ENCOUNTER
----- Message from Dl Martínez sent at 11/5/2019  8:17 AM CST -----  Contact: patient  Type:  Patient Returning Call    Who Called: Petra  Who Left Message for Patient: does not know  Does the patient know what this is regarding?: no  Would the patient rather a call back or a response via [facility]?  Call back  Best Call Back Number: [phone] or [phone]  Additional Information:  Please call today

## 2019-11-08 RX ORDER — LOSARTAN POTASSIUM 25 MG/1
25 TABLET ORAL DAILY
Qty: 90 TABLET | Refills: 3 | OUTPATIENT
Start: 2019-11-08

## 2019-11-14 ENCOUNTER — OFFICE VISIT (OUTPATIENT)
Dept: INTERNAL MEDICINE | Facility: CLINIC | Age: 84
End: 2019-11-14
Payer: MEDICARE

## 2019-11-14 VITALS
HEART RATE: 67 BPM | SYSTOLIC BLOOD PRESSURE: 126 MMHG | TEMPERATURE: 99 F | RESPIRATION RATE: 18 BRPM | WEIGHT: 182.63 LBS | HEIGHT: 62 IN | OXYGEN SATURATION: 99 % | BODY MASS INDEX: 33.61 KG/M2 | DIASTOLIC BLOOD PRESSURE: 70 MMHG

## 2019-11-14 DIAGNOSIS — E11.21 TYPE 2 DIABETES MELLITUS WITH DIABETIC NEPHROPATHY, WITHOUT LONG-TERM CURRENT USE OF INSULIN: ICD-10-CM

## 2019-11-14 DIAGNOSIS — E21.3 HYPERPARATHYROIDISM: ICD-10-CM

## 2019-11-14 DIAGNOSIS — R41.3 MEMORY CHANGES: ICD-10-CM

## 2019-11-14 DIAGNOSIS — M17.12 OSTEOARTHRITIS OF LEFT KNEE, UNSPECIFIED OSTEOARTHRITIS TYPE: ICD-10-CM

## 2019-11-14 DIAGNOSIS — N18.4 CKD (CHRONIC KIDNEY DISEASE) STAGE 4, GFR 15-29 ML/MIN: Primary | ICD-10-CM

## 2019-11-14 DIAGNOSIS — E78.5 HYPERLIPIDEMIA, UNSPECIFIED HYPERLIPIDEMIA TYPE: ICD-10-CM

## 2019-11-14 DIAGNOSIS — I10 ESSENTIAL HYPERTENSION: ICD-10-CM

## 2019-11-14 PROCEDURE — 3078F DIAST BP <80 MM HG: CPT | Mod: CPTII,S$GLB,, | Performed by: INTERNAL MEDICINE

## 2019-11-14 PROCEDURE — 99214 PR OFFICE/OUTPT VISIT, EST, LEVL IV, 30-39 MIN: ICD-10-PCS | Mod: S$GLB,,, | Performed by: INTERNAL MEDICINE

## 2019-11-14 PROCEDURE — 1101F PR PT FALLS ASSESS DOC 0-1 FALLS W/OUT INJ PAST YR: ICD-10-PCS | Mod: CPTII,S$GLB,, | Performed by: INTERNAL MEDICINE

## 2019-11-14 PROCEDURE — 3078F PR MOST RECENT DIASTOLIC BLOOD PRESSURE < 80 MM HG: ICD-10-PCS | Mod: CPTII,S$GLB,, | Performed by: INTERNAL MEDICINE

## 2019-11-14 PROCEDURE — 3074F SYST BP LT 130 MM HG: CPT | Mod: CPTII,S$GLB,, | Performed by: INTERNAL MEDICINE

## 2019-11-14 PROCEDURE — 99999 PR PBB SHADOW E&M-EST. PATIENT-LVL IV: ICD-10-PCS | Mod: PBBFAC,,, | Performed by: INTERNAL MEDICINE

## 2019-11-14 PROCEDURE — 3074F PR MOST RECENT SYSTOLIC BLOOD PRESSURE < 130 MM HG: ICD-10-PCS | Mod: CPTII,S$GLB,, | Performed by: INTERNAL MEDICINE

## 2019-11-14 PROCEDURE — 99214 OFFICE O/P EST MOD 30 MIN: CPT | Mod: S$GLB,,, | Performed by: INTERNAL MEDICINE

## 2019-11-14 PROCEDURE — 1101F PT FALLS ASSESS-DOCD LE1/YR: CPT | Mod: CPTII,S$GLB,, | Performed by: INTERNAL MEDICINE

## 2019-11-14 PROCEDURE — 99999 PR PBB SHADOW E&M-EST. PATIENT-LVL IV: CPT | Mod: PBBFAC,,, | Performed by: INTERNAL MEDICINE

## 2019-11-14 RX ORDER — SPIRONOLACTONE 25 MG/1
25 TABLET ORAL 2 TIMES DAILY
Refills: 3 | COMMUNITY
Start: 2019-09-03 | End: 2020-06-17

## 2019-11-14 NOTE — PROGRESS NOTES
INTERNAL MEDICINE    Patient Active Problem List   Diagnosis    HTN (hypertension)    Hyperlipidemia    PAD (peripheral artery disease)    Coronary artery disease    Type 2 diabetes mellitus with diabetic nephropathy    Interval gout    CKD (chronic kidney disease) stage 4, GFR 15-29 ml/min    Hyperparathyroidism    Acute on chronic diastolic heart failure    Morbid (severe) obesity due to excess calories       CC:   Chief Complaint   Patient presents with    Follow-up     3 month follow up    Medication Refill    Hypertension    Diabetes       SUBJECTIVE:  Petra Zazueta   is a 84 y.o. female  HPI   84y/oAAF, here alone.  She has ot followed up with her Nephrologist since January, altho she always states she has an upcoming apt. Her memory seems to be less sure now.  DX: T2DM with nephropathy, CKD stage 4,hyperparathyroidism,HTN,hyperlipidemia,gout,obesity,CAD with chronic diastolic heart failure,PAD.  She complains of chronic left knee pain ( she had a scope on it years ago).  Not very active, but has her routine. When she is very active she states she does get SOB, but no chest pain,palpitations.    Brings in her log of BS fasting 's.  She denies any hypoglycemic episodes.    ROS: Review of Systems   Constitutional: Negative.    HENT: Negative.    Eyes: Negative.    Respiratory: Positive for shortness of breath. Negative for chest tightness and wheezing.    Cardiovascular: Negative.  Negative for chest pain, palpitations and leg swelling.   Gastrointestinal: Negative.    Endocrine: Negative.    Genitourinary: Negative.    Musculoskeletal: Positive for arthralgias.   Allergic/Immunologic: Negative.    Neurological: Negative.    Hematological: Negative.    Psychiatric/Behavioral: Negative.        Past Medical History:   Diagnosis Date    Anemia     Arthritis     Coronary artery disease     Diabetes mellitus     Diabetes mellitus type II     Dyslipidemia     Hypertension     Insomnia      PAD (peripheral artery disease)        Past Surgical History:   Procedure Laterality Date    angiagram  2018    CORONARY ANGIOPLASTY      RCA 2011 EJ    GALLBLADDER SURGERY      HYSTERECTOMY      PERIPHERAL ARTERIAL STENT GRAFT      Left lower extremity RCA        Family History   Problem Relation Age of Onset    Heart attack Mother        Social History     Tobacco Use    Smoking status: Former Smoker     Last attempt to quit: 2005     Years since quittin.8    Smokeless tobacco: Never Used   Substance Use Topics    Alcohol use: No    Drug use: No       Social History     Social History Narrative    Lives alone in [place]. Has 2 sons. Quit drinking years ago. She cares for her houseplants.        ALLERGIES:   Review of patient's allergies indicates:   Allergen Reactions    Codeine Other (See Comments)     Jittery, lightheadedness, nausea  Other reaction(s): NAUSEA       MEDS:   Current Outpatient Medications:     allopurinol (ZYLOPRIM) 300 MG tablet, Take by mouth., Disp: , Rfl:     amLODIPine (NORVASC) 5 MG tablet, Take 1 tablet (5 mg total) by mouth once daily., Disp: 30 tablet, Rfl: 3    aspirin (ECOTRIN) 81 MG EC tablet, Take 1 tablet (81 mg total) by mouth once daily., Disp: , Rfl: 0    atorvastatin (LIPITOR) 80 MG tablet, Take 1 tablet (80 mg total) by mouth every evening., Disp: 90 tablet, Rfl: 3    blood sugar diagnostic (TRUE METRIX GLUCOSE TEST STRIP) Strp, 1 strip by Misc.(Non-Drug; Combo Route) route 2 (two) times daily., Disp: 200 each, Rfl: 1    cilostazol (PLETAL) 100 MG Tab, Take 100 mg by mouth 2 (two) times daily., Disp: , Rfl: 3    cloNIDine (CATAPRES) 0.1 MG tablet, Take by mouth., Disp: , Rfl:     clopidogrel (PLAVIX) 75 mg tablet, Take by mouth., Disp: , Rfl:     ferrous sulfate (FEOSOL) 325 mg (65 mg iron) Tab tablet, TAKE 1 TABLET BY MOUTH DAILY WITH BREAKFAST, Disp: 90 tablet, Rfl: 0    fish oil-omega-3 fatty acids 300-1,000 mg capsule, Take 500 mg by  mouth 2 (two) times daily., Disp: , Rfl:     FLUZONE HIGH-DOSE 2019-20, PF, 180 mcg/0.5 mL Syrg, ADM 0.5ML IM UTD, Disp: , Rfl: 0    gabapentin (NEURONTIN) 300 MG capsule, TAKE 1 CAPSULE BY MOUTH TWICE A DAY, Disp: 60 capsule, Rfl: 5    hydrALAZINE (APRESOLINE) 50 MG tablet, Take 1 tablet (50 mg total) by mouth every 8 (eight) hours., Disp: 90 tablet, Rfl: 11    JANUVIA 50 mg Tab, TAKE 1 TABLET BY MOUTH ONCE DAILY, Disp: 90 tablet, Rfl: 0    lancets (BD ULTRA FINE LANCETS) 33 gauge Misc, 1 lancet by Misc.(Non-Drug; Combo Route) route 2 (two) times daily., Disp: 200 each, Rfl: 1    latanoprost 0.005 % ophthalmic solution, INT 1 GTT INTO OU HS, Disp: , Rfl: 4    losartan (COZAAR) 25 MG tablet, Take 1 tablet by mouth once daily., Disp: , Rfl: 3    lovastatin (MEVACOR) 40 MG tablet, Take by mouth., Disp: , Rfl:     metoprolol tartrate (LOPRESSOR) 25 MG tablet, Take 0.5 tablets (12.5 mg total) by mouth 2 (two) times daily., Disp: 90 tablet, Rfl: 3    pioglitazone (ACTOS) 30 MG tablet, TAKE 1 TABLET BY MOUTH EVERY DAY, Disp: 90 tablet, Rfl: 0    aspirin-calcium carbonate 81 mg-300 mg calcium(777 mg) Tab, Take by mouth., Disp: , Rfl:     blood-glucose meter (TRUE METRIX GLUCOSE METER) Misc, Test blood sugars twice daily, Disp: 1 each, Rfl: 0    cyanocobalamin (VITAMIN B-12) 100 MCG tablet, Take by mouth., Disp: , Rfl:     cyanocobalamin (VITAMIN B-12) 1000 MCG tablet, Take 1 tablet (1,000 mcg total) by mouth once daily. (Patient not taking: Reported on 11/14/2019), Disp: 90 tablet, Rfl: 4    furosemide (LASIX) 40 MG tablet, Take 1 tablet (40 mg total) by mouth 2 (two) times daily., Disp: 60 tablet, Rfl: 11    gabapentin (NEURONTIN) 300 MG capsule, TAKE 1 CAPSULE BY MOUTH TWICE A DAY, Disp: 60 capsule, Rfl: 0    HYDROmorphone (DILAUDID) 2 MG tablet, Take by mouth., Disp: , Rfl:     irbesartan (AVAPRO) 150 MG tablet, Take by mouth., Disp: , Rfl:     JANUVIA 50 mg Tab, TAKE 1 TABLET BY MOUTH ONCE DAILY,  "Disp: 90 tablet, Rfl: 0    ramelteon (ROZEREM) 8 mg tablet, Take by mouth., Disp: , Rfl:     spironolactone (ALDACTONE) 25 MG tablet, Take 25 mg by mouth 2 (two) times daily., Disp: , Rfl: 3    OBJECTIVE:   Vitals:    11/14/19 0938   BP: 126/70   BP Location: Left arm   Patient Position: Sitting   BP Method: X-Large (Manual)   Pulse: 67   Resp: 18   Temp: 98.6 °F (37 °C)   TempSrc: Oral   SpO2: 99%   Weight: 82.8 kg (182 lb 9.6 oz)   Height: 5' 2" (1.575 m)     Body mass index is 33.4 kg/m².    Physical Exam   Constitutional: She is oriented to person, place, and time. She appears well-developed and well-nourished.   HENT:   Head: Normocephalic and atraumatic.   Right Ear: External ear normal.   Left Ear: External ear normal.   Nose: Nose normal.   Eyes: Conjunctivae and EOM are normal.   Neck: No JVD present.   Cardiovascular: Normal rate, regular rhythm and normal heart sounds.   Pulmonary/Chest: Effort normal and breath sounds normal.   Abdominal: Soft. Bowel sounds are normal.   Musculoskeletal: She exhibits edema and deformity.        Left knee: She exhibits decreased range of motion and deformity. No tenderness found.   Neurological: She is alert and oriented to person, place, and time.   Skin: Skin is warm and dry.   Psychiatric: She has a normal mood and affect. Her behavior is normal.   Nursing note and vitals reviewed.        PERTINENT RESULTS:   CBC:  No results for input(s): WBC, RBC, HGB, HCT, PLT, MCV, MCH, MCHC in the last 2160 hours.  CMP:  No results for input(s): GLU, CALCIUM, ALBUMIN, PROT, NA, K, CO2, CL, BUN, ALKPHOS, ALT, AST, BILITOT in the last 2160 hours.    Invalid input(s): CREATININ  URINALYSIS:  No results for input(s): COLORU, CLARITYU, SPECGRAV, PHUR, PROTEINUA, GLUCOSEU, BILIRUBINCON, BLOODU, WBCU, RBCU, BACTERIA, MUCUS, NITRITE, LEUKOCYTESUR, UROBILINOGEN, HYALINECASTS in the last 2160 hours.   LIPIDS:  No results for input(s): TSH, HDL, CHOL, TRIG, LDLCALC, CHOLHDL, NONHDLCHOL, " TOTALCHOLEST in the last 2160 hours.          ASSESSMENT:  Problem List Items Addressed This Visit        Cardiac/Vascular    HTN (hypertension)    Relevant Orders    CBC auto differential    Comprehensive metabolic panel    Hyperlipidemia    Relevant Orders    Lipid panel       Renal/    CKD (chronic kidney disease) stage 4, GFR 15-29 ml/min - Primary    Relevant Orders    Ambulatory referral to Nephrology       Endocrine    Type 2 diabetes mellitus with diabetic nephropathy    Relevant Orders    Hemoglobin A1c    Hyperparathyroidism    Relevant Orders    PTH, intact    Vitamin D      Other Visit Diagnoses     Memory changes        Osteoarthritis of left knee, unspecified osteoarthritis type              PLAN:   Orders Placed This Encounter    CBC auto differential    Comprehensive metabolic panel    Lipid panel    Hemoglobin A1c    PTH, intact    Vitamin D    Ambulatory referral to Nephrology     Orders Placed This Encounter   Procedures    CBC auto differential     Standing Status:   Future     Standing Expiration Date:   11/13/2020    Comprehensive metabolic panel     Standing Status:   Future     Standing Expiration Date:   11/13/2020    Lipid panel     Standing Status:   Future     Standing Expiration Date:   11/13/2020    Hemoglobin A1c     Standing Status:   Future     Standing Expiration Date:   11/13/2020    PTH, intact     Standing Status:   Future     Standing Expiration Date:   2/12/2020    Vitamin D     Standing Status:   Future     Standing Expiration Date:   2/12/2020    Ambulatory referral to Nephrology     Referral Priority:   Routine     Referral Type:   Consultation     Referral Reason:   Specialty Services Required     Referred to Provider:   Velvet Morales MD     Requested Specialty:   Nephrology     Number of Visits Requested:   1       Follow-up with Dr Greer in 3months.   Dr. Jasmin David  Internal Medicine

## 2019-11-19 ENCOUNTER — OFFICE VISIT (OUTPATIENT)
Dept: INTERNAL MEDICINE | Facility: CLINIC | Age: 84
End: 2019-11-19
Payer: MEDICARE

## 2019-11-19 VITALS
OXYGEN SATURATION: 100 % | HEART RATE: 70 BPM | TEMPERATURE: 98 F | BODY MASS INDEX: 33.53 KG/M2 | WEIGHT: 182.19 LBS | RESPIRATION RATE: 16 BRPM | SYSTOLIC BLOOD PRESSURE: 138 MMHG | HEIGHT: 62 IN | DIASTOLIC BLOOD PRESSURE: 80 MMHG

## 2019-11-19 DIAGNOSIS — E78.5 HYPERLIPIDEMIA, UNSPECIFIED HYPERLIPIDEMIA TYPE: ICD-10-CM

## 2019-11-19 DIAGNOSIS — M10.9 INTERVAL GOUT: ICD-10-CM

## 2019-11-19 DIAGNOSIS — E66.01 MORBID (SEVERE) OBESITY DUE TO EXCESS CALORIES: ICD-10-CM

## 2019-11-19 DIAGNOSIS — N18.4 CKD (CHRONIC KIDNEY DISEASE) STAGE 4, GFR 15-29 ML/MIN: Primary | ICD-10-CM

## 2019-11-19 DIAGNOSIS — I10 ESSENTIAL HYPERTENSION: ICD-10-CM

## 2019-11-19 DIAGNOSIS — E21.3 HYPERPARATHYROIDISM: ICD-10-CM

## 2019-11-19 DIAGNOSIS — E11.21 TYPE 2 DIABETES MELLITUS WITH DIABETIC NEPHROPATHY, WITHOUT LONG-TERM CURRENT USE OF INSULIN: ICD-10-CM

## 2019-11-19 DIAGNOSIS — I50.33 ACUTE ON CHRONIC DIASTOLIC HEART FAILURE: ICD-10-CM

## 2019-11-19 DIAGNOSIS — I73.9 PAD (PERIPHERAL ARTERY DISEASE): ICD-10-CM

## 2019-11-19 PROCEDURE — 1101F PR PT FALLS ASSESS DOC 0-1 FALLS W/OUT INJ PAST YR: ICD-10-PCS | Mod: CPTII,S$GLB,, | Performed by: INTERNAL MEDICINE

## 2019-11-19 PROCEDURE — 1101F PT FALLS ASSESS-DOCD LE1/YR: CPT | Mod: CPTII,S$GLB,, | Performed by: INTERNAL MEDICINE

## 2019-11-19 PROCEDURE — 3075F SYST BP GE 130 - 139MM HG: CPT | Mod: CPTII,S$GLB,, | Performed by: INTERNAL MEDICINE

## 2019-11-19 PROCEDURE — 99214 PR OFFICE/OUTPT VISIT, EST, LEVL IV, 30-39 MIN: ICD-10-PCS | Mod: S$GLB,,, | Performed by: INTERNAL MEDICINE

## 2019-11-19 PROCEDURE — 99499 RISK ADDL DX/OHS AUDIT: ICD-10-PCS | Mod: S$GLB,,, | Performed by: INTERNAL MEDICINE

## 2019-11-19 PROCEDURE — 99214 OFFICE O/P EST MOD 30 MIN: CPT | Mod: S$GLB,,, | Performed by: INTERNAL MEDICINE

## 2019-11-19 PROCEDURE — 3079F DIAST BP 80-89 MM HG: CPT | Mod: CPTII,S$GLB,, | Performed by: INTERNAL MEDICINE

## 2019-11-19 PROCEDURE — 3075F PR MOST RECENT SYSTOLIC BLOOD PRESS GE 130-139MM HG: ICD-10-PCS | Mod: CPTII,S$GLB,, | Performed by: INTERNAL MEDICINE

## 2019-11-19 PROCEDURE — 99999 PR PBB SHADOW E&M-EST. PATIENT-LVL III: ICD-10-PCS | Mod: PBBFAC,,, | Performed by: INTERNAL MEDICINE

## 2019-11-19 PROCEDURE — 3079F PR MOST RECENT DIASTOLIC BLOOD PRESSURE 80-89 MM HG: ICD-10-PCS | Mod: CPTII,S$GLB,, | Performed by: INTERNAL MEDICINE

## 2019-11-19 PROCEDURE — 99999 PR PBB SHADOW E&M-EST. PATIENT-LVL III: CPT | Mod: PBBFAC,,, | Performed by: INTERNAL MEDICINE

## 2019-11-19 PROCEDURE — 99499 UNLISTED E&M SERVICE: CPT | Mod: S$GLB,,, | Performed by: INTERNAL MEDICINE

## 2019-11-19 NOTE — PROGRESS NOTES
"INTERNAL MEDICINE    Patient Active Problem List   Diagnosis    HTN (hypertension)    Hyperlipidemia    PAD (peripheral artery disease)    Coronary artery disease    Type 2 diabetes mellitus with diabetic nephropathy    Interval gout    CKD (chronic kidney disease) stage 4, GFR 15-29 ml/min    Hyperparathyroidism    Acute on chronic diastolic heart failure    Morbid (severe) obesity due to excess calories       CC:   Chief Complaint   Patient presents with    Follow-up     1 week follow up    Results     lab results    Diabetes    Hyperlipidemia    Hypertension       SUBJECTIVE:  Petra Zazueta   is a 84 y.o. female  HPI   84y/oAAF, here for the results of her labs.  She carries the DX:  CKD stage 4  Secondary hyperparathyroidism  -T2DM, not on insulin,controlled  -HTN,controlled  -Hyperlipidemia  -CAD,controlled  -PAD  -ch  Diastolic CHF, controlled  -gout, controlled  -neuropathy,periferal.    I have reviewed all her labs with her.  She has yet to see a Nephrologist.  I have spoken to her about possible HD, but so far she seems to be holding steady.        ROS: Review of Systems   Constitutional: Negative.  Negative for appetite change and unexpected weight change.   HENT: Negative.    Eyes: Negative.    Respiratory: Negative for cough, chest tightness, shortness of breath and wheezing.    Cardiovascular: Negative for chest pain, palpitations and leg swelling.   Gastrointestinal: Negative.    Genitourinary: Negative.    Musculoskeletal: Negative.         Legs hurt " at times"   Skin: Negative.    Neurological: Negative.    Hematological: Negative.    Psychiatric/Behavioral: Negative.        Past Medical History:   Diagnosis Date    Anemia     Arthritis     Coronary artery disease     Diabetes mellitus     Diabetes mellitus type II     Dyslipidemia     Hypertension     Insomnia     PAD (peripheral artery disease)        Past Surgical History:   Procedure Laterality Date    angiagram  " 2018    CORONARY ANGIOPLASTY      RCA 2011 EJ    GALLBLADDER SURGERY      HYSTERECTOMY      PERIPHERAL ARTERIAL STENT GRAFT      Left lower extremity RCA        Family History   Problem Relation Age of Onset    Heart attack Mother        Social History     Tobacco Use    Smoking status: Former Smoker     Last attempt to quit: 2005     Years since quittin.9    Smokeless tobacco: Never Used   Substance Use Topics    Alcohol use: No    Drug use: No       Social History     Social History Narrative    Lives alone in [place]. Has 2 sons. Quit drinking years ago. She cares for her houseplants.        ALLERGIES:   Review of patient's allergies indicates:   Allergen Reactions    Codeine Other (See Comments)     Jittery, lightheadedness, nausea  Other reaction(s): NAUSEA       MEDS:   Current Outpatient Medications:     allopurinol (ZYLOPRIM) 300 MG tablet, Take by mouth., Disp: , Rfl:     amLODIPine (NORVASC) 5 MG tablet, Take 1 tablet (5 mg total) by mouth once daily., Disp: 30 tablet, Rfl: 3    aspirin (ECOTRIN) 81 MG EC tablet, Take 1 tablet (81 mg total) by mouth once daily., Disp: , Rfl: 0    aspirin-calcium carbonate 81 mg-300 mg calcium(777 mg) Tab, Take by mouth., Disp: , Rfl:     atorvastatin (LIPITOR) 80 MG tablet, Take 1 tablet (80 mg total) by mouth every evening., Disp: 90 tablet, Rfl: 3    blood sugar diagnostic (TRUE METRIX GLUCOSE TEST STRIP) Strp, 1 strip by Misc.(Non-Drug; Combo Route) route 2 (two) times daily., Disp: 200 each, Rfl: 1    blood-glucose meter (TRUE METRIX GLUCOSE METER) Misc, Test blood sugars twice daily, Disp: 1 each, Rfl: 0    cilostazol (PLETAL) 100 MG Tab, Take 100 mg by mouth 2 (two) times daily., Disp: , Rfl: 3    cloNIDine (CATAPRES) 0.1 MG tablet, Take by mouth., Disp: , Rfl:     clopidogrel (PLAVIX) 75 mg tablet, Take by mouth., Disp: , Rfl:     cyanocobalamin (VITAMIN B-12) 100 MCG tablet, Take by mouth., Disp: , Rfl:     cyanocobalamin  (VITAMIN B-12) 1000 MCG tablet, Take 1 tablet (1,000 mcg total) by mouth once daily., Disp: 90 tablet, Rfl: 4    ferrous sulfate (FEOSOL) 325 mg (65 mg iron) Tab tablet, TAKE 1 TABLET BY MOUTH DAILY WITH BREAKFAST, Disp: 90 tablet, Rfl: 0    fish oil-omega-3 fatty acids 300-1,000 mg capsule, Take 500 mg by mouth 2 (two) times daily., Disp: , Rfl:     FLUZONE HIGH-DOSE 2019-20, PF, 180 mcg/0.5 mL Syrg, ADM 0.5ML IM UTD, Disp: , Rfl: 0    furosemide (LASIX) 40 MG tablet, Take 1 tablet (40 mg total) by mouth 2 (two) times daily., Disp: 60 tablet, Rfl: 11    gabapentin (NEURONTIN) 300 MG capsule, TAKE 1 CAPSULE BY MOUTH TWICE A DAY, Disp: 60 capsule, Rfl: 0    gabapentin (NEURONTIN) 300 MG capsule, TAKE 1 CAPSULE BY MOUTH TWICE A DAY, Disp: 60 capsule, Rfl: 5    hydrALAZINE (APRESOLINE) 50 MG tablet, Take 1 tablet (50 mg total) by mouth every 8 (eight) hours., Disp: 90 tablet, Rfl: 11    HYDROmorphone (DILAUDID) 2 MG tablet, Take by mouth., Disp: , Rfl:     irbesartan (AVAPRO) 150 MG tablet, Take by mouth., Disp: , Rfl:     JANUVIA 50 mg Tab, TAKE 1 TABLET BY MOUTH ONCE DAILY, Disp: 90 tablet, Rfl: 0    JANUVIA 50 mg Tab, TAKE 1 TABLET BY MOUTH ONCE DAILY, Disp: 90 tablet, Rfl: 0    lancets (BD ULTRA FINE LANCETS) 33 gauge Misc, 1 lancet by Misc.(Non-Drug; Combo Route) route 2 (two) times daily., Disp: 200 each, Rfl: 1    latanoprost 0.005 % ophthalmic solution, INT 1 GTT INTO OU HS, Disp: , Rfl: 4    losartan (COZAAR) 25 MG tablet, Take 1 tablet by mouth once daily., Disp: , Rfl: 3    lovastatin (MEVACOR) 40 MG tablet, Take by mouth., Disp: , Rfl:     metoprolol tartrate (LOPRESSOR) 25 MG tablet, Take 0.5 tablets (12.5 mg total) by mouth 2 (two) times daily., Disp: 90 tablet, Rfl: 3    pioglitazone (ACTOS) 30 MG tablet, TAKE 1 TABLET BY MOUTH EVERY DAY, Disp: 90 tablet, Rfl: 0    ramelteon (ROZEREM) 8 mg tablet, Take by mouth., Disp: , Rfl:     spironolactone (ALDACTONE) 25 MG tablet, Take 25 mg by  "mouth 2 (two) times daily., Disp: , Rfl: 3    OBJECTIVE:   Vitals:    11/19/19 0941   BP: 138/80   BP Location: Left arm   Patient Position: Sitting   BP Method: X-Large (Manual)   Pulse: 70   Resp: 16   Temp: 98 °F (36.7 °C)   TempSrc: Oral   SpO2: 100%   Weight: 82.6 kg (182 lb 3.2 oz)   Height: 5' 2" (1.575 m)     Body mass index is 33.32 kg/m².    Physical Exam   Constitutional: She is oriented to person, place, and time. She appears well-developed and well-nourished.   HENT:   Head: Normocephalic and atraumatic.   Right Ear: External ear normal.   Left Ear: External ear normal.   Nose: Nose normal.   Mouth/Throat: Oropharynx is clear and moist.   Eyes: Conjunctivae and EOM are normal.   Neck: Neck supple. No JVD present.   Cardiovascular: Normal rate, regular rhythm and normal heart sounds.   Pulmonary/Chest: Effort normal and breath sounds normal.   Abdominal: Soft. Bowel sounds are normal.   Musculoskeletal: She exhibits no edema.   Neurological: She is alert and oriented to person, place, and time.   Skin: Skin is warm and dry.   Psychiatric: She has a normal mood and affect. Her behavior is normal.   Nursing note and vitals reviewed.        PERTINENT RESULTS:   CBC:  Recent Labs   Lab Result Units 11/14/19  1102   WBC K/uL 5.74   RBC M/uL 3.89*   Hemoglobin g/dL 12.0   Hematocrit % 38.0   Platelets K/uL 209   Mean Corpuscular Volume fL 98   Mean Corpuscular Hemoglobin pg 30.8   Mean Corpuscular Hemoglobin Conc g/dL 31.6*     CMP:  Recent Labs   Lab Result Units 11/14/19  1102   Glucose mg/dL 102   Calcium mg/dL 10.5   Albumin g/dL 4.6   Total Protein g/dL 8.2   Sodium mmol/L 146*   Potassium mmol/L 5.1   CO2 mmol/L 26   Chloride mmol/L 110   BUN, Bld mg/dL 39*   Alkaline Phosphatase U/L 82   ALT U/L 9*   AST U/L 24   Total Bilirubin mg/dL 0.5     URINALYSIS:  No results for input(s): COLORU, CLARITYU, SPECGRAV, PHUR, PROTEINUA, GLUCOSEU, BILIRUBINCON, BLOODU, WBCU, RBCU, BACTERIA, MUCUS, NITRITE, " LEUKOCYTESUR, UROBILINOGEN, HYALINECASTS in the last 2160 hours.   LIPIDS:  Recent Labs   Lab Result Units 11/14/19  1102   HDL mg/dL 50   Cholesterol mg/dL 211*   Triglycerides mg/dL 115   LDL Cholesterol mg/dL 138.0   Hdl/Cholesterol Ratio % 23.7   Non-HDL Cholesterol mg/dL 161   Total Cholesterol/HDL Ratio  4.2             ASSESSMENT:  Problem List Items Addressed This Visit        Cardiac/Vascular    HTN (hypertension)    Hyperlipidemia    PAD (peripheral artery disease)    Acute on chronic diastolic heart failure       Renal/    CKD (chronic kidney disease) stage 4, GFR 15-29 ml/min - Primary       Endocrine    Type 2 diabetes mellitus with diabetic nephropathy    Hyperparathyroidism    Morbid (severe) obesity due to excess calories       Orthopedic    Interval gout          PLAN:      No orders of the defined types were placed in this encounter.  Will see if Dr. Morales can see pt soon.  Otherwise, continue all meds.    Follow-up with Dr Greer in  January..   Dr. Jasmin David  Internal Medicine

## 2019-11-29 RX ORDER — GLIMEPIRIDE 2 MG/1
TABLET ORAL
Qty: 90 TABLET | Refills: 1 | Status: SHIPPED | OUTPATIENT
Start: 2019-11-29 | End: 2020-02-19

## 2019-12-09 RX ORDER — AMLODIPINE BESYLATE 5 MG/1
TABLET ORAL
Qty: 30 TABLET | Refills: 3 | Status: SHIPPED | OUTPATIENT
Start: 2019-12-09 | End: 2020-01-20 | Stop reason: SDUPTHER

## 2020-01-02 RX ORDER — PIOGLITAZONEHYDROCHLORIDE 30 MG/1
TABLET ORAL
Qty: 90 TABLET | Refills: 1 | Status: SHIPPED | OUTPATIENT
Start: 2020-01-02 | End: 2020-01-20

## 2020-01-02 RX ORDER — FERROUS SULFATE 325(65) MG
TABLET ORAL
Qty: 90 TABLET | Refills: 1 | Status: SHIPPED | OUTPATIENT
Start: 2020-01-02 | End: 2020-08-25 | Stop reason: SDUPTHER

## 2020-01-10 DIAGNOSIS — I10 ESSENTIAL HYPERTENSION: Primary | ICD-10-CM

## 2020-01-10 RX ORDER — HYDRALAZINE HYDROCHLORIDE 50 MG/1
50 TABLET, FILM COATED ORAL EVERY 8 HOURS
Qty: 90 TABLET | Refills: 2 | Status: SHIPPED | OUTPATIENT
Start: 2020-01-10 | End: 2020-04-15

## 2020-01-10 NOTE — TELEPHONE ENCOUNTER
Medication refilled.     Has appt to establish care with me 1/20/2020    Dr. Polly Greer D.O.   [facility]

## 2020-01-10 NOTE — TELEPHONE ENCOUNTER
----- Message from Michelle Watson sent at 1/10/2020 10:21 AM CST -----  Contact: Pt son Jamey Concepcion says need to get an authorization before getting the medication refill says she is out    hydrALAZINE (APRESOLINE) 50 MG tablet    [facility]/pharm[id number] [address] [facility]    Jamey can be reached at [phone]

## 2020-01-10 NOTE — TELEPHONE ENCOUNTER
Spoke to son and stated that prescription and has  and just need a new one sent to the pharmacy, she will be out after today.

## 2020-01-20 ENCOUNTER — OFFICE VISIT (OUTPATIENT)
Dept: FAMILY MEDICINE | Facility: CLINIC | Age: 85
End: 2020-01-20
Payer: MEDICARE

## 2020-01-20 VITALS
TEMPERATURE: 98 F | OXYGEN SATURATION: 96 % | BODY MASS INDEX: 34.23 KG/M2 | HEART RATE: 65 BPM | HEIGHT: 62 IN | WEIGHT: 186 LBS | DIASTOLIC BLOOD PRESSURE: 70 MMHG | SYSTOLIC BLOOD PRESSURE: 138 MMHG | RESPIRATION RATE: 18 BRPM

## 2020-01-20 DIAGNOSIS — E66.09 CLASS 1 OBESITY DUE TO EXCESS CALORIES WITH SERIOUS COMORBIDITY AND BODY MASS INDEX (BMI) OF 34.0 TO 34.9 IN ADULT: ICD-10-CM

## 2020-01-20 DIAGNOSIS — N18.4 CKD (CHRONIC KIDNEY DISEASE) STAGE 4, GFR 15-29 ML/MIN: ICD-10-CM

## 2020-01-20 DIAGNOSIS — I50.32 CHRONIC DIASTOLIC HEART FAILURE: ICD-10-CM

## 2020-01-20 DIAGNOSIS — I10 ESSENTIAL HYPERTENSION: ICD-10-CM

## 2020-01-20 DIAGNOSIS — E79.0 HYPERURICEMIA: ICD-10-CM

## 2020-01-20 DIAGNOSIS — I73.9 PAD (PERIPHERAL ARTERY DISEASE): ICD-10-CM

## 2020-01-20 DIAGNOSIS — E11.42 TYPE 2 DIABETES MELLITUS WITH DIABETIC POLYNEUROPATHY, WITHOUT LONG-TERM CURRENT USE OF INSULIN: Primary | ICD-10-CM

## 2020-01-20 DIAGNOSIS — E11.22 TYPE 2 DIABETES MELLITUS WITH STAGE 4 CHRONIC KIDNEY DISEASE, WITHOUT LONG-TERM CURRENT USE OF INSULIN: ICD-10-CM

## 2020-01-20 DIAGNOSIS — E21.3 HYPERPARATHYROIDISM: ICD-10-CM

## 2020-01-20 DIAGNOSIS — E11.51 TYPE 2 DIABETES MELLITUS WITH DIABETIC PERIPHERAL ANGIOPATHY WITHOUT GANGRENE, WITHOUT LONG-TERM CURRENT USE OF INSULIN: ICD-10-CM

## 2020-01-20 DIAGNOSIS — I25.10 CORONARY ARTERY DISEASE INVOLVING NATIVE CORONARY ARTERY OF NATIVE HEART WITHOUT ANGINA PECTORIS: ICD-10-CM

## 2020-01-20 DIAGNOSIS — Z87.39 HISTORY OF GOUT: ICD-10-CM

## 2020-01-20 DIAGNOSIS — E78.00 PURE HYPERCHOLESTEROLEMIA: ICD-10-CM

## 2020-01-20 DIAGNOSIS — N18.4 TYPE 2 DIABETES MELLITUS WITH STAGE 4 CHRONIC KIDNEY DISEASE, WITHOUT LONG-TERM CURRENT USE OF INSULIN: ICD-10-CM

## 2020-01-20 PROBLEM — E66.811 CLASS 1 OBESITY DUE TO EXCESS CALORIES WITH SERIOUS COMORBIDITY AND BODY MASS INDEX (BMI) OF 34.0 TO 34.9 IN ADULT: Status: ACTIVE | Noted: 2019-04-01

## 2020-01-20 PROCEDURE — 1100F PR PT FALLS ASSESS DOC 2+ FALLS/FALL W/INJURY/YR: ICD-10-PCS | Mod: CPTII,S$GLB,, | Performed by: FAMILY MEDICINE

## 2020-01-20 PROCEDURE — 3288F FALL RISK ASSESSMENT DOCD: CPT | Mod: CPTII,S$GLB,, | Performed by: FAMILY MEDICINE

## 2020-01-20 PROCEDURE — 3288F PR FALLS RISK ASSESSMENT DOCUMENTED: ICD-10-PCS | Mod: CPTII,S$GLB,, | Performed by: FAMILY MEDICINE

## 2020-01-20 PROCEDURE — 99999 PR PBB SHADOW E&M-EST. PATIENT-LVL V: ICD-10-PCS | Mod: PBBFAC,,, | Performed by: FAMILY MEDICINE

## 2020-01-20 PROCEDURE — 1126F PR PAIN SEVERITY QUANTIFIED, NO PAIN PRESENT: ICD-10-PCS | Mod: S$GLB,,, | Performed by: FAMILY MEDICINE

## 2020-01-20 PROCEDURE — 99999 PR PBB SHADOW E&M-EST. PATIENT-LVL V: CPT | Mod: PBBFAC,,, | Performed by: FAMILY MEDICINE

## 2020-01-20 PROCEDURE — 99214 PR OFFICE/OUTPT VISIT, EST, LEVL IV, 30-39 MIN: ICD-10-PCS | Mod: S$GLB,,, | Performed by: FAMILY MEDICINE

## 2020-01-20 PROCEDURE — 99499 UNLISTED E&M SERVICE: CPT | Mod: S$GLB,,, | Performed by: FAMILY MEDICINE

## 2020-01-20 PROCEDURE — 3075F SYST BP GE 130 - 139MM HG: CPT | Mod: CPTII,S$GLB,, | Performed by: FAMILY MEDICINE

## 2020-01-20 PROCEDURE — 3078F DIAST BP <80 MM HG: CPT | Mod: CPTII,S$GLB,, | Performed by: FAMILY MEDICINE

## 2020-01-20 PROCEDURE — 1126F AMNT PAIN NOTED NONE PRSNT: CPT | Mod: S$GLB,,, | Performed by: FAMILY MEDICINE

## 2020-01-20 PROCEDURE — 1159F PR MEDICATION LIST DOCUMENTED IN MEDICAL RECORD: ICD-10-PCS | Mod: S$GLB,,, | Performed by: FAMILY MEDICINE

## 2020-01-20 PROCEDURE — 3075F PR MOST RECENT SYSTOLIC BLOOD PRESS GE 130-139MM HG: ICD-10-PCS | Mod: CPTII,S$GLB,, | Performed by: FAMILY MEDICINE

## 2020-01-20 PROCEDURE — 99214 OFFICE O/P EST MOD 30 MIN: CPT | Mod: S$GLB,,, | Performed by: FAMILY MEDICINE

## 2020-01-20 PROCEDURE — 1159F MED LIST DOCD IN RCRD: CPT | Mod: S$GLB,,, | Performed by: FAMILY MEDICINE

## 2020-01-20 PROCEDURE — 3078F PR MOST RECENT DIASTOLIC BLOOD PRESSURE < 80 MM HG: ICD-10-PCS | Mod: CPTII,S$GLB,, | Performed by: FAMILY MEDICINE

## 2020-01-20 PROCEDURE — 1100F PTFALLS ASSESS-DOCD GE2>/YR: CPT | Mod: CPTII,S$GLB,, | Performed by: FAMILY MEDICINE

## 2020-01-20 PROCEDURE — 99499 RISK ADDL DX/OHS AUDIT: ICD-10-PCS | Mod: S$GLB,,, | Performed by: FAMILY MEDICINE

## 2020-01-20 RX ORDER — AMLODIPINE BESYLATE 5 MG/1
5 TABLET ORAL DAILY
Qty: 90 TABLET | Refills: 1 | Status: SHIPPED | OUTPATIENT
Start: 2020-01-20 | End: 2020-06-17

## 2020-01-20 RX ORDER — GABAPENTIN 300 MG/1
300 CAPSULE ORAL NIGHTLY
Qty: 90 CAPSULE | Refills: 1 | Status: SHIPPED | OUTPATIENT
Start: 2020-01-20 | End: 2020-06-16 | Stop reason: SDUPTHER

## 2020-01-20 RX ORDER — ATORVASTATIN CALCIUM 80 MG/1
80 TABLET, FILM COATED ORAL NIGHTLY
Qty: 90 TABLET | Refills: 3 | Status: SHIPPED | OUTPATIENT
Start: 2020-01-20 | End: 2021-01-27 | Stop reason: SDUPTHER

## 2020-01-20 RX ORDER — CLONIDINE HYDROCHLORIDE 0.1 MG/1
0.2 TABLET ORAL DAILY
COMMUNITY
Start: 2011-04-05 | End: 2020-06-17

## 2020-01-20 NOTE — ASSESSMENT & PLAN NOTE
Lab Results   Component Value Date    URICACID 8.0 (H) 12/19/2019     - on allopurinol 300 mg daily with another provider  - though pt did not know her medications well and recommend bring to next visit

## 2020-01-20 NOTE — PROGRESS NOTES
FAMILY MEDICINE    Patient Active Problem List   Diagnosis    Essential hypertension    Hyperlipidemia    PAD (peripheral artery disease)    Coronary artery disease    Type 2 diabetes mellitus with diabetic polyneuropathy    History of gout    CKD (chronic kidney disease) stage 4, GFR 15-29 ml/min    Hyperparathyroidism    Chronic diastolic heart failure    Class 1 obesity due to excess calories with serious comorbidity and body mass index (BMI) of 34.0 to 34.9 in adult    Hyperuricemia    Type 2 diabetes mellitus with stage 4 chronic kidney disease, without long-term current use of insulin    Type 2 diabetes mellitus with diabetic peripheral angiopathy without gangrene, without long-term current use of insulin       CC:   Chief Complaint   Patient presents with    Follow-up    Establish Care       SUBJECTIVE:  Petra Zazueta   is a 84 y.o. female  - with obesity, CAD (with stents in place), chronic kidney disease stage 4, secondary hyperparathyroidism, type 2 diabetes, pEFHF, PAD with history of stent and chronic left distal leg swelling, history of gout and hypertension presents to establish care. Last PCP Dr. David    Nephrology Dr. Morales  Cardiology Dr. Clif COBB    Pt did not know any of her medications.     1. Diabetes Type 2    Current treatment regimen:   glimepiride (AMARYL) 2 MG tablet, TAKE 1 TABLET(2 MG) BY MOUTH BEFORE BREAKFAST, Disp: 90 tablet, Rfl: 1:   JANUVIA 50 mg Tab, TAKE 1 TABLET BY MOUTH ONCE DAILY, Disp: 90 tablet, Rfl: 0    Side effects from treatment: hypoglycemia (see readings below  Complications of diabetes: neuropathy    Glucometer: yes   Glucose monitoring: daily and see readings below    Last A1C:   Lab Results       Component                Value               Date                       LABA1C                   7.2 (H)             05/25/2018                 HGBA1C                   5.4                 11/14/2019              Low dose statin: atorvastatin 80 mg  daily  Last eye exam: 19 Dr. García  Last foot exam: 19 and due today 2020    Vaccines:   Influenza: 19  Pneumovax: 23 18  Prevnar 13: 2017    2. Hypertension    Current medication treatment:   cloNIDine (CATAPRES) 0.1 MG tablet, Take 0.2 mg by mouth once daily., Disp: , Rfl:   hydrALAZINE (APRESOLINE) 50 MG tablet, Take 1 tablet (50 mg total) by mouth every 8 (eight) hours., Disp: 90 tablet, Rfl: 2  irbesartan (AVAPRO) 150 MG tablet, Take by mouth., Disp: , Rfl:   metoprolol tartrate (LOPRESSOR) 25 MG tablet, Take 0.5 tablets (12.5 mg total) by mouth 2 (two) times daily., Disp: 90 tablet, Rfl: 3  spironolactone (ALDACTONE) 25 MG tablet, Take 25 mg by mouth 2 (two) times daily., Disp: , Rfl: 3  amLODIPine (NORVASC) 5 MG tablet, TAKE 1 TABLET BY MOUTH EVERY DAY, Disp: 30 tablet, Rfl: 3     Medication side effects: denies  Exercise regimen: none  Dietary treatment: low Na    Home BP cuff: yes  How often does patient monitoring BP? daily  Last home BP readin/69 this AM       3. Hyperlipidemia  - with DM2 and CAD  - LDL goal <70    Current treatment:  Atorvastatin 80 mg daily     Treated tried and failed in the past: Lovastatin   Side effects from current treatment: denies    Lab Results       Component                Value               Date                       CHOL                     211 (H)             2019                 CHOL                     121                 2019                 CHOL                     155                 2018            Lab Results       Component                Value               Date                       HDL                      50                  2019                 HDL                      46                  2019                 HDL                      46                  2018            Lab Results       Component                Value               Date                       LDLCALC                  138.0                11/14/2019                 LDLCALC                  57.6 (L)            08/16/2019                 LDLCALC                  87.8                08/31/2018            Lab Results       Component                Value               Date                       TRIG                     115                 11/14/2019                 TRIG                     87                  08/16/2019                 TRIG                     106                 08/31/2018            Lab Results       Component                Value               Date                       CHOLHDL                  23.7                11/14/2019                 CHOLHDL                  38.0                08/16/2019                 CHOLHDL                  29.7                08/31/2018                  ROS: Review of Systems   Constitutional: Negative.    HENT: Negative.    Eyes: Negative.    Respiratory: Negative.    Cardiovascular: Positive for leg swelling (chronic left leg and attending PT).   Gastrointestinal: Negative.    Endocrine: Negative.    Genitourinary: Negative.    Musculoskeletal: Positive for arthralgias.        Otherwise negative     Skin: Negative.    Allergic/Immunologic: Negative.    Neurological:        +neuropathy well controlled and only taking Gabapentin at bedtime. Otherwise negative   Hematological: Negative.    Psychiatric/Behavioral: Negative.        Past Medical History:   Diagnosis Date    Anemia     Arthritis     Coronary artery disease     Diabetes mellitus     Diabetes mellitus type II     Dyslipidemia     Hypertension     Insomnia     PAD (peripheral artery disease)        Past Surgical History:   Procedure Laterality Date    angiagram  08/31/2018    CORONARY ANGIOPLASTY      RCA 4/2011 EJ    GALLBLADDER SURGERY      HYSTERECTOMY      PERIPHERAL ARTERIAL STENT GRAFT      Left lower extremity RCA        Family History   Problem Relation Age of Onset    Heart attack Mother        Social History      Tobacco Use    Smoking status: Former Smoker     Last attempt to quit: 2005     Years since quittin.0    Smokeless tobacco: Never Used   Substance Use Topics    Alcohol use: No    Drug use: No       Social History     Social History Narrative    Lives alone in [place]. Has 2 sons. Quit drinking years ago. She cares for her houseplants.        ALLERGIES:   Review of patient's allergies indicates:   Allergen Reactions    Codeine Other (See Comments)     Jittery, lightheadedness, nausea  Other reaction(s): NAUSEA       MEDS:   Current Outpatient Medications:     acetaminophen (TYLENOL) 650 MG TbSR, Take 650 mg by mouth daily as needed., Disp: , Rfl:     allopurinol (ZYLOPRIM) 300 MG tablet, Take by mouth., Disp: , Rfl:     amLODIPine (NORVASC) 5 MG tablet, Take 1 tablet (5 mg total) by mouth once daily., Disp: 90 tablet, Rfl: 1    aspirin (ECOTRIN) 81 MG EC tablet, Take 1 tablet (81 mg total) by mouth once daily., Disp: , Rfl: 0    atorvastatin (LIPITOR) 80 MG tablet, Take 1 tablet (80 mg total) by mouth every evening., Disp: 90 tablet, Rfl: 3    blood sugar diagnostic (TRUE METRIX GLUCOSE TEST STRIP) Strp, 1 strip by Misc.(Non-Drug; Combo Route) route 2 (two) times daily., Disp: 200 each, Rfl: 1    blood-glucose meter (TRUE METRIX GLUCOSE METER) Misc, Test blood sugars twice daily, Disp: 1 each, Rfl: 0    cilostazol (PLETAL) 100 MG Tab, Take 100 mg by mouth 2 (two) times daily., Disp: , Rfl: 3    cloNIDine (CATAPRES) 0.1 MG tablet, Take 0.2 mg by mouth once daily., Disp: , Rfl:     clopidogrel (PLAVIX) 75 mg tablet, Take by mouth., Disp: , Rfl:     cyanocobalamin (VITAMIN B-12) 100 MCG tablet, Take by mouth., Disp: , Rfl:     cyanocobalamin (VITAMIN B-12) 1000 MCG tablet, Take 1 tablet (1,000 mcg total) by mouth once daily., Disp: 90 tablet, Rfl: 4    ferrous sulfate (FEOSOL) 325 mg (65 mg iron) Tab tablet, TAKE 1 TABLET BY MOUTH DAILY WITH BREAKFAST, Disp: 90 tablet, Rfl: 1    fish  "oil-omega-3 fatty acids 300-1,000 mg capsule, Take 500 mg by mouth 2 (two) times daily., Disp: , Rfl:     gabapentin (NEURONTIN) 300 MG capsule, Take 1 capsule (300 mg total) by mouth every evening., Disp: 90 capsule, Rfl: 1    glimepiride (AMARYL) 2 MG tablet, TAKE 1 TABLET(2 MG) BY MOUTH BEFORE BREAKFAST, Disp: 90 tablet, Rfl: 1    hydrALAZINE (APRESOLINE) 50 MG tablet, Take 1 tablet (50 mg total) by mouth every 8 (eight) hours., Disp: 90 tablet, Rfl: 2    irbesartan (AVAPRO) 150 MG tablet, Take by mouth., Disp: , Rfl:     lancets (BD ULTRA FINE LANCETS) 33 gauge Misc, 1 lancet by Misc.(Non-Drug; Combo Route) route 2 (two) times daily., Disp: 200 each, Rfl: 1    latanoprost 0.005 % ophthalmic solution, INT 1 GTT INTO OU HS, Disp: , Rfl: 4    losartan (COZAAR) 25 MG tablet, Take 1 tablet by mouth once daily., Disp: , Rfl: 3    metoprolol tartrate (LOPRESSOR) 25 MG tablet, Take 0.5 tablets (12.5 mg total) by mouth 2 (two) times daily., Disp: 90 tablet, Rfl: 3    ramelteon (ROZEREM) 8 mg tablet, Take by mouth., Disp: , Rfl:     spironolactone (ALDACTONE) 25 MG tablet, Take 25 mg by mouth 2 (two) times daily., Disp: , Rfl: 3    linaGLIPtin (TRADJENTA) 5 mg Tab tablet, Take 1 tablet (5 mg total) by mouth once daily., Disp: 90 tablet, Rfl: 1    tetanus and diphther. tox, PF, (TENIVAC, PF,) 5-2 Lf unit/0.5 mL Syrg, Inject 0.5 mLs into the muscle once. for 1 dose, Disp: 0.5 mL, Rfl: 0    varicella-zoster gE-AS01B, PF, (SHINGRIX, PF,) 50 mcg/0.5 mL injection, Inject 0.5mL IM at 0 and 2-6 months, Disp: 0.5 mL, Rfl: 1    OBJECTIVE:   Vitals:    01/20/20 1354   BP: 138/70   BP Location: Left arm   Patient Position: Sitting   BP Method: X-Large (Manual)   Pulse: 65   Resp: 18   Temp: 97.9 °F (36.6 °C)   TempSrc: Oral   SpO2: 96%   Weight: 84.4 kg (186 lb)   Height: 5' 2" (1.575 m)     Body mass index is 34.02 kg/m².    Physical Exam   Constitutional: No distress.   Neck: Neck supple.   Cardiovascular: Normal rate, " regular rhythm and intact distal pulses. Exam reveals no gallop and no friction rub.   Murmur heard.   Systolic (LUSB) murmur is present with a grade of 3/6.  Pulses:       Dorsalis pedis pulses are 2+ on the right side, and 2+ on the left side.        Posterior tibial pulses are 2+ on the right side, and 2+ on the left side.   Pulmonary/Chest: Effort normal and breath sounds normal.   Musculoskeletal: She exhibits edema (left distal leg non-pitting).   Feet:   Right Foot:   Protective Sensation: 5 sites tested. 3 sites sensed.   Skin Integrity: Negative for ulcer, blister, skin breakdown, erythema, warmth or callus.   Left Foot:   Protective Sensation: 5 sites tested. 3 sites sensed.   Skin Integrity: Negative for ulcer, blister, skin breakdown, erythema, warmth or callus.   Neurological: She is alert.         PERTINENT RESULTS:   BMP  Lab Results   Component Value Date     12/19/2019    K 4.9 12/19/2019     12/19/2019    CO2 24 12/19/2019    BUN 40 (H) 12/19/2019    CREATININE 2.02 (H) 12/19/2019    CALCIUM 9.8 12/19/2019    ANIONGAP 10 12/19/2019    ESTGFRAFRICA 25.7 (A) 12/19/2019    EGFRNONAA 22.3 (A) 12/19/2019     Lab Results   Component Value Date    ALT 10 12/19/2019    AST 23 12/19/2019    ALKPHOS 70 12/19/2019    BILITOT 0.4 12/19/2019     Lab Results   Component Value Date    CHOL 211 (H) 11/14/2019    CHOL 121 08/16/2019    CHOL 155 08/31/2018     Lab Results   Component Value Date    HDL 50 11/14/2019    HDL 46 08/16/2019    HDL 46 08/31/2018     Lab Results   Component Value Date    LDLCALC 138.0 11/14/2019    LDLCALC 57.6 (L) 08/16/2019    LDLCALC 87.8 08/31/2018     Lab Results   Component Value Date    TRIG 115 11/14/2019    TRIG 87 08/16/2019    TRIG 106 08/31/2018     Lab Results   Component Value Date    CHOLHDL 23.7 11/14/2019    CHOLHDL 38.0 08/16/2019    CHOLHDL 29.7 08/31/2018     Lab Results   Component Value Date    LABA1C 7.2 (H) 05/25/2018    HGBA1C 5.4 11/14/2019        08/31/2018  A. Indication/Pre-Operative Diagnosis  The patient is a 83 year old female that was referred for catheterization by Florentin Castro for ACS (NSTEMI).   B. Summary/Post-Operative Diagnosis   1.   LM: Normal caliber; no obstruction. Bifurcates into LAD and LcX.   2.   LAD: Normal, tortuous, to apex,  no obstructive lesions present.   3.   LcX: Normal caliber, tortuous, gives off 2 large OMs, nonobstructive.   4.   RCA: Dominant vessel, normal caliber, mRCA stent patent without any obstruction.   5.   LVEDP: 28mmHg.   6.   EF 55% on echo.   7.   Abnormal TNI likely related to elevated LVEDP and HTN.    ASSESSMENT/PLAN:  Problem List Items Addressed This Visit        Cardiac/Vascular    Essential hypertension    Current Assessment & Plan     - well controlled  - continue current medications         Relevant Medications    amLODIPine (NORVASC) 5 MG tablet    cloNIDine (CATAPRES) 0.1 MG tablet    Hyperlipidemia    Current Assessment & Plan     Lab Results   Component Value Date    LDLCALC 138.0 11/14/2019     - LDL goal <70 on Lovastatin previously  - now on Atorvastatin 80 mg daily since 11/2019  - repeat lipids           Relevant Orders    Lipid panel    PAD (peripheral artery disease)    Overview     - stent left leg with chronic nonpitting edema  - followed by Cardiology Dr. Clif COBB  - goal LDL <70  - BP goal < 130/80  - DAPT  - A1C goal <7%         Coronary artery disease    Overview     - 4/2011 RAUL RCA  - followed by Cardiology Dr. Clif COBB  - goal LDL <70  - BP goal < 130/80  - DAPT  - A1C goal <7%         Relevant Medications    atorvastatin (LIPITOR) 80 MG tablet    Other Relevant Orders    Lipid panel    Chronic diastolic heart failure    Overview     - followed by Cardiology Dr. Clif COBB         Current Assessment & Plan     - no signs of acute decompensation            Renal/    CKD (chronic kidney disease) stage 4, GFR 15-29 ml/min    Overview     - followed by  Nephrology         Current Assessment & Plan     - stable  - avoid nephrotoxic medications  - adjust Januvia to Tradjenta 5 mg daily            Endocrine    Type 2 diabetes mellitus with diabetic polyneuropathy - Primary    Current Assessment & Plan     Lab Results   Component Value Date    LABA1C 7.2 (H) 05/25/2018    HGBA1C 5.4 11/14/2019     - + hypoglycemia  - stop Glimepiride 4 mg daily  - pt not taking Actos  - change Januvia 50 mg daily to Tradjenta 5 mg daily due to renal issues  - counseling regarding new medication including expected results, potential side effects, and appropriate use. Questions elicited and answered  - encouraged to patient to notify me of any questions or concerns         Relevant Medications    gabapentin (NEURONTIN) 300 MG capsule    linaGLIPtin (TRADJENTA) 5 mg Tab tablet    Other Relevant Orders    Hemoglobin A1c    HM DIABETES FOOT EXAM (Completed)    Hyperparathyroidism    Current Assessment & Plan     - followed by Nephrology         Class 1 obesity due to excess calories with serious comorbidity and body mass index (BMI) of 34.0 to 34.9 in adult    Current Assessment & Plan     - discussed recommendation for diet, exercise and weight loss         Type 2 diabetes mellitus with stage 4 chronic kidney disease, without long-term current use of insulin    Relevant Medications    linaGLIPtin (TRADJENTA) 5 mg Tab tablet    Other Relevant Orders    Hemoglobin A1c    HM DIABETES FOOT EXAM (Completed)    Type 2 diabetes mellitus with diabetic peripheral angiopathy without gangrene, without long-term current use of insulin    Relevant Medications    linaGLIPtin (TRADJENTA) 5 mg Tab tablet    Other Relevant Orders    Hemoglobin A1c    HM DIABETES FOOT EXAM (Completed)       Orthopedic    History of gout    Current Assessment & Plan     - no recent issues  - on Allopurinol 300 mg daily from another provider         Hyperuricemia    Current Assessment & Plan     Lab Results   Component Value  Date    URICACID 8.0 (H) 12/19/2019     - on allopurinol 300 mg daily with another provider  - though pt did not know her medications well and recommend bring to next visit               ORDERS:   Orders Placed This Encounter    Hemoglobin A1c    Lipid panel    HM DIABETES FOOT EXAM    tetanus and diphther. tox, PF, (TENIVAC, PF,) 5-2 Lf unit/0.5 mL Syrg    varicella-zoster gE-AS01B, PF, (SHINGRIX, PF,) 50 mcg/0.5 mL injection    amLODIPine (NORVASC) 5 MG tablet    gabapentin (NEURONTIN) 300 MG capsule    linaGLIPtin (TRADJENTA) 5 mg Tab tablet    atorvastatin (LIPITOR) 80 MG tablet     Vaccines recommended: Td and Shingles sent to pharmacy    Follow-up in 1 month with labs and bring all medications.     Dr. Polly Greer D.O.   Family Medicine

## 2020-01-20 NOTE — ASSESSMENT & PLAN NOTE
Lab Results   Component Value Date    LABA1C 7.2 (H) 05/25/2018    HGBA1C 5.4 11/14/2019     - + hypoglycemia  - stop Glimepiride 4 mg daily  - pt not taking Actos  - change Januvia 50 mg daily to Tradjenta 5 mg daily due to renal issues  - counseling regarding new medication including expected results, potential side effects, and appropriate use. Questions elicited and answered  - encouraged to patient to notify me of any questions or concerns

## 2020-01-20 NOTE — ASSESSMENT & PLAN NOTE
Lab Results   Component Value Date    LDLCALC 138.0 11/14/2019     - LDL goal <70 on Lovastatin previously  - now on Atorvastatin 80 mg daily since 11/2019  - repeat lipids

## 2020-01-20 NOTE — PATIENT INSTRUCTIONS
1. Stop Glimepiride and Januvia  2. Start Tradjenta 5 mg daily  3. Bring all medications and glucose readings to your next visit

## 2020-01-28 ENCOUNTER — TELEPHONE (OUTPATIENT)
Dept: FAMILY MEDICINE | Facility: CLINIC | Age: 85
End: 2020-01-28

## 2020-01-28 NOTE — TELEPHONE ENCOUNTER
----- Message from April Mcneil sent at 1/27/2020 11:10 AM CST -----  Contact: self, [phone]  Patient requests to schedule her mammogram exam. Please advise.

## 2020-02-05 ENCOUNTER — TELEPHONE (OUTPATIENT)
Dept: FAMILY MEDICINE | Facility: CLINIC | Age: 85
End: 2020-02-05

## 2020-02-05 ENCOUNTER — PATIENT OUTREACH (OUTPATIENT)
Dept: ADMINISTRATIVE | Facility: HOSPITAL | Age: 85
End: 2020-02-05

## 2020-02-05 DIAGNOSIS — Z12.31 ENCOUNTER FOR SCREENING MAMMOGRAM FOR BREAST CANCER: Primary | ICD-10-CM

## 2020-02-05 NOTE — TELEPHONE ENCOUNTER
"----- Message from Doe Florentino sent at 2/5/2020  9:09 AM CST -----  Contact: SELF  "Can you please schedule my mammogram test before my appointment 02/19/20, so that we can discuss it on my visit date.  Please call me soon."    Thanks.  "

## 2020-02-05 NOTE — TELEPHONE ENCOUNTER
----- Message from Cynthia Garces MA sent at 2/5/2020  4:54 PM CST -----  Contact: kasandra [phone]      ----- Message -----  From: Ruth Shannon  Sent: 2/5/2020   4:42 PM CST  To: [facility] Staff    Patient is returning your call. Please advise.

## 2020-02-05 NOTE — TELEPHONE ENCOUNTER
Please let patient know mammogram order but is optional after 75 years old.   Dr. Polly Greer D.O.   Family Medicine

## 2020-02-05 NOTE — TELEPHONE ENCOUNTER
----- Message from Ruth Shannon sent at 2/5/2020 10:04 AM CST -----  Contact: son  Type:  Patient Returning Call    Who Called: Patients son  Who Left Message for Patient: Gracie  Does the patient know what this is regarding?: Unknown  Would the patient rather a call back or a response via [facility]? Call back  Best Call Back Number: [phone]  Additional Information: n/a

## 2020-02-05 NOTE — TELEPHONE ENCOUNTER
----- Message from Ruth Shannon sent at 2/5/2020  4:43 PM CST -----  Contact: patient [phone]  Patient requesting orders for a mammogram. [facility]. Please advise.

## 2020-02-18 NOTE — ASSESSMENT & PLAN NOTE
Lab Results   Component Value Date    LABA1C 7.2 (H) 05/25/2018    HGBA1C 5.7 (H) 02/13/2020     - well controlled  - glimepiride stopped last visit   - continue current medications

## 2020-02-18 NOTE — ASSESSMENT & PLAN NOTE
Lab Results   Component Value Date    LDLCALC 53.2 (L) 02/13/2020     - well controlled  - continue current medications

## 2020-02-18 NOTE — PROGRESS NOTES
FAMILY MEDICINE    Patient Active Problem List   Diagnosis    Essential hypertension    Hyperlipidemia    PAD (peripheral artery disease)    Coronary artery disease    Type 2 diabetes mellitus with diabetic polyneuropathy, without long-term current use of insulin    History of gout    CKD (chronic kidney disease) stage 4, GFR 15-29 ml/min    Hyperparathyroidism    Chronic diastolic heart failure    Class 1 obesity due to excess calories with serious comorbidity and body mass index (BMI) of 33.0 to 33.9 in adult    Hyperuricemia    Type 2 diabetes mellitus with stage 4 chronic kidney disease, without long-term current use of insulin    Type 2 diabetes mellitus with diabetic peripheral angiopathy without gangrene, without long-term current use of insulin       CC:   Chief Complaint   Patient presents with    Follow-up    Hypertension    Diabetes       SUBJECTIVE:  Petra Zazueta   is a 85 y.o. female  - with obesity, CAD (with stents in place), chronic kidney disease stage 4, secondary hyperparathyroidism, type 2 diabetes, pEFHF, PAD with history of stent and chronic left distal leg swelling, history of gout and hypertension presents for  follow    Nephrology Dr. Morales  Cardiology Dr. Clif COBB    She still is unsure about her medications. She brought a list but it is not updated.     1. Diabetes Type 2    Current treatment regimen:   linaGLIPtin (TRADJENTA) 5 mg Tab tablet, Take 1 tablet (5 mg total) by mouth once daily., Disp: 90 tablet, Rfl: 1  pioglitazone (ACTOS) 30 MG tablet, Take 30 mg by mouth once daily., Disp: , Rfl:     Side effects from treatment: none  - hypoglycemia resolved s/p stopping glimeperide  Complications of diabetes: neuropathy    Glucometer: yes (did not bring monitor but brought readings)   Glucose monitoring: daily and see readings below     Lab Results       Component                Value               Date                       LABA1C                   7.2 (H)              05/25/2018                 HGBA1C                   5.7 (H)             02/13/2020                Low dose statin: atorvastatin 80 mg daily  Last eye exam: 8/22/19 Dr. García  Last foot exam: 1/20/2020    Vaccines:   Influenza: 9/24/19  Pneumovax: 23 5/25/18  Prevnar 13: 1/13/2017    2. Hypertension    Current medication treatment:   cloNIDine (CATAPRES) 0.1 MG tablet, Take 0.2 mg by mouth once daily., Disp: , Rfl:   hydrALAZINE (APRESOLINE) 50 MG tablet, Take 1 tablet (50 mg total) by mouth every 8 (eight) hours., Disp: 90 tablet, Rfl: 2  irbesartan (AVAPRO) 150 MG tablet, Take by mouth., Disp: , Rfl:   metoprolol tartrate (LOPRESSOR) 25 MG tablet, Take 0.5 tablets (12.5 mg total) by mouth 2 (two) times daily., Disp: 90 tablet, Rfl: 3  spironolactone (ALDACTONE) 25 MG tablet, Take 25 mg by mouth 2 (two) times daily., Disp: , Rfl: 3  amLODIPine (NORVASC) 5 MG tablet, TAKE 1 TABLET BY MOUTH EVERY DAY, Disp: 30 tablet, Rfl: 3     Medication side effects: denies  Exercise regimen: none  Dietary treatment: low Na    Home BP cuff: yes  How often does patient monitoring BP? daily  Last home BP reading: see below            ROS: Review of Systems   Constitutional: Negative.    HENT: Negative.    Eyes: Negative.    Respiratory: Negative.    Cardiovascular: Positive for leg swelling (chronic left leg and attending PT).        Otherwise negative   Gastrointestinal: Negative.    Endocrine: Negative.    Genitourinary: Negative.    Musculoskeletal: Positive for arthralgias.        Otherwise negative     Skin: Negative.    Allergic/Immunologic: Negative.    Neurological:        +neuropathy well controlled and only taking Gabapentin at bedtime. Otherwise negative   Hematological: Negative.    Psychiatric/Behavioral: Negative.        Past Medical History:   Diagnosis Date    Anemia     Arthritis     Coronary artery disease     Diabetes mellitus     Diabetes mellitus type II     Dyslipidemia     Hypertension     Insomnia      PAD (peripheral artery disease)        Past Surgical History:   Procedure Laterality Date    angiagram  2018    CORONARY ANGIOPLASTY      RCA 2011 EJ    GALLBLADDER SURGERY      HYSTERECTOMY      PERIPHERAL ARTERIAL STENT GRAFT      Left lower extremity RCA        Family History   Problem Relation Age of Onset    Heart attack Mother        Social History     Tobacco Use    Smoking status: Former Smoker     Last attempt to quit: 2005     Years since quittin.1    Smokeless tobacco: Never Used   Substance Use Topics    Alcohol use: No    Drug use: No       Social History     Social History Narrative    Lives alone in [place]. Has 2 sons. Quit drinking years ago. She cares for her houseplants.        ALLERGIES:   Review of patient's allergies indicates:   Allergen Reactions    Codeine Other (See Comments)     Jittery, lightheadedness, nausea  Other reaction(s): NAUSEA       MEDS:   Current Outpatient Medications:     acetaminophen (TYLENOL) 650 MG TbSR, Take 650 mg by mouth daily as needed., Disp: , Rfl:     allopurinol (ZYLOPRIM) 300 MG tablet, Take by mouth., Disp: , Rfl:     amLODIPine (NORVASC) 5 MG tablet, Take 1 tablet (5 mg total) by mouth once daily., Disp: 90 tablet, Rfl: 1    atorvastatin (LIPITOR) 80 MG tablet, Take 1 tablet (80 mg total) by mouth every evening., Disp: 90 tablet, Rfl: 3    blood sugar diagnostic (TRUE METRIX GLUCOSE TEST STRIP) Strp, 1 strip by Misc.(Non-Drug; Combo Route) route 2 (two) times daily., Disp: 200 each, Rfl: 1    blood-glucose meter (TRUE METRIX GLUCOSE METER) Misc, Test blood sugars twice daily, Disp: 1 each, Rfl: 0    cilostazol (PLETAL) 100 MG Tab, Take 100 mg by mouth 2 (two) times daily., Disp: , Rfl: 3    cloNIDine (CATAPRES) 0.1 MG tablet, Take 0.2 mg by mouth once daily., Disp: , Rfl:     clopidogrel (PLAVIX) 75 mg tablet, Take by mouth., Disp: , Rfl:     cyanocobalamin (VITAMIN B-12) 100 MCG tablet, Take by mouth., Disp: , Rfl:      cyanocobalamin (VITAMIN B-12) 1000 MCG tablet, Take 1 tablet (1,000 mcg total) by mouth once daily., Disp: 90 tablet, Rfl: 4    ferrous sulfate (FEOSOL) 325 mg (65 mg iron) Tab tablet, TAKE 1 TABLET BY MOUTH DAILY WITH BREAKFAST, Disp: 90 tablet, Rfl: 1    fish oil-omega-3 fatty acids 300-1,000 mg capsule, Take 500 mg by mouth 2 (two) times daily., Disp: , Rfl:     gabapentin (NEURONTIN) 300 MG capsule, Take 1 capsule (300 mg total) by mouth every evening., Disp: 90 capsule, Rfl: 1    hydrALAZINE (APRESOLINE) 50 MG tablet, Take 1 tablet (50 mg total) by mouth every 8 (eight) hours., Disp: 90 tablet, Rfl: 2    irbesartan (AVAPRO) 150 MG tablet, Take by mouth., Disp: , Rfl:     lancets (BD ULTRA FINE LANCETS) 33 gauge Misc, 1 lancet by Misc.(Non-Drug; Combo Route) route 2 (two) times daily., Disp: 200 each, Rfl: 1    latanoprost 0.005 % ophthalmic solution, INT 1 GTT INTO OU HS, Disp: , Rfl: 4    metoprolol tartrate (LOPRESSOR) 25 MG tablet, Take 0.5 tablets (12.5 mg total) by mouth 2 (two) times daily., Disp: 90 tablet, Rfl: 3    ramelteon (ROZEREM) 8 mg tablet, Take by mouth., Disp: , Rfl:     spironolactone (ALDACTONE) 25 MG tablet, Take 25 mg by mouth 2 (two) times daily., Disp: , Rfl: 3    varicella-zoster [id number], PF, (SHINGRIX, PF,) 50 mcg/0.5 mL injection, Inject 0.5mL IM at 0 and 2-6 months, Disp: 0.5 mL, Rfl: 1    aspirin (ECOTRIN) 81 MG EC tablet, Take 1 tablet (81 mg total) by mouth once daily., Disp: , Rfl: 11    linaGLIPtin (TRADJENTA) 5 mg Tab tablet, Take 1 tablet (5 mg total) by mouth once daily., Disp: 90 tablet, Rfl: 1    pioglitazone (ACTOS) 30 MG tablet, Take 1 tablet (30 mg total) by mouth once daily., Disp: 90 tablet, Rfl: 1    OBJECTIVE:   Vitals:    02/19/20 0938   BP: 130/60   BP Location: Left arm   Patient Position: Sitting   BP Method: X-Large (Manual)   Pulse: 75   Resp: 18   Temp: 98.6 °F (37 °C)   TempSrc: Oral   SpO2: 96%   Weight: 83.1 kg (183 lb 4.8 oz)   Height:  "5' 2" (1.575 m)     Body mass index is 33.53 kg/m².    Physical Exam   Constitutional: No distress.   Neck: Neck supple.   Cardiovascular: Normal rate, regular rhythm and intact distal pulses. Exam reveals no gallop and no friction rub.   Murmur heard.   Systolic (LUSB) murmur is present with a grade of 3/6.  Pulmonary/Chest: Effort normal and breath sounds normal.   Musculoskeletal: She exhibits edema (left distal leg non-pitting).   Neurological: She is alert.         PERTINENT RESULTS:   Lab Visit on 02/14/2020   Component Date Value Ref Range Status    Protein, Urine Random 02/14/2020 15  0 - 15 mg/dL Final    Comment: The random urine reference ranges provided were established   for 24 hour urine collections.  No reference ranges exist for  random urine specimens.  Correlate clinically.      Creatinine, Random Ur 02/14/2020 47.0  15.0 - 325.0 mg/dL Final    Comment: The random urine reference ranges provided were established   for 24 hour urine collections.  No reference ranges exist for  random urine specimens.  Correlate clinically.      Prot/Creat Ratio, Ur 02/14/2020 0.32* 0.00 - 0.20 Final    Specimen UA 02/14/2020 Urine, Clean Catch   Final    Color, UA 02/14/2020 Yellow  Yellow, Straw, Ellie Final    Appearance, UA 02/14/2020 Clear  Clear Final    pH, UA 02/14/2020 6.0  5.0 - 8.0 Final    Specific Gravity, UA 02/14/2020 1.020  1.005 - 1.030 Final    Protein, UA 02/14/2020 Negative  Negative Final    Comment: Recommend a 24 hour urine protein or a urine   protein/creatinine ratio if globulin induced proteinuria is  clinically suspected.      Glucose, UA 02/14/2020 Negative  Negative Final    Ketones, UA 02/14/2020 Negative  Negative Final    Bilirubin (UA) 02/14/2020 Negative  Negative Final    Occult Blood UA 02/14/2020 Negative  Negative Final    Nitrite, UA 02/14/2020 Negative  Negative Final    Urobilinogen, UA 02/14/2020 Negative  <2.0 EU/dL Final    Leukocytes, UA 02/14/2020 Negative "  Negative Final   Lab Visit on 02/14/2020   Component Date Value Ref Range Status    WBC 02/14/2020 6.58  3.90 - 12.70 K/uL Final    RBC 02/14/2020 3.60* 4.00 - 5.40 M/uL Final    Hemoglobin 02/14/2020 11.3* 12.0 - 16.0 g/dL Final    Hematocrit 02/14/2020 36.7* 37.0 - 48.5 % Final    Mean Corpuscular Volume 02/14/2020 102* 82 - 98 fL Final    Mean Corpuscular Hemoglobin 02/14/2020 31.4* 27.0 - 31.0 pg Final    Mean Corpuscular Hemoglobin Conc 02/14/2020 30.8* 32.0 - 36.0 g/dL Final    RDW 02/14/2020 14.5  11.5 - 14.5 % Final    Platelets 02/14/2020 180  150 - 350 K/uL Final    MPV 02/14/2020 11.5  9.2 - 12.9 fL Final    Immature Granulocytes 02/14/2020 0.5  0.0 - 0.5 % Final    Gran # (ANC) 02/14/2020 5.0  1.8 - 7.7 K/uL Final    Immature Grans (Abs) 02/14/2020 0.03  0.00 - 0.04 K/uL Final    Comment: Mild elevation in immature granulocytes is non specific and   can be seen in a variety of conditions including stress response,   acute inflammation, trauma and pregnancy. Correlation with other   laboratory and clinical findings is essential.      Lymph # 02/14/2020 1.1  1.0 - 4.8 K/uL Final    Mono # 02/14/2020 0.4  0.3 - 1.0 K/uL Final    Eos # 02/14/2020 0.1  0.0 - 0.5 K/uL Final    Baso # 02/14/2020 0.01  0.00 - 0.20 K/uL Final    nRBC 02/14/2020 0  0 /100 WBC Final    Gran% 02/14/2020 76.2* 38.0 - 73.0 % Final    Lymph% 02/14/2020 16.3* 18.0 - 48.0 % Final    Mono% 02/14/2020 5.6  4.0 - 15.0 % Final    Eosinophil% 02/14/2020 1.2  0.0 - 8.0 % Final    Basophil% 02/14/2020 0.2  0.0 - 1.9 % Final    Differential Method 02/14/2020 Automated   Final    Sodium 02/14/2020 141  136 - 145 mmol/L Final    Potassium 02/14/2020 4.5  3.5 - 5.1 mmol/L Final    Chloride 02/14/2020 108  95 - 110 mmol/L Final    CO2 02/14/2020 22* 23 - 29 mmol/L Final    Glucose 02/14/2020 178* 70 - 110 mg/dL Final    BUN, Bld 02/14/2020 24* 7 - 17 mg/dL Final    Creatinine 02/14/2020 1.37  0.50 - 1.40 mg/dL Final     Calcium 02/14/2020 9.7  8.7 - 10.5 mg/dL Final    Total Protein 02/14/2020 7.6  6.0 - 8.4 g/dL Final    Albumin 02/14/2020 4.3  3.5 - 5.2 g/dL Final    Total Bilirubin 02/14/2020 0.4  0.1 - 1.0 mg/dL Final    Comment: For infants and newborns, interpretation of results should be based  on gestational age, weight and in agreement with clinical  observations.  Premature Infant recommended reference ranges:  Up to 24 hours.............<8.0 mg/dL  Up to 48 hours............<12.0 mg/dL  3-5 days..................<15.0 mg/dL  6-29 days.................<15.0 mg/dL      Alkaline Phosphatase 02/14/2020 81  38 - 126 U/L Final    AST 02/14/2020 32  15 - 46 U/L Final    ALT 02/14/2020 17  10 - 44 U/L Final    Anion Gap 02/14/2020 11  8 - 16 mmol/L Final    eGFR if  02/14/2020 40.9* >60 mL/min/1.73 m^2 Final    eGFR if non African American 02/14/2020 35.4* >60 mL/min/1.73 m^2 Final    Comment: Calculation used to obtain the estimated glomerular filtration  rate (eGFR) is the CKD-EPI equation.       Magnesium 02/14/2020 1.9  1.6 - 2.6 mg/dL Final    Phosphorus 02/14/2020 3.5  2.7 - 4.5 mg/dL Final     Screen 02/14/2020 Negative <1:80  Negative <1:80 Final    Cytoplasmic Neutrophilic Ab 02/14/2020 <1:20  <1:20 Titer Final    Comment: Test performed at [facility],  [address], [place]  [address] [phone]  Juan Carlos Kuhn MD  - Medical Director      Perinuclear (P-ANCA) 02/14/2020 <1:20  <1:20 Titer Final    Complement (C-3) 02/14/2020 128  50 - 180 mg/dL Final    Complement (C-4) 02/14/2020 43  11 - 44 mg/dL Final    ds DNA Ab 02/14/2020 Negative 1:10  Negative 1:10 Final    Performed by fluorescent crithidia assay.   Lab Visit on 02/13/2020   Component Date Value Ref Range Status    Hemoglobin A1C 02/13/2020 5.7* 4.0 - 5.6 % Final    Comment: ADA Screening Guidelines:  5.7-6.4%  Consistent with prediabetes  >or=6.5%  Consistent with diabetes  High levels of  fetal hemoglobin interfere with the HbA1C  assay. Heterozygous hemoglobin variants (HbS, HgC, etc)do  not significantly interfere with this assay.   However, presence of multiple variants may affect accuracy.      Estimated Avg Glucose 02/13/2020 117  68 - 131 mg/dL Final    Cholesterol 02/13/2020 123  120 - 199 mg/dL Final    Comment: The National Cholesterol Education Program (NCEP) has set the  following guidelines (reference ranges) for Cholesterol:  Optimal.....................<200 mg/dL  Borderline High.............200-239 mg/dL  High........................> or = 240 mg/dL      Triglycerides 02/13/2020 79  30 - 150 mg/dL Final    Comment: The National Cholesterol Education Program (NCEP) has set the  following guidelines (reference values) for triglycerides:  Normal......................<150 mg/dL  Borderline High.............150-199 mg/dL  High........................200-499 mg/dL      HDL 02/13/2020 54  40 - 75 mg/dL Final    Comment: The National Cholesterol Education Program (NCEP) has set the  following guidelines (reference values) for HDL Cholesterol:  Low...............<40 mg/dL  Optimal...........>60 mg/dL      LDL Cholesterol 02/13/2020 53.2* 63.0 - 159.0 mg/dL Final    Comment: The National Cholesterol Education Program (NCEP) has set the  following guidelines (reference values) for LDL Cholesterol:  Optimal.......................<130 mg/dL  Borderline High...............130-159 mg/dL  High..........................160-189 mg/dL  Very High.....................>190 mg/dL      Hdl/Cholesterol Ratio 02/13/2020 43.9  20.0 - 50.0 % Final    Total Cholesterol/HDL Ratio 02/13/2020 2.3  2.0 - 5.0 Final    Non-HDL Cholesterol 02/13/2020 69  mg/dL Final    Comment: Risk category and Non-HDL cholesterol goals:  Coronary heart disease (CHD)or equivalent (10-year risk of CHD >20%):  Non-HDL cholesterol goal     <130 mg/dL  Two or more CHD risk factors and 10-year risk of CHD <= 20%:  Non-HDL cholesterol  goal     <160 mg/dL  0 to 1 CHD risk factor:  Non-HDL cholesterol goal     <190 mg/dL           08/31/2018  A. Indication/Pre-Operative Diagnosis  The patient is a 83 year old female that was referred for catheterization by Florentin Castro for ACS (NSTEMI).   B. Summary/Post-Operative Diagnosis   1.   LM: Normal caliber; no obstruction. Bifurcates into LAD and LcX.   2.   LAD: Normal, tortuous, to apex,  no obstructive lesions present.   3.   LcX: Normal caliber, tortuous, gives off 2 large OMs, nonobstructive.   4.   RCA: Dominant vessel, normal caliber, mRCA stent patent without any obstruction.   5.   LVEDP: 28mmHg.   6.   EF 55% on echo.   7.   Abnormal TNI likely related to elevated LVEDP and HTN.    ASSESSMENT/PLAN:  Problem List Items Addressed This Visit        Cardiac/Vascular    Essential hypertension    Current Assessment & Plan     - well controlled  - continue current medications         Hyperlipidemia    Current Assessment & Plan     Lab Results   Component Value Date    LDLCALC 53.2 (L) 02/13/2020     - well controlled  - continue current medications         Coronary artery disease    Overview     - 4/2011 RAUL RCA  - followed by Cardiology Dr. Clif COBB  - goal LDL <70  - BP goal < 130/80  - DAPT  - A1C goal <7%         Relevant Medications    aspirin (ECOTRIN) 81 MG EC tablet    Chronic diastolic heart failure    Overview     - followed by Cardiology Dr. Clif COBB         Current Assessment & Plan     - no signs of acute decompensation            Endocrine    Type 2 diabetes mellitus with diabetic polyneuropathy, without long-term current use of insulin    Relevant Medications    linaGLIPtin (TRADJENTA) 5 mg Tab tablet    pioglitazone (ACTOS) 30 MG tablet    Other Relevant Orders    Hemoglobin A1c    Class 1 obesity due to excess calories with serious comorbidity and body mass index (BMI) of 33.0 to 33.9 in adult    Overview     - discussed recommendation for diet, exercise and weight loss          Type 2 diabetes mellitus with stage 4 chronic kidney disease, without long-term current use of insulin - Primary    Current Assessment & Plan     Lab Results   Component Value Date    LABA1C 7.2 (H) 05/25/2018    HGBA1C 5.7 (H) 02/13/2020     - well controlled  - glimepiride stopped last visit   - continue current medications         Relevant Medications    linaGLIPtin (TRADJENTA) 5 mg Tab tablet    pioglitazone (ACTOS) 30 MG tablet    Other Relevant Orders    Hemoglobin A1c    Type 2 diabetes mellitus with diabetic peripheral angiopathy without gangrene, without long-term current use of insulin    Relevant Medications    linaGLIPtin (TRADJENTA) 5 mg Tab tablet    pioglitazone (ACTOS) 30 MG tablet    Other Relevant Orders    Hemoglobin A1c          ORDERS:   Orders Placed This Encounter    Hemoglobin A1c    linaGLIPtin (TRADJENTA) 5 mg Tab tablet    pioglitazone (ACTOS) 30 MG tablet    aspirin (ECOTRIN) 81 MG EC tablet     Vaccines recommended: Td and Shingles sent to pharmacy previously [facility] and pt reports that they told her she was up to date. Will request records from pharmacy. *update she is not up to date and notified that Tetanus and Shingles available at pharmacy    Follow-up in 4 months with labs     Dr. Polly Greer D.O.   Family Medicine

## 2020-02-19 ENCOUNTER — TELEPHONE (OUTPATIENT)
Dept: FAMILY MEDICINE | Facility: CLINIC | Age: 85
End: 2020-02-19

## 2020-02-19 ENCOUNTER — OFFICE VISIT (OUTPATIENT)
Dept: FAMILY MEDICINE | Facility: CLINIC | Age: 85
End: 2020-02-19
Payer: MEDICARE

## 2020-02-19 VITALS
HEIGHT: 62 IN | BODY MASS INDEX: 33.73 KG/M2 | WEIGHT: 183.31 LBS | RESPIRATION RATE: 18 BRPM | HEART RATE: 75 BPM | SYSTOLIC BLOOD PRESSURE: 130 MMHG | DIASTOLIC BLOOD PRESSURE: 60 MMHG | TEMPERATURE: 99 F | OXYGEN SATURATION: 96 %

## 2020-02-19 DIAGNOSIS — E11.22 TYPE 2 DIABETES MELLITUS WITH STAGE 4 CHRONIC KIDNEY DISEASE, WITHOUT LONG-TERM CURRENT USE OF INSULIN: Primary | ICD-10-CM

## 2020-02-19 DIAGNOSIS — E11.42 TYPE 2 DIABETES MELLITUS WITH DIABETIC POLYNEUROPATHY, WITHOUT LONG-TERM CURRENT USE OF INSULIN: ICD-10-CM

## 2020-02-19 DIAGNOSIS — E66.09 CLASS 1 OBESITY DUE TO EXCESS CALORIES WITH SERIOUS COMORBIDITY AND BODY MASS INDEX (BMI) OF 33.0 TO 33.9 IN ADULT: ICD-10-CM

## 2020-02-19 DIAGNOSIS — N18.4 TYPE 2 DIABETES MELLITUS WITH STAGE 4 CHRONIC KIDNEY DISEASE, WITHOUT LONG-TERM CURRENT USE OF INSULIN: Primary | ICD-10-CM

## 2020-02-19 DIAGNOSIS — I25.10 CORONARY ARTERY DISEASE INVOLVING NATIVE CORONARY ARTERY OF NATIVE HEART WITHOUT ANGINA PECTORIS: ICD-10-CM

## 2020-02-19 DIAGNOSIS — I10 ESSENTIAL HYPERTENSION: ICD-10-CM

## 2020-02-19 DIAGNOSIS — E11.51 TYPE 2 DIABETES MELLITUS WITH DIABETIC PERIPHERAL ANGIOPATHY WITHOUT GANGRENE, WITHOUT LONG-TERM CURRENT USE OF INSULIN: ICD-10-CM

## 2020-02-19 DIAGNOSIS — I50.32 CHRONIC DIASTOLIC HEART FAILURE: ICD-10-CM

## 2020-02-19 DIAGNOSIS — E78.00 PURE HYPERCHOLESTEROLEMIA: ICD-10-CM

## 2020-02-19 PROCEDURE — 99499 RISK ADDL DX/OHS AUDIT: ICD-10-PCS | Mod: S$GLB,,, | Performed by: FAMILY MEDICINE

## 2020-02-19 PROCEDURE — 1159F PR MEDICATION LIST DOCUMENTED IN MEDICAL RECORD: ICD-10-PCS | Mod: S$GLB,,, | Performed by: FAMILY MEDICINE

## 2020-02-19 PROCEDURE — 1125F PR PAIN SEVERITY QUANTIFIED, PAIN PRESENT: ICD-10-PCS | Mod: S$GLB,,, | Performed by: FAMILY MEDICINE

## 2020-02-19 PROCEDURE — 1125F AMNT PAIN NOTED PAIN PRSNT: CPT | Mod: S$GLB,,, | Performed by: FAMILY MEDICINE

## 2020-02-19 PROCEDURE — 99499 UNLISTED E&M SERVICE: CPT | Mod: S$GLB,,, | Performed by: FAMILY MEDICINE

## 2020-02-19 PROCEDURE — 3078F PR MOST RECENT DIASTOLIC BLOOD PRESSURE < 80 MM HG: ICD-10-PCS | Mod: CPTII,S$GLB,, | Performed by: FAMILY MEDICINE

## 2020-02-19 PROCEDURE — 99214 PR OFFICE/OUTPT VISIT, EST, LEVL IV, 30-39 MIN: ICD-10-PCS | Mod: S$GLB,,, | Performed by: FAMILY MEDICINE

## 2020-02-19 PROCEDURE — 99999 PR PBB SHADOW E&M-EST. PATIENT-LVL V: CPT | Mod: PBBFAC,,, | Performed by: FAMILY MEDICINE

## 2020-02-19 PROCEDURE — 3075F SYST BP GE 130 - 139MM HG: CPT | Mod: CPTII,S$GLB,, | Performed by: FAMILY MEDICINE

## 2020-02-19 PROCEDURE — 1101F PT FALLS ASSESS-DOCD LE1/YR: CPT | Mod: CPTII,S$GLB,, | Performed by: FAMILY MEDICINE

## 2020-02-19 PROCEDURE — 1101F PR PT FALLS ASSESS DOC 0-1 FALLS W/OUT INJ PAST YR: ICD-10-PCS | Mod: CPTII,S$GLB,, | Performed by: FAMILY MEDICINE

## 2020-02-19 PROCEDURE — 3078F DIAST BP <80 MM HG: CPT | Mod: CPTII,S$GLB,, | Performed by: FAMILY MEDICINE

## 2020-02-19 PROCEDURE — 3075F PR MOST RECENT SYSTOLIC BLOOD PRESS GE 130-139MM HG: ICD-10-PCS | Mod: CPTII,S$GLB,, | Performed by: FAMILY MEDICINE

## 2020-02-19 PROCEDURE — 99999 PR PBB SHADOW E&M-EST. PATIENT-LVL V: ICD-10-PCS | Mod: PBBFAC,,, | Performed by: FAMILY MEDICINE

## 2020-02-19 PROCEDURE — 1159F MED LIST DOCD IN RCRD: CPT | Mod: S$GLB,,, | Performed by: FAMILY MEDICINE

## 2020-02-19 PROCEDURE — 99214 OFFICE O/P EST MOD 30 MIN: CPT | Mod: S$GLB,,, | Performed by: FAMILY MEDICINE

## 2020-02-19 RX ORDER — ASPIRIN 81 MG/1
81 TABLET ORAL DAILY
Refills: 11 | COMMUNITY
Start: 2020-02-19 | End: 2022-01-26 | Stop reason: SDUPTHER

## 2020-02-19 RX ORDER — GLIMEPIRIDE 2 MG/1
TABLET ORAL
Qty: 90 TABLET | Refills: 1 | Status: CANCELLED | OUTPATIENT
Start: 2020-02-19

## 2020-02-19 RX ORDER — PIOGLITAZONEHYDROCHLORIDE 30 MG/1
30 TABLET ORAL DAILY
Qty: 90 TABLET | Refills: 1 | Status: SHIPPED | OUTPATIENT
Start: 2020-02-19 | End: 2020-06-16 | Stop reason: SDUPTHER

## 2020-02-19 NOTE — PROGRESS NOTES
Patient, Petra Zazueta (MRN #[id number]), presented with a recent Estimated Glumerular Filtration Rate (EGFR) between 30 and 45 consistent with the definition of chronic kidney disease stage 3 - moderate (ICD10 - N18.3).    eGFR if    Date Value Ref Range Status   02/14/2020 40.9 (A) >60 mL/min/1.73 m^2 Final       The patient's chronic kidney disease stage 3 was monitored, evaluated, addressed and/or treated. This addendum to the medical record is made on 02/19/2020.

## 2020-02-19 NOTE — TELEPHONE ENCOUNTER
----- Message from Polly Greer, DO sent at 2/19/2020  9:44 AM CST -----  Call [employer]. Pt reports that they would not give her Tdap or Shingles because she was up to date? Please get records

## 2020-02-28 ENCOUNTER — TELEPHONE (OUTPATIENT)
Dept: FAMILY MEDICINE | Facility: CLINIC | Age: 85
End: 2020-02-28

## 2020-04-15 DIAGNOSIS — I10 ESSENTIAL HYPERTENSION: ICD-10-CM

## 2020-04-15 RX ORDER — HYDRALAZINE HYDROCHLORIDE 50 MG/1
50 TABLET, FILM COATED ORAL EVERY 8 HOURS
Qty: 90 TABLET | Refills: 2 | Status: SHIPPED | OUTPATIENT
Start: 2020-04-15 | End: 2020-05-11

## 2020-05-10 DIAGNOSIS — I10 ESSENTIAL HYPERTENSION: ICD-10-CM

## 2020-05-11 RX ORDER — HYDRALAZINE HYDROCHLORIDE 50 MG/1
50 TABLET, FILM COATED ORAL EVERY 8 HOURS
Qty: 90 TABLET | Refills: 2 | Status: SHIPPED | OUTPATIENT
Start: 2020-05-11 | End: 2020-08-05

## 2020-06-16 RX ORDER — LINAGLIPTIN 5 MG/1
5 TABLET, FILM COATED ORAL DAILY
Qty: 90 TABLET | Refills: 1 | Status: CANCELLED | OUTPATIENT
Start: 2020-06-16 | End: 2021-06-16

## 2020-06-16 RX ORDER — AMLODIPINE BESYLATE 5 MG/1
5 TABLET ORAL DAILY
Qty: 90 TABLET | Refills: 1 | Status: CANCELLED | OUTPATIENT
Start: 2020-06-16

## 2020-06-16 NOTE — PROGRESS NOTES
FAMILY MEDICINE    Patient Active Problem List   Diagnosis    Essential hypertension    Hyperlipidemia    PAD (peripheral artery disease)    Coronary artery disease    Type 2 diabetes mellitus with diabetic polyneuropathy, without long-term current use of insulin    History of gout    CKD (chronic kidney disease) stage 4, GFR 15-29 ml/min    Hyperparathyroidism    Chronic diastolic heart failure    Class 1 obesity due to excess calories with serious comorbidity and body mass index (BMI) of 33.0 to 33.9 in adult    Hyperuricemia    Type 2 diabetes mellitus with stage 4 chronic kidney disease, without long-term current use of insulin    Type 2 diabetes mellitus with diabetic peripheral angiopathy without gangrene, without long-term current use of insulin       CC:   Chief Complaint   Patient presents with    Follow-up       SUBJECTIVE:  Petra Zazueta   is a 85 y.o. female  - with obesity, CAD (with stents in place), chronic kidney disease stage 4, secondary hyperparathyroidism, type 2 diabetes, pEFHF, PAD with history of stent and chronic left distal leg swelling, history of gout and hypertension presents for follow-up diabetes and labs    Nephrology Dr. Morales  Cardiology Dr. Clif COBB    She still is unsure about her medications and brought all medication bottles which are different than her prior list. She also did not stop glimepiride as previously recommended and did not start tradjenta. She has difficulty understanding her medications.     1. Diabetes Type 2    Current treatment regimen:   linaGLIPtin (TRADJENTA) 5 mg Tab tablet, Take 1 tablet (5 mg total) by mouth once daily., Disp: 90 tablet, Rfl: 1  - not taking    pioglitazone (ACTOS) 30 MG tablet, Take 30 mg by mouth once daily., Disp: , Rfl:   glimperide 2 mg daily with breakfast    Side effects from treatment: denies and has restart glimepiride since last visit though previously caused hypoglycemia  Complications of diabetes:  neuropathy    Glucometer: yes (did not bring monitor )   Glucose monitoring: did not bring readings and reports AM fasting 90's    Lab Results       Component                Value               Date                       LABA1C                   7.2 (H)             05/25/2018                 HGBA1C                   5.4                 06/15/2020                         Low dose statin: atorvastatin 80 mg daily  Last eye exam: 8/22/19 Dr. García  Last foot exam: 1/20/2020    Vaccines:   Influenza: 9/24/19  Pneumovax: 23 5/25/18  Prevnar 13: 1/13/2017    2. Hypertension    Current medication treatment:   hydrALAZINE (APRESOLINE) 50 MG tablet, TAKE 1 TABLET (50 MG TOTAL) BY MOUTH EVERY 8 (EIGHT) HOURS., Disp: 90 tablet, Rfl: 2  metoprolol tartrate (LOPRESSOR) 25 MG tablet, Take 0.5 tablets (12.5 mg total) by mouth 2 (two) times daily., Disp: 90 tablet, Rfl: 3:   Losartan 25 mg daily    Medication side effects: denies  Exercise regimen: none  Dietary treatment: low Na      ROS: Review of Systems   Constitutional: Negative.    HENT: Negative.    Eyes: Negative.    Respiratory: Negative.    Cardiovascular: Negative.  Leg swelling: chronic left leg and attending PT.   Gastrointestinal: Negative.    Endocrine: Negative.    Genitourinary: Negative.    Musculoskeletal: Positive for arthralgias.        Otherwise negative     Skin: Negative.    Allergic/Immunologic: Negative.    Neurological:        +neuropathy well controlled and only taking Gabapentin at bedtime. Otherwise negative   Hematological: Negative.    Psychiatric/Behavioral: Negative.        Past Medical History:   Diagnosis Date    Anemia     Arthritis     Coronary artery disease     Diabetes mellitus     Diabetes mellitus type II     Dyslipidemia     Hypertension     Insomnia     PAD (peripheral artery disease)        Past Surgical History:   Procedure Laterality Date    angiagram  08/31/2018    CORONARY ANGIOPLASTY      RCA 4/2011 EJ    GALLBLADDER  SURGERY      HYSTERECTOMY      PERIPHERAL ARTERIAL STENT GRAFT      Left lower extremity RCA        Family History   Problem Relation Age of Onset    Heart attack Mother        Social History     Tobacco Use    Smoking status: Former Smoker     Quit date: 2005     Years since quittin.4    Smokeless tobacco: Never Used   Substance Use Topics    Alcohol use: No    Drug use: No       Social History     Social History Narrative    Lives alone in [place]. Has 2 sons. Quit drinking years ago. She cares for her houseplants.        ALLERGIES:   Review of patient's allergies indicates:   Allergen Reactions    Codeine Other (See Comments)     Jittery, lightheadedness, nausea  Other reaction(s): NAUSEA       MEDS:   Current Outpatient Medications:     aspirin (ECOTRIN) 81 MG EC tablet, Take 1 tablet (81 mg total) by mouth once daily., Disp: , Rfl: 11    atorvastatin (LIPITOR) 80 MG tablet, Take 1 tablet (80 mg total) by mouth every evening., Disp: 90 tablet, Rfl: 3    blood sugar diagnostic (TRUE METRIX GLUCOSE TEST STRIP) Strp, 1 strip by Misc.(Non-Drug; Combo Route) route 2 (two) times daily., Disp: 200 each, Rfl: 1    blood-glucose meter (TRUE METRIX GLUCOSE METER) Misc, Test blood sugars twice daily, Disp: 1 each, Rfl: 0    cilostazol (PLETAL) 100 MG Tab, Take 100 mg by mouth 2 (two) times daily., Disp: , Rfl: 3    clopidogreL (PLAVIX) 75 mg tablet, Take 75 mg by mouth once daily., Disp: , Rfl:     cyanocobalamin (VITAMIN B-12) 1000 MCG tablet, Take 1 tablet (1,000 mcg total) by mouth once daily., Disp: 90 tablet, Rfl: 4    ferrous sulfate (FEOSOL) 325 mg (65 mg iron) Tab tablet, TAKE 1 TABLET BY MOUTH DAILY WITH BREAKFAST, Disp: 90 tablet, Rfl: 1    ferrous sulfate (FEOSOL) 325 mg (65 mg iron) Tab tablet, Take 325 mg by mouth daily with breakfast., Disp: , Rfl:     fish oil-omega-3 fatty acids 300-1,000 mg capsule, Take 500 mg by mouth 2 (two) times daily., Disp: , Rfl:     gabapentin  "(NEURONTIN) 300 MG capsule, Take 300 mg by mouth every evening., Disp: , Rfl:     hydrALAZINE (APRESOLINE) 50 MG tablet, TAKE 1 TABLET (50 MG TOTAL) BY MOUTH EVERY 8 (EIGHT) HOURS., Disp: 90 tablet, Rfl: 2    lancets (BD ULTRA FINE LANCETS) 33 gauge Misc, 1 lancet by Misc.(Non-Drug; Combo Route) route 2 (two) times daily., Disp: 200 each, Rfl: 1    latanoprost 0.005 % ophthalmic solution, INT 1 GTT INTO OU HS, Disp: , Rfl: 4    losartan (COZAAR) 25 MG tablet, Take 1 tablet (25 mg total) by mouth once daily., Disp: 90 tablet, Rfl: 1    metoprolol tartrate (LOPRESSOR) 25 MG tablet, Take 1 tablet (25 mg total) by mouth 2 (two) times daily., Disp: 90 tablet, Rfl: 3    vitamin D (VITAMIN D3) 1000 units Tab, Take 1,000 Units by mouth once daily., Disp: , Rfl:     allopurinol (ZYLOPRIM) 300 MG tablet, Take by mouth., Disp: , Rfl:     gabapentin (NEURONTIN) 300 MG capsule, Take 1 capsule (300 mg total) by mouth every evening., Disp: 90 capsule, Rfl: 1    linaGLIPtin (TRADJENTA) 5 mg Tab tablet, Take 1 tablet (5 mg total) by mouth once daily., Disp: 90 tablet, Rfl: 1    pioglitazone (ACTOS) 30 MG tablet, Take 1 tablet (30 mg total) by mouth once daily., Disp: 90 tablet, Rfl: 1    OBJECTIVE:   Vitals:    06/17/20 0813 06/17/20 0902   BP: (!) 144/78 130/70   BP Location: Left arm    Patient Position: Sitting    BP Method: Medium (Manual)    Pulse: 72    SpO2: 96%    Weight: 82.1 kg (181 lb)    Height: 5' 2" (1.575 m)      Body mass index is 33.11 kg/m².    Physical Exam  Constitutional:       General: She is not in acute distress.  Neck:      Musculoskeletal: Neck supple.   Cardiovascular:      Rate and Rhythm: Normal rate and regular rhythm.      Heart sounds: Murmur present. Systolic (LUSB) murmur present with a grade of 3/6. No friction rub. No gallop.    Pulmonary:      Effort: Pulmonary effort is normal.      Breath sounds: Normal breath sounds.   Musculoskeletal:      Right lower leg: No edema.      Left lower " leg: No edema.   Neurological:      Mental Status: She is alert.           PERTINENT RESULTS:   Lab Visit on 06/15/2020   Component Date Value Ref Range Status    Hemoglobin A1C 06/15/2020 5.4  4.0 - 5.6 % Final    Comment: ADA Screening Guidelines:  5.7-6.4%  Consistent with prediabetes  >or=6.5%  Consistent with diabetes  High levels of fetal hemoglobin interfere with the HbA1C  assay. Heterozygous hemoglobin variants (HbS, HgC, etc)do  not significantly interfere with this assay.   However, presence of multiple variants may affect accuracy.      Estimated Avg Glucose 06/15/2020 108  68 - 131 mg/dL Final     BMP  Lab Results   Component Value Date     02/14/2020    K 4.5 02/14/2020     02/14/2020    CO2 22 (L) 02/14/2020    BUN 24 (H) 02/14/2020    CREATININE 1.37 02/14/2020    CALCIUM 9.7 02/14/2020    ANIONGAP 11 02/14/2020    ESTGFRAFRICA 40.9 (A) 02/14/2020    EGFRNONAA 35.4 (A) 02/14/2020     Lab Results   Component Value Date    ALT 17 02/14/2020    AST 32 02/14/2020    ALKPHOS 81 02/14/2020    BILITOT 0.4 02/14/2020     Lab Results   Component Value Date    URICACID 8.0 (H) 12/19/2019       ASSESSMENT/PLAN:  Problem List Items Addressed This Visit        Cardiac/Vascular    Chronic diastolic heart failure    Overview     - followed by Cardiology Dr. Clif COBB         Current Assessment & Plan     - no signs of acute decompensation  - no issues with Actos and I hope to be able to stop medication         Relevant Medications    losartan (COZAAR) 25 MG tablet    metoprolol tartrate (LOPRESSOR) 25 MG tablet    Coronary artery disease    Overview     - 4/2011 RAUL RCA  - followed by Cardiology Dr. Clif COBB  - goal LDL <70  - BP goal < 130/80  - DAPT  - A1C goal <7%         Essential hypertension - Primary    Current Assessment & Plan     - well controlled  - continue current medications         Relevant Medications    losartan (COZAAR) 25 MG tablet    Other Relevant Orders    Hemoglobin  A1C       Renal/    CKD (chronic kidney disease) stage 4, GFR 15-29 ml/min    Overview     - followed by Nephrology         Current Assessment & Plan     Lab Results   Component Value Date    CREATININE 1.37 02/14/2020     - stable         Relevant Medications    losartan (COZAAR) 25 MG tablet    Other Relevant Orders    Basic metabolic panel       Endocrine    Type 2 diabetes mellitus with diabetic polyneuropathy, without long-term current use of insulin    Relevant Medications    pioglitazone (ACTOS) 30 MG tablet    gabapentin (NEURONTIN) 300 MG capsule    linaGLIPtin (TRADJENTA) 5 mg Tab tablet    Other Relevant Orders    Hemoglobin A1C    Basic metabolic panel    Type 2 diabetes mellitus with diabetic peripheral angiopathy without gangrene, without long-term current use of insulin    Relevant Medications    pioglitazone (ACTOS) 30 MG tablet    linaGLIPtin (TRADJENTA) 5 mg Tab tablet    Other Relevant Orders    Hemoglobin A1C    Basic metabolic panel    Type 2 diabetes mellitus with stage 4 chronic kidney disease, without long-term current use of insulin    Current Assessment & Plan     Lab Results   Component Value Date    LABA1C 7.2 (H) 05/25/2018    HGBA1C 5.4 06/15/2020     - well controlled  - stop glimepiride due to hypoglycemia in the past  - continue actos at this time with plans to stop  - start trajdenta 5 mg daily (which attempted to start last visit)         Relevant Medications    pioglitazone (ACTOS) 30 MG tablet    linaGLIPtin (TRADJENTA) 5 mg Tab tablet    Other Relevant Orders    Hemoglobin A1C    Basic metabolic panel          ORDERS:   Orders Placed This Encounter    Hemoglobin A1C    Basic metabolic panel    pioglitazone (ACTOS) 30 MG tablet    gabapentin (NEURONTIN) 300 MG capsule    losartan (COZAAR) 25 MG tablet    metoprolol tartrate (LOPRESSOR) 25 MG tablet    linaGLIPtin (TRADJENTA) 5 mg Tab tablet     Vaccines recommended: Td and #2 shingles (both previously sent to  pharmacy)    Follow-up in 3 months with labs or sooner if any concerns    Dr. Polly Greer D.O.   [facility]

## 2020-06-17 ENCOUNTER — TELEPHONE (OUTPATIENT)
Dept: FAMILY MEDICINE | Facility: CLINIC | Age: 85
End: 2020-06-17

## 2020-06-17 ENCOUNTER — OFFICE VISIT (OUTPATIENT)
Dept: FAMILY MEDICINE | Facility: CLINIC | Age: 85
End: 2020-06-17
Payer: MEDICARE

## 2020-06-17 VITALS
OXYGEN SATURATION: 96 % | DIASTOLIC BLOOD PRESSURE: 70 MMHG | SYSTOLIC BLOOD PRESSURE: 130 MMHG | BODY MASS INDEX: 33.31 KG/M2 | HEART RATE: 72 BPM | WEIGHT: 181 LBS | HEIGHT: 62 IN

## 2020-06-17 DIAGNOSIS — E11.51 TYPE 2 DIABETES MELLITUS WITH DIABETIC PERIPHERAL ANGIOPATHY WITHOUT GANGRENE, WITHOUT LONG-TERM CURRENT USE OF INSULIN: ICD-10-CM

## 2020-06-17 DIAGNOSIS — E11.22 TYPE 2 DIABETES MELLITUS WITH STAGE 4 CHRONIC KIDNEY DISEASE, WITHOUT LONG-TERM CURRENT USE OF INSULIN: ICD-10-CM

## 2020-06-17 DIAGNOSIS — N18.4 CKD (CHRONIC KIDNEY DISEASE) STAGE 4, GFR 15-29 ML/MIN: ICD-10-CM

## 2020-06-17 DIAGNOSIS — E11.42 TYPE 2 DIABETES MELLITUS WITH DIABETIC POLYNEUROPATHY, WITHOUT LONG-TERM CURRENT USE OF INSULIN: ICD-10-CM

## 2020-06-17 DIAGNOSIS — I10 ESSENTIAL HYPERTENSION: Primary | ICD-10-CM

## 2020-06-17 DIAGNOSIS — N18.4 TYPE 2 DIABETES MELLITUS WITH STAGE 4 CHRONIC KIDNEY DISEASE, WITHOUT LONG-TERM CURRENT USE OF INSULIN: ICD-10-CM

## 2020-06-17 DIAGNOSIS — I25.10 CORONARY ARTERY DISEASE INVOLVING NATIVE CORONARY ARTERY OF NATIVE HEART WITHOUT ANGINA PECTORIS: ICD-10-CM

## 2020-06-17 DIAGNOSIS — I50.32 CHRONIC DIASTOLIC HEART FAILURE: ICD-10-CM

## 2020-06-17 PROCEDURE — 99499 RISK ADDL DX/OHS AUDIT: ICD-10-PCS | Mod: S$GLB,,, | Performed by: FAMILY MEDICINE

## 2020-06-17 PROCEDURE — 1125F AMNT PAIN NOTED PAIN PRSNT: CPT | Mod: S$GLB,,, | Performed by: FAMILY MEDICINE

## 2020-06-17 PROCEDURE — 3075F PR MOST RECENT SYSTOLIC BLOOD PRESS GE 130-139MM HG: ICD-10-PCS | Mod: CPTII,S$GLB,, | Performed by: FAMILY MEDICINE

## 2020-06-17 PROCEDURE — 1101F PR PT FALLS ASSESS DOC 0-1 FALLS W/OUT INJ PAST YR: ICD-10-PCS | Mod: CPTII,S$GLB,, | Performed by: FAMILY MEDICINE

## 2020-06-17 PROCEDURE — 3078F PR MOST RECENT DIASTOLIC BLOOD PRESSURE < 80 MM HG: ICD-10-PCS | Mod: CPTII,S$GLB,, | Performed by: FAMILY MEDICINE

## 2020-06-17 PROCEDURE — 1159F PR MEDICATION LIST DOCUMENTED IN MEDICAL RECORD: ICD-10-PCS | Mod: S$GLB,,, | Performed by: FAMILY MEDICINE

## 2020-06-17 PROCEDURE — 99214 OFFICE O/P EST MOD 30 MIN: CPT | Mod: S$GLB,,, | Performed by: FAMILY MEDICINE

## 2020-06-17 PROCEDURE — 99214 PR OFFICE/OUTPT VISIT, EST, LEVL IV, 30-39 MIN: ICD-10-PCS | Mod: S$GLB,,, | Performed by: FAMILY MEDICINE

## 2020-06-17 PROCEDURE — 99999 PR PBB SHADOW E&M-EST. PATIENT-LVL III: CPT | Mod: PBBFAC,,, | Performed by: FAMILY MEDICINE

## 2020-06-17 PROCEDURE — 99999 PR PBB SHADOW E&M-EST. PATIENT-LVL III: ICD-10-PCS | Mod: PBBFAC,,, | Performed by: FAMILY MEDICINE

## 2020-06-17 PROCEDURE — 99499 UNLISTED E&M SERVICE: CPT | Mod: S$GLB,,, | Performed by: FAMILY MEDICINE

## 2020-06-17 PROCEDURE — 3075F SYST BP GE 130 - 139MM HG: CPT | Mod: CPTII,S$GLB,, | Performed by: FAMILY MEDICINE

## 2020-06-17 PROCEDURE — 1125F PR PAIN SEVERITY QUANTIFIED, PAIN PRESENT: ICD-10-PCS | Mod: S$GLB,,, | Performed by: FAMILY MEDICINE

## 2020-06-17 PROCEDURE — 1101F PT FALLS ASSESS-DOCD LE1/YR: CPT | Mod: CPTII,S$GLB,, | Performed by: FAMILY MEDICINE

## 2020-06-17 PROCEDURE — 1159F MED LIST DOCD IN RCRD: CPT | Mod: S$GLB,,, | Performed by: FAMILY MEDICINE

## 2020-06-17 PROCEDURE — 3078F DIAST BP <80 MM HG: CPT | Mod: CPTII,S$GLB,, | Performed by: FAMILY MEDICINE

## 2020-06-17 RX ORDER — PIOGLITAZONEHYDROCHLORIDE 30 MG/1
30 TABLET ORAL DAILY
Qty: 90 TABLET | Refills: 1 | Status: SHIPPED | OUTPATIENT
Start: 2020-06-17 | End: 2020-09-17

## 2020-06-17 RX ORDER — FERROUS SULFATE 325(65) MG
325 TABLET ORAL
COMMUNITY
End: 2020-08-25 | Stop reason: SDUPTHER

## 2020-06-17 RX ORDER — GABAPENTIN 300 MG/1
300 CAPSULE ORAL NIGHTLY
Qty: 90 CAPSULE | Refills: 1 | Status: SHIPPED | OUTPATIENT
Start: 2020-06-17 | End: 2020-09-15

## 2020-06-17 RX ORDER — CLOPIDOGREL BISULFATE 75 MG/1
75 TABLET ORAL DAILY
COMMUNITY
End: 2021-04-27

## 2020-06-17 RX ORDER — CHOLECALCIFEROL (VITAMIN D3) 25 MCG
1000 TABLET ORAL DAILY
COMMUNITY
End: 2022-10-03

## 2020-06-17 RX ORDER — GABAPENTIN 300 MG/1
300 CAPSULE ORAL NIGHTLY
COMMUNITY
End: 2020-09-15

## 2020-06-17 RX ORDER — METOPROLOL TARTRATE 25 MG/1
25 TABLET, FILM COATED ORAL 2 TIMES DAILY
Qty: 90 TABLET | Refills: 3 | Status: SHIPPED | OUTPATIENT
Start: 2020-06-17 | End: 2020-12-17

## 2020-06-17 RX ORDER — LINAGLIPTIN 5 MG/1
5 TABLET, FILM COATED ORAL DAILY
Qty: 90 TABLET | Refills: 1 | Status: SHIPPED | OUTPATIENT
Start: 2020-06-17 | End: 2020-06-23 | Stop reason: SDUPTHER

## 2020-06-17 RX ORDER — LOSARTAN POTASSIUM 25 MG/1
25 TABLET ORAL DAILY
Qty: 90 TABLET | Refills: 1 | Status: SHIPPED | OUTPATIENT
Start: 2020-06-17 | End: 2020-09-17

## 2020-06-17 RX ORDER — LOSARTAN POTASSIUM 50 MG/1
50 TABLET ORAL DAILY
COMMUNITY
End: 2020-06-17 | Stop reason: SDUPTHER

## 2020-06-17 RX ORDER — GLIMEPIRIDE 2 MG/1
2 TABLET ORAL
COMMUNITY
End: 2020-06-17

## 2020-06-17 NOTE — ASSESSMENT & PLAN NOTE
Lab Results   Component Value Date    LABA1C 7.2 (H) 05/25/2018    HGBA1C 5.4 06/15/2020     - well controlled  - stop glimepiride due to hypoglycemia in the past  - continue actos at this time with plans to stop  - start trajdenta 5 mg daily (which attempted to start last visit)

## 2020-06-17 NOTE — TELEPHONE ENCOUNTER
----- Message from Polly Greer DO sent at 6/17/2020  9:04 AM CDT -----  Please call pharmacy and let them know glimepiride stopped

## 2020-06-17 NOTE — ASSESSMENT & PLAN NOTE
- no signs of acute decompensation  - no issues with Actos and I hope to be able to stop medication

## 2020-06-23 ENCOUNTER — OFFICE VISIT (OUTPATIENT)
Dept: FAMILY MEDICINE | Facility: CLINIC | Age: 85
End: 2020-06-23
Payer: MEDICARE

## 2020-06-23 VITALS
WEIGHT: 185.63 LBS | OXYGEN SATURATION: 96 % | HEIGHT: 62 IN | TEMPERATURE: 98 F | DIASTOLIC BLOOD PRESSURE: 58 MMHG | SYSTOLIC BLOOD PRESSURE: 112 MMHG | HEART RATE: 62 BPM | BODY MASS INDEX: 34.16 KG/M2

## 2020-06-23 DIAGNOSIS — E11.42 TYPE 2 DIABETES MELLITUS WITH DIABETIC POLYNEUROPATHY, WITHOUT LONG-TERM CURRENT USE OF INSULIN: ICD-10-CM

## 2020-06-23 DIAGNOSIS — E11.51 TYPE 2 DIABETES MELLITUS WITH DIABETIC PERIPHERAL ANGIOPATHY WITHOUT GANGRENE, WITHOUT LONG-TERM CURRENT USE OF INSULIN: ICD-10-CM

## 2020-06-23 DIAGNOSIS — E11.22 TYPE 2 DIABETES MELLITUS WITH STAGE 4 CHRONIC KIDNEY DISEASE, WITHOUT LONG-TERM CURRENT USE OF INSULIN: ICD-10-CM

## 2020-06-23 DIAGNOSIS — N18.4 TYPE 2 DIABETES MELLITUS WITH STAGE 4 CHRONIC KIDNEY DISEASE, WITHOUT LONG-TERM CURRENT USE OF INSULIN: ICD-10-CM

## 2020-06-23 DIAGNOSIS — M79.642 PAIN OF LEFT HAND: Primary | ICD-10-CM

## 2020-06-23 PROCEDURE — 1125F AMNT PAIN NOTED PAIN PRSNT: CPT | Mod: S$GLB,,, | Performed by: INTERNAL MEDICINE

## 2020-06-23 PROCEDURE — 3074F SYST BP LT 130 MM HG: CPT | Mod: CPTII,S$GLB,, | Performed by: INTERNAL MEDICINE

## 2020-06-23 PROCEDURE — 1125F PR PAIN SEVERITY QUANTIFIED, PAIN PRESENT: ICD-10-PCS | Mod: S$GLB,,, | Performed by: INTERNAL MEDICINE

## 2020-06-23 PROCEDURE — 99214 PR OFFICE/OUTPT VISIT, EST, LEVL IV, 30-39 MIN: ICD-10-PCS | Mod: S$GLB,,, | Performed by: INTERNAL MEDICINE

## 2020-06-23 PROCEDURE — 1159F MED LIST DOCD IN RCRD: CPT | Mod: S$GLB,,, | Performed by: INTERNAL MEDICINE

## 2020-06-23 PROCEDURE — 3078F DIAST BP <80 MM HG: CPT | Mod: CPTII,S$GLB,, | Performed by: INTERNAL MEDICINE

## 2020-06-23 PROCEDURE — 1159F PR MEDICATION LIST DOCUMENTED IN MEDICAL RECORD: ICD-10-PCS | Mod: S$GLB,,, | Performed by: INTERNAL MEDICINE

## 2020-06-23 PROCEDURE — 3074F PR MOST RECENT SYSTOLIC BLOOD PRESSURE < 130 MM HG: ICD-10-PCS | Mod: CPTII,S$GLB,, | Performed by: INTERNAL MEDICINE

## 2020-06-23 PROCEDURE — 1101F PR PT FALLS ASSESS DOC 0-1 FALLS W/OUT INJ PAST YR: ICD-10-PCS | Mod: CPTII,S$GLB,, | Performed by: INTERNAL MEDICINE

## 2020-06-23 PROCEDURE — 99214 OFFICE O/P EST MOD 30 MIN: CPT | Mod: S$GLB,,, | Performed by: INTERNAL MEDICINE

## 2020-06-23 PROCEDURE — 1101F PT FALLS ASSESS-DOCD LE1/YR: CPT | Mod: CPTII,S$GLB,, | Performed by: INTERNAL MEDICINE

## 2020-06-23 PROCEDURE — 99999 PR PBB SHADOW E&M-EST. PATIENT-LVL V: ICD-10-PCS | Mod: PBBFAC,,, | Performed by: INTERNAL MEDICINE

## 2020-06-23 PROCEDURE — 99999 PR PBB SHADOW E&M-EST. PATIENT-LVL V: CPT | Mod: PBBFAC,,, | Performed by: INTERNAL MEDICINE

## 2020-06-23 PROCEDURE — 3078F PR MOST RECENT DIASTOLIC BLOOD PRESSURE < 80 MM HG: ICD-10-PCS | Mod: CPTII,S$GLB,, | Performed by: INTERNAL MEDICINE

## 2020-06-23 RX ORDER — LOSARTAN POTASSIUM 50 MG/1
50 TABLET ORAL DAILY
COMMUNITY
End: 2020-09-17 | Stop reason: SDUPTHER

## 2020-06-23 RX ORDER — LINAGLIPTIN 5 MG/1
5 TABLET, FILM COATED ORAL DAILY
Qty: 90 TABLET | Refills: 1 | Status: SHIPPED | OUTPATIENT
Start: 2020-06-23 | End: 2020-09-17 | Stop reason: SDUPTHER

## 2020-06-23 NOTE — PROGRESS NOTES
[facility] Note    Chief Complaint      Chief Complaint   Patient presents with    Hand Pain       History of Present Illness      Petra Zazueta is a 85 y.o. female with chronic conditions of DM2 with CKD4, CAD with HFpEF, HTN, PAD with hx of stent, chronic venous insufficiency, gout  who presents today for: complaints of left third digit pain.  Pt previously saw Dr. Nath, then Dr. David, but has since established with Dr. Greer.  Pain has been present for the past few months.  Located at the base of the right 3rd finger.  Unable to flex finger fully.  Does not lock in place.  Not associated with numbness or tingling.  Has not noticed any nodules or intermittent swelling.  Has been taking tylenol OTC for pain but helps only temporarily.  Does not see ortho regularly.      Past Medical History:  Past Medical History:   Diagnosis Date    Anemia     Arthritis     Coronary artery disease     Diabetes mellitus     Diabetes mellitus type II     Dyslipidemia     Hypertension     Insomnia     PAD (peripheral artery disease)        Past Surgical History:   has a past surgical history that includes Hysterectomy; Gallbladder surgery; Peripheral arterial stent graft; Coronary angioplasty; and angiagram (2018).    Family History:  family history includes Heart attack in her mother.     Social History:  Social History     Tobacco Use    Smoking status: Former Smoker     Quit date: 2005     Years since quittin.5    Smokeless tobacco: Never Used   Substance Use Topics    Alcohol use: No    Drug use: No       Review of Systems   Constitutional: Negative for chills, fever and malaise/fatigue.   Respiratory: Positive for shortness of breath.    Cardiovascular: Negative for chest pain.   Gastrointestinal: Negative for constipation, diarrhea, nausea and vomiting.   Skin: Negative for rash.   Neurological: Negative for weakness.        Medications:  Outpatient Encounter Medications as  of 6/23/2020   Medication Sig Dispense Refill    aspirin (ECOTRIN) 81 MG EC tablet Take 1 tablet (81 mg total) by mouth once daily.  11    atorvastatin (LIPITOR) 80 MG tablet Take 1 tablet (80 mg total) by mouth every evening. 90 tablet 3    cilostazol (PLETAL) 100 MG Tab Take 100 mg by mouth 2 (two) times daily.  3    cyanocobalamin (VITAMIN B-12) 1000 MCG tablet Take 1 tablet (1,000 mcg total) by mouth once daily. 90 tablet 4    ferrous sulfate (FEOSOL) 325 mg (65 mg iron) Tab tablet TAKE 1 TABLET BY MOUTH DAILY WITH BREAKFAST 90 tablet 1    fish oil-omega-3 fatty acids 300-1,000 mg capsule Take 500 mg by mouth 2 (two) times daily.      gabapentin (NEURONTIN) 300 MG capsule Take 1 capsule (300 mg total) by mouth every evening. 90 capsule 1    hydrALAZINE (APRESOLINE) 50 MG tablet TAKE 1 TABLET (50 MG TOTAL) BY MOUTH EVERY 8 (EIGHT) HOURS. 90 tablet 2    latanoprost 0.005 % ophthalmic solution INT 1 GTT INTO OU HS  4    losartan (COZAAR) 50 MG tablet Take 50 mg by mouth once daily.      metoprolol tartrate (LOPRESSOR) 25 MG tablet Take 1 tablet (25 mg total) by mouth 2 (two) times daily. 90 tablet 3    pioglitazone (ACTOS) 30 MG tablet Take 1 tablet (30 mg total) by mouth once daily. 90 tablet 1    vitamin D (VITAMIN D3) 1000 units Tab Take 1,000 Units by mouth once daily.      allopurinol (ZYLOPRIM) 300 MG tablet Take by mouth.      blood sugar diagnostic (TRUE METRIX GLUCOSE TEST STRIP) Strp 1 strip by Misc.(Non-Drug; Combo Route) route 2 (two) times daily. 200 each 1    blood-glucose meter (TRUE METRIX GLUCOSE METER) Misc Test blood sugars twice daily 1 each 0    clopidogreL (PLAVIX) 75 mg tablet Take 75 mg by mouth once daily.      ferrous sulfate (FEOSOL) 325 mg (65 mg iron) Tab tablet Take 325 mg by mouth daily with breakfast.      gabapentin (NEURONTIN) 300 MG capsule Take 300 mg by mouth every evening.      lancets (BD ULTRA FINE LANCETS) 33 gauge Misc 1 lancet by Misc.(Non-Drug; Combo  "Route) route 2 (two) times daily. 200 each 1    linaGLIPtin (TRADJENTA) 5 mg Tab tablet Take 1 tablet (5 mg total) by mouth once daily. 90 tablet 1    losartan (COZAAR) 25 MG tablet Take 1 tablet (25 mg total) by mouth once daily. (Patient not taking: Reported on 6/23/2020) 90 tablet 1    [DISCONTINUED] linaGLIPtin (TRADJENTA) 5 mg Tab tablet Take 1 tablet (5 mg total) by mouth once daily. (Patient not taking: Reported on 6/23/2020) 90 tablet 1     No facility-administered encounter medications on file as of 6/23/2020.        Allergies:  Review of patient's allergies indicates:   Allergen Reactions    Codeine Other (See Comments)     Jittery, lightheadedness, nausea  Other reaction(s): NAUSEA       Health Maintenance:  Immunization History   Administered Date(s) Administered    Influenza 10/17/2014    Influenza - High Dose - PF (65 years and older) 11/11/2013, 10/05/2015, 01/13/2017, 12/04/2017, 10/16/2018, 09/24/2019    Influenza - Quadrivalent - PF (6 months and older) 10/17/2014    Pneumococcal Conjugate - 13 Valent 01/13/2017    Pneumococcal Polysaccharide - 23 Valent 05/25/2018      Health Maintenance   Topic Date Due    TETANUS VACCINE  02/10/1953    Hemoglobin A1c  12/15/2020    Eye Exam  02/12/2021    Lipid Panel  02/13/2021    Foot Exam  02/19/2021    DEXA SCAN  02/19/2021    Aspirin/Antiplatelet Therapy  06/23/2021    Pneumococcal Vaccine (65+ High/Highest Risk)  Completed        Physical Exam      Vital Signs  Temp: 97.9 °F (36.6 °C)  Temp src: Oral  Pulse: 62  SpO2: 96 %  BP: (!) 112/58  BP Location: Left arm  Patient Position: Sitting  Pain Score:   6  Pain Loc: Finger  Height and Weight  Height: 5' 2" (157.5 cm)  Weight: 84.2 kg (185 lb 10 oz)  BSA (Calculated - sq m): 1.92 sq meters  BMI (Calculated): 33.9  Weight in (lb) to have BMI = 25: 136.4]    Physical Exam  Vitals signs reviewed.   Constitutional:       Appearance: She is well-developed.   HENT:      Head: Normocephalic and " atraumatic.      Right Ear: External ear normal.      Left Ear: External ear normal.   Eyes:      Conjunctiva/sclera: Conjunctivae normal.      Pupils: Pupils are equal, round, and reactive to light.   Neck:      Vascular: No carotid bruit.   Cardiovascular:      Rate and Rhythm: Normal rate and regular rhythm.      Heart sounds: Normal heart sounds. No murmur.   Pulmonary:      Effort: Pulmonary effort is normal.      Breath sounds: Normal breath sounds. No wheezing or rales.   Abdominal:      General: Bowel sounds are normal. There is no distension.      Palpations: Abdomen is soft.      Tenderness: There is no abdominal tenderness.   Musculoskeletal:         General: Swelling (Slight bony enlargment of right 3rd mcp) and tenderness (right 3rd mcp joint) present.          Laboratory:  CBC:  Recent Labs   Lab 11/14/19  1102 12/19/19  0940 02/14/20  1106   WBC 5.74 5.13 6.58   RBC 3.89 L 3.63 L 3.60 L   Hemoglobin 12.0 11.2 L 11.3 L   Hematocrit 38.0 36.0 L 36.7 L   Platelets 209 176 180   Mean Corpuscular Volume 98 99 H 102 H   Mean Corpuscular Hemoglobin 30.8 30.9 31.4 H   Mean Corpuscular Hemoglobin Conc 31.6 L 31.1 L 30.8 L     CMP:  Recent Labs   Lab 11/14/19  1102 12/19/19  0940 02/14/20  1106   Glucose 102 106 178 H   Calcium 10.5 9.8 9.7   Albumin 4.6 4.2 4.3   Total Protein 8.2 7.4 7.6   Sodium 146 H 143 141   Potassium 5.1 4.9 4.5   CO2 26 24 22 L   Chloride 110 109 108   BUN, Bld 39 H 40 H 24 H   Alkaline Phosphatase 82 70 81   ALT 9 L 10 17   AST 24 23 32   Total Bilirubin 0.5 0.4 0.4     URINALYSIS:  Recent Labs   Lab 08/16/19  1053  02/14/20  1133   Color, UA Yellow   < > Yellow   Specific Gravity, UA 1.020   < > 1.020   pH, UA 6.0   < > 6.0   Protein, UA 1+ A   < > Negative   Bacteria Occasional  --   --    Nitrite, UA Negative   < > Negative   Leukocytes, UA Negative   < > Negative   Urobilinogen, UA Negative   < > Negative   Hyaline Casts, UA 0  --   --     < > = values in this interval not  displayed.      LIPIDS:  Recent Labs   Lab 12/22/17  1142  08/24/18  0521  08/16/19  0944 11/14/19  1102 02/13/20  1109   TSH 1.700  --  0.732  --   --   --   --    HDL 48   < >  --    < > 46 50 54   Cholesterol 154   < >  --    < > 121 211 H 123   Triglycerides 116   < >  --    < > 87 115 79   LDL Cholesterol 82.8   < >  --    < > 57.6 L 138.0 53.2 L   Hdl/Cholesterol Ratio 31.2   < >  --    < > 38.0 23.7 43.9   Non-HDL Cholesterol 106  --   --    < > 75 161 69   Non HDL Chol. (LDL+VLDL)  --    < >  --   --   --   --   --    Total Cholesterol/HDL Ratio 3.2  --   --    < > 2.6 4.2 2.3    < > = values in this interval not displayed.     TSH:  Recent Labs   Lab 12/22/17  1142 08/24/18  0521   TSH 1.700 0.732     A1C:  Recent Labs   Lab 12/22/17  1141 08/24/18  0521 08/30/18  1721 08/16/19  0944 11/14/19  1102 02/13/20  1109 06/15/20  0904   Hemoglobin A1C 6.9 H 7.9 H 7.8 H 5.7 H 5.4 5.7 H 5.4       Assessment/Plan     Petra Zazueta is a 85 y.o.female with:    1. Pain of left hand  - X-Ray Hand 3 View Left; Future  History and exam seem consistent with osteoarthritis.  Will send for xray to further evaluate.  If no other abnormalities, will send to hand surgeon for steroid injection.  Ok to continue tylenol but avoid NSAIDs 2/2 heart and kidney disease.      2. Type 2 diabetes mellitus with diabetic peripheral angiopathy without gangrene, without long-term current use of insulin  - linaGLIPtin (TRADJENTA) 5 mg Tab tablet; Take 1 tablet (5 mg total) by mouth once daily.  Dispense: 90 tablet; Refill: 1  Pt tried getting tradjenta filled as instructed but pharmacy told her they did not receive the prescription.  Sent in new prescription.       F/U with Dr. Greer as scheduled.    Drew Lerner MD  [facility]

## 2020-07-16 ENCOUNTER — PATIENT OUTREACH (OUTPATIENT)
Dept: ADMINISTRATIVE | Facility: OTHER | Age: 85
End: 2020-07-16

## 2020-07-16 NOTE — PROGRESS NOTES
Requested updates within Care Everywhere.  Patient's chart was reviewed for overdue ROBSON topics.  Immunizations reconciled.    Orders placed:  Tasked appts:  Labs Linked:

## 2020-07-20 ENCOUNTER — OFFICE VISIT (OUTPATIENT)
Dept: ORTHOPEDICS | Facility: CLINIC | Age: 85
End: 2020-07-20
Payer: MEDICARE

## 2020-07-20 VITALS — BODY MASS INDEX: 34.04 KG/M2 | HEIGHT: 62 IN | WEIGHT: 185 LBS

## 2020-07-20 DIAGNOSIS — M79.642 PAIN OF LEFT HAND: ICD-10-CM

## 2020-07-20 DIAGNOSIS — M65.332 TRIGGER MIDDLE FINGER OF LEFT HAND: ICD-10-CM

## 2020-07-20 PROCEDURE — 1125F PR PAIN SEVERITY QUANTIFIED, PAIN PRESENT: ICD-10-PCS | Mod: S$GLB,,, | Performed by: ORTHOPAEDIC SURGERY

## 2020-07-20 PROCEDURE — 1159F PR MEDICATION LIST DOCUMENTED IN MEDICAL RECORD: ICD-10-PCS | Mod: S$GLB,,, | Performed by: ORTHOPAEDIC SURGERY

## 2020-07-20 PROCEDURE — 99203 OFFICE O/P NEW LOW 30 MIN: CPT | Mod: 25,S$GLB,, | Performed by: ORTHOPAEDIC SURGERY

## 2020-07-20 PROCEDURE — 99999 PR PBB SHADOW E&M-EST. PATIENT-LVL IV: CPT | Mod: PBBFAC,,, | Performed by: ORTHOPAEDIC SURGERY

## 2020-07-20 PROCEDURE — 1101F PR PT FALLS ASSESS DOC 0-1 FALLS W/OUT INJ PAST YR: ICD-10-PCS | Mod: CPTII,S$GLB,, | Performed by: ORTHOPAEDIC SURGERY

## 2020-07-20 PROCEDURE — 1125F AMNT PAIN NOTED PAIN PRSNT: CPT | Mod: S$GLB,,, | Performed by: ORTHOPAEDIC SURGERY

## 2020-07-20 PROCEDURE — 20550 NJX 1 TENDON SHEATH/LIGAMENT: CPT | Mod: F2,S$GLB,, | Performed by: ORTHOPAEDIC SURGERY

## 2020-07-20 PROCEDURE — 1101F PT FALLS ASSESS-DOCD LE1/YR: CPT | Mod: CPTII,S$GLB,, | Performed by: ORTHOPAEDIC SURGERY

## 2020-07-20 PROCEDURE — 20550 PR INJECT TENDON SHEATH/LIGAMENT: ICD-10-PCS | Mod: F2,S$GLB,, | Performed by: ORTHOPAEDIC SURGERY

## 2020-07-20 PROCEDURE — 99203 PR OFFICE/OUTPT VISIT, NEW, LEVL III, 30-44 MIN: ICD-10-PCS | Mod: 25,S$GLB,, | Performed by: ORTHOPAEDIC SURGERY

## 2020-07-20 PROCEDURE — 1159F MED LIST DOCD IN RCRD: CPT | Mod: S$GLB,,, | Performed by: ORTHOPAEDIC SURGERY

## 2020-07-20 PROCEDURE — 99999 PR PBB SHADOW E&M-EST. PATIENT-LVL IV: ICD-10-PCS | Mod: PBBFAC,,, | Performed by: ORTHOPAEDIC SURGERY

## 2020-07-20 RX ORDER — TRIAMCINOLONE ACETONIDE 40 MG/ML
20 INJECTION, SUSPENSION INTRA-ARTICULAR; INTRAMUSCULAR
Status: COMPLETED | OUTPATIENT
Start: 2020-07-20 | End: 2020-07-20

## 2020-07-20 RX ORDER — GLIMEPIRIDE 2 MG/1
TABLET ORAL
COMMUNITY
Start: 2020-06-21 | End: 2020-09-15

## 2020-07-20 RX ADMIN — TRIAMCINOLONE ACETONIDE 20 MG: 40 INJECTION, SUSPENSION INTRA-ARTICULAR; INTRAMUSCULAR at 02:07

## 2020-07-20 NOTE — PROGRESS NOTES
Subjective:      Patient ID: Petra Zazueta is a 85 y.o. female.    Chief Complaint: Pain of the Left Hand      HPI  Petra Zazueta is a  85 y.o. female presenting today for painful locking of the left middle finger.  There was not a history of trauma.  Onset of symptoms began several months ago  Symptoms worse with use  No numbness or tingling is reported.      Review of patient's allergies indicates:   Allergen Reactions    Codeine Other (See Comments)     Jittery, lightheadedness, nausea  Other reaction(s): NAUSEA         Current Outpatient Medications   Medication Sig Dispense Refill    allopurinol (ZYLOPRIM) 300 MG tablet Take by mouth.      aspirin (ECOTRIN) 81 MG EC tablet Take 1 tablet (81 mg total) by mouth once daily.  11    atorvastatin (LIPITOR) 80 MG tablet Take 1 tablet (80 mg total) by mouth every evening. 90 tablet 3    cilostazol (PLETAL) 100 MG Tab Take 100 mg by mouth 2 (two) times daily.  3    clopidogreL (PLAVIX) 75 mg tablet Take 75 mg by mouth once daily.      cyanocobalamin (VITAMIN B-12) 1000 MCG tablet Take 1 tablet (1,000 mcg total) by mouth once daily. 90 tablet 4    ferrous sulfate (FEOSOL) 325 mg (65 mg iron) Tab tablet TAKE 1 TABLET BY MOUTH DAILY WITH BREAKFAST 90 tablet 1    fish oil-omega-3 fatty acids 300-1,000 mg capsule Take 500 mg by mouth 2 (two) times daily.      gabapentin (NEURONTIN) 300 MG capsule Take 1 capsule (300 mg total) by mouth every evening. 90 capsule 1    glimepiride (AMARYL) 2 MG tablet       hydrALAZINE (APRESOLINE) 50 MG tablet TAKE 1 TABLET (50 MG TOTAL) BY MOUTH EVERY 8 (EIGHT) HOURS. 90 tablet 2    latanoprost 0.005 % ophthalmic solution INT 1 GTT INTO OU HS  4    linaGLIPtin (TRADJENTA) 5 mg Tab tablet Take 1 tablet (5 mg total) by mouth once daily. 90 tablet 1    losartan (COZAAR) 50 MG tablet Take 50 mg by mouth once daily.      metoprolol tartrate (LOPRESSOR) 25 MG tablet Take 1 tablet (25 mg total) by mouth 2 (two) times daily. 90 tablet  "3    pioglitazone (ACTOS) 30 MG tablet Take 1 tablet (30 mg total) by mouth once daily. 90 tablet 1    vitamin D (VITAMIN D3) 1000 units Tab Take 1,000 Units by mouth once daily.      blood sugar diagnostic (TRUE METRIX GLUCOSE TEST STRIP) Strp 1 strip by Misc.(Non-Drug; Combo Route) route 2 (two) times daily. (Patient not taking: Reported on 7/20/2020) 200 each 1    blood-glucose meter (TRUE METRIX GLUCOSE METER) Misc Test blood sugars twice daily (Patient not taking: Reported on 7/20/2020) 1 each 0    ferrous sulfate (FEOSOL) 325 mg (65 mg iron) Tab tablet Take 325 mg by mouth daily with breakfast.      gabapentin (NEURONTIN) 300 MG capsule Take 300 mg by mouth every evening.      lancets (BD ULTRA FINE LANCETS) 33 gauge Misc 1 lancet by Misc.(Non-Drug; Combo Route) route 2 (two) times daily. (Patient not taking: Reported on 7/20/2020) 200 each 1    losartan (COZAAR) 25 MG tablet Take 1 tablet (25 mg total) by mouth once daily. (Patient not taking: Reported on 7/20/2020) 90 tablet 1     No current facility-administered medications for this visit.        Past Medical History:   Diagnosis Date    Anemia     Arthritis     Coronary artery disease     Diabetes mellitus     Diabetes mellitus type II     Dyslipidemia     Hypertension     Insomnia     PAD (peripheral artery disease)        Past Surgical History:   Procedure Laterality Date    angiagram  08/31/2018    CORONARY ANGIOPLASTY      RCA 4/2011 EJ    GALLBLADDER SURGERY      HYSTERECTOMY      PERIPHERAL ARTERIAL STENT GRAFT      Left lower extremity RCA        Review of Systems:  ROS    OBJECTIVE:     PHYSICAL EXAM:  Height: 5' 2" (157.5 cm) Weight: 83.9 kg (185 lb)  Vitals:    07/20/20 1343   Weight: 83.9 kg (185 lb)   Height: 5' 2" (1.575 m)   PainSc:   9     Well developed, well nourished female in no acute distress  Alert and oriented x 3  HEENT- Normal exam  Lungs- Clear to auscultation  Heart- Regular rate and rhythm  Abdomen- Soft " nontender  Extremity exam- examination left hand demonstrates tenderness along the flexor tendon sheath left middle finger with mild swelling  Limited flexion of the finger and triggering at the A1 pulley with tenderness over the A1 pulley is noted   strength decreased sensation intact Tinel sign negative at the wrist    RADIOGRAPHS:  AP and lateral x-rays left middle finger demonstrate some mild degenerative changes  Comments: I have personally reviewed the imaging and I agree with the above radiologist's report.    ASSESSMENT/PLAN:     IMPRESSION:  Triggering left middle finger    PLAN:  I explained the nature of problem to the patient recommended injection today  After pause for time-out identified the left middle finger injected flexor tendon sheath combination Kenalog 20 mg 0.5 cc xylocaine sterile technique  Tolerated the procedure well without complication  Light activities no heavy lifting  Follow-up 4-6 weeks or as needed       - We talked at length about the anatomy and pathophysiology of   Encounter Diagnoses   Name Primary?    Pain of left hand     Trigger middle finger of left hand            Disclaimer: This note has been generated using voice-recognition software. There may be typographical errors that have been missed during proof-reading.

## 2020-07-20 NOTE — LETTER
July 20, 2020      Drew Lerner MD  [address] [facility]  [address]  [place] [address]           [facility]  [address]  [facility] [address]  Phone: [phone]          Patient: Petra Zazueta   MR Number: [id number]   YOB: 1935   Date of Visit: 7/20/2020       Dear Dr. Drew Lerner:    Thank you for referring Petra Zazueta to me for evaluation. Attached you will find relevant portions of my assessment and plan of care.    If you have questions, please do not hesitate to call me. I look forward to following Petra Zazueta along with you.    Sincerely,    Fabian Ibarra Jr., MD    Enclosure  CC:  No Recipients    If you would like to receive this communication electronically, please contact [email or web address] or [phone] to request more information on [facility] access.    For providers and/or their staff who would like to refer a patient to [facility], please contact us through our one-stop-shop provider referral line, [facility] , at [phone].    If you feel you have received this communication in error or would no longer like to receive these types of communications, please e-mail [email or web address]

## 2020-08-25 RX ORDER — FERROUS SULFATE 325(65) MG
325 TABLET ORAL DAILY
Qty: 90 TABLET | Refills: 1 | Status: SHIPPED | OUTPATIENT
Start: 2020-08-25 | End: 2020-08-31

## 2020-09-01 ENCOUNTER — PATIENT OUTREACH (OUTPATIENT)
Dept: ADMINISTRATIVE | Facility: OTHER | Age: 85
End: 2020-09-01

## 2020-09-01 NOTE — PROGRESS NOTES
Health Maintenance Due   Topic Date Due    TETANUS VACCINE  02/10/1953    Shingles Vaccine (2 of 2) 04/16/2020    Influenza Vaccine (1) 09/01/2020     Updates were requested from care everywhere.  Chart was reviewed for overdue Proactive [facility] Encounters (ROBSON) topics (CRS, Breast Cancer Screening, Eye exam)  Health Maintenance has been updated.  LINKS immunization registry triggered.  Immunizations were reconciled.

## 2020-09-16 NOTE — PROGRESS NOTES
FAMILY MEDICINE    Patient Active Problem List   Diagnosis    Essential hypertension    Hyperlipidemia    PAD (peripheral artery disease)    Coronary artery disease    Type 2 diabetes mellitus with diabetic polyneuropathy, without long-term current use of insulin    History of gout    CKD (chronic kidney disease) stage 4, GFR 15-29 ml/min    Hyperparathyroidism    Chronic diastolic heart failure    Class 1 obesity due to excess calories with serious comorbidity and body mass index (BMI) of 33.0 to 33.9 in adult    Hyperuricemia    Type 2 diabetes mellitus with stage 4 chronic kidney disease, without long-term current use of insulin    Type 2 diabetes mellitus with diabetic peripheral angiopathy without gangrene, without long-term current use of insulin    Trigger middle finger of left hand       CC:   Chief Complaint   Patient presents with    Follow-up    Diabetes       SUBJECTIVE:  Petra Zazueta   is a 85 y.o. female  - with obesity, CAD (with stents in place), chronic kidney disease stage 4, secondary hyperparathyroidism, type 2 diabetes, pEFHF, PAD with history of stent and chronic left distal leg swelling, history of gout and hypertension presents for follow-up diabetes and labs    Nephrology Dr. Morales  Cardiology Dr. Clif COBB    Reports that she would like a new Cardiologist so that she does not have to go to [facility]    1. Diabetes Type 2    Current treatment regimen:   linaGLIPtin (TRADJENTA) 5 mg Tab tablet, Take 1 tablet (5 mg total) by mouth once daily., Disp: 90 tablet, Rfl: 1  pioglitazone (ACTOS) 30 MG tablet, Take 1 tablet (30 mg total) by mouth once daily., Disp: 90 tablet, Rfl: 1    Prior medication:   Glimepiride - stopped due to hypoglycemia    Side effects from treatment: denies   Complications of diabetes: neuropathy    Glucometer: yes  Glucose monitoring: daily  Reports  mg/dL    Lab Results              HGBA1C                   5.8 (H)             09/10/2020                        Low dose statin: atorvastatin 80 mg daily  Last eye exam: 2/12/2020 Dr. García  Last foot exam: 1/20/2020    Vaccines:   Influenza: 9/24/19  Pneumovax: 23 5/25/18  Prevnar 13: 1/13/2017    2. Hypertension    Current medication treatment:   hydrALAZINE (APRESOLINE) 50 MG tablet, TAKE 1 TABLET (50 MG TOTAL) BY MOUTH EVERY 8 (EIGHT) HOURS., Disp: 270 tablet, Rfl: 1  metoprolol tartrate (LOPRESSOR) 25 MG tablet, Take 1 tablet (25 mg total) by mouth 2 (two) times daily., Disp: 90 tablet, Rfl: 3  losartan (COZAAR) 50 MG tablet, Take 50 mg by mouth once daily., Disp: , Rfl:     Medication side effects: denies      Wt Readings from Last 5 Encounters:   09/17/20 81.5 kg (179 lb 11.2 oz)   07/20/20 83.9 kg (185 lb)   06/23/20 84.2 kg (185 lb 10 oz)   06/17/20 82.1 kg (181 lb)   02/19/20 83.1 kg (183 lb 4.8 oz)       ROS: Review of Systems   Constitutional: Negative.    HENT: Negative.    Eyes: Negative.    Respiratory: Negative.    Cardiovascular: Negative.  Leg swelling: chronic left leg and attending PT.   Gastrointestinal: Negative.    Endocrine: Negative.    Genitourinary: Negative.    Musculoskeletal: Positive for arthralgias.        Otherwise negative     Skin: Negative.    Allergic/Immunologic: Negative.    Neurological:        +neuropathy well controlled and only taking Gabapentin at bedtime. Otherwise negative   Hematological: Negative.    Psychiatric/Behavioral: Negative.        Past Medical History:   Diagnosis Date    Anemia     Arthritis     Coronary artery disease     Diabetes mellitus     Diabetes mellitus type II     Dyslipidemia     Hypertension     Insomnia     PAD (peripheral artery disease)        Past Surgical History:   Procedure Laterality Date    angiagram  08/31/2018    CORONARY ANGIOPLASTY      RCA 4/2011 EJ    GALLBLADDER SURGERY      HYSTERECTOMY      PERIPHERAL ARTERIAL STENT GRAFT      Left lower extremity RCA        Family History   Problem Relation Age of Onset     Heart attack Mother        Social History     Tobacco Use    Smoking status: Former Smoker     Quit date: 2005     Years since quittin.7    Smokeless tobacco: Never Used   Substance Use Topics    Alcohol use: No    Drug use: No       Social History     Social History Narrative    Lives alone in [place]. Has 2 sons. Quit drinking years ago. She cares for her houseplants.        ALLERGIES:   Review of patient's allergies indicates:   Allergen Reactions    Codeine Other (See Comments)     Jittery, lightheadedness, nausea  Other reaction(s): NAUSEA       MEDS:   Current Outpatient Medications:     aspirin (ECOTRIN) 81 MG EC tablet, Take 1 tablet (81 mg total) by mouth once daily., Disp: , Rfl: 11    atorvastatin (LIPITOR) 80 MG tablet, Take 1 tablet (80 mg total) by mouth every evening., Disp: 90 tablet, Rfl: 3    blood sugar diagnostic (TRUE METRIX GLUCOSE TEST STRIP) Strp, 1 strip by Misc.(Non-Drug; Combo Route) route 2 (two) times daily., Disp: 200 each, Rfl: 1    blood-glucose meter (TRUE METRIX GLUCOSE METER) Misc, Test blood sugars twice daily, Disp: 1 each, Rfl: 0    cilostazol (PLETAL) 100 MG Tab, Take 100 mg by mouth 2 (two) times daily., Disp: , Rfl: 3    clopidogreL (PLAVIX) 75 mg tablet, Take 75 mg by mouth once daily., Disp: , Rfl:     cyanocobalamin (VITAMIN B-12) 1000 MCG tablet, Take 1 tablet (1,000 mcg total) by mouth once daily., Disp: 90 tablet, Rfl: 4    fish oil-omega-3 fatty acids 300-1,000 mg capsule, Take 500 mg by mouth 2 (two) times daily., Disp: , Rfl:     gabapentin (NEURONTIN) 300 MG capsule, TAKE 1 CAPSULE(300 MG) BY MOUTH EVERY EVENING, Disp: 90 capsule, Rfl: 1    hydrALAZINE (APRESOLINE) 50 MG tablet, TAKE 1 TABLET (50 MG TOTAL) BY MOUTH EVERY 8 (EIGHT) HOURS., Disp: 270 tablet, Rfl: 1    lancets (BD ULTRA FINE LANCETS) 33 gauge Misc, 1 lancet by Misc.(Non-Drug; Combo Route) route 2 (two) times daily., Disp: 200 each, Rfl: 1    latanoprost 0.005 % ophthalmic  "solution, INT 1 GTT INTO OU HS, Disp: , Rfl: 4    linaGLIPtin (TRADJENTA) 5 mg Tab tablet, Take 1 tablet (5 mg total) by mouth once daily., Disp: 90 tablet, Rfl: 1    losartan (COZAAR) 50 MG tablet, Take 1 tablet (50 mg total) by mouth once daily., Disp: 90 tablet, Rfl: 1    metoprolol tartrate (LOPRESSOR) 25 MG tablet, Take 1 tablet (25 mg total) by mouth 2 (two) times daily., Disp: 90 tablet, Rfl: 3    pioglitazone (ACTOS) 15 MG tablet, Take 1 tablet (15 mg total) by mouth once daily., Disp: 90 tablet, Rfl: 0    vitamin D (VITAMIN D3) 1000 units Tab, Take 1,000 Units by mouth once daily., Disp: , Rfl:     OBJECTIVE:   Vitals:    09/17/20 0908   BP: 138/82   BP Location: Left arm   Patient Position: Sitting   BP Method: X-Large (Manual)   Pulse: 70   Resp: 18   Temp: 97.2 °F (36.2 °C)   TempSrc: Oral   SpO2: 98%   Weight: 81.5 kg (179 lb 11.2 oz)   Height: 5' 2" (1.575 m)     Body mass index is 32.87 kg/m².    Physical Exam  Constitutional:       General: She is not in acute distress.  Neck:      Musculoskeletal: Neck supple.   Cardiovascular:      Rate and Rhythm: Normal rate and regular rhythm.      Pulses: Normal pulses.      Heart sounds: Murmur present. Systolic (LUSB) murmur present with a grade of 3/6. No friction rub. No gallop.    Pulmonary:      Effort: Pulmonary effort is normal.      Breath sounds: Normal breath sounds. No wheezing, rhonchi or rales.   Musculoskeletal:      Right lower leg: No edema.      Left lower leg: Edema (chronic 1+) present.   Skin:     General: Skin is warm.   Neurological:      Mental Status: She is alert.           PERTINENT RESULTS:   No visits with results within 1 Week(s) from this visit.   Latest known visit with results is:   Lab Visit on 09/10/2020   Component Date Value Ref Range Status    Hemoglobin A1C 09/10/2020 5.8* 4.0 - 5.6 % Final    Comment: ADA Screening Guidelines:  5.7-6.4%  Consistent with prediabetes  >or=6.5%  Consistent with diabetes  High levels of " fetal hemoglobin interfere with the HbA1C  assay. Heterozygous hemoglobin variants (HbS, HgC, etc)do  not significantly interfere with this assay.   However, presence of multiple variants may affect accuracy.      Estimated Avg Glucose 09/10/2020 120  68 - 131 mg/dL Final    Sodium 09/10/2020 147* 136 - 145 mmol/L Final    Potassium 09/10/2020 4.0  3.5 - 5.1 mmol/L Final    Chloride 09/10/2020 113* 95 - 110 mmol/L Final    CO2 09/10/2020 25  23 - 29 mmol/L Final    Glucose 09/10/2020 137* 70 - 110 mg/dL Final    BUN, Bld 09/10/2020 30* 7 - 17 mg/dL Final    Creatinine 09/10/2020 1.40  0.50 - 1.40 mg/dL Final    Calcium 09/10/2020 9.9  8.7 - 10.5 mg/dL Final    Anion Gap 09/10/2020 9  8 - 16 mmol/L Final    eGFR if  09/10/2020 39.8* >60 mL/min/1.73 m^2 Final    eGFR if non African American 09/10/2020 34.5* >60 mL/min/1.73 m^2 Final    Comment: Calculation used to obtain the estimated glomerular filtration  rate (eGFR) is the CKD-EPI equation.        BMP  Lab Results   Component Value Date     (H) 09/10/2020    K 4.0 09/10/2020     (H) 09/10/2020    CO2 25 09/10/2020    BUN 30 (H) 09/10/2020    CREATININE 1.40 09/10/2020    CALCIUM 9.9 09/10/2020    ANIONGAP 9 09/10/2020    ESTGFRAFRICA 39.8 (A) 09/10/2020    EGFRNONAA 34.5 (A) 09/10/2020     Lab Results   Component Value Date    ALT 17 02/14/2020    AST 32 02/14/2020    ALKPHOS 81 02/14/2020    BILITOT 0.4 02/14/2020     Lab Results   Component Value Date    URICACID 8.0 (H) 12/19/2019       ASSESSMENT/PLAN:  Problem List Items Addressed This Visit        Cardiac/Vascular    Chronic diastolic heart failure    Current Assessment & Plan     - no signs of decompensation  - on Actos and tolerating well without exacerbation and working on weaning off of medication  - referral to Cardiology Dr. Johns          Relevant Orders    Ambulatory referral/consult to Cardiology    Coronary artery disease    Overview     - 4/2011 RAUL RCA  -  followed by Cardiology Dr. Clif VERDUGO  - goal LDL <70  - BP goal < 130/80  - DAPT  - A1C goal <7%         Relevant Orders    Ambulatory referral/consult to Cardiology    Essential hypertension    Current Assessment & Plan     - well controlled  - continue current medications         Relevant Medications    losartan (COZAAR) 50 MG tablet    Hyperlipidemia - Primary    Current Assessment & Plan     Lab Results   Component Value Date    LDLCALC 53.2 (L) 02/13/2020     - well controlled  - continue current medication         Relevant Orders    Lipid Panel       Renal/    CKD (chronic kidney disease) stage 4, GFR 15-29 ml/min    Overview     - followed by Nephrology         Current Assessment & Plan     Lab Results   Component Value Date    CREATININE 1.40 09/10/2020     - stable  - has follow with Dr. Morales in 1-2 weeks            Endocrine    Type 2 diabetes mellitus with diabetic polyneuropathy, without long-term current use of insulin    Relevant Medications    linaGLIPtin (TRADJENTA) 5 mg Tab tablet    pioglitazone (ACTOS) 15 MG tablet    Other Relevant Orders    Hemoglobin A1C    Lipid Panel    Type 2 diabetes mellitus with diabetic peripheral angiopathy without gangrene, without long-term current use of insulin    Relevant Medications    linaGLIPtin (TRADJENTA) 5 mg Tab tablet    pioglitazone (ACTOS) 15 MG tablet    Other Relevant Orders    Hemoglobin A1C    Lipid Panel    Type 2 diabetes mellitus with stage 4 chronic kidney disease, without long-term current use of insulin    Current Assessment & Plan     Lab Results   Component Value Date    LABA1C 7.2 (H) 05/25/2018    HGBA1C 5.8 (H) 09/10/2020     - continue Tradjenta 5 mg daily  - decreased Pioglitazone from 30 mg to 15 mg daily         Relevant Medications    linaGLIPtin (TRADJENTA) 5 mg Tab tablet    pioglitazone (ACTOS) 15 MG tablet    Other Relevant Orders    Hemoglobin A1C    Lipid Panel       Orthopedic    History of gout    Current Assessment &  Plan     - no issues and reports that no longer taking allopurinol               ORDERS:   Orders Placed This Encounter    Hemoglobin A1C    Lipid Panel    Ambulatory referral/consult to Cardiology    linaGLIPtin (TRADJENTA) 5 mg Tab tablet    losartan (COZAAR) 50 MG tablet    pioglitazone (ACTOS) 15 MG tablet     Vaccines recommended: Td and #2 shingles (both previously sent to pharmacy) and flu    Follow-up in 4 months with labs or sooner if any concerns    Dr. Polly Greer D.O.   Family Medicine

## 2020-09-17 ENCOUNTER — OFFICE VISIT (OUTPATIENT)
Dept: FAMILY MEDICINE | Facility: CLINIC | Age: 85
End: 2020-09-17
Payer: MEDICARE

## 2020-09-17 ENCOUNTER — TELEPHONE (OUTPATIENT)
Dept: FAMILY MEDICINE | Facility: CLINIC | Age: 85
End: 2020-09-17

## 2020-09-17 VITALS
SYSTOLIC BLOOD PRESSURE: 138 MMHG | RESPIRATION RATE: 18 BRPM | BODY MASS INDEX: 33.07 KG/M2 | TEMPERATURE: 97 F | HEART RATE: 70 BPM | OXYGEN SATURATION: 98 % | DIASTOLIC BLOOD PRESSURE: 82 MMHG | HEIGHT: 62 IN | WEIGHT: 179.69 LBS

## 2020-09-17 DIAGNOSIS — E78.00 PURE HYPERCHOLESTEROLEMIA: Primary | ICD-10-CM

## 2020-09-17 DIAGNOSIS — E11.22 TYPE 2 DIABETES MELLITUS WITH STAGE 4 CHRONIC KIDNEY DISEASE, WITHOUT LONG-TERM CURRENT USE OF INSULIN: ICD-10-CM

## 2020-09-17 DIAGNOSIS — I25.10 CORONARY ARTERY DISEASE INVOLVING NATIVE CORONARY ARTERY OF NATIVE HEART WITHOUT ANGINA PECTORIS: ICD-10-CM

## 2020-09-17 DIAGNOSIS — N18.4 TYPE 2 DIABETES MELLITUS WITH STAGE 4 CHRONIC KIDNEY DISEASE, WITHOUT LONG-TERM CURRENT USE OF INSULIN: ICD-10-CM

## 2020-09-17 DIAGNOSIS — N18.4 CKD (CHRONIC KIDNEY DISEASE) STAGE 4, GFR 15-29 ML/MIN: ICD-10-CM

## 2020-09-17 DIAGNOSIS — I50.32 CHRONIC DIASTOLIC HEART FAILURE: ICD-10-CM

## 2020-09-17 DIAGNOSIS — Z87.39 HISTORY OF GOUT: ICD-10-CM

## 2020-09-17 DIAGNOSIS — E11.42 TYPE 2 DIABETES MELLITUS WITH DIABETIC POLYNEUROPATHY, WITHOUT LONG-TERM CURRENT USE OF INSULIN: ICD-10-CM

## 2020-09-17 DIAGNOSIS — E11.51 TYPE 2 DIABETES MELLITUS WITH DIABETIC PERIPHERAL ANGIOPATHY WITHOUT GANGRENE, WITHOUT LONG-TERM CURRENT USE OF INSULIN: ICD-10-CM

## 2020-09-17 DIAGNOSIS — I10 ESSENTIAL HYPERTENSION: ICD-10-CM

## 2020-09-17 PROCEDURE — 1101F PR PT FALLS ASSESS DOC 0-1 FALLS W/OUT INJ PAST YR: ICD-10-PCS | Mod: CPTII,S$GLB,, | Performed by: FAMILY MEDICINE

## 2020-09-17 PROCEDURE — 99214 PR OFFICE/OUTPT VISIT, EST, LEVL IV, 30-39 MIN: ICD-10-PCS | Mod: 25,S$GLB,, | Performed by: FAMILY MEDICINE

## 2020-09-17 PROCEDURE — 3075F SYST BP GE 130 - 139MM HG: CPT | Mod: CPTII,S$GLB,, | Performed by: FAMILY MEDICINE

## 2020-09-17 PROCEDURE — 99999 PR PBB SHADOW E&M-EST. PATIENT-LVL V: ICD-10-PCS | Mod: PBBFAC,,, | Performed by: FAMILY MEDICINE

## 2020-09-17 PROCEDURE — 3079F PR MOST RECENT DIASTOLIC BLOOD PRESSURE 80-89 MM HG: ICD-10-PCS | Mod: CPTII,S$GLB,, | Performed by: FAMILY MEDICINE

## 2020-09-17 PROCEDURE — G0008 PR ADMIN INFLUENZA VIRUS VAC: ICD-10-PCS | Mod: S$GLB,,, | Performed by: FAMILY MEDICINE

## 2020-09-17 PROCEDURE — 90694 VACC AIIV4 NO PRSRV 0.5ML IM: CPT | Mod: S$GLB,,, | Performed by: FAMILY MEDICINE

## 2020-09-17 PROCEDURE — G0008 ADMIN INFLUENZA VIRUS VAC: HCPCS | Mod: S$GLB,,, | Performed by: FAMILY MEDICINE

## 2020-09-17 PROCEDURE — 1126F AMNT PAIN NOTED NONE PRSNT: CPT | Mod: S$GLB,,, | Performed by: FAMILY MEDICINE

## 2020-09-17 PROCEDURE — 90694 FLU VACCINE - QUADRIVALENT - ADJUVANTED: ICD-10-PCS | Mod: S$GLB,,, | Performed by: FAMILY MEDICINE

## 2020-09-17 PROCEDURE — 3079F DIAST BP 80-89 MM HG: CPT | Mod: CPTII,S$GLB,, | Performed by: FAMILY MEDICINE

## 2020-09-17 PROCEDURE — 1126F PR PAIN SEVERITY QUANTIFIED, NO PAIN PRESENT: ICD-10-PCS | Mod: S$GLB,,, | Performed by: FAMILY MEDICINE

## 2020-09-17 PROCEDURE — 99999 PR PBB SHADOW E&M-EST. PATIENT-LVL V: CPT | Mod: PBBFAC,,, | Performed by: FAMILY MEDICINE

## 2020-09-17 PROCEDURE — 99214 OFFICE O/P EST MOD 30 MIN: CPT | Mod: 25,S$GLB,, | Performed by: FAMILY MEDICINE

## 2020-09-17 PROCEDURE — 3075F PR MOST RECENT SYSTOLIC BLOOD PRESS GE 130-139MM HG: ICD-10-PCS | Mod: CPTII,S$GLB,, | Performed by: FAMILY MEDICINE

## 2020-09-17 PROCEDURE — 1159F MED LIST DOCD IN RCRD: CPT | Mod: S$GLB,,, | Performed by: FAMILY MEDICINE

## 2020-09-17 PROCEDURE — 1101F PT FALLS ASSESS-DOCD LE1/YR: CPT | Mod: CPTII,S$GLB,, | Performed by: FAMILY MEDICINE

## 2020-09-17 PROCEDURE — 1159F PR MEDICATION LIST DOCUMENTED IN MEDICAL RECORD: ICD-10-PCS | Mod: S$GLB,,, | Performed by: FAMILY MEDICINE

## 2020-09-17 RX ORDER — LOSARTAN POTASSIUM 50 MG/1
50 TABLET ORAL DAILY
Qty: 90 TABLET | Refills: 1 | Status: SHIPPED | OUTPATIENT
Start: 2020-09-17 | End: 2020-12-17

## 2020-09-17 RX ORDER — LINAGLIPTIN 5 MG/1
5 TABLET, FILM COATED ORAL DAILY
Qty: 90 TABLET | Refills: 1 | Status: SHIPPED | OUTPATIENT
Start: 2020-09-17 | End: 2021-01-19 | Stop reason: SDUPTHER

## 2020-09-17 RX ORDER — PIOGLITAZONEHYDROCHLORIDE 15 MG/1
15 TABLET ORAL DAILY
Qty: 90 TABLET | Refills: 0 | Status: SHIPPED | OUTPATIENT
Start: 2020-09-17 | End: 2020-12-17

## 2020-09-17 NOTE — ASSESSMENT & PLAN NOTE
Lab Results   Component Value Date    LDLCALC 53.2 (L) 02/13/2020     - well controlled  - continue current medication

## 2020-09-17 NOTE — TELEPHONE ENCOUNTER
----- Message from Polly Greer, DO sent at 9/17/2020  9:22 AM CDT -----  Please call pharmacy Glimperide stopped and do not refill

## 2020-09-17 NOTE — ASSESSMENT & PLAN NOTE
- no signs of decompensation  - on Actos and tolerating well without exacerbation and working on weaning off of medication  - referral to Cardiology Dr. Johns

## 2020-09-17 NOTE — ASSESSMENT & PLAN NOTE
Lab Results   Component Value Date    CREATININE 1.40 09/10/2020     - stable  - has follow with Dr. Morales in 1-2 weeks

## 2020-09-17 NOTE — ASSESSMENT & PLAN NOTE
Lab Results   Component Value Date    LABA1C 7.2 (H) 05/25/2018    HGBA1C 5.8 (H) 09/10/2020     - continue Tradjenta 5 mg daily  - decreased Pioglitazone from 30 mg to 15 mg daily

## 2020-09-17 NOTE — PATIENT INSTRUCTIONS
1. Recommend cut Pioglitazone 30 mg to 1/2 tab daily (for diabetes)  2. Continue tradjenta 5 mg daily (for daibetes)  3. Make sure that you are not taking glimepiride (daibetes medication stopped)

## 2020-11-05 ENCOUNTER — TELEPHONE (OUTPATIENT)
Dept: ORTHOPEDICS | Facility: CLINIC | Age: 85
End: 2020-11-05

## 2020-11-05 ENCOUNTER — OFFICE VISIT (OUTPATIENT)
Dept: FAMILY MEDICINE | Facility: CLINIC | Age: 85
End: 2020-11-05
Payer: MEDICARE

## 2020-11-05 VITALS
HEART RATE: 71 BPM | TEMPERATURE: 98 F | SYSTOLIC BLOOD PRESSURE: 154 MMHG | RESPIRATION RATE: 16 BRPM | DIASTOLIC BLOOD PRESSURE: 82 MMHG | OXYGEN SATURATION: 95 % | WEIGHT: 174.5 LBS | HEIGHT: 62 IN | BODY MASS INDEX: 32.11 KG/M2

## 2020-11-05 DIAGNOSIS — R60.0 EDEMA OF LEFT LOWER EXTREMITY: ICD-10-CM

## 2020-11-05 DIAGNOSIS — M25.562 ACUTE PAIN OF LEFT KNEE: ICD-10-CM

## 2020-11-05 DIAGNOSIS — M25.562 LEFT KNEE PAIN, UNSPECIFIED CHRONICITY: Primary | ICD-10-CM

## 2020-11-05 PROCEDURE — 99214 PR OFFICE/OUTPT VISIT, EST, LEVL IV, 30-39 MIN: ICD-10-PCS | Mod: S$GLB,,, | Performed by: INTERNAL MEDICINE

## 2020-11-05 PROCEDURE — 1159F MED LIST DOCD IN RCRD: CPT | Mod: S$GLB,,, | Performed by: INTERNAL MEDICINE

## 2020-11-05 PROCEDURE — 99999 PR PBB SHADOW E&M-EST. PATIENT-LVL V: CPT | Mod: PBBFAC,,, | Performed by: INTERNAL MEDICINE

## 2020-11-05 PROCEDURE — 3079F PR MOST RECENT DIASTOLIC BLOOD PRESSURE 80-89 MM HG: ICD-10-PCS | Mod: CPTII,S$GLB,, | Performed by: INTERNAL MEDICINE

## 2020-11-05 PROCEDURE — 3077F PR MOST RECENT SYSTOLIC BLOOD PRESSURE >= 140 MM HG: ICD-10-PCS | Mod: CPTII,S$GLB,, | Performed by: INTERNAL MEDICINE

## 2020-11-05 PROCEDURE — 3077F SYST BP >= 140 MM HG: CPT | Mod: CPTII,S$GLB,, | Performed by: INTERNAL MEDICINE

## 2020-11-05 PROCEDURE — 1101F PR PT FALLS ASSESS DOC 0-1 FALLS W/OUT INJ PAST YR: ICD-10-PCS | Mod: CPTII,S$GLB,, | Performed by: INTERNAL MEDICINE

## 2020-11-05 PROCEDURE — 1101F PT FALLS ASSESS-DOCD LE1/YR: CPT | Mod: CPTII,S$GLB,, | Performed by: INTERNAL MEDICINE

## 2020-11-05 PROCEDURE — 1159F PR MEDICATION LIST DOCUMENTED IN MEDICAL RECORD: ICD-10-PCS | Mod: S$GLB,,, | Performed by: INTERNAL MEDICINE

## 2020-11-05 PROCEDURE — 99999 PR PBB SHADOW E&M-EST. PATIENT-LVL V: ICD-10-PCS | Mod: PBBFAC,,, | Performed by: INTERNAL MEDICINE

## 2020-11-05 PROCEDURE — 3079F DIAST BP 80-89 MM HG: CPT | Mod: CPTII,S$GLB,, | Performed by: INTERNAL MEDICINE

## 2020-11-05 PROCEDURE — 99214 OFFICE O/P EST MOD 30 MIN: CPT | Mod: S$GLB,,, | Performed by: INTERNAL MEDICINE

## 2020-11-05 RX ORDER — FUROSEMIDE 20 MG/1
20 TABLET ORAL DAILY PRN
Qty: 2 TABLET | Refills: 0 | Status: SHIPPED | OUTPATIENT
Start: 2020-11-05 | End: 2021-04-27

## 2020-11-05 NOTE — Clinical Note
Flip hernández, she lad a lot of le edema so I gave her a few tabs of lasix, not on another diuretic  Just fyi

## 2020-11-05 NOTE — ASSESSMENT & PLAN NOTE
Connective tissue injury? rec 1/2 meloxicam qod as needed with tylenol in between given antiplt and CKD, will ref to ortho for definitive management

## 2020-11-05 NOTE — PROGRESS NOTES
[facility] Note    Chief Complaint      Chief Complaint   Patient presents with    Knee Pain     left swelling      History of Present Illness      Petra Zazueta is a 85 y.o. female who presents today for chief complaint ED dc follow up for L knee pain  . Patient is new to me.    PCP: Polly Greer,   Patient comes to appointment alone.     HPI   L knee pain  Is s/p L leg stent for PAD, denies venous harvest in that leg, does have chronic edema, not wearing compression garment    Nephrology Dr. Morales  Cardiology Dr. Clif COBB, now trasnitioning to dr huerta, has CAD w PCI on plavix   Denies hx of cout   Active Problem List with Overview Notes    Diagnosis Date Noted    Edema of left lower extremity 11/05/2020    Left knee pain 11/05/2020    Trigger middle finger of left hand 07/20/2020    Hyperuricemia 01/20/2020    Type 2 diabetes mellitus with stage 4 chronic kidney disease, without long-term current use of insulin 01/20/2020    Type 2 diabetes mellitus with diabetic peripheral angiopathy without gangrene, without long-term current use of insulin 01/20/2020    Class 1 obesity due to excess calories with serious comorbidity and body mass index (BMI) of 33.0 to 33.9 in adult 04/01/2019     - discussed recommendation for diet, exercise and weight loss      Chronic diastolic heart failure 08/23/2018    Hyperparathyroidism 08/03/2018    CKD (chronic kidney disease) stage 4, GFR 15-29 ml/min 06/17/2016     - followed by Nephrology      Type 2 diabetes mellitus with diabetic polyneuropathy, without long-term current use of insulin 06/25/2015    History of gout 06/25/2015    Coronary artery disease      - 4/2011 RAUL RCA  - followed by Cardiology Dr. Clif COBB  - goal LDL <70  - BP goal < 130/80  - DAPT  - A1C goal <7%      Essential hypertension 12/26/2012    Hyperlipidemia 12/26/2012    PAD (peripheral artery disease) 12/26/2012     - stent left leg with  chronic nonpitting edema  - followed by Cardiology Dr. Menezes [facility]  - goal LDL <70  - BP goal < 130/80  - DAPT  - A1C goal <7%         Health Maintenance   Topic Date Due    TETANUS VACCINE  02/10/1953    Eye Exam  2021    Lipid Panel  2021    Foot Exam  2021    DEXA SCAN  2021    Hemoglobin A1c  03/10/2021    Aspirin/Antiplatelet Therapy  2021    Pneumococcal Vaccine (65+ High/Highest Risk)  Completed       Past Medical History:   Diagnosis Date    Anemia     Arthritis     Coronary artery disease     Diabetes mellitus     Diabetes mellitus type II     Dyslipidemia     Hypertension     Insomnia     PAD (peripheral artery disease)        Past Surgical History:   Procedure Laterality Date    angiagram  2018    CORONARY ANGIOPLASTY      RCA 2011 EJ    GALLBLADDER SURGERY      HYSTERECTOMY      PERIPHERAL ARTERIAL STENT GRAFT      Left lower extremity RCA        family history includes Heart attack in her mother.    Social History     Tobacco Use    Smoking status: Former Smoker     Quit date: 2005     Years since quittin.8    Smokeless tobacco: Never Used   Substance Use Topics    Alcohol use: No    Drug use: No       Review of Systems   Respiratory: Negative for cough, sputum production and shortness of breath.    Cardiovascular: Positive for orthopnea and leg swelling. Negative for chest pain and claudication.   Gastrointestinal: Negative for abdominal pain, diarrhea, nausea and vomiting.   Musculoskeletal: Positive for joint pain. Negative for back pain, falls and neck pain.        Outpatient Encounter Medications as of 2020   Medication Sig Dispense Refill    aspirin (ECOTRIN) 81 MG EC tablet Take 1 tablet (81 mg total) by mouth once daily.  11    atorvastatin (LIPITOR) 80 MG tablet Take 1 tablet (80 mg total) by mouth every evening. 90 tablet 3    blood sugar diagnostic (TRUE METRIX GLUCOSE TEST STRIP) Strp 1 strip by  Misc.(Non-Drug; Combo Route) route 2 (two) times daily. 200 each 1    blood-glucose meter (TRUE METRIX GLUCOSE METER) Misc Test blood sugars twice daily 1 each 0    cilostazol (PLETAL) 100 MG Tab Take 100 mg by mouth 2 (two) times daily.  3    clopidogreL (PLAVIX) 75 mg tablet Take 75 mg by mouth once daily.      cyanocobalamin (VITAMIN B-12) 1000 MCG tablet Take 1 tablet (1,000 mcg total) by mouth once daily. 90 tablet 4    ferrous sulfate 324 mg (65 mg iron) TbEC Take 1 tablet (324 mg total) by mouth once daily. 90 tablet 3    fish oil-omega-3 fatty acids 300-1,000 mg capsule Take 500 mg by mouth 2 (two) times daily.      gabapentin (NEURONTIN) 300 MG capsule TAKE 1 CAPSULE(300 MG) BY MOUTH EVERY EVENING 90 capsule 1    lancets (BD ULTRA FINE LANCETS) 33 gauge Misc 1 lancet by Misc.(Non-Drug; Combo Route) route 2 (two) times daily. 200 each 1    latanoprost 0.005 % ophthalmic solution INT 1 GTT INTO OU HS  4    linaGLIPtin (TRADJENTA) 5 mg Tab tablet Take 1 tablet (5 mg total) by mouth once daily. 90 tablet 1    losartan (COZAAR) 50 MG tablet Take 1 tablet (50 mg total) by mouth once daily. 90 tablet 1    meloxicam (MOBIC) 15 MG tablet Take 0.5 tablets (7.5 mg total) by mouth once daily. 14 tablet 0    metoprolol tartrate (LOPRESSOR) 25 MG tablet Take 1 tablet (25 mg total) by mouth 2 (two) times daily. 90 tablet 3    pioglitazone (ACTOS) 15 MG tablet Take 1 tablet (15 mg total) by mouth once daily. 90 tablet 0    vitamin D (VITAMIN D3) 1000 units Tab Take 1,000 Units by mouth once daily.      furosemide (LASIX) 20 MG tablet Take 1 tablet (20 mg total) by mouth daily as needed. 2 tablet 0    hydrALAZINE (APRESOLINE) 50 MG tablet TAKE 1 TABLET (50 MG TOTAL) BY MOUTH EVERY 8 (EIGHT) HOURS. 270 tablet 1     No facility-administered encounter medications on file as of 11/5/2020.         Review of patient's allergies indicates:   Allergen Reactions    Codeine Other (See Comments)     Oliver  "lightheadedness, nausea  Other reaction(s): NAUSEA           Physical Exam      Vital Signs  Temp: 97.7 °F (36.5 °C)  Temp src: Temporal  Pulse: 71  Resp: 16  SpO2: 95 %  BP: (!) 154/82  BP Location: Left arm  Patient Position: Sitting  Height and Weight  Height: 5' 2" (157.5 cm)  Weight: 79.2 kg (174 lb 7.9 oz)  BSA (Calculated - sq m): 1.86 sq meters  BMI (Calculated): 31.9  Weight in (lb) to have BMI = 25: 136.4]    Physical Exam  Vitals signs reviewed.   Constitutional:       General: She is not in acute distress.     Appearance: She is well-developed. She is not diaphoretic.   HENT:      Head: Normocephalic and atraumatic.      Right Ear: External ear normal.      Left Ear: External ear normal.      Nose: Nose normal.   Eyes:      General:         Right eye: No discharge.         Left eye: No discharge.      Conjunctiva/sclera: Conjunctivae normal.   Neck:      Musculoskeletal: Normal range of motion.   Cardiovascular:      Rate and Rhythm: Normal rate and regular rhythm.      Pulses: Normal pulses.      Heart sounds: Normal heart sounds. No murmur.   Pulmonary:      Effort: Pulmonary effort is normal. No respiratory distress.      Breath sounds: Normal breath sounds. No wheezing or rales.   Musculoskeletal: Normal range of motion.         General: Tenderness present.      Right lower leg: No edema.      Left lower leg: Edema present.   Skin:     Coloration: Skin is not pale.      Findings: No rash.   Neurological:      Mental Status: She is alert and oriented to person, place, and time.   Psychiatric:         Behavior: Behavior normal.         Thought Content: Thought content normal.         Judgment: Judgment normal.          Laboratory:  CBC:  Recent Labs   Lab Result Units 09/26/20  0916   WBC K/uL 6.15   RBC M/uL 3.46*   Hemoglobin g/dL 10.7*   Hematocrit % 34.9*   Platelets K/uL 182   MCV fL 101*   MCH pg 30.9   MCHC g/dL 30.7*     CMP:  Recent Labs   Lab Result Units 09/26/20  0916   Glucose mg/dL 125* "   Calcium mg/dL 9.7   Albumin g/dL 4.2   Total Protein g/dL 7.4   Sodium mmol/L 146*   Potassium mmol/L 4.2   CO2 mmol/L 24   Chloride mmol/L 113*   BUN mg/dL 23*   Alkaline Phosphatase U/L 78   ALT U/L 11   AST U/L 26   Total Bilirubin mg/dL 0.5     URINALYSIS:  Recent Labs   Lab Result Units 09/26/20  0916   Color, UA  Yellow   Specific Gravity, UA  1.025   pH, UA  5.0   Protein, UA  1+*   Bacteria /hpf Moderate*   Nitrite, UA  Negative   Leukocytes, UA  Negative   Urobilinogen, UA EU/dL Negative   Hyaline Casts, UA /lpf 0      LIPIDS:  No results for input(s): TSH, HDL, CHOL, TRIG, LDLCALC, CHOLHDL, NONHDLCHOL, TOTALCHOLEST in the last 2160 hours.  TSH:  No results for input(s): TSH in the last 2160 hours.  A1C:  Recent Labs   Lab Result Units 09/10/20  0947   Hemoglobin A1C % 5.8*         Assessment/Plan     Petra Zazueta is a 85 y.o.female with:    Edema of left lower extremity  R/o DVT, lasix x2d, compression and elevation     Left knee pain  Connective tissue injury? rec 1/2 meloxicam qod as needed with tylenol in between given antiplt and CKD, will ref to ortho for definitive management       Orders Placed This Encounter   Procedures    US Lower Extremity Veins Bilateral     Standing Status:   Future     Standing Expiration Date:   11/5/2021     Order Specific Question:   May the Radiologist modify the order per protocol to meet the clinical needs of the patient?     Answer:   Yes    Ambulatory referral/consult to Orthopedics     Standing Status:   Future     Standing Expiration Date:   12/5/2021     Referral Priority:   Urgent     Referral Type:   Consultation     Requested Specialty:   Orthopedic Surgery     Number of Visits Requested:   1       Use of the [employer] Patient Portal discussed and encouraged during today's visit  Follow up PCP with scheduled   Future Appointments   Date Time Provider Department Center   11/5/2020  8:45 AM DES [facility] US1 [facility] [place]   11/10/2020 10:00 AM Rigo MONTES  MD [facility] ORTHO [place]   11/24/2020  2:30 PM Doe Johns III, MD [facility] CARDIO [place]   1/15/2021  9:50 AM HOSPITAL LAB, [facility] LAB [facility] LAB [facility]   1/18/2021  8:30 AM Polly Greer [place] [facility] [place]   2/1/2021 10:15 AM Velvet Morales MD [facility]       Qiana Buckley MD  11/5/2020 8:13 AM    Primary Care Internal Medicine - [facility]

## 2020-11-05 NOTE — Clinical Note
ZOË Matias  Sending her for ortho eval- pain on wt bearing and joint effusion, she says not gout  Also a lot of le edema, gave dayron 2 tabs lasix and getting dvt study she was worrie d ab clot

## 2020-11-06 ENCOUNTER — PATIENT OUTREACH (OUTPATIENT)
Dept: ADMINISTRATIVE | Facility: OTHER | Age: 85
End: 2020-11-06

## 2020-11-06 NOTE — PROGRESS NOTES
Updates were requested from care everywhere.  Chart was reviewed for overdue Proactive [facility] Encounters ([facility]) topics (CRS, Breast Cancer Screening, Eye exam)  Health Maintenance has been updated.  LINKS not responding.

## 2020-11-10 ENCOUNTER — OFFICE VISIT (OUTPATIENT)
Dept: ORTHOPEDICS | Facility: CLINIC | Age: 85
End: 2020-11-10
Payer: MEDICARE

## 2020-11-10 VITALS — HEIGHT: 62 IN | BODY MASS INDEX: 32.14 KG/M2 | WEIGHT: 174.63 LBS

## 2020-11-10 DIAGNOSIS — M17.12 PRIMARY OSTEOARTHRITIS OF LEFT KNEE: Primary | ICD-10-CM

## 2020-11-10 DIAGNOSIS — M25.562 ACUTE PAIN OF LEFT KNEE: ICD-10-CM

## 2020-11-10 PROCEDURE — 1126F PR PAIN SEVERITY QUANTIFIED, NO PAIN PRESENT: ICD-10-PCS | Mod: S$GLB,,, | Performed by: ORTHOPAEDIC SURGERY

## 2020-11-10 PROCEDURE — 1159F MED LIST DOCD IN RCRD: CPT | Mod: S$GLB,,, | Performed by: ORTHOPAEDIC SURGERY

## 2020-11-10 PROCEDURE — 99202 PR OFFICE/OUTPT VISIT, NEW, LEVL II, 15-29 MIN: ICD-10-PCS | Mod: S$GLB,,, | Performed by: ORTHOPAEDIC SURGERY

## 2020-11-10 PROCEDURE — 1101F PT FALLS ASSESS-DOCD LE1/YR: CPT | Mod: CPTII,S$GLB,, | Performed by: ORTHOPAEDIC SURGERY

## 2020-11-10 PROCEDURE — 99999 PR PBB SHADOW E&M-EST. PATIENT-LVL IV: CPT | Mod: PBBFAC,,, | Performed by: ORTHOPAEDIC SURGERY

## 2020-11-10 PROCEDURE — 99202 OFFICE O/P NEW SF 15 MIN: CPT | Mod: S$GLB,,, | Performed by: ORTHOPAEDIC SURGERY

## 2020-11-10 PROCEDURE — 99999 PR PBB SHADOW E&M-EST. PATIENT-LVL IV: ICD-10-PCS | Mod: PBBFAC,,, | Performed by: ORTHOPAEDIC SURGERY

## 2020-11-10 PROCEDURE — 1126F AMNT PAIN NOTED NONE PRSNT: CPT | Mod: S$GLB,,, | Performed by: ORTHOPAEDIC SURGERY

## 2020-11-10 PROCEDURE — 1159F PR MEDICATION LIST DOCUMENTED IN MEDICAL RECORD: ICD-10-PCS | Mod: S$GLB,,, | Performed by: ORTHOPAEDIC SURGERY

## 2020-11-10 PROCEDURE — 1101F PR PT FALLS ASSESS DOC 0-1 FALLS W/OUT INJ PAST YR: ICD-10-PCS | Mod: CPTII,S$GLB,, | Performed by: ORTHOPAEDIC SURGERY

## 2020-11-10 NOTE — LETTER
November 10, 2020      Qiana Buckley MD  [id number] [place] [id number]           [facility]  [address] [place], [place] [address] , [place] [address]  [facility] [address]  Phone: [phone]  Fax: [phone]          Patient: Petra Zazueta   MR Number: [id number]   YOB: 1935   Date of Visit: 11/10/2020       Dear Dr. Qiana Buckley:    Thank you for referring Petra Zazueta to me for evaluation. Attached you will find relevant portions of my assessment and plan of care.    If you have questions, please do not hesitate to call me. I look forward to following Petra Zazueta along with you.    Sincerely,    Rigo Banks MD    Enclosure  CC:  No Recipients    If you would like to receive this communication electronically, please contact [email or web address] or [phone] to request more information on [employer] Link access.    For providers and/or their staff who would like to refer a patient to [facility], please contact us through our one-stop-shop provider referral line, [facility], at [phone].    If you feel you have received this communication in error or would no longer like to receive these types of communications, please e-mail [email or web address]

## 2020-11-10 NOTE — PROGRESS NOTES
Subjective:      Patient ID: Petra Zazueta is a 85 y.o. female.    Chief Complaint: Consult and Knee Pain (left )    HPI     They have experienced problems with their left knee over the past 3 days. The patient denies relevant history of injury/aggravation.  Patient reports symptoms started acutely while walking outdoors.  Pain is located  anteriorly Associated symptoms include NA.  Symptoms are aggravated by no particular active. They have been treated with meloxicam.   Symptoms have recently improved. Ambulation reportedly has not been impaired. Self care ADLs are not painful.     ROS      Objective:      Ortho/SPM Exam        Left knee    The patient is not in acute distress.   Body habitus is normal.   Sclera appear normal  No respiratory distress  The patient walks without a limp.  Resisted SLR negative.   The skin over the knee is intact.  Knee effusion trace  Tendernes is located absent.  Range of motion- Flexion full, Extension full.   Ligament exam:   MCL intact   Lachman intact              Post sag intact    LCL intact  Patellar apprehension negative.  Popliteal cyst negative  Patellar crepitation present.  Flexion/pinch negative.  Pulses DP present, PT present.  Motor normal 5/5 strength in all tested muscle groups.   Sensory normal.  I reviewed the relevant imaging for the patient's condition:  Left knee films show mild, diffuse narrowing and small osteophytes.  Vascular calcification and postsurgical changes noted      Assessment:       Encounter Diagnoses   Name Primary?    Acute pain of left knee     Primary osteoarthritis of left knee Yes      The condition is structurally mild.  Rapid resolution with NSAIDs suggests resolving synovitis.      Plan:       Petra was seen today for consult and knee pain.    Diagnoses and all orders for this visit:    Primary osteoarthritis of left knee    Acute pain of left knee  -     Ambulatory referral/consult to Orthopedics      I explained my diagnostic  impression and the reasoning behind it in detail, using layman's terms.  Models and/or pictures were used to help in the explanation.    Patient advised to complete current prescription.  I warned her that long-term use of this medication may cause problems related to her chronic kidney disease; she indicates that she is already aware of this.        I also explained that progressive symptoms are possible with osteoarthritis and future treatment with injection can be considered, if clinically needed.

## 2020-11-13 ENCOUNTER — TELEPHONE (OUTPATIENT)
Dept: FAMILY MEDICINE | Facility: CLINIC | Age: 85
End: 2020-11-13

## 2020-11-13 NOTE — TELEPHONE ENCOUNTER
Called and spoke with pt in regards of her Ultrasound results. Pt verbalized understanding of message.

## 2020-11-13 NOTE — TELEPHONE ENCOUNTER
----- Message from Lazaro Valdovinos sent at 11/13/2020  4:03 PM CST -----  Contact: patient ///  [phone]  Patient son returning your call   Please advise

## 2020-11-24 ENCOUNTER — OFFICE VISIT (OUTPATIENT)
Dept: CARDIOLOGY | Facility: CLINIC | Age: 85
End: 2020-11-24
Payer: MEDICARE

## 2020-11-24 VITALS
BODY MASS INDEX: 32.39 KG/M2 | DIASTOLIC BLOOD PRESSURE: 74 MMHG | WEIGHT: 176 LBS | HEART RATE: 61 BPM | OXYGEN SATURATION: 98 % | SYSTOLIC BLOOD PRESSURE: 160 MMHG | HEIGHT: 62 IN

## 2020-11-24 DIAGNOSIS — E78.00 PURE HYPERCHOLESTEROLEMIA: ICD-10-CM

## 2020-11-24 DIAGNOSIS — E66.09 CLASS 1 OBESITY DUE TO EXCESS CALORIES WITH SERIOUS COMORBIDITY AND BODY MASS INDEX (BMI) OF 32.0 TO 32.9 IN ADULT: ICD-10-CM

## 2020-11-24 DIAGNOSIS — I25.10 CORONARY ARTERY DISEASE INVOLVING NATIVE CORONARY ARTERY OF NATIVE HEART WITHOUT ANGINA PECTORIS: ICD-10-CM

## 2020-11-24 DIAGNOSIS — I50.32 CHRONIC DIASTOLIC HEART FAILURE: ICD-10-CM

## 2020-11-24 DIAGNOSIS — I25.118 CORONARY ARTERY DISEASE OF NATIVE ARTERY OF NATIVE HEART WITH STABLE ANGINA PECTORIS: ICD-10-CM

## 2020-11-24 DIAGNOSIS — I50.40 COMBINED SYSTOLIC AND DIASTOLIC CONGESTIVE HEART FAILURE, UNSPECIFIED HF CHRONICITY: ICD-10-CM

## 2020-11-24 DIAGNOSIS — N18.4 CKD (CHRONIC KIDNEY DISEASE) STAGE 4, GFR 15-29 ML/MIN: ICD-10-CM

## 2020-11-24 DIAGNOSIS — R60.0 EDEMA OF LEFT LOWER EXTREMITY: ICD-10-CM

## 2020-11-24 DIAGNOSIS — I25.10 ATHEROSCLEROSIS OF NATIVE CORONARY ARTERY OF NATIVE HEART WITHOUT ANGINA PECTORIS: ICD-10-CM

## 2020-11-24 DIAGNOSIS — I73.9 PAD (PERIPHERAL ARTERY DISEASE): ICD-10-CM

## 2020-11-24 DIAGNOSIS — R06.02 SOB (SHORTNESS OF BREATH) ON EXERTION: ICD-10-CM

## 2020-11-24 DIAGNOSIS — E11.42 TYPE 2 DIABETES MELLITUS WITH DIABETIC POLYNEUROPATHY, WITHOUT LONG-TERM CURRENT USE OF INSULIN: ICD-10-CM

## 2020-11-24 DIAGNOSIS — I10 ESSENTIAL HYPERTENSION: ICD-10-CM

## 2020-11-24 PROCEDURE — 93000 EKG 12-LEAD: ICD-10-PCS | Mod: S$GLB,,, | Performed by: INTERNAL MEDICINE

## 2020-11-24 PROCEDURE — 3078F DIAST BP <80 MM HG: CPT | Mod: CPTII,S$GLB,, | Performed by: INTERNAL MEDICINE

## 2020-11-24 PROCEDURE — 3288F FALL RISK ASSESSMENT DOCD: CPT | Mod: CPTII,S$GLB,, | Performed by: INTERNAL MEDICINE

## 2020-11-24 PROCEDURE — 1159F MED LIST DOCD IN RCRD: CPT | Mod: S$GLB,,, | Performed by: INTERNAL MEDICINE

## 2020-11-24 PROCEDURE — 3077F PR MOST RECENT SYSTOLIC BLOOD PRESSURE >= 140 MM HG: ICD-10-PCS | Mod: CPTII,S$GLB,, | Performed by: INTERNAL MEDICINE

## 2020-11-24 PROCEDURE — 99204 OFFICE O/P NEW MOD 45 MIN: CPT | Mod: S$GLB,,, | Performed by: INTERNAL MEDICINE

## 2020-11-24 PROCEDURE — 3288F PR FALLS RISK ASSESSMENT DOCUMENTED: ICD-10-PCS | Mod: CPTII,S$GLB,, | Performed by: INTERNAL MEDICINE

## 2020-11-24 PROCEDURE — 93000 ELECTROCARDIOGRAM COMPLETE: CPT | Mod: S$GLB,,, | Performed by: INTERNAL MEDICINE

## 2020-11-24 PROCEDURE — 3078F PR MOST RECENT DIASTOLIC BLOOD PRESSURE < 80 MM HG: ICD-10-PCS | Mod: CPTII,S$GLB,, | Performed by: INTERNAL MEDICINE

## 2020-11-24 PROCEDURE — 1101F PT FALLS ASSESS-DOCD LE1/YR: CPT | Mod: CPTII,S$GLB,, | Performed by: INTERNAL MEDICINE

## 2020-11-24 PROCEDURE — 3077F SYST BP >= 140 MM HG: CPT | Mod: CPTII,S$GLB,, | Performed by: INTERNAL MEDICINE

## 2020-11-24 PROCEDURE — 1101F PR PT FALLS ASSESS DOC 0-1 FALLS W/OUT INJ PAST YR: ICD-10-PCS | Mod: CPTII,S$GLB,, | Performed by: INTERNAL MEDICINE

## 2020-11-24 PROCEDURE — 99999 PR PBB SHADOW E&M-EST. PATIENT-LVL V: CPT | Mod: PBBFAC,,, | Performed by: INTERNAL MEDICINE

## 2020-11-24 PROCEDURE — 1126F AMNT PAIN NOTED NONE PRSNT: CPT | Mod: S$GLB,,, | Performed by: INTERNAL MEDICINE

## 2020-11-24 PROCEDURE — 1126F PR PAIN SEVERITY QUANTIFIED, NO PAIN PRESENT: ICD-10-PCS | Mod: S$GLB,,, | Performed by: INTERNAL MEDICINE

## 2020-11-24 PROCEDURE — 99204 PR OFFICE/OUTPT VISIT, NEW, LEVL IV, 45-59 MIN: ICD-10-PCS | Mod: S$GLB,,, | Performed by: INTERNAL MEDICINE

## 2020-11-24 PROCEDURE — 1159F PR MEDICATION LIST DOCUMENTED IN MEDICAL RECORD: ICD-10-PCS | Mod: S$GLB,,, | Performed by: INTERNAL MEDICINE

## 2020-11-24 PROCEDURE — 99999 PR PBB SHADOW E&M-EST. PATIENT-LVL V: ICD-10-PCS | Mod: PBBFAC,,, | Performed by: INTERNAL MEDICINE

## 2020-11-24 RX ORDER — SPIRONOLACTONE 25 MG/1
TABLET ORAL
COMMUNITY
Start: 2020-11-24 | End: 2021-04-27

## 2020-11-24 NOTE — ASSESSMENT & PLAN NOTE
Pt denies claudication.  Was previously followed by outside cardiologist.    - obtain prior cardiac records  - continue medical therapy  - encouraged risk factor and lifestyle modifications

## 2020-11-24 NOTE — ASSESSMENT & PLAN NOTE
CCS 2.  Pt compliant w/ meds.  S/p RAUL to RCA.  She notes astorga w/ ambulation of 3/4 of a mile which may be an angina equivalent.    - check ETT MPI to eval for ischemia  - continue medical therapy  - obtain outside cardiology records  - encouraged risk factor and lifestyle modifications

## 2020-11-24 NOTE — LETTER
November 24, 2020      Polly Greer, DO  [address]  [address]   [facility] [address]           [place] [facility] - Cardiology  [address]  [facility] [address]  Phone: [phone]  Fax: [phone]          Patient: Petra Zazueta   MR Number: [id number]   YOB: 1935   Date of Visit: 11/24/2020       Dear Dr. Polly Greer:    Thank you for referring Petra Zazueta to me for evaluation. Attached you will find relevant portions of my assessment and plan of care.    If you have questions, please do not hesitate to call me. I look forward to following Petra Zazueta along with you.    Sincerely,    Doe Johns III, MD    Enclosure  CC:  No Recipients    If you would like to receive this communication electronically, please contact [email or web address] or [phone] to request more information on [employer] Link access.    For providers and/or their staff who would like to refer a patient to [facility], please contact us through our one-stop-shop provider referral line, [facility], at [phone].    If you feel you have received this communication in error or would no longer like to receive these types of communications, please e-mail [email or web address]

## 2020-11-24 NOTE — ASSESSMENT & PLAN NOTE
Elevated in clinic.  Goal BP < 140/90.  Pt compliant w/ meds.    - continue medical therapy  - pt to monitor BP at home and record  - encouraged risk factor and lifestyle modifications

## 2020-11-24 NOTE — ASSESSMENT & PLAN NOTE
Pt notes chronic following PTA of her LLE.    - obtain records from prior cardiologist  - thigh high compression stockings

## 2020-11-24 NOTE — PROGRESS NOTES
"Subjective:    Patient ID:  Petra Zazueta is a 85 y.o. female who presents for evaluation of Carotid Artery Disease (Ref by Dr Greer) and Congestive Heart Failure      PCP/Referring: Polly Greer, DO     HPI  Pt is a 86 yo F w/ PMH of CAD s/p PCI to RCA w/ RAUL 2011, DM2 w/ hgba1c 5.8%, HTN, HLD, and PAD s/p stenting of LLE 2016 who presents for evaluation of CAD and to establish care.  She mentions that she has had astorga x7 months and palpitations for the same duration.  She notes the astorga precedes the palpitations, she develops the astorga w/ 3/4 mile of ambulation.  She was previously followed by an outside cardiologist however states she desires to switch.  She notes LLE edema which has been present x4 years following her stenting of her LLE and tried compression stockings in the past but had difficultly taking them on and off.  She denies cp, orthopnea, PND, presyncope, LOC, and claudication.  She notes compliance w/ meds and denies side effects.  She exercises at times w/ chair exercises and denies cp but gets sob.   She monitors her BP at home however does not remember the numbers but states that it is "good".         Past Medical History:   Diagnosis Date    Anemia     Arthritis     Coronary artery disease     Diabetes mellitus     Diabetes mellitus type II     Dyslipidemia     Hypertension     Insomnia     PAD (peripheral artery disease)      Past Surgical History:   Procedure Laterality Date    angiagram  08/31/2018    CORONARY ANGIOPLASTY      RCA 4/2011 EJ    GALLBLADDER SURGERY      HYSTERECTOMY      PERIPHERAL ARTERIAL STENT GRAFT      Left lower extremity RCA      Social History     Socioeconomic History    Marital status:      Spouse name: Not on file    Number of children: Not on file    Years of education: Not on file    Highest education level: Not on file   Occupational History    Not on file   Social Needs    Financial resource strain: Not on file    Food insecurity     Worry: " Not on file     Inability: Not on file    Transportation needs     Medical: Not on file     Non-medical: Not on file   Tobacco Use    Smoking status: Former Smoker     Quit date: 2005     Years since quittin.9    Smokeless tobacco: Never Used   Substance and Sexual Activity    Alcohol use: No    Drug use: No    Sexual activity: Not Currently   Lifestyle    Physical activity     Days per week: Not on file     Minutes per session: Not on file    Stress: Not on file   Relationships    Social connections     Talks on phone: Not on file     Gets together: Not on file     Attends [employer] service: Not on file     Active member of club or organization: Not on file     Attends meetings of clubs or organizations: Not on file     Relationship status: Not on file   Other Topics Concern    Not on file   Social History Narrative    Lives alone in [place]. Has 2 sons. Quit drinking years ago. She cares for her houseplants.      Family History   Problem Relation Age of Onset    Heart attack Mother        Review of patient's allergies indicates:   Allergen Reactions    Codeine Other (See Comments)     Jittery, lightheadedness, nausea  Other reaction(s): NAUSEA       Medication List with Changes/Refills   Current Medications    ASPIRIN (ECOTRIN) 81 MG EC TABLET    Take 1 tablet (81 mg total) by mouth once daily.    ATORVASTATIN (LIPITOR) 80 MG TABLET    Take 1 tablet (80 mg total) by mouth every evening.    BLOOD SUGAR DIAGNOSTIC (TRUE METRIX GLUCOSE TEST STRIP) STRP    1 strip by Misc.(Non-Drug; Combo Route) route 2 (two) times daily.    BLOOD-GLUCOSE METER (TRUE METRIX GLUCOSE METER) MISC    Test blood sugars twice daily    CILOSTAZOL (PLETAL) 100 MG TAB    Take 100 mg by mouth 2 (two) times daily.    CLOPIDOGREL (PLAVIX) 75 MG TABLET    Take 75 mg by mouth once daily.    CYANOCOBALAMIN (VITAMIN B-12) 1000 MCG TABLET    Take 1 tablet (1,000 mcg total) by mouth once daily.    FERROUS SULFATE 324 MG (65 MG  IRON) TBEC    Take 1 tablet (324 mg total) by mouth once daily.    FISH OIL-OMEGA-3 FATTY ACIDS 300-1,000 MG CAPSULE    Take 500 mg by mouth 2 (two) times daily.    FUROSEMIDE (LASIX) 20 MG TABLET    Take 1 tablet (20 mg total) by mouth daily as needed.    GABAPENTIN (NEURONTIN) 300 MG CAPSULE    TAKE 1 CAPSULE(300 MG) BY MOUTH EVERY EVENING    HYDRALAZINE (APRESOLINE) 50 MG TABLET    TAKE 1 TABLET (50 MG TOTAL) BY MOUTH EVERY 8 (EIGHT) HOURS.    LANCETS (BD ULTRA FINE LANCETS) 33 GAUGE MISC    1 lancet by Misc.(Non-Drug; Combo Route) route 2 (two) times daily.    LATANOPROST 0.005 % OPHTHALMIC SOLUTION    INT 1 GTT INTO OU HS    LINAGLIPTIN (TRADJENTA) 5 MG TAB TABLET    Take 1 tablet (5 mg total) by mouth once daily.    LOSARTAN (COZAAR) 50 MG TABLET    Take 1 tablet (50 mg total) by mouth once daily.    MELOXICAM (MOBIC) 15 MG TABLET    Take 0.5 tablets (7.5 mg total) by mouth once daily.    METOPROLOL TARTRATE (LOPRESSOR) 25 MG TABLET    Take 1 tablet (25 mg total) by mouth 2 (two) times daily.    PIOGLITAZONE (ACTOS) 15 MG TABLET    Take 1 tablet (15 mg total) by mouth once daily.    SPIRONOLACTONE (ALDACTONE) 25 MG TABLET        VITAMIN D (VITAMIN D3) 1000 UNITS TAB    Take 1,000 Units by mouth once daily.       Review of Systems   Constitution: Negative for diaphoresis and fever.   HENT: Negative for congestion and hearing loss.    Eyes: Negative for blurred vision and pain.   Cardiovascular: Negative for chest pain, claudication, dyspnea on exertion, leg swelling, near-syncope, palpitations and syncope.   Respiratory: Negative for shortness of breath and sleep disturbances due to breathing.    Hematologic/Lymphatic: Negative for bleeding problem. Does not bruise/bleed easily.   Skin: Negative for color change and poor wound healing.   Gastrointestinal: Negative for abdominal pain and nausea.   Genitourinary: Negative for bladder incontinence and flank pain.   Neurological: Negative for focal weakness and  "light-headedness.        Objective:   BP (!) 160/74 (BP Location: Left arm, Patient Position: Sitting, BP Method: Large (Manual))   Pulse 61   Ht 5' 2" (1.575 m)   Wt 79.8 kg (176 lb)   LMP  (LMP Unknown)   SpO2 98%   BMI 32.19 kg/m²    Physical Exam   Constitutional: She is oriented to person, place, and time. She appears well-developed and well-nourished.   HENT:   Head: Normocephalic and atraumatic.   Mouth/Throat: Oropharynx is clear and moist.   Eyes: Pupils are equal, round, and reactive to light. EOM are normal. No scleral icterus.   Neck: Normal range of motion. Neck supple. No JVD present.   Cardiovascular: Normal rate, regular rhythm, S1 normal, S2 normal and intact distal pulses. Exam reveals no gallop and no friction rub.   Murmur heard.  Systolic murmur   Pulmonary/Chest: Effort normal and breath sounds normal. No respiratory distress. She has no wheezes. She has no rales. She exhibits no tenderness.   Abdominal: Soft. Bowel sounds are normal. She exhibits no distension and no mass. There is no abdominal tenderness. There is no rebound.   Musculoskeletal: Normal range of motion.         General: Edema present. No tenderness.      Comments: LLE chronic nonpitting edema   Neurological: She is alert and oriented to person, place, and time. She displays normal reflexes. Coordination normal.   Skin: Skin is warm and dry. She is not diaphoretic. No pallor.   Psychiatric: She has a normal mood and affect. Her behavior is normal. Judgment normal.         EC2020- reviewed.  NSR, T wave abnormality w/ possible inferolateral ischemia.      Assessment:       1. Coronary artery disease involving native coronary artery of native heart without angina pectoris    2. Chronic diastolic heart failure    3. Essential hypertension    4. Pure hypercholesterolemia    5. PAD (peripheral artery disease)    6. Coronary artery disease of native artery of native heart with stable angina pectoris    7. CKD (chronic " kidney disease) stage 4, GFR 15-29 ml/min    8. Type 2 diabetes mellitus with diabetic polyneuropathy, without long-term current use of insulin    9. Class 1 obesity due to excess calories with serious comorbidity and body mass index (BMI) of 32.0 to 32.9 in adult    10. Edema of left lower extremity    11. Atherosclerosis of native coronary artery of native heart without angina pectoris    12. SOB (shortness of breath) on exertion         Plan:         Essential hypertension  Elevated in clinic.  Goal BP < 140/90.  Pt compliant w/ meds.    - continue medical therapy  - pt to monitor BP at home and record  - encouraged risk factor and lifestyle modifications     Hyperlipidemia  Controlled.  Goal LDL < 70, last 53 2/2020.  Compliant w/ meds.    - continue medical therapy  - encouraged risk factor and lifestyle modifications     PAD (peripheral artery disease)  Pt denies claudication.  Was previously followed by outside cardiologist.    - obtain prior cardiac records  - continue medical therapy  - encouraged risk factor and lifestyle modifications     Coronary artery disease of native artery of native heart with stable angina pectoris  CCS 2.  Pt compliant w/ meds.  S/p RAUL to RCA.  She notes astorga w/ ambulation of 3/4 of a mile which may be an angina equivalent.    - check ETT MPI to eval for ischemia  - continue medical therapy  - obtain outside cardiology records  - encouraged risk factor and lifestyle modifications     Chronic diastolic heart failure  Compensated.  Stage B, Class II. Continue medical therapy.      CKD (chronic kidney disease) stage 4, GFR 15-29 ml/min  Followed by renal.      Type 2 diabetes mellitus with diabetic polyneuropathy, without long-term current use of insulin  Followed by PCP.      Class 1 obesity due to excess calories with serious comorbidity and body mass index (BMI) of 32.0 to 32.9 in adult  BMI 32.2.  Pt aware of health complications a/w obesity and desires to lose weight.    -  encouraged lifestyle modifications (diet, exercise, and weight loss)     Edema of left lower extremity  Pt notes chronic following PTA of her LLE.    - obtain records from prior cardiologist  - thigh high compression stockings    SOB (shortness of breath) on exertion  May be anginal equivalent.       Total duration of face to face visit time 30 minutes.  Total time spent counseling greater than fifty percent of total visit time.  Counseling included discussion regarding imaging findings, diagnosis, possibilities, treatment options, risks and benefits.  The patient had many questions regarding the options and long-term effects      Doe Johns M.D.  Interventional Cardiology

## 2020-11-24 NOTE — ASSESSMENT & PLAN NOTE
BMI 32.2.  Pt aware of health complications a/w obesity and desires to lose weight.    - encouraged lifestyle modifications (diet, exercise, and weight loss)

## 2020-11-24 NOTE — ASSESSMENT & PLAN NOTE
Controlled.  Goal LDL < 70, last 53 2/2020.  Compliant w/ meds.    - continue medical therapy  - encouraged risk factor and lifestyle modifications

## 2020-12-01 ENCOUNTER — TELEPHONE (OUTPATIENT)
Dept: CARDIOLOGY | Facility: CLINIC | Age: 85
End: 2020-12-01

## 2020-12-01 NOTE — TELEPHONE ENCOUNTER
----- Message from Lazaro Valdovinos sent at 12/1/2020 11:53 AM CST -----  Contact: [phone]/ Kasie avina/[employer]  Kasie avina/[employer] calling requesting compression stocking orders and clinical notes be faxed to [phone]                      Please advise

## 2020-12-02 ENCOUNTER — TELEPHONE (OUTPATIENT)
Dept: CARDIOLOGY | Facility: CLINIC | Age: 85
End: 2020-12-02

## 2020-12-02 NOTE — TELEPHONE ENCOUNTER
----- Message from Juana Rogers sent at 12/2/2020  2:40 PM CST -----  Contact: [phone] or [phone]  Pt's son Abhishek is calling in regards to Petra Zazueta returning the office call      Please leo and advise

## 2020-12-03 ENCOUNTER — TELEPHONE (OUTPATIENT)
Dept: CARDIOLOGY | Facility: CLINIC | Age: 85
End: 2020-12-03

## 2020-12-03 NOTE — TELEPHONE ENCOUNTER
Spoke Citlalli with [facility], who needs a new compression hose order for knee Highs because thigh high is not covered. We will send new order by the end of business day today.

## 2020-12-03 NOTE — TELEPHONE ENCOUNTER
----- Message from Ama Alvarenga sent at 12/3/2020 11:56 AM CST -----  Type:  Needs Medical Advice    Who Called: [facility]  Reason:Needs to speak with the nurse in regards to the order they received for compression stockings  Would the patient rather a call back or a response via [facility]? call  Best Call Back Number: [phone]  Additional Information: none

## 2020-12-17 DIAGNOSIS — I50.32 CHRONIC DIASTOLIC HEART FAILURE: ICD-10-CM

## 2020-12-17 DIAGNOSIS — N18.4 TYPE 2 DIABETES MELLITUS WITH STAGE 4 CHRONIC KIDNEY DISEASE, WITHOUT LONG-TERM CURRENT USE OF INSULIN: ICD-10-CM

## 2020-12-17 DIAGNOSIS — E11.22 TYPE 2 DIABETES MELLITUS WITH STAGE 4 CHRONIC KIDNEY DISEASE, WITHOUT LONG-TERM CURRENT USE OF INSULIN: ICD-10-CM

## 2020-12-17 DIAGNOSIS — E11.51 TYPE 2 DIABETES MELLITUS WITH DIABETIC PERIPHERAL ANGIOPATHY WITHOUT GANGRENE, WITHOUT LONG-TERM CURRENT USE OF INSULIN: ICD-10-CM

## 2020-12-17 DIAGNOSIS — E11.42 TYPE 2 DIABETES MELLITUS WITH DIABETIC POLYNEUROPATHY, WITHOUT LONG-TERM CURRENT USE OF INSULIN: ICD-10-CM

## 2020-12-17 DIAGNOSIS — I10 ESSENTIAL HYPERTENSION: ICD-10-CM

## 2020-12-17 DIAGNOSIS — N18.4 CKD (CHRONIC KIDNEY DISEASE) STAGE 4, GFR 15-29 ML/MIN: ICD-10-CM

## 2020-12-17 RX ORDER — LOSARTAN POTASSIUM 25 MG/1
TABLET ORAL
Qty: 90 TABLET | Refills: 1 | Status: SHIPPED | OUTPATIENT
Start: 2020-12-17 | End: 2021-06-16

## 2020-12-17 RX ORDER — METOPROLOL TARTRATE 25 MG/1
TABLET, FILM COATED ORAL
Qty: 180 TABLET | Refills: 1 | Status: SHIPPED | OUTPATIENT
Start: 2020-12-17 | End: 2021-06-07

## 2020-12-17 RX ORDER — PIOGLITAZONEHYDROCHLORIDE 30 MG/1
TABLET ORAL
Qty: 90 TABLET | Refills: 1 | Status: SHIPPED | OUTPATIENT
Start: 2020-12-17 | End: 2021-01-19

## 2021-01-09 ENCOUNTER — IMMUNIZATION (OUTPATIENT)
Dept: FAMILY MEDICINE | Facility: CLINIC | Age: 86
End: 2021-01-09
Payer: MEDICARE

## 2021-01-09 DIAGNOSIS — Z23 NEED FOR VACCINATION: ICD-10-CM

## 2021-01-09 PROCEDURE — 91300 COVID-19, MRNA, LNP-S, PF, 30 MCG/0.3 ML DOSE VACCINE: CPT | Mod: PBBFAC | Performed by: FAMILY MEDICINE

## 2021-01-19 ENCOUNTER — OFFICE VISIT (OUTPATIENT)
Dept: FAMILY MEDICINE | Facility: CLINIC | Age: 86
End: 2021-01-19
Payer: MEDICARE

## 2021-01-19 VITALS
BODY MASS INDEX: 32.24 KG/M2 | HEIGHT: 62 IN | TEMPERATURE: 98 F | HEART RATE: 73 BPM | OXYGEN SATURATION: 96 % | WEIGHT: 175.19 LBS | SYSTOLIC BLOOD PRESSURE: 118 MMHG | DIASTOLIC BLOOD PRESSURE: 70 MMHG | RESPIRATION RATE: 18 BRPM

## 2021-01-19 DIAGNOSIS — E11.51 TYPE 2 DIABETES MELLITUS WITH DIABETIC PERIPHERAL ANGIOPATHY WITHOUT GANGRENE, WITHOUT LONG-TERM CURRENT USE OF INSULIN: ICD-10-CM

## 2021-01-19 DIAGNOSIS — E11.42 TYPE 2 DIABETES MELLITUS WITH DIABETIC POLYNEUROPATHY, WITHOUT LONG-TERM CURRENT USE OF INSULIN: ICD-10-CM

## 2021-01-19 DIAGNOSIS — E11.22 TYPE 2 DIABETES MELLITUS WITH STAGE 3B CHRONIC KIDNEY DISEASE, WITHOUT LONG-TERM CURRENT USE OF INSULIN: Primary | ICD-10-CM

## 2021-01-19 DIAGNOSIS — I50.32 CHRONIC DIASTOLIC HEART FAILURE: ICD-10-CM

## 2021-01-19 DIAGNOSIS — I10 ESSENTIAL HYPERTENSION: ICD-10-CM

## 2021-01-19 DIAGNOSIS — N18.32 TYPE 2 DIABETES MELLITUS WITH STAGE 3B CHRONIC KIDNEY DISEASE, WITHOUT LONG-TERM CURRENT USE OF INSULIN: Primary | ICD-10-CM

## 2021-01-19 DIAGNOSIS — N18.32 STAGE 3B CHRONIC KIDNEY DISEASE: ICD-10-CM

## 2021-01-19 DIAGNOSIS — I25.10 CORONARY ARTERY DISEASE INVOLVING NATIVE CORONARY ARTERY OF NATIVE HEART WITHOUT ANGINA PECTORIS: ICD-10-CM

## 2021-01-19 DIAGNOSIS — E78.00 PURE HYPERCHOLESTEROLEMIA: ICD-10-CM

## 2021-01-19 DIAGNOSIS — N25.81 SECONDARY HYPERPARATHYROIDISM OF RENAL ORIGIN: ICD-10-CM

## 2021-01-19 DIAGNOSIS — I73.9 PAD (PERIPHERAL ARTERY DISEASE): ICD-10-CM

## 2021-01-19 PROBLEM — I25.118 CORONARY ARTERY DISEASE OF NATIVE ARTERY OF NATIVE HEART WITH STABLE ANGINA PECTORIS: Status: ACTIVE | Noted: 2021-01-19

## 2021-01-19 PROCEDURE — 3074F PR MOST RECENT SYSTOLIC BLOOD PRESSURE < 130 MM HG: ICD-10-PCS | Mod: CPTII,S$GLB,, | Performed by: FAMILY MEDICINE

## 2021-01-19 PROCEDURE — 1101F PT FALLS ASSESS-DOCD LE1/YR: CPT | Mod: CPTII,S$GLB,, | Performed by: FAMILY MEDICINE

## 2021-01-19 PROCEDURE — 1159F PR MEDICATION LIST DOCUMENTED IN MEDICAL RECORD: ICD-10-PCS | Mod: S$GLB,,, | Performed by: FAMILY MEDICINE

## 2021-01-19 PROCEDURE — 99214 PR OFFICE/OUTPT VISIT, EST, LEVL IV, 30-39 MIN: ICD-10-PCS | Mod: S$GLB,,, | Performed by: FAMILY MEDICINE

## 2021-01-19 PROCEDURE — 99214 OFFICE O/P EST MOD 30 MIN: CPT | Mod: S$GLB,,, | Performed by: FAMILY MEDICINE

## 2021-01-19 PROCEDURE — 1159F MED LIST DOCD IN RCRD: CPT | Mod: S$GLB,,, | Performed by: FAMILY MEDICINE

## 2021-01-19 PROCEDURE — 1126F PR PAIN SEVERITY QUANTIFIED, NO PAIN PRESENT: ICD-10-PCS | Mod: S$GLB,,, | Performed by: FAMILY MEDICINE

## 2021-01-19 PROCEDURE — 3074F SYST BP LT 130 MM HG: CPT | Mod: CPTII,S$GLB,, | Performed by: FAMILY MEDICINE

## 2021-01-19 PROCEDURE — 1126F AMNT PAIN NOTED NONE PRSNT: CPT | Mod: S$GLB,,, | Performed by: FAMILY MEDICINE

## 2021-01-19 PROCEDURE — 3288F PR FALLS RISK ASSESSMENT DOCUMENTED: ICD-10-PCS | Mod: CPTII,S$GLB,, | Performed by: FAMILY MEDICINE

## 2021-01-19 PROCEDURE — 3288F FALL RISK ASSESSMENT DOCD: CPT | Mod: CPTII,S$GLB,, | Performed by: FAMILY MEDICINE

## 2021-01-19 PROCEDURE — 3078F PR MOST RECENT DIASTOLIC BLOOD PRESSURE < 80 MM HG: ICD-10-PCS | Mod: CPTII,S$GLB,, | Performed by: FAMILY MEDICINE

## 2021-01-19 PROCEDURE — 99999 PR PBB SHADOW E&M-EST. PATIENT-LVL V: CPT | Mod: PBBFAC,,, | Performed by: FAMILY MEDICINE

## 2021-01-19 PROCEDURE — 99999 PR PBB SHADOW E&M-EST. PATIENT-LVL V: ICD-10-PCS | Mod: PBBFAC,,, | Performed by: FAMILY MEDICINE

## 2021-01-19 PROCEDURE — 3078F DIAST BP <80 MM HG: CPT | Mod: CPTII,S$GLB,, | Performed by: FAMILY MEDICINE

## 2021-01-19 PROCEDURE — 1101F PR PT FALLS ASSESS DOC 0-1 FALLS W/OUT INJ PAST YR: ICD-10-PCS | Mod: CPTII,S$GLB,, | Performed by: FAMILY MEDICINE

## 2021-01-19 RX ORDER — LINAGLIPTIN 5 MG/1
5 TABLET, FILM COATED ORAL DAILY
Qty: 90 TABLET | Refills: 3 | Status: SHIPPED | OUTPATIENT
Start: 2021-01-19 | End: 2022-02-03

## 2021-01-27 ENCOUNTER — PATIENT MESSAGE (OUTPATIENT)
Dept: FAMILY MEDICINE | Facility: CLINIC | Age: 86
End: 2021-01-27

## 2021-01-27 DIAGNOSIS — I25.10 CORONARY ARTERY DISEASE INVOLVING NATIVE CORONARY ARTERY OF NATIVE HEART WITHOUT ANGINA PECTORIS: ICD-10-CM

## 2021-01-27 RX ORDER — ATORVASTATIN CALCIUM 80 MG/1
80 TABLET, FILM COATED ORAL NIGHTLY
Qty: 90 TABLET | Refills: 3 | Status: SHIPPED | OUTPATIENT
Start: 2021-01-27 | End: 2021-10-21 | Stop reason: SDUPTHER

## 2021-01-30 ENCOUNTER — IMMUNIZATION (OUTPATIENT)
Dept: FAMILY MEDICINE | Facility: CLINIC | Age: 86
End: 2021-01-30
Payer: MEDICARE

## 2021-01-30 DIAGNOSIS — Z23 NEED FOR VACCINATION: Primary | ICD-10-CM

## 2021-01-30 PROCEDURE — 91300 COVID-19, MRNA, LNP-S, PF, 30 MCG/0.3 ML DOSE VACCINE: CPT | Mod: PBBFAC | Performed by: FAMILY MEDICINE

## 2021-01-30 PROCEDURE — 0002A COVID-19, MRNA, LNP-S, PF, 30 MCG/0.3 ML DOSE VACCINE: CPT | Mod: PBBFAC | Performed by: FAMILY MEDICINE

## 2021-02-03 ENCOUNTER — PATIENT OUTREACH (OUTPATIENT)
Dept: ADMINISTRATIVE | Facility: OTHER | Age: 86
End: 2021-02-03

## 2021-02-11 ENCOUNTER — OFFICE VISIT (OUTPATIENT)
Dept: CARDIOLOGY | Facility: CLINIC | Age: 86
End: 2021-02-11
Payer: MEDICARE

## 2021-02-11 VITALS
HEART RATE: 55 BPM | WEIGHT: 174 LBS | HEIGHT: 62 IN | DIASTOLIC BLOOD PRESSURE: 70 MMHG | BODY MASS INDEX: 32.02 KG/M2 | SYSTOLIC BLOOD PRESSURE: 144 MMHG | OXYGEN SATURATION: 95 %

## 2021-02-11 DIAGNOSIS — I50.32 CHRONIC DIASTOLIC HEART FAILURE: ICD-10-CM

## 2021-02-11 DIAGNOSIS — R60.0 EDEMA OF LEFT LOWER EXTREMITY: ICD-10-CM

## 2021-02-11 DIAGNOSIS — E66.09 CLASS 1 OBESITY DUE TO EXCESS CALORIES WITH SERIOUS COMORBIDITY AND BODY MASS INDEX (BMI) OF 32.0 TO 32.9 IN ADULT: ICD-10-CM

## 2021-02-11 DIAGNOSIS — I10 ESSENTIAL HYPERTENSION: ICD-10-CM

## 2021-02-11 DIAGNOSIS — N18.32 STAGE 3B CHRONIC KIDNEY DISEASE: ICD-10-CM

## 2021-02-11 DIAGNOSIS — I73.9 PAD (PERIPHERAL ARTERY DISEASE): ICD-10-CM

## 2021-02-11 DIAGNOSIS — E78.00 PURE HYPERCHOLESTEROLEMIA: ICD-10-CM

## 2021-02-11 DIAGNOSIS — I25.118 CORONARY ARTERY DISEASE OF NATIVE ARTERY OF NATIVE HEART WITH STABLE ANGINA PECTORIS: ICD-10-CM

## 2021-02-11 DIAGNOSIS — E11.51 TYPE 2 DIABETES MELLITUS WITH DIABETIC PERIPHERAL ANGIOPATHY WITHOUT GANGRENE, WITHOUT LONG-TERM CURRENT USE OF INSULIN: ICD-10-CM

## 2021-02-11 DIAGNOSIS — N25.81 SECONDARY HYPERPARATHYROIDISM OF RENAL ORIGIN: ICD-10-CM

## 2021-02-11 PROCEDURE — 99499 UNLISTED E&M SERVICE: CPT | Mod: S$GLB,,, | Performed by: INTERNAL MEDICINE

## 2021-02-11 PROCEDURE — 99214 PR OFFICE/OUTPT VISIT, EST, LEVL IV, 30-39 MIN: ICD-10-PCS | Mod: S$GLB,,, | Performed by: INTERNAL MEDICINE

## 2021-02-11 PROCEDURE — 99999 PR PBB SHADOW E&M-EST. PATIENT-LVL IV: ICD-10-PCS | Mod: PBBFAC,,, | Performed by: INTERNAL MEDICINE

## 2021-02-11 PROCEDURE — 3078F DIAST BP <80 MM HG: CPT | Mod: CPTII,S$GLB,, | Performed by: INTERNAL MEDICINE

## 2021-02-11 PROCEDURE — 3077F SYST BP >= 140 MM HG: CPT | Mod: CPTII,S$GLB,, | Performed by: INTERNAL MEDICINE

## 2021-02-11 PROCEDURE — 1101F PT FALLS ASSESS-DOCD LE1/YR: CPT | Mod: CPTII,S$GLB,, | Performed by: INTERNAL MEDICINE

## 2021-02-11 PROCEDURE — 1126F PR PAIN SEVERITY QUANTIFIED, NO PAIN PRESENT: ICD-10-PCS | Mod: S$GLB,,, | Performed by: INTERNAL MEDICINE

## 2021-02-11 PROCEDURE — 99999 PR PBB SHADOW E&M-EST. PATIENT-LVL IV: CPT | Mod: PBBFAC,,, | Performed by: INTERNAL MEDICINE

## 2021-02-11 PROCEDURE — 1101F PR PT FALLS ASSESS DOC 0-1 FALLS W/OUT INJ PAST YR: ICD-10-PCS | Mod: CPTII,S$GLB,, | Performed by: INTERNAL MEDICINE

## 2021-02-11 PROCEDURE — 3288F PR FALLS RISK ASSESSMENT DOCUMENTED: ICD-10-PCS | Mod: CPTII,S$GLB,, | Performed by: INTERNAL MEDICINE

## 2021-02-11 PROCEDURE — 3077F PR MOST RECENT SYSTOLIC BLOOD PRESSURE >= 140 MM HG: ICD-10-PCS | Mod: CPTII,S$GLB,, | Performed by: INTERNAL MEDICINE

## 2021-02-11 PROCEDURE — 1159F MED LIST DOCD IN RCRD: CPT | Mod: S$GLB,,, | Performed by: INTERNAL MEDICINE

## 2021-02-11 PROCEDURE — 3288F FALL RISK ASSESSMENT DOCD: CPT | Mod: CPTII,S$GLB,, | Performed by: INTERNAL MEDICINE

## 2021-02-11 PROCEDURE — 3078F PR MOST RECENT DIASTOLIC BLOOD PRESSURE < 80 MM HG: ICD-10-PCS | Mod: CPTII,S$GLB,, | Performed by: INTERNAL MEDICINE

## 2021-02-11 PROCEDURE — 1126F AMNT PAIN NOTED NONE PRSNT: CPT | Mod: S$GLB,,, | Performed by: INTERNAL MEDICINE

## 2021-02-11 PROCEDURE — 1159F PR MEDICATION LIST DOCUMENTED IN MEDICAL RECORD: ICD-10-PCS | Mod: S$GLB,,, | Performed by: INTERNAL MEDICINE

## 2021-02-11 PROCEDURE — 99214 OFFICE O/P EST MOD 30 MIN: CPT | Mod: S$GLB,,, | Performed by: INTERNAL MEDICINE

## 2021-02-11 PROCEDURE — 99499 RISK ADDL DX/OHS AUDIT: ICD-10-PCS | Mod: S$GLB,,, | Performed by: INTERNAL MEDICINE

## 2021-03-04 ENCOUNTER — TELEPHONE (OUTPATIENT)
Dept: FAMILY MEDICINE | Facility: CLINIC | Age: 86
End: 2021-03-04

## 2021-03-04 DIAGNOSIS — Z12.31 ENCOUNTER FOR SCREENING MAMMOGRAM FOR BREAST CANCER: Primary | ICD-10-CM

## 2021-03-31 ENCOUNTER — PATIENT MESSAGE (OUTPATIENT)
Dept: FAMILY MEDICINE | Facility: CLINIC | Age: 86
End: 2021-03-31

## 2021-06-16 DIAGNOSIS — E11.51 TYPE 2 DIABETES MELLITUS WITH DIABETIC PERIPHERAL ANGIOPATHY WITHOUT GANGRENE, WITHOUT LONG-TERM CURRENT USE OF INSULIN: ICD-10-CM

## 2021-06-16 DIAGNOSIS — E11.22 TYPE 2 DIABETES MELLITUS WITH STAGE 4 CHRONIC KIDNEY DISEASE, WITHOUT LONG-TERM CURRENT USE OF INSULIN: ICD-10-CM

## 2021-06-16 DIAGNOSIS — E11.42 TYPE 2 DIABETES MELLITUS WITH DIABETIC POLYNEUROPATHY, WITHOUT LONG-TERM CURRENT USE OF INSULIN: ICD-10-CM

## 2021-06-16 DIAGNOSIS — N18.4 TYPE 2 DIABETES MELLITUS WITH STAGE 4 CHRONIC KIDNEY DISEASE, WITHOUT LONG-TERM CURRENT USE OF INSULIN: ICD-10-CM

## 2021-06-16 DIAGNOSIS — I10 ESSENTIAL HYPERTENSION: ICD-10-CM

## 2021-06-16 RX ORDER — PIOGLITAZONEHYDROCHLORIDE 30 MG/1
TABLET ORAL
Qty: 90 TABLET | Refills: 0 | OUTPATIENT
Start: 2021-06-16

## 2021-06-16 RX ORDER — LOSARTAN POTASSIUM 50 MG/1
TABLET ORAL
Qty: 90 TABLET | Refills: 0 | Status: SHIPPED | OUTPATIENT
Start: 2021-06-16 | End: 2021-06-21 | Stop reason: SDUPTHER

## 2021-06-30 ENCOUNTER — OFFICE VISIT (OUTPATIENT)
Dept: FAMILY MEDICINE | Facility: CLINIC | Age: 86
End: 2021-06-30
Payer: MEDICARE

## 2021-06-30 VITALS
DIASTOLIC BLOOD PRESSURE: 60 MMHG | HEIGHT: 62 IN | HEART RATE: 62 BPM | OXYGEN SATURATION: 97 % | WEIGHT: 169.31 LBS | BODY MASS INDEX: 31.16 KG/M2 | TEMPERATURE: 98 F | SYSTOLIC BLOOD PRESSURE: 138 MMHG

## 2021-06-30 DIAGNOSIS — M25.512 ACUTE PAIN OF LEFT SHOULDER: Primary | ICD-10-CM

## 2021-06-30 PROCEDURE — 1159F PR MEDICATION LIST DOCUMENTED IN MEDICAL RECORD: ICD-10-PCS | Mod: S$GLB,,, | Performed by: INTERNAL MEDICINE

## 2021-06-30 PROCEDURE — 99499 UNLISTED E&M SERVICE: CPT | Mod: S$GLB,,, | Performed by: INTERNAL MEDICINE

## 2021-06-30 PROCEDURE — 99214 OFFICE O/P EST MOD 30 MIN: CPT | Mod: S$GLB,,, | Performed by: INTERNAL MEDICINE

## 2021-06-30 PROCEDURE — 1126F PR PAIN SEVERITY QUANTIFIED, NO PAIN PRESENT: ICD-10-PCS | Mod: S$GLB,,, | Performed by: INTERNAL MEDICINE

## 2021-06-30 PROCEDURE — 1126F AMNT PAIN NOTED NONE PRSNT: CPT | Mod: S$GLB,,, | Performed by: INTERNAL MEDICINE

## 2021-06-30 PROCEDURE — 1159F MED LIST DOCD IN RCRD: CPT | Mod: S$GLB,,, | Performed by: INTERNAL MEDICINE

## 2021-06-30 PROCEDURE — 99999 PR PBB SHADOW E&M-EST. PATIENT-LVL IV: CPT | Mod: PBBFAC,,, | Performed by: INTERNAL MEDICINE

## 2021-06-30 PROCEDURE — 99214 PR OFFICE/OUTPT VISIT, EST, LEVL IV, 30-39 MIN: ICD-10-PCS | Mod: S$GLB,,, | Performed by: INTERNAL MEDICINE

## 2021-06-30 PROCEDURE — 99999 PR PBB SHADOW E&M-EST. PATIENT-LVL IV: ICD-10-PCS | Mod: PBBFAC,,, | Performed by: INTERNAL MEDICINE

## 2021-06-30 PROCEDURE — 99499 RISK ADDL DX/OHS AUDIT: ICD-10-PCS | Mod: S$GLB,,, | Performed by: INTERNAL MEDICINE

## 2021-07-06 ENCOUNTER — TELEPHONE (OUTPATIENT)
Dept: FAMILY MEDICINE | Facility: CLINIC | Age: 86
End: 2021-07-06

## 2021-07-06 DIAGNOSIS — I50.32 CHRONIC DIASTOLIC HEART FAILURE: Primary | ICD-10-CM

## 2021-07-26 ENCOUNTER — OFFICE VISIT (OUTPATIENT)
Dept: FAMILY MEDICINE | Facility: CLINIC | Age: 86
End: 2021-07-26
Payer: MEDICARE

## 2021-07-26 VITALS
SYSTOLIC BLOOD PRESSURE: 110 MMHG | BODY MASS INDEX: 31.25 KG/M2 | DIASTOLIC BLOOD PRESSURE: 60 MMHG | RESPIRATION RATE: 18 BRPM | OXYGEN SATURATION: 97 % | HEART RATE: 71 BPM | HEIGHT: 62 IN | WEIGHT: 169.81 LBS

## 2021-07-26 DIAGNOSIS — E11.22 TYPE 2 DIABETES MELLITUS WITH STAGE 3B CHRONIC KIDNEY DISEASE, WITHOUT LONG-TERM CURRENT USE OF INSULIN: ICD-10-CM

## 2021-07-26 DIAGNOSIS — N18.32 STAGE 3B CHRONIC KIDNEY DISEASE: ICD-10-CM

## 2021-07-26 DIAGNOSIS — E11.42 TYPE 2 DIABETES MELLITUS WITH DIABETIC POLYNEUROPATHY, WITHOUT LONG-TERM CURRENT USE OF INSULIN: Primary | ICD-10-CM

## 2021-07-26 DIAGNOSIS — N25.81 SECONDARY HYPERPARATHYROIDISM OF RENAL ORIGIN: ICD-10-CM

## 2021-07-26 DIAGNOSIS — I50.32 CHRONIC DIASTOLIC HEART FAILURE: ICD-10-CM

## 2021-07-26 DIAGNOSIS — I10 ESSENTIAL HYPERTENSION: ICD-10-CM

## 2021-07-26 DIAGNOSIS — E11.51 TYPE 2 DIABETES MELLITUS WITH DIABETIC PERIPHERAL ANGIOPATHY WITHOUT GANGRENE, WITHOUT LONG-TERM CURRENT USE OF INSULIN: ICD-10-CM

## 2021-07-26 DIAGNOSIS — N18.32 TYPE 2 DIABETES MELLITUS WITH STAGE 3B CHRONIC KIDNEY DISEASE, WITHOUT LONG-TERM CURRENT USE OF INSULIN: ICD-10-CM

## 2021-07-26 DIAGNOSIS — E78.00 PURE HYPERCHOLESTEROLEMIA: ICD-10-CM

## 2021-07-26 DIAGNOSIS — I25.10 CORONARY ARTERY DISEASE INVOLVING NATIVE CORONARY ARTERY OF NATIVE HEART WITHOUT ANGINA PECTORIS: ICD-10-CM

## 2021-07-26 DIAGNOSIS — I73.9 PAD (PERIPHERAL ARTERY DISEASE): ICD-10-CM

## 2021-07-26 PROCEDURE — 3078F PR MOST RECENT DIASTOLIC BLOOD PRESSURE < 80 MM HG: ICD-10-PCS | Mod: CPTII,S$GLB,, | Performed by: FAMILY MEDICINE

## 2021-07-26 PROCEDURE — 3074F SYST BP LT 130 MM HG: CPT | Mod: CPTII,S$GLB,, | Performed by: FAMILY MEDICINE

## 2021-07-26 PROCEDURE — 99499 UNLISTED E&M SERVICE: CPT | Mod: S$GLB,,, | Performed by: FAMILY MEDICINE

## 2021-07-26 PROCEDURE — 1159F PR MEDICATION LIST DOCUMENTED IN MEDICAL RECORD: ICD-10-PCS | Mod: CPTII,S$GLB,, | Performed by: FAMILY MEDICINE

## 2021-07-26 PROCEDURE — 1160F RVW MEDS BY RX/DR IN RCRD: CPT | Mod: CPTII,S$GLB,, | Performed by: FAMILY MEDICINE

## 2021-07-26 PROCEDURE — 3288F PR FALLS RISK ASSESSMENT DOCUMENTED: ICD-10-PCS | Mod: CPTII,S$GLB,, | Performed by: FAMILY MEDICINE

## 2021-07-26 PROCEDURE — 99214 PR OFFICE/OUTPT VISIT, EST, LEVL IV, 30-39 MIN: ICD-10-PCS | Mod: S$GLB,,, | Performed by: FAMILY MEDICINE

## 2021-07-26 PROCEDURE — 3074F PR MOST RECENT SYSTOLIC BLOOD PRESSURE < 130 MM HG: ICD-10-PCS | Mod: CPTII,S$GLB,, | Performed by: FAMILY MEDICINE

## 2021-07-26 PROCEDURE — 1101F PR PT FALLS ASSESS DOC 0-1 FALLS W/OUT INJ PAST YR: ICD-10-PCS | Mod: CPTII,S$GLB,, | Performed by: FAMILY MEDICINE

## 2021-07-26 PROCEDURE — 1126F AMNT PAIN NOTED NONE PRSNT: CPT | Mod: CPTII,S$GLB,, | Performed by: FAMILY MEDICINE

## 2021-07-26 PROCEDURE — 99214 OFFICE O/P EST MOD 30 MIN: CPT | Mod: S$GLB,,, | Performed by: FAMILY MEDICINE

## 2021-07-26 PROCEDURE — 1101F PT FALLS ASSESS-DOCD LE1/YR: CPT | Mod: CPTII,S$GLB,, | Performed by: FAMILY MEDICINE

## 2021-07-26 PROCEDURE — 99999 PR PBB SHADOW E&M-EST. PATIENT-LVL V: CPT | Mod: PBBFAC,,, | Performed by: FAMILY MEDICINE

## 2021-07-26 PROCEDURE — 3288F FALL RISK ASSESSMENT DOCD: CPT | Mod: CPTII,S$GLB,, | Performed by: FAMILY MEDICINE

## 2021-07-26 PROCEDURE — 1160F PR REVIEW ALL MEDS BY PRESCRIBER/CLIN PHARMACIST DOCUMENTED: ICD-10-PCS | Mod: CPTII,S$GLB,, | Performed by: FAMILY MEDICINE

## 2021-07-26 PROCEDURE — 99999 PR PBB SHADOW E&M-EST. PATIENT-LVL V: ICD-10-PCS | Mod: PBBFAC,,, | Performed by: FAMILY MEDICINE

## 2021-07-26 PROCEDURE — 1126F PR PAIN SEVERITY QUANTIFIED, NO PAIN PRESENT: ICD-10-PCS | Mod: CPTII,S$GLB,, | Performed by: FAMILY MEDICINE

## 2021-07-26 PROCEDURE — 1159F MED LIST DOCD IN RCRD: CPT | Mod: CPTII,S$GLB,, | Performed by: FAMILY MEDICINE

## 2021-07-26 PROCEDURE — 99499 RISK ADDL DX/OHS AUDIT: ICD-10-PCS | Mod: S$GLB,,, | Performed by: FAMILY MEDICINE

## 2021-07-26 PROCEDURE — 3078F DIAST BP <80 MM HG: CPT | Mod: CPTII,S$GLB,, | Performed by: FAMILY MEDICINE

## 2021-07-27 ENCOUNTER — PATIENT MESSAGE (OUTPATIENT)
Dept: FAMILY MEDICINE | Facility: CLINIC | Age: 86
End: 2021-07-27

## 2021-08-05 DIAGNOSIS — I10 ESSENTIAL HYPERTENSION: ICD-10-CM

## 2021-08-05 RX ORDER — HYDRALAZINE HYDROCHLORIDE 50 MG/1
50 TABLET, FILM COATED ORAL EVERY 8 HOURS
Qty: 270 TABLET | Refills: 3 | Status: SHIPPED | OUTPATIENT
Start: 2021-08-05 | End: 2022-06-02 | Stop reason: SDUPTHER

## 2021-08-06 ENCOUNTER — PATIENT MESSAGE (OUTPATIENT)
Dept: FAMILY MEDICINE | Facility: CLINIC | Age: 86
End: 2021-08-06

## 2021-08-06 DIAGNOSIS — M85.89 OSTEOPENIA OF MULTIPLE SITES: ICD-10-CM

## 2021-10-04 ENCOUNTER — OFFICE VISIT (OUTPATIENT)
Dept: CARDIOLOGY | Facility: CLINIC | Age: 86
End: 2021-10-04
Payer: MEDICARE

## 2021-10-04 VITALS
SYSTOLIC BLOOD PRESSURE: 132 MMHG | DIASTOLIC BLOOD PRESSURE: 74 MMHG | OXYGEN SATURATION: 97 % | HEART RATE: 70 BPM | BODY MASS INDEX: 31.4 KG/M2 | HEIGHT: 62 IN | WEIGHT: 170.63 LBS

## 2021-10-04 DIAGNOSIS — I73.9 PAD (PERIPHERAL ARTERY DISEASE): ICD-10-CM

## 2021-10-04 DIAGNOSIS — E11.42 TYPE 2 DIABETES MELLITUS WITH DIABETIC POLYNEUROPATHY, WITHOUT LONG-TERM CURRENT USE OF INSULIN: ICD-10-CM

## 2021-10-04 DIAGNOSIS — E78.2 MIXED HYPERLIPIDEMIA: ICD-10-CM

## 2021-10-04 DIAGNOSIS — E66.09 CLASS 1 OBESITY DUE TO EXCESS CALORIES WITH SERIOUS COMORBIDITY AND BODY MASS INDEX (BMI) OF 32.0 TO 32.9 IN ADULT: ICD-10-CM

## 2021-10-04 DIAGNOSIS — R60.0 EDEMA OF LEFT LOWER EXTREMITY: ICD-10-CM

## 2021-10-04 DIAGNOSIS — N18.32 TYPE 2 DIABETES MELLITUS WITH STAGE 3B CHRONIC KIDNEY DISEASE, WITHOUT LONG-TERM CURRENT USE OF INSULIN: ICD-10-CM

## 2021-10-04 DIAGNOSIS — I10 ESSENTIAL HYPERTENSION: ICD-10-CM

## 2021-10-04 DIAGNOSIS — E11.22 TYPE 2 DIABETES MELLITUS WITH STAGE 3B CHRONIC KIDNEY DISEASE, WITHOUT LONG-TERM CURRENT USE OF INSULIN: ICD-10-CM

## 2021-10-04 DIAGNOSIS — I25.10 CORONARY ARTERY DISEASE INVOLVING NATIVE CORONARY ARTERY OF NATIVE HEART WITHOUT ANGINA PECTORIS: ICD-10-CM

## 2021-10-04 PROCEDURE — 3078F PR MOST RECENT DIASTOLIC BLOOD PRESSURE < 80 MM HG: ICD-10-PCS | Mod: CPTII,S$GLB,, | Performed by: INTERNAL MEDICINE

## 2021-10-04 PROCEDURE — 99214 PR OFFICE/OUTPT VISIT, EST, LEVL IV, 30-39 MIN: ICD-10-PCS | Mod: S$GLB,,, | Performed by: INTERNAL MEDICINE

## 2021-10-04 PROCEDURE — 99999 PR PBB SHADOW E&M-EST. PATIENT-LVL III: CPT | Mod: PBBFAC,,, | Performed by: INTERNAL MEDICINE

## 2021-10-04 PROCEDURE — 1160F PR REVIEW ALL MEDS BY PRESCRIBER/CLIN PHARMACIST DOCUMENTED: ICD-10-PCS | Mod: CPTII,S$GLB,, | Performed by: INTERNAL MEDICINE

## 2021-10-04 PROCEDURE — 99999 PR PBB SHADOW E&M-EST. PATIENT-LVL III: ICD-10-PCS | Mod: PBBFAC,,, | Performed by: INTERNAL MEDICINE

## 2021-10-04 PROCEDURE — 3075F SYST BP GE 130 - 139MM HG: CPT | Mod: CPTII,S$GLB,, | Performed by: INTERNAL MEDICINE

## 2021-10-04 PROCEDURE — 1101F PR PT FALLS ASSESS DOC 0-1 FALLS W/OUT INJ PAST YR: ICD-10-PCS | Mod: CPTII,S$GLB,, | Performed by: INTERNAL MEDICINE

## 2021-10-04 PROCEDURE — 1101F PT FALLS ASSESS-DOCD LE1/YR: CPT | Mod: CPTII,S$GLB,, | Performed by: INTERNAL MEDICINE

## 2021-10-04 PROCEDURE — 99499 RISK ADDL DX/OHS AUDIT: ICD-10-PCS | Mod: S$GLB,,, | Performed by: INTERNAL MEDICINE

## 2021-10-04 PROCEDURE — 3078F DIAST BP <80 MM HG: CPT | Mod: CPTII,S$GLB,, | Performed by: INTERNAL MEDICINE

## 2021-10-04 PROCEDURE — 1159F MED LIST DOCD IN RCRD: CPT | Mod: CPTII,S$GLB,, | Performed by: INTERNAL MEDICINE

## 2021-10-04 PROCEDURE — 3288F PR FALLS RISK ASSESSMENT DOCUMENTED: ICD-10-PCS | Mod: CPTII,S$GLB,, | Performed by: INTERNAL MEDICINE

## 2021-10-04 PROCEDURE — 1160F RVW MEDS BY RX/DR IN RCRD: CPT | Mod: CPTII,S$GLB,, | Performed by: INTERNAL MEDICINE

## 2021-10-04 PROCEDURE — 1126F AMNT PAIN NOTED NONE PRSNT: CPT | Mod: CPTII,S$GLB,, | Performed by: INTERNAL MEDICINE

## 2021-10-04 PROCEDURE — 1126F PR PAIN SEVERITY QUANTIFIED, NO PAIN PRESENT: ICD-10-PCS | Mod: CPTII,S$GLB,, | Performed by: INTERNAL MEDICINE

## 2021-10-04 PROCEDURE — 1159F PR MEDICATION LIST DOCUMENTED IN MEDICAL RECORD: ICD-10-PCS | Mod: CPTII,S$GLB,, | Performed by: INTERNAL MEDICINE

## 2021-10-04 PROCEDURE — 99214 OFFICE O/P EST MOD 30 MIN: CPT | Mod: S$GLB,,, | Performed by: INTERNAL MEDICINE

## 2021-10-04 PROCEDURE — 99499 UNLISTED E&M SERVICE: CPT | Mod: S$GLB,,, | Performed by: INTERNAL MEDICINE

## 2021-10-04 PROCEDURE — 3288F FALL RISK ASSESSMENT DOCD: CPT | Mod: CPTII,S$GLB,, | Performed by: INTERNAL MEDICINE

## 2021-10-04 PROCEDURE — 3075F PR MOST RECENT SYSTOLIC BLOOD PRESS GE 130-139MM HG: ICD-10-PCS | Mod: CPTII,S$GLB,, | Performed by: INTERNAL MEDICINE

## 2021-10-05 ENCOUNTER — PATIENT MESSAGE (OUTPATIENT)
Dept: FAMILY MEDICINE | Facility: CLINIC | Age: 86
End: 2021-10-05

## 2021-10-12 ENCOUNTER — IMMUNIZATION (OUTPATIENT)
Dept: FAMILY MEDICINE | Facility: CLINIC | Age: 86
End: 2021-10-12
Payer: MEDICARE

## 2021-10-12 DIAGNOSIS — Z23 NEED FOR VACCINATION: Primary | ICD-10-CM

## 2021-10-12 PROCEDURE — 91300 COVID-19, MRNA, LNP-S, PF, 30 MCG/0.3 ML DOSE VACCINE: CPT | Mod: PBBFAC | Performed by: FAMILY MEDICINE

## 2021-10-12 PROCEDURE — 0003A COVID-19, MRNA, LNP-S, PF, 30 MCG/0.3 ML DOSE VACCINE: CPT | Mod: PBBFAC | Performed by: FAMILY MEDICINE

## 2021-10-21 DIAGNOSIS — I25.10 CORONARY ARTERY DISEASE INVOLVING NATIVE CORONARY ARTERY OF NATIVE HEART WITHOUT ANGINA PECTORIS: ICD-10-CM

## 2021-10-21 DIAGNOSIS — E11.42 TYPE 2 DIABETES MELLITUS WITH DIABETIC POLYNEUROPATHY, WITHOUT LONG-TERM CURRENT USE OF INSULIN: ICD-10-CM

## 2021-10-21 RX ORDER — GABAPENTIN 300 MG/1
300 CAPSULE ORAL NIGHTLY
Qty: 90 CAPSULE | Refills: 3 | Status: CANCELLED | OUTPATIENT
Start: 2021-10-21

## 2021-10-22 ENCOUNTER — PATIENT MESSAGE (OUTPATIENT)
Dept: FAMILY MEDICINE | Facility: CLINIC | Age: 86
End: 2021-10-22
Payer: MEDICARE

## 2021-10-22 DIAGNOSIS — E11.42 TYPE 2 DIABETES MELLITUS WITH DIABETIC POLYNEUROPATHY, WITHOUT LONG-TERM CURRENT USE OF INSULIN: ICD-10-CM

## 2021-10-22 RX ORDER — ATORVASTATIN CALCIUM 80 MG/1
80 TABLET, FILM COATED ORAL NIGHTLY
Qty: 90 TABLET | Refills: 3 | Status: SHIPPED | OUTPATIENT
Start: 2021-10-22 | End: 2022-11-09 | Stop reason: SDUPTHER

## 2021-10-22 RX ORDER — GABAPENTIN 300 MG/1
300 CAPSULE ORAL NIGHTLY
Qty: 90 CAPSULE | Refills: 3 | Status: CANCELLED | OUTPATIENT
Start: 2021-10-22

## 2021-12-08 ENCOUNTER — OFFICE VISIT (OUTPATIENT)
Dept: FAMILY MEDICINE | Facility: CLINIC | Age: 86
End: 2021-12-08
Payer: MEDICARE

## 2021-12-08 VITALS
DIASTOLIC BLOOD PRESSURE: 72 MMHG | BODY MASS INDEX: 31.54 KG/M2 | HEIGHT: 62 IN | OXYGEN SATURATION: 97 % | SYSTOLIC BLOOD PRESSURE: 160 MMHG | HEART RATE: 58 BPM | WEIGHT: 171.38 LBS

## 2021-12-08 DIAGNOSIS — E78.00 PURE HYPERCHOLESTEROLEMIA: ICD-10-CM

## 2021-12-08 DIAGNOSIS — N18.4 CKD (CHRONIC KIDNEY DISEASE) STAGE 4, GFR 15-29 ML/MIN: ICD-10-CM

## 2021-12-08 DIAGNOSIS — I10 ESSENTIAL HYPERTENSION: ICD-10-CM

## 2021-12-08 DIAGNOSIS — E11.42 TYPE 2 DIABETES MELLITUS WITH DIABETIC POLYNEUROPATHY, WITHOUT LONG-TERM CURRENT USE OF INSULIN: Primary | ICD-10-CM

## 2021-12-08 PROCEDURE — 99999 PR PBB SHADOW E&M-EST. PATIENT-LVL IV: ICD-10-PCS | Mod: PBBFAC,,, | Performed by: FAMILY MEDICINE

## 2021-12-08 PROCEDURE — 99999 PR PBB SHADOW E&M-EST. PATIENT-LVL IV: CPT | Mod: PBBFAC,,, | Performed by: FAMILY MEDICINE

## 2021-12-08 PROCEDURE — 99499 RISK ADDL DX/OHS AUDIT: ICD-10-PCS | Mod: S$GLB,,, | Performed by: FAMILY MEDICINE

## 2021-12-08 PROCEDURE — 99214 PR OFFICE/OUTPT VISIT, EST, LEVL IV, 30-39 MIN: ICD-10-PCS | Mod: S$GLB,,, | Performed by: FAMILY MEDICINE

## 2021-12-08 PROCEDURE — 99214 OFFICE O/P EST MOD 30 MIN: CPT | Mod: S$GLB,,, | Performed by: FAMILY MEDICINE

## 2021-12-08 PROCEDURE — 99499 UNLISTED E&M SERVICE: CPT | Mod: S$GLB,,, | Performed by: FAMILY MEDICINE

## 2021-12-09 LAB
ALBUMIN SERPL-MCNC: 3.8 G/DL (ref 3.6–5.1)
ALBUMIN/CREAT UR: 69 MCG/MG CREAT
ALBUMIN/GLOB SERPL: 1.4 (CALC) (ref 1–2.5)
ALP SERPL-CCNC: 63 U/L (ref 37–153)
ALT SERPL-CCNC: 12 U/L (ref 6–29)
AST SERPL-CCNC: 17 U/L (ref 10–35)
BASOPHILS # BLD AUTO: 28 CELLS/UL (ref 0–200)
BASOPHILS NFR BLD AUTO: 0.4 %
BILIRUB SERPL-MCNC: 0.6 MG/DL (ref 0.2–1.2)
BUN SERPL-MCNC: 28 MG/DL (ref 7–25)
BUN/CREAT SERPL: 19 (CALC) (ref 6–22)
CALCIUM SERPL-MCNC: 9.6 MG/DL (ref 8.6–10.4)
CHLORIDE SERPL-SCNC: 110 MMOL/L (ref 98–110)
CHOLEST SERPL-MCNC: 105 MG/DL
CHOLEST/HDLC SERPL: 2.4 (CALC)
CO2 SERPL-SCNC: 25 MMOL/L (ref 20–32)
COMMENT: NORMAL
CREAT SERPL-MCNC: 1.48 MG/DL (ref 0.6–0.88)
CREAT UR-MCNC: 84 MG/DL (ref 20–275)
EOSINOPHIL # BLD AUTO: 121 CELLS/UL (ref 15–500)
EOSINOPHIL NFR BLD AUTO: 1.7 %
ERYTHROCYTE [DISTWIDTH] IN BLOOD BY AUTOMATED COUNT: 12 % (ref 11–15)
EXTRA URINE SPECIMEN: NORMAL
GLOBULIN SER CALC-MCNC: 2.8 G/DL (CALC) (ref 1.9–3.7)
GLUCOSE SERPL-MCNC: 142 MG/DL (ref 65–99)
HBA1C MFR BLD: 6.7 % OF TOTAL HGB
HCT VFR BLD AUTO: 35.5 % (ref 35–45)
HDLC SERPL-MCNC: 43 MG/DL
HGB BLD-MCNC: 11.3 G/DL (ref 11.7–15.5)
LDLC SERPL CALC-MCNC: 45 MG/DL (CALC)
LYMPHOCYTES # BLD AUTO: 1676 CELLS/UL (ref 850–3900)
LYMPHOCYTES NFR BLD AUTO: 23.6 %
MCH RBC QN AUTO: 30.8 PG (ref 27–33)
MCHC RBC AUTO-ENTMCNC: 31.8 G/DL (ref 32–36)
MCV RBC AUTO: 96.7 FL (ref 80–100)
MICROALBUMIN UR-MCNC: 5.8 MG/DL
MONOCYTES # BLD AUTO: 391 CELLS/UL (ref 200–950)
MONOCYTES NFR BLD AUTO: 5.5 %
NEUTROPHILS # BLD AUTO: 4885 CELLS/UL (ref 1500–7800)
NEUTROPHILS NFR BLD AUTO: 68.8 %
NONHDLC SERPL-MCNC: 62 MG/DL (CALC)
PLATELET # BLD AUTO: 150 THOUSAND/UL (ref 140–400)
PMV BLD REES-ECKER: 12.6 FL (ref 7.5–12.5)
POTASSIUM SERPL-SCNC: 5 MMOL/L (ref 3.5–5.3)
PROT SERPL-MCNC: 6.6 G/DL (ref 6.1–8.1)
RBC # BLD AUTO: 3.67 MILLION/UL (ref 3.8–5.1)
SODIUM SERPL-SCNC: 143 MMOL/L (ref 135–146)
TRIGL SERPL-MCNC: 83 MG/DL
WBC # BLD AUTO: 7.1 THOUSAND/UL (ref 3.8–10.8)

## 2022-01-21 DIAGNOSIS — E11.42 TYPE 2 DIABETES MELLITUS WITH DIABETIC POLYNEUROPATHY, WITHOUT LONG-TERM CURRENT USE OF INSULIN: Primary | ICD-10-CM

## 2022-01-21 DIAGNOSIS — I25.10 CORONARY ARTERY DISEASE INVOLVING NATIVE CORONARY ARTERY OF NATIVE HEART WITHOUT ANGINA PECTORIS: ICD-10-CM

## 2022-01-22 LAB
CHOLEST SERPL-MCNC: 124 MG/DL
CHOLEST/HDLC SERPL: 2.3 (CALC)
HBA1C MFR BLD: 6.1 % OF TOTAL HGB
HDLC SERPL-MCNC: 53 MG/DL
LDLC SERPL CALC-MCNC: 55 MG/DL (CALC)
NONHDLC SERPL-MCNC: 71 MG/DL (CALC)
TRIGL SERPL-MCNC: 84 MG/DL

## 2022-01-26 ENCOUNTER — OFFICE VISIT (OUTPATIENT)
Dept: FAMILY MEDICINE | Facility: CLINIC | Age: 87
End: 2022-01-26
Payer: MEDICARE

## 2022-01-26 VITALS
SYSTOLIC BLOOD PRESSURE: 132 MMHG | WEIGHT: 169.19 LBS | BODY MASS INDEX: 31.13 KG/M2 | RESPIRATION RATE: 18 BRPM | DIASTOLIC BLOOD PRESSURE: 76 MMHG | OXYGEN SATURATION: 95 % | HEIGHT: 62 IN | HEART RATE: 60 BPM

## 2022-01-26 DIAGNOSIS — E11.59 HYPERTENSION ASSOCIATED WITH TYPE 2 DIABETES MELLITUS: ICD-10-CM

## 2022-01-26 DIAGNOSIS — I50.32 CHRONIC DIASTOLIC HEART FAILURE: ICD-10-CM

## 2022-01-26 DIAGNOSIS — E78.5 HYPERLIPIDEMIA ASSOCIATED WITH TYPE 2 DIABETES MELLITUS: ICD-10-CM

## 2022-01-26 DIAGNOSIS — R60.0 EDEMA OF LEFT LOWER EXTREMITY: ICD-10-CM

## 2022-01-26 DIAGNOSIS — N18.32 TYPE 2 DIABETES MELLITUS WITH STAGE 3B CHRONIC KIDNEY DISEASE, WITHOUT LONG-TERM CURRENT USE OF INSULIN: ICD-10-CM

## 2022-01-26 DIAGNOSIS — I25.10 CORONARY ARTERY DISEASE INVOLVING NATIVE CORONARY ARTERY OF NATIVE HEART WITHOUT ANGINA PECTORIS: ICD-10-CM

## 2022-01-26 DIAGNOSIS — E11.42 TYPE 2 DIABETES MELLITUS WITH DIABETIC POLYNEUROPATHY, WITHOUT LONG-TERM CURRENT USE OF INSULIN: ICD-10-CM

## 2022-01-26 DIAGNOSIS — E11.22 TYPE 2 DIABETES MELLITUS WITH STAGE 3B CHRONIC KIDNEY DISEASE, WITHOUT LONG-TERM CURRENT USE OF INSULIN: ICD-10-CM

## 2022-01-26 DIAGNOSIS — I73.9 PAD (PERIPHERAL ARTERY DISEASE): ICD-10-CM

## 2022-01-26 DIAGNOSIS — E11.51 TYPE 2 DIABETES MELLITUS WITH DIABETIC PERIPHERAL ANGIOPATHY WITHOUT GANGRENE, WITHOUT LONG-TERM CURRENT USE OF INSULIN: ICD-10-CM

## 2022-01-26 DIAGNOSIS — N25.81 SECONDARY HYPERPARATHYROIDISM OF RENAL ORIGIN: ICD-10-CM

## 2022-01-26 DIAGNOSIS — I15.2 HYPERTENSION ASSOCIATED WITH TYPE 2 DIABETES MELLITUS: ICD-10-CM

## 2022-01-26 DIAGNOSIS — E66.09 CLASS 1 OBESITY DUE TO EXCESS CALORIES WITH SERIOUS COMORBIDITY AND BODY MASS INDEX (BMI) OF 32.0 TO 32.9 IN ADULT: ICD-10-CM

## 2022-01-26 DIAGNOSIS — N18.32 TYPE 2 DIABETES MELLITUS WITH STAGE 3B CHRONIC KIDNEY DISEASE, WITHOUT LONG-TERM CURRENT USE OF INSULIN: Primary | ICD-10-CM

## 2022-01-26 DIAGNOSIS — E11.69 HYPERLIPIDEMIA ASSOCIATED WITH TYPE 2 DIABETES MELLITUS: ICD-10-CM

## 2022-01-26 DIAGNOSIS — E11.22 TYPE 2 DIABETES MELLITUS WITH STAGE 3B CHRONIC KIDNEY DISEASE, WITHOUT LONG-TERM CURRENT USE OF INSULIN: Primary | ICD-10-CM

## 2022-01-26 DIAGNOSIS — N18.32 STAGE 3B CHRONIC KIDNEY DISEASE: ICD-10-CM

## 2022-01-26 PROBLEM — I25.118 CORONARY ARTERY DISEASE OF NATIVE ARTERY OF NATIVE HEART WITH STABLE ANGINA PECTORIS: Status: RESOLVED | Noted: 2021-01-19 | Resolved: 2022-01-26

## 2022-01-26 PROCEDURE — 3075F PR MOST RECENT SYSTOLIC BLOOD PRESS GE 130-139MM HG: ICD-10-PCS | Mod: CPTII,S$GLB,, | Performed by: FAMILY MEDICINE

## 2022-01-26 PROCEDURE — 1101F PT FALLS ASSESS-DOCD LE1/YR: CPT | Mod: CPTII,S$GLB,, | Performed by: FAMILY MEDICINE

## 2022-01-26 PROCEDURE — 3078F PR MOST RECENT DIASTOLIC BLOOD PRESSURE < 80 MM HG: ICD-10-PCS | Mod: CPTII,S$GLB,, | Performed by: FAMILY MEDICINE

## 2022-01-26 PROCEDURE — 99999 PR PBB SHADOW E&M-EST. PATIENT-LVL IV: CPT | Mod: PBBFAC,,, | Performed by: FAMILY MEDICINE

## 2022-01-26 PROCEDURE — 1125F PR PAIN SEVERITY QUANTIFIED, PAIN PRESENT: ICD-10-PCS | Mod: CPTII,S$GLB,, | Performed by: FAMILY MEDICINE

## 2022-01-26 PROCEDURE — 99999 PR PBB SHADOW E&M-EST. PATIENT-LVL IV: ICD-10-PCS | Mod: PBBFAC,,, | Performed by: FAMILY MEDICINE

## 2022-01-26 PROCEDURE — 3288F PR FALLS RISK ASSESSMENT DOCUMENTED: ICD-10-PCS | Mod: CPTII,S$GLB,, | Performed by: FAMILY MEDICINE

## 2022-01-26 PROCEDURE — 1125F AMNT PAIN NOTED PAIN PRSNT: CPT | Mod: CPTII,S$GLB,, | Performed by: FAMILY MEDICINE

## 2022-01-26 PROCEDURE — 99214 OFFICE O/P EST MOD 30 MIN: CPT | Mod: S$GLB,,, | Performed by: FAMILY MEDICINE

## 2022-01-26 PROCEDURE — 1159F PR MEDICATION LIST DOCUMENTED IN MEDICAL RECORD: ICD-10-PCS | Mod: CPTII,S$GLB,, | Performed by: FAMILY MEDICINE

## 2022-01-26 PROCEDURE — 3075F SYST BP GE 130 - 139MM HG: CPT | Mod: CPTII,S$GLB,, | Performed by: FAMILY MEDICINE

## 2022-01-26 PROCEDURE — 1101F PR PT FALLS ASSESS DOC 0-1 FALLS W/OUT INJ PAST YR: ICD-10-PCS | Mod: CPTII,S$GLB,, | Performed by: FAMILY MEDICINE

## 2022-01-26 PROCEDURE — 3288F FALL RISK ASSESSMENT DOCD: CPT | Mod: CPTII,S$GLB,, | Performed by: FAMILY MEDICINE

## 2022-01-26 PROCEDURE — 1159F MED LIST DOCD IN RCRD: CPT | Mod: CPTII,S$GLB,, | Performed by: FAMILY MEDICINE

## 2022-01-26 PROCEDURE — 99214 PR OFFICE/OUTPT VISIT, EST, LEVL IV, 30-39 MIN: ICD-10-PCS | Mod: S$GLB,,, | Performed by: FAMILY MEDICINE

## 2022-01-26 PROCEDURE — 3078F DIAST BP <80 MM HG: CPT | Mod: CPTII,S$GLB,, | Performed by: FAMILY MEDICINE

## 2022-01-26 PROCEDURE — 99214 OFFICE O/P EST MOD 30 MIN: CPT | Mod: PBBFAC,PN | Performed by: FAMILY MEDICINE

## 2022-01-26 RX ORDER — ASPIRIN 81 MG/1
81 TABLET ORAL DAILY
Refills: 11 | COMMUNITY
Start: 2022-01-26 | End: 2023-07-28 | Stop reason: SDUPTHER

## 2022-01-26 NOTE — TELEPHONE ENCOUNTER
No new care gaps identified.  Powered by [employer]. Reference number: [id number].   1/26/2022 12:12:24 AM CST

## 2022-01-26 NOTE — PROGRESS NOTES
FAMILY MEDICINE  [facility]    Reason for visit:   Chief Complaint   Patient presents with    Follow-up       SUBJECTIVE: Petra Zazueta is a 86 y.o. female  - with obesity, CAD (with stents in place), chronic kidney disease stage 4, secondary hyperparathyroidism, type 2 diabetes, pEFHF, PAD with history of stent and chronic left distal leg swelling, history of gout and hypertension presents for follow-up diabetes and labs      Nephrology Dr. Morales  Cardiology Dr. Johns     Today she reports that she is doing well and denies any complaints.     1. Diabetes Type 2     Current treatment regimen:   linaGLIPtin (TRADJENTA) 5 mg Tab tablet, Take 1 tablet (5 mg total) by mouth once daily., Disp: 90 tablet, Rfl: 1     Prior medication:   Glimepiride - stopped due to hypoglycemia  pioglitazone (ACTOS) 30 MG tablet  - stopped due to pEFHF      Side effects from treatment: denies   Complications of diabetes: neuropathy     Glucometer: yes  Glucose monitoring: weekly  Fasting 100-120's mg/dL    Lab Results                 HGBA1C                   6.1 (H)             01/21/2022               Pending today     Lab Results8                 HGBA1C                   5.7 (H)             01/15/2021               Lab Results              HGBA1C                   5.8 (H)             09/10/2020                        Low dose statin: atorvastatin 80 mg daily  Last eye exam: 2/12/2020 Dr. García  Last foot exam: 1/26/2022      Vaccines:   Influenza: up to date   Pneumovax: 23 5/25/18  Prevnar 13: 1/13/2017  Covid-19 up to date     2. Hypertension     Current medication treatment:   carvediloL (COREG) 25 MG tablet, Take 1 tablet (25 mg total) by mouth 2 (two) times daily with meals., Disp: 60 tablet, Rfl: 11  hydrALAZINE (APRESOLINE) 50 MG tablet, Take 1 tablet (50 mg total) by mouth every 8 (eight) hours., Disp: 270 tablet, Rfl: 3  losartan (COZAAR) 100 MG tablet, Take 1 tablet (100 mg total) by mouth once daily., Disp: 90  tablet, Rfl: 3    Medication side effects: denies         Review of Systems   All other systems reviewed and are negative.      HEALTH MAINTENANCE:   Health Maintenance   Topic Date Due    Eye Exam  2022    Hemoglobin A1c  2022    Lipid Panel  2023    Aspirin/Antiplatelet Therapy  2023    Foot Exam  2023    DEXA SCAN  2024    TETANUS VACCINE  2031     Health Maintenance Topics with due status: Not Due       Topic Last Completion Date    Eye Exam 2021    TETANUS VACCINE 2021    DEXA SCAN 2021    Diabetes Urine Screening 2021    Lipid Panel 2022    Hemoglobin A1c 2022    Aspirin/Antiplatelet Therapy 2022    Foot Exam 2022     There are no preventive care reminders to display for this patient.    HISTORY:   Past Medical History:   Diagnosis Date    Anemia     Arthritis     Coronary artery disease     Diabetes mellitus     Diabetes mellitus type II     Diabetic retinopathy 2019    Dyslipidemia     Hypertension     Insomnia     PAD (peripheral artery disease)        Past Surgical History:   Procedure Laterality Date    angiagram  2018    CORONARY ANGIOPLASTY      RCA 2011 EJ    GALLBLADDER SURGERY      HYSTERECTOMY      PERIPHERAL ARTERIAL STENT GRAFT      Left lower extremity RCA        Family History   Problem Relation Age of Onset    Heart attack Mother        Social History     Tobacco Use    Smoking status: Former Smoker     Quit date: 2005     Years since quittin.0    Smokeless tobacco: Never Used   Substance Use Topics    Alcohol use: No    Drug use: No       Social History     Social History Narrative    Lives alone in [place]. Has 2 sons. Darell Concepcion lives in [place] and cares for her. He is mPOA. Other son lives in [place]. Quit drinking years ago. [place].        ALLERGIES:   Review of patient's allergies indicates:   Allergen Reactions    Codeine Other (See Comments)      Jittery, lightheadedness, nausea  Other reaction(s): NAUSEA       MEDS:     Current Outpatient Medications:     atorvastatin (LIPITOR) 80 MG tablet, Take 1 tablet (80 mg total) by mouth every evening., Disp: 90 tablet, Rfl: 3    blood sugar diagnostic (TRUE METRIX GLUCOSE TEST STRIP) Strp, 1 strip by Misc.(Non-Drug; Combo Route) route 2 (two) times daily., Disp: 200 each, Rfl: 1    blood-glucose meter (TRUE METRIX GLUCOSE METER) Misc, Test blood sugars twice daily, Disp: 1 each, Rfl: 0    carvediloL (COREG) 25 MG tablet, Take 1 tablet (25 mg total) by mouth 2 (two) times daily with meals., Disp: 60 tablet, Rfl: 11    cilostazol (PLETAL) 100 MG Tab, Take 100 mg by mouth 2 (two) times daily., Disp: , Rfl: 3    cyanocobalamin (VITAMIN B-12) 1000 MCG tablet, Take 1 tablet (1,000 mcg total) by mouth once daily., Disp: 90 tablet, Rfl: 4    ferrous sulfate 324 mg (65 mg iron) TbEC, TAKE 1 TABLET (324 MG TOTAL) BY MOUTH ONCE DAILY., Disp: 90 tablet, Rfl: 3    fish oil-omega-3 fatty acids 300-1,000 mg capsule, Take 500 mg by mouth 2 (two) times daily., Disp: , Rfl:     gabapentin (NEURONTIN) 300 MG capsule, TAKE 1 CAPSULE BY MOUTH EVERY EVENING, Disp: 90 capsule, Rfl: 3    hydrALAZINE (APRESOLINE) 50 MG tablet, Take 1 tablet (50 mg total) by mouth every 8 (eight) hours., Disp: 270 tablet, Rfl: 3    lancets (BD ULTRA FINE LANCETS) 33 gauge Misc, 1 lancet by Misc.(Non-Drug; Combo Route) route 2 (two) times daily., Disp: 200 each, Rfl: 1    latanoprost 0.005 % ophthalmic solution, INT 1 GTT INTO OU HS, Disp: , Rfl: 4    linaGLIPtin (TRADJENTA) 5 mg Tab tablet, Take 1 tablet (5 mg total) by mouth once daily., Disp: 90 tablet, Rfl: 3    losartan (COZAAR) 100 MG tablet, Take 1 tablet (100 mg total) by mouth once daily., Disp: 90 tablet, Rfl: 3    vitamin D (VITAMIN D3) 1000 units Tab, Take 1,000 Units by mouth once daily., Disp: , Rfl:     aspirin (ECOTRIN) 81 MG EC tablet, Take 1 tablet (81 mg total) by mouth once  "daily., Disp: , Rfl: 11      Vital signs:   Vitals:    01/26/22 0841   BP: 132/76   BP Location: Left arm   Patient Position: Sitting   BP Method: X-Large (Manual)   Pulse: 60   Resp: 18   SpO2: 95%   Weight: 76.7 kg (169 lb 3.2 oz)   Height: 5' 2" (1.575 m)     Body mass index is 30.95 kg/m².  PHYSICAL EXAM:     Physical Exam  Constitutional:       General: She is not in acute distress.  HENT:      Right Ear: Tympanic membrane and ear canal normal.      Left Ear: Tympanic membrane and ear canal normal.   Cardiovascular:      Rate and Rhythm: Normal rate and regular rhythm.      Pulses: Normal pulses.           Dorsalis pedis pulses are 2+ on the right side and 2+ on the left side.        Posterior tibial pulses are 2+ on the right side and 2+ on the left side.      Heart sounds: Normal heart sounds. No murmur heard.  No friction rub. No gallop.    Pulmonary:      Effort: Pulmonary effort is normal.      Breath sounds: Normal breath sounds. No wheezing, rhonchi or rales.   Abdominal:      General: Bowel sounds are normal. There is no distension.      Palpations: Abdomen is soft.      Tenderness: There is no abdominal tenderness. There is no guarding or rebound.   Musculoskeletal:      Cervical back: Neck supple.      Right lower leg: No edema.      Left lower leg: Edema (chronic and nonpitting today) present.   Feet:      Right foot:      Protective Sensation: 5 sites tested. 4 sites sensed.      Skin integrity: Skin integrity normal.      Left foot:      Protective Sensation: 5 sites tested. 4 sites sensed.      Skin integrity: Skin integrity normal.   Neurological:      Mental Status: She is alert.           PHQ4 = Score: 0    PERTINENT RESULTS:   Orders Only on 01/21/2022   Component Date Value Ref Range Status    Cholesterol 01/21/2022 124  <200 mg/dL Final    HDL 01/21/2022 53  > OR = 50 mg/dL Final    Triglycerides 01/21/2022 84  <150 mg/dL Final    LDL Cholesterol 01/21/2022 55  mg/dL (calc) Final    " Comment: Reference range: <100     Desirable range <100 mg/dL for primary prevention;    <70 mg/dL for patients with CHD or diabetic patients   with > or = 2 CHD risk factors.     LDL-C is now calculated using the Sarah   calculation, which is a validated novel method providing   better accuracy than the Friedewald equation in the   estimation of LDL-C.   Edy PUENTE et al. INDHI. 2013;310(19): 0789-4253   (http://education.Bunchball.doxIQ/faq/LLQ168)      HDL/Cholesterol Ratio 01/21/2022 2.3  <5.0 (calc) Final    Non HDL Chol. (LDL+VLDL) 01/21/2022 71  <130 mg/dL (calc) Final    Comment: For patients with diabetes plus 1 major ASCVD risk   factor, treating to a non-HDL-C goal of <100 mg/dL   (LDL-C of <70 mg/dL) is considered a therapeutic   option.      Hemoglobin A1C 01/21/2022 6.1* <5.7 % of total Hgb Final    Comment: For someone without known diabetes, a hemoglobin   A1c value between 5.7% and 6.4% is consistent with  prediabetes and should be confirmed with a   follow-up test.     For someone with known diabetes, a value <7%  indicates that their diabetes is well controlled. A1c  targets should be individualized based on duration of  diabetes, age, comorbid conditions, and other  considerations.     This assay result is consistent with an increased risk  of diabetes.     Currently, no consensus exists regarding use of  hemoglobin A1c for diagnosis of diabetes for children.          BMP  Lab Results   Component Value Date     01/22/2022    K 4.8 01/22/2022     01/22/2022    CO2 26 01/22/2022    BUN 33 (H) 01/22/2022    CREATININE 1.43 (H) 01/22/2022    CALCIUM 9.7 01/22/2022    ANIONGAP 9 10/30/2021    ESTGFRAFRICA 39 (L) 01/22/2022    EGFRNONAA 33 (L) 01/22/2022     Lab Results   Component Value Date    ALT 12 01/22/2022    AST 17 01/22/2022    ALKPHOS 74 10/30/2021    BILITOT 0.6 01/22/2022       ASSESSMENT/PLAN:  Problem List Items Addressed This Visit        Cardiac/Vascular     Chronic diastolic heart failure    Overview     - compensated  - continue with current medical therapy         Coronary artery disease involving native coronary artery of native heart without angina pectoris    Overview     - 4/2011 RAUL RCA  - followed by Cardiology Dr. Clif COBB  - goal LDL <70  - BP goal < 130/80  - DAPT  - A1C goal <7%         Relevant Medications    aspirin (ECOTRIN) 81 MG EC tablet    Hyperlipidemia associated with type 2 diabetes mellitus    Overview     Lab Results   Component Value Date    LDLCALC 55 01/21/2022   - well controlled  - continue current medication         PAD (peripheral artery disease)    Overview     - stent left leg with chronic nonpitting edema  - followed by Cardiology Dr. Clif COBB  - goal LDL <70  - BP goal < 130/80  - DAPT  - A1C goal <7%            Renal/    Stage 3b chronic kidney disease    Overview     - followed by Nephrology  Lab Results   Component Value Date    CREATININE 1.28 06/02/2021     - baseline creatinine 1.2-1.4  - baseline GFR 39-44  - stable            Endocrine    Class 1 obesity due to excess calories with serious comorbidity and body mass index (BMI) of 32.0 to 32.9 in adult    Overview     - discussed recommendation for diet, exercise and weight loss         Secondary hyperparathyroidism of renal origin    Overview     Lab Results   Component Value Date    .0 (H) 12/19/2019    CALCIUM 9.7 09/26/2020    PHOS 3.7 09/26/2020     - followed by Nephrology         Type 2 diabetes mellitus with diabetic polyneuropathy, without long-term current use of insulin    Overview     Lab Results   Component Value Date    LABA1C 7.2 (H) 05/25/2018    HGBA1C 6.1 (H) 01/21/2022              Type 2 diabetes mellitus with diabetic peripheral angiopathy without gangrene, without long-term current use of insulin    Overview     Lab Results   Component Value Date    LABA1C 7.2 (H) 05/25/2018    HGBA1C 6.1 (H) 01/21/2022              Relevant Orders     Hemoglobin A1C    HM DIABETES FOOT EXAM (Completed)    Type 2 diabetes mellitus with stage 3b chronic kidney disease, without long-term current use of insulin - Primary    Overview     Lab Results   Component Value Date    LABA1C 7.2 (H) 05/25/2018    HGBA1C 5.7 (H) 01/15/2021     - well controlled  - continue current medication  - repeat A1C due tod            Other    Edema of left lower extremity    Overview     - chronic following PTA for left lower extremity  - cardiology evaluated  - thigh-high compression stockings recommended         Hypertension associated with type 2 diabetes mellitus    Overview     - well controlled  - continue current medications               ORDERS:   Orders Placed This Encounter    Hemoglobin A1C     DIABETES FOOT EXAM    aspirin (ECOTRIN) 81 MG EC tablet       Vaccines recommended: up to date    Follow-up in  6 months with labs or sooner if any concerns.      This note is dictated using the M*Modal Fluency Direct word recognition program. There are word recognition mistakes that are occasionally missed on review.    Dr. Polly Greer D.O.   [facility]

## 2022-02-03 RX ORDER — LINAGLIPTIN 5 MG/1
TABLET, FILM COATED ORAL
Qty: 90 TABLET | Refills: 1 | Status: SHIPPED | OUTPATIENT
Start: 2022-02-03 | End: 2022-08-04

## 2022-02-03 NOTE — TELEPHONE ENCOUNTER
Refill Authorization Note   Petra Zazueta  is requesting a refill authorization.  Brief Assessment and Rationale for Refill:  Approve     Medication Therapy Plan:       Medication Reconciliation Completed: No   Comments:   --->Care Gap information included below if applicable.       Requested Prescriptions   Pending Prescriptions Disp Refills    TRADJENTA 5 mg Tab tablet [Pharmacy Med Name: TRADJENTA 5 MG TABLET] 90 tablet 1     Sig: TAKE 1 TABLET BY MOUTH EVERY DAY       Endocrinology:  Diabetes - DPP-4 Inhibitors - linagliptin Passed - 1/26/2022 12:10 AM        Passed - Patient is at least 18 years old        Passed - Valid encounter within last 15 months     Recent Visits  Date Type Provider Dept   01/26/22 Office Visit Polly Greer, DO [facility]   07/26/21 Office Visit Polly Greer, DO [facility]   01/19/21 Office Visit Polly Greer, DO [facility]   09/17/20 Office Visit Polly Greer, DO [facility]   06/17/20 Office Visit Polly Greer, DO [facility]   02/19/20 Office Visit Polly Greer, DO [facility]   Showing recent visits within past 720 days and meeting all other requirements  Future Appointments  No visits were found meeting these conditions.  Showing future appointments within next 150 days and meeting all other requirements      Future Appointments              In 4 days Velvet Morales MD [facility], [facility], [facility]    In 5 months Polly Greer, DO [facility]                Passed - HBA1C within 180 days     Lab Results   Component Value Date    LABA1C 7.2 (H) 05/25/2018    HGBA1C 6.1 (H) 01/21/2022    HGBA1C 6.7 (H) 12/08/2021    HGBA1C 5.8 (H) 10/04/2021                  Appointments  past 12m or future 3m with PCP    Date Provider   Last Visit   7/26/2021 Polly Greer, DO   Next Visit   1/26/2022 Polly Greer, DO   ED visits in past 90  days: 0     Note composed:8:45 AM 02/03/2022

## 2022-02-04 DIAGNOSIS — N18.32 STAGE 3B CHRONIC KIDNEY DISEASE: Primary | ICD-10-CM

## 2022-02-04 DIAGNOSIS — R82.1 MYOGLOBINURIA: ICD-10-CM

## 2022-02-08 LAB
APPEARANCE UR: ABNORMAL
BACTERIA UR CULT: ABNORMAL
BILIRUB UR QL STRIP: NEGATIVE
COLOR UR: YELLOW
CREAT UR-MCNC: NORMAL MG/DL
GLUCOSE UR QL STRIP: NEGATIVE
HGB UR QL STRIP: NEGATIVE
KETONES UR QL STRIP: NEGATIVE
LEUKOCYTE ESTERASE UR QL STRIP: ABNORMAL
MAGNESIUM SERPL-MCNC: 2.1 MG/DL (ref 1.5–2.5)
NITRITE UR QL STRIP: NEGATIVE
PH UR STRIP: 6.5 [PH] (ref 5–8)
PROT UR QL STRIP: ABNORMAL
PTH-INTACT SERPL-MCNC: 45 PG/ML (ref 14–64)
SP GR UR STRIP: 1.02 (ref 1–1.03)

## 2022-02-14 ENCOUNTER — TELEPHONE (OUTPATIENT)
Dept: FAMILY MEDICINE | Facility: CLINIC | Age: 87
End: 2022-02-14
Payer: MEDICARE

## 2022-02-14 ENCOUNTER — PATIENT MESSAGE (OUTPATIENT)
Dept: FAMILY MEDICINE | Facility: CLINIC | Age: 87
End: 2022-02-14
Payer: MEDICARE

## 2022-02-14 NOTE — TELEPHONE ENCOUNTER
Please let me know what you would like me to address. I did not order any labs for her. It does look like Dr. Morales ordered labs.     Dr. Polly Greer D.O.   [facility]

## 2022-03-25 ENCOUNTER — TELEPHONE (OUTPATIENT)
Dept: FAMILY MEDICINE | Facility: CLINIC | Age: 87
End: 2022-03-25
Payer: MEDICARE

## 2022-03-25 DIAGNOSIS — Z12.31 ENCOUNTER FOR SCREENING MAMMOGRAM FOR MALIGNANT NEOPLASM OF BREAST: ICD-10-CM

## 2022-03-25 DIAGNOSIS — Z12.39 ENCOUNTER FOR SCREENING FOR MALIGNANT NEOPLASM OF BREAST, UNSPECIFIED SCREENING MODALITY: Primary | ICD-10-CM

## 2022-03-25 NOTE — TELEPHONE ENCOUNTER
----- Message from Ginna Mota sent at 3/25/2022 10:54 AM CDT -----  Type:  Patient Requesting call back    Who Called:Petra Zazueta  Would the patient rather a call back or a response via [facility]? Call back  Best Call Back Number:[phone]  Additional Information:   Patient Requesting call back for mammogram referral.

## 2022-05-16 ENCOUNTER — PATIENT MESSAGE (OUTPATIENT)
Dept: FAMILY MEDICINE | Facility: CLINIC | Age: 87
End: 2022-05-16
Payer: MEDICARE

## 2022-05-25 ENCOUNTER — PATIENT MESSAGE (OUTPATIENT)
Dept: FAMILY MEDICINE | Facility: CLINIC | Age: 87
End: 2022-05-25
Payer: MEDICARE

## 2022-06-06 ENCOUNTER — HOSPITAL ENCOUNTER (INPATIENT)
Facility: HOSPITAL | Age: 87
LOS: 2 days | Discharge: HOME | DRG: 281 | End: 2022-06-08
Attending: EMERGENCY MEDICINE | Admitting: INTERNAL MEDICINE
Payer: MEDICARE

## 2022-06-06 ENCOUNTER — PATIENT MESSAGE (OUTPATIENT)
Dept: FAMILY MEDICINE | Facility: CLINIC | Age: 87
End: 2022-06-06
Payer: MEDICARE

## 2022-06-06 DIAGNOSIS — I21.4 NSTEMI (NON-ST ELEVATED MYOCARDIAL INFARCTION): Primary | ICD-10-CM

## 2022-06-06 DIAGNOSIS — R07.9 CHEST PAIN: ICD-10-CM

## 2022-06-06 DIAGNOSIS — R79.89 ELEVATED TROPONIN: ICD-10-CM

## 2022-06-06 DIAGNOSIS — I20.0 UNSTABLE ANGINA PECTORIS: ICD-10-CM

## 2022-06-06 DIAGNOSIS — E11.22 TYPE 2 DIABETES MELLITUS WITH STAGE 3B CHRONIC KIDNEY DISEASE, WITHOUT LONG-TERM CURRENT USE OF INSULIN: ICD-10-CM

## 2022-06-06 DIAGNOSIS — N18.32 TYPE 2 DIABETES MELLITUS WITH STAGE 3B CHRONIC KIDNEY DISEASE, WITHOUT LONG-TERM CURRENT USE OF INSULIN: ICD-10-CM

## 2022-06-06 LAB
ABO + RH BLD: NORMAL
ALBUMIN SERPL BCP-MCNC: 3.4 G/DL (ref 3.5–5.2)
ALP SERPL-CCNC: 53 U/L (ref 55–135)
ALT SERPL W/O P-5'-P-CCNC: 13 U/L (ref 10–44)
ANION GAP SERPL CALC-SCNC: 12 MMOL/L (ref 8–16)
APTT BLDCRRT: 28.2 SEC (ref 21–32)
APTT BLDCRRT: 40.3 SEC (ref 21–32)
AST SERPL-CCNC: 17 U/L (ref 10–40)
BASOPHILS # BLD AUTO: 0.01 K/UL (ref 0–0.2)
BASOPHILS # BLD AUTO: 0.03 K/UL (ref 0–0.2)
BASOPHILS NFR BLD: 0.1 % (ref 0–1.9)
BASOPHILS NFR BLD: 0.4 % (ref 0–1.9)
BILIRUB SERPL-MCNC: 0.5 MG/DL (ref 0.1–1)
BLD GP AB SCN CELLS X3 SERPL QL: NORMAL
BNP SERPL-MCNC: 171 PG/ML (ref 0–99)
BUN SERPL-MCNC: 32 MG/DL (ref 8–23)
CALCIUM SERPL-MCNC: 9.1 MG/DL (ref 8.7–10.5)
CHLORIDE SERPL-SCNC: 109 MMOL/L (ref 95–110)
CO2 SERPL-SCNC: 22 MMOL/L (ref 23–29)
CREAT SERPL-MCNC: 1.7 MG/DL (ref 0.5–1.4)
CTP QC/QA: YES
DIFFERENTIAL METHOD: ABNORMAL
DIFFERENTIAL METHOD: ABNORMAL
EOSINOPHIL # BLD AUTO: 0.1 K/UL (ref 0–0.5)
EOSINOPHIL # BLD AUTO: 0.1 K/UL (ref 0–0.5)
EOSINOPHIL NFR BLD: 0.8 % (ref 0–8)
EOSINOPHIL NFR BLD: 1.2 % (ref 0–8)
ERYTHROCYTE [DISTWIDTH] IN BLOOD BY AUTOMATED COUNT: 13.8 % (ref 11.5–14.5)
ERYTHROCYTE [DISTWIDTH] IN BLOOD BY AUTOMATED COUNT: 13.9 % (ref 11.5–14.5)
EST. GFR  (AFRICAN AMERICAN): 31 ML/MIN/1.73 M^2
EST. GFR  (NON AFRICAN AMERICAN): 27 ML/MIN/1.73 M^2
GLUCOSE SERPL-MCNC: 229 MG/DL (ref 70–110)
HCT VFR BLD AUTO: 33.2 % (ref 37–48.5)
HCT VFR BLD AUTO: 34.2 % (ref 37–48.5)
HGB BLD-MCNC: 10.7 G/DL (ref 12–16)
HGB BLD-MCNC: 10.7 G/DL (ref 12–16)
IMM GRANULOCYTES # BLD AUTO: 0.02 K/UL (ref 0–0.04)
IMM GRANULOCYTES # BLD AUTO: 0.03 K/UL (ref 0–0.04)
IMM GRANULOCYTES NFR BLD AUTO: 0.3 % (ref 0–0.5)
IMM GRANULOCYTES NFR BLD AUTO: 0.4 % (ref 0–0.5)
INR PPP: 1.1 (ref 0.8–1.2)
LIPASE SERPL-CCNC: 38 U/L (ref 4–60)
LYMPHOCYTES # BLD AUTO: 1.1 K/UL (ref 1–4.8)
LYMPHOCYTES # BLD AUTO: 1.5 K/UL (ref 1–4.8)
LYMPHOCYTES NFR BLD: 15.1 % (ref 18–48)
LYMPHOCYTES NFR BLD: 21.7 % (ref 18–48)
MCH RBC QN AUTO: 31.6 PG (ref 27–31)
MCH RBC QN AUTO: 31.8 PG (ref 27–31)
MCHC RBC AUTO-ENTMCNC: 31.3 G/DL (ref 32–36)
MCHC RBC AUTO-ENTMCNC: 32.2 G/DL (ref 32–36)
MCV RBC AUTO: 101 FL (ref 82–98)
MCV RBC AUTO: 99 FL (ref 82–98)
MONOCYTES # BLD AUTO: 0.5 K/UL (ref 0.3–1)
MONOCYTES # BLD AUTO: 0.6 K/UL (ref 0.3–1)
MONOCYTES NFR BLD: 6.5 % (ref 4–15)
MONOCYTES NFR BLD: 9.2 % (ref 4–15)
NEUTROPHILS # BLD AUTO: 4.7 K/UL (ref 1.8–7.7)
NEUTROPHILS # BLD AUTO: 5.5 K/UL (ref 1.8–7.7)
NEUTROPHILS NFR BLD: 67.5 % (ref 38–73)
NEUTROPHILS NFR BLD: 76.8 % (ref 38–73)
NRBC BLD-RTO: 0 /100 WBC
NRBC BLD-RTO: 0 /100 WBC
PHOSPHATE SERPL-MCNC: 3.2 MG/DL (ref 2.7–4.5)
PLATELET # BLD AUTO: 120 K/UL (ref 150–450)
PLATELET # BLD AUTO: 123 K/UL (ref 150–450)
PMV BLD AUTO: 12.5 FL (ref 9.2–12.9)
PMV BLD AUTO: 13 FL (ref 9.2–12.9)
POTASSIUM SERPL-SCNC: 3.8 MMOL/L (ref 3.5–5.1)
PROT SERPL-MCNC: 6.8 G/DL (ref 6–8.4)
PROTHROMBIN TIME: 11.1 SEC (ref 9–12.5)
RBC # BLD AUTO: 3.36 M/UL (ref 4–5.4)
RBC # BLD AUTO: 3.39 M/UL (ref 4–5.4)
SARS-COV-2 RDRP RESP QL NAA+PROBE: NEGATIVE
SODIUM SERPL-SCNC: 143 MMOL/L (ref 136–145)
TROPONIN I SERPL DL<=0.01 NG/ML-MCNC: 0.12 NG/ML (ref 0–0.03)
TROPONIN I SERPL DL<=0.01 NG/ML-MCNC: 0.16 NG/ML (ref 0–0.03)
TROPONIN I SERPL DL<=0.01 NG/ML-MCNC: 0.17 NG/ML (ref 0–0.03)
WBC # BLD AUTO: 6.92 K/UL (ref 3.9–12.7)
WBC # BLD AUTO: 7.21 K/UL (ref 3.9–12.7)

## 2022-06-06 PROCEDURE — U0002 COVID-19 LAB TEST NON-CDC: HCPCS | Performed by: STUDENT IN AN ORGANIZED HEALTH CARE EDUCATION/TRAINING PROGRAM

## 2022-06-06 PROCEDURE — 84484 ASSAY OF TROPONIN QUANT: CPT | Mod: 91 | Performed by: STUDENT IN AN ORGANIZED HEALTH CARE EDUCATION/TRAINING PROGRAM

## 2022-06-06 PROCEDURE — 36415 COLL VENOUS BLD VENIPUNCTURE: CPT | Performed by: STUDENT IN AN ORGANIZED HEALTH CARE EDUCATION/TRAINING PROGRAM

## 2022-06-06 PROCEDURE — 85025 COMPLETE CBC W/AUTO DIFF WBC: CPT | Mod: 91 | Performed by: STUDENT IN AN ORGANIZED HEALTH CARE EDUCATION/TRAINING PROGRAM

## 2022-06-06 PROCEDURE — 25000003 PHARM REV CODE 250: Performed by: STUDENT IN AN ORGANIZED HEALTH CARE EDUCATION/TRAINING PROGRAM

## 2022-06-06 PROCEDURE — 63600175 PHARM REV CODE 636 W HCPCS: Performed by: STUDENT IN AN ORGANIZED HEALTH CARE EDUCATION/TRAINING PROGRAM

## 2022-06-06 PROCEDURE — 93010 EKG 12-LEAD: ICD-10-PCS | Mod: ,,, | Performed by: INTERNAL MEDICINE

## 2022-06-06 PROCEDURE — 11000001 HC ACUTE MED/SURG PRIVATE ROOM

## 2022-06-06 PROCEDURE — 80053 COMPREHEN METABOLIC PANEL: CPT | Performed by: EMERGENCY MEDICINE

## 2022-06-06 PROCEDURE — 93005 ELECTROCARDIOGRAM TRACING: CPT

## 2022-06-06 PROCEDURE — 86901 BLOOD TYPING SEROLOGIC RH(D): CPT | Performed by: STUDENT IN AN ORGANIZED HEALTH CARE EDUCATION/TRAINING PROGRAM

## 2022-06-06 PROCEDURE — 83690 ASSAY OF LIPASE: CPT | Performed by: STUDENT IN AN ORGANIZED HEALTH CARE EDUCATION/TRAINING PROGRAM

## 2022-06-06 PROCEDURE — 85730 THROMBOPLASTIN TIME PARTIAL: CPT | Mod: 91 | Performed by: INTERNAL MEDICINE

## 2022-06-06 PROCEDURE — 83880 ASSAY OF NATRIURETIC PEPTIDE: CPT | Performed by: EMERGENCY MEDICINE

## 2022-06-06 PROCEDURE — 99223 1ST HOSP IP/OBS HIGH 75: CPT | Mod: ,,, | Performed by: INTERNAL MEDICINE

## 2022-06-06 PROCEDURE — 99285 EMERGENCY DEPT VISIT HI MDM: CPT | Mod: 25

## 2022-06-06 PROCEDURE — 85730 THROMBOPLASTIN TIME PARTIAL: CPT | Performed by: STUDENT IN AN ORGANIZED HEALTH CARE EDUCATION/TRAINING PROGRAM

## 2022-06-06 PROCEDURE — 93010 ELECTROCARDIOGRAM REPORT: CPT | Mod: ,,, | Performed by: INTERNAL MEDICINE

## 2022-06-06 PROCEDURE — 84100 ASSAY OF PHOSPHORUS: CPT | Performed by: STUDENT IN AN ORGANIZED HEALTH CARE EDUCATION/TRAINING PROGRAM

## 2022-06-06 PROCEDURE — 85025 COMPLETE CBC W/AUTO DIFF WBC: CPT | Performed by: EMERGENCY MEDICINE

## 2022-06-06 PROCEDURE — 25000003 PHARM REV CODE 250: Performed by: EMERGENCY MEDICINE

## 2022-06-06 PROCEDURE — 84484 ASSAY OF TROPONIN QUANT: CPT | Mod: 91 | Performed by: EMERGENCY MEDICINE

## 2022-06-06 PROCEDURE — 99223 PR INITIAL HOSPITAL CARE,LEVL III: ICD-10-PCS | Mod: ,,, | Performed by: INTERNAL MEDICINE

## 2022-06-06 PROCEDURE — 85610 PROTHROMBIN TIME: CPT | Performed by: STUDENT IN AN ORGANIZED HEALTH CARE EDUCATION/TRAINING PROGRAM

## 2022-06-06 RX ORDER — HEPARIN SODIUM,PORCINE/D5W 25000/250
0-40 INTRAVENOUS SOLUTION INTRAVENOUS CONTINUOUS
Status: DISCONTINUED | OUTPATIENT
Start: 2022-06-06 | End: 2022-06-07

## 2022-06-06 RX ORDER — ASPIRIN 325 MG
325 TABLET ORAL
Status: COMPLETED | OUTPATIENT
Start: 2022-06-06 | End: 2022-06-06

## 2022-06-06 RX ORDER — LOSARTAN POTASSIUM 50 MG/1
100 TABLET ORAL DAILY
Status: DISCONTINUED | OUTPATIENT
Start: 2022-06-07 | End: 2022-06-06

## 2022-06-06 RX ORDER — CLOPIDOGREL BISULFATE 75 MG/1
300 TABLET ORAL
Status: COMPLETED | OUTPATIENT
Start: 2022-06-06 | End: 2022-06-06

## 2022-06-06 RX ORDER — ACETAMINOPHEN 325 MG/1
650 TABLET ORAL EVERY 4 HOURS PRN
Status: DISCONTINUED | OUTPATIENT
Start: 2022-06-06 | End: 2022-06-08 | Stop reason: HOSPADM

## 2022-06-06 RX ORDER — GABAPENTIN 300 MG/1
300 CAPSULE ORAL NIGHTLY
Status: DISCONTINUED | OUTPATIENT
Start: 2022-06-06 | End: 2022-06-08 | Stop reason: HOSPADM

## 2022-06-06 RX ORDER — LATANOPROST 50 UG/ML
1 SOLUTION/ DROPS OPHTHALMIC NIGHTLY
Status: DISCONTINUED | OUTPATIENT
Start: 2022-06-06 | End: 2022-06-08 | Stop reason: HOSPADM

## 2022-06-06 RX ORDER — LANOLIN ALCOHOL/MO/W.PET/CERES
1 CREAM (GRAM) TOPICAL EVERY OTHER DAY
Refills: 3 | Status: DISCONTINUED | OUTPATIENT
Start: 2022-06-07 | End: 2022-06-08 | Stop reason: HOSPADM

## 2022-06-06 RX ORDER — SODIUM CHLORIDE 0.9 % (FLUSH) 0.9 %
10 SYRINGE (ML) INJECTION
Status: DISCONTINUED | OUTPATIENT
Start: 2022-06-06 | End: 2022-06-08 | Stop reason: HOSPADM

## 2022-06-06 RX ORDER — HYDRALAZINE HYDROCHLORIDE 25 MG/1
100 TABLET, FILM COATED ORAL EVERY 8 HOURS
Status: DISCONTINUED | OUTPATIENT
Start: 2022-06-06 | End: 2022-06-08 | Stop reason: HOSPADM

## 2022-06-06 RX ORDER — ASPIRIN 81 MG/1
81 TABLET ORAL DAILY
Status: DISCONTINUED | OUTPATIENT
Start: 2022-06-07 | End: 2022-06-08 | Stop reason: HOSPADM

## 2022-06-06 RX ORDER — CARVEDILOL 25 MG/1
25 TABLET ORAL 2 TIMES DAILY WITH MEALS
Status: DISCONTINUED | OUTPATIENT
Start: 2022-06-06 | End: 2022-06-08 | Stop reason: HOSPADM

## 2022-06-06 RX ORDER — ATORVASTATIN CALCIUM 40 MG/1
80 TABLET, FILM COATED ORAL NIGHTLY
Status: DISCONTINUED | OUTPATIENT
Start: 2022-06-06 | End: 2022-06-08 | Stop reason: HOSPADM

## 2022-06-06 RX ORDER — CILOSTAZOL 50 MG/1
100 TABLET ORAL 2 TIMES DAILY
Status: DISCONTINUED | OUTPATIENT
Start: 2022-06-06 | End: 2022-06-07

## 2022-06-06 RX ORDER — LANOLIN ALCOHOL/MO/W.PET/CERES
1000 CREAM (GRAM) TOPICAL DAILY
Status: DISCONTINUED | OUTPATIENT
Start: 2022-06-07 | End: 2022-06-08 | Stop reason: HOSPADM

## 2022-06-06 RX ORDER — CHOLECALCIFEROL (VITAMIN D3) 25 MCG
1000 TABLET ORAL DAILY
Status: DISCONTINUED | OUTPATIENT
Start: 2022-06-07 | End: 2022-06-08 | Stop reason: HOSPADM

## 2022-06-06 RX ADMIN — GABAPENTIN 300 MG: 300 CAPSULE ORAL at 09:06

## 2022-06-06 RX ADMIN — CILOSTAZOL 100 MG: 50 TABLET ORAL at 09:06

## 2022-06-06 RX ADMIN — HYDRALAZINE HYDROCHLORIDE 100 MG: 25 TABLET, FILM COATED ORAL at 02:06

## 2022-06-06 RX ADMIN — LATANOPROST 1 DROP: 50 SOLUTION OPHTHALMIC at 09:06

## 2022-06-06 RX ADMIN — ATORVASTATIN CALCIUM 80 MG: 40 TABLET, FILM COATED ORAL at 09:06

## 2022-06-06 RX ADMIN — CLOPIDOGREL 300 MG: 75 TABLET, FILM COATED ORAL at 05:06

## 2022-06-06 RX ADMIN — HEPARIN SODIUM 12 UNITS/KG/HR: 1000 INJECTION, SOLUTION INTRAVENOUS; SUBCUTANEOUS at 04:06

## 2022-06-06 RX ADMIN — CARVEDILOL 25 MG: 25 TABLET, FILM COATED ORAL at 04:06

## 2022-06-06 RX ADMIN — HYDRALAZINE HYDROCHLORIDE 100 MG: 25 TABLET, FILM COATED ORAL at 09:06

## 2022-06-06 RX ADMIN — ASPIRIN 325 MG ORAL TABLET 325 MG: 325 PILL ORAL at 11:06

## 2022-06-06 NOTE — SUBJECTIVE & OBJECTIVE
Past Medical History:   Diagnosis Date    Anemia     Arthritis     Coronary artery disease     Diabetes mellitus     Diabetes mellitus type II     Diabetic retinopathy 08/22/2019    Dyslipidemia     Hypertension     Insomnia     PAD (peripheral artery disease)        Past Surgical History:   Procedure Laterality Date    angiagram  08/31/2018    CORONARY ANGIOPLASTY      RCA 4/2011 EJ    GALLBLADDER SURGERY      HYSTERECTOMY      PERIPHERAL ARTERIAL STENT GRAFT      Left lower extremity RCA        Review of patient's allergies indicates:   Allergen Reactions    Codeine Other (See Comments)     Jittery, lightheadedness, nausea  Other reaction(s): NAUSEA       No current facility-administered medications on file prior to encounter.     Current Outpatient Medications on File Prior to Encounter   Medication Sig    acetaminophen (TYLENOL) 325 MG tablet Take 325 mg by mouth as needed for Pain.    aspirin (ECOTRIN) 81 MG EC tablet Take 1 tablet (81 mg total) by mouth once daily.    atorvastatin (LIPITOR) 80 MG tablet Take 1 tablet (80 mg total) by mouth every evening.    blood sugar diagnostic (TRUE METRIX GLUCOSE TEST STRIP) Strp 1 strip by Misc.(Non-Drug; Combo Route) route 2 (two) times daily.    blood-glucose meter (TRUE METRIX GLUCOSE METER) Misc Test blood sugars twice daily    carvediloL (COREG) 25 MG tablet Take 1 tablet (25 mg total) by mouth 2 (two) times daily with meals.    cilostazol (PLETAL) 100 MG Tab Take 100 mg by mouth 2 (two) times daily.    cyanocobalamin (VITAMIN B-12) 1000 MCG tablet Take 1 tablet (1,000 mcg total) by mouth once daily.    ferrous sulfate 324 mg (65 mg iron) TbEC TAKE 1 TABLET (324 MG TOTAL) BY MOUTH ONCE DAILY. (Patient taking differently: Take 324 mg by mouth once daily.)    fish oil-omega-3 fatty acids 300-1,000 mg capsule Take 500 mg by mouth 2 (two) times daily.    gabapentin (NEURONTIN) 300 MG capsule TAKE 1 CAPSULE BY MOUTH EVERY EVENING (Patient taking differently: Take 300 mg  by mouth every evening.)    hydrALAZINE (APRESOLINE) 100 MG tablet Take 1 tablet (100 mg total) by mouth every 8 (eight) hours.    lancets (BD ULTRA FINE LANCETS) 33 gauge Misc 1 lancet by Misc.(Non-Drug; Combo Route) route 2 (two) times daily.    latanoprost 0.005 % ophthalmic solution Place 1 drop into both eyes every evening.    losartan (COZAAR) 100 MG tablet Take 1 tablet (100 mg total) by mouth once daily.    TRADJENTA 5 mg Tab tablet TAKE 1 TABLET BY MOUTH EVERY DAY (Patient taking differently: Take 5 mg by mouth once daily.)    vitamin D (VITAMIN D3) 1000 units Tab Take 1,000 Units by mouth once daily.     Family History       Problem Relation (Age of Onset)    Heart attack Mother          Tobacco Use    Smoking status: Former Smoker     Quit date: 2005     Years since quittin.4    Smokeless tobacco: Never Used   Substance and Sexual Activity    Alcohol use: No    Drug use: No    Sexual activity: Not Currently     Review of Systems   Constitutional: Negative. Negative for diaphoresis.   HENT: Negative.     Eyes: Negative.    Cardiovascular:  Positive for chest pain, claudication and palpitations. Negative for dyspnea on exertion, orthopnea, paroxysmal nocturnal dyspnea and syncope.   Respiratory: Negative.  Negative for cough and shortness of breath.    Endocrine: Negative.    Hematologic/Lymphatic: Negative.    Skin: Negative.    Musculoskeletal: Negative.    Gastrointestinal: Negative.  Negative for nausea and vomiting.   Genitourinary: Negative.    Neurological: Negative.    Psychiatric/Behavioral: Negative.     Allergic/Immunologic: Negative.    Objective:     Vital Signs (Most Recent):  Temp: 99 °F (37.2 °C) (22 1024)  Pulse: 85 (22 1024)  Resp: 18 (22 1024)  BP: 133/62 (22 1024)  SpO2: 99 % (22 1024) Vital Signs (24h Range):  Temp:  [99 °F (37.2 °C)] 99 °F (37.2 °C)  Pulse:  [85] 85  Resp:  [18] 18  SpO2:  [99 %] 99 %  BP: (133)/(62) 133/62     Weight: 77.1 kg  (170 lb)  Body mass index is 31.09 kg/m².    SpO2: 99 %  O2 Device (Oxygen Therapy): room air    No intake or output data in the 24 hours ending 06/06/22 1315    Lines/Drains/Airways       Peripheral Intravenous Line  Duration                  Peripheral IV - Single Lumen 06/06/22 1053 20 G Right Antecubital <1 day                    Physical Exam  Constitutional:       General: She is not in acute distress.     Appearance: She is not diaphoretic.   HENT:      Head: Atraumatic.   Eyes:      General:         Right eye: No discharge.         Left eye: No discharge.   Cardiovascular:      Rate and Rhythm: Normal rate and regular rhythm.   Pulmonary:      Effort: Pulmonary effort is normal.      Breath sounds: Normal breath sounds. No rales.   Abdominal:      General: Bowel sounds are normal.      Palpations: Abdomen is soft.   Musculoskeletal:      Right lower leg: No edema.      Left lower leg: No edema.   Skin:     General: Skin is warm and dry.      Capillary Refill: Capillary refill takes less than 2 seconds.   Neurological:      Mental Status: She is alert and oriented to person, place, and time.   Psychiatric:         Mood and Affect: Mood normal.         Behavior: Behavior normal.         Thought Content: Thought content normal.         Judgment: Judgment normal.       Significant Labs: BMP:   Recent Labs   Lab 06/06/22  1053   *      K 3.8      CO2 22*   BUN 32*   CREATININE 1.7*   CALCIUM 9.1   , CMP   Recent Labs   Lab 06/06/22  1053      K 3.8      CO2 22*   *   BUN 32*   CREATININE 1.7*   CALCIUM 9.1   PROT 6.8   ALBUMIN 3.4*   BILITOT 0.5   ALKPHOS 53*   AST 17   ALT 13   ANIONGAP 12   ESTGFRAFRICA 31*   EGFRNONAA 27*   , CBC   Recent Labs   Lab 06/06/22  1053   WBC 7.21   HGB 10.7*   HCT 34.2*   *   , INR No results for input(s): INR, PROTIME in the last 48 hours., Lipid Panel No results for input(s): CHOL, HDL, LDLCALC, TRIG, CHOLHDL in the last 48 hours.,  Troponin   Recent Labs   Lab 06/06/22  1053   TROPONINI 0.118*   , and All pertinent lab results from the last 24 hours have been reviewed.    Significant Imaging: Echocardiogram: Transthoracic echo (TTE) complete (Cupid Only): No results found for this or any previous visit.

## 2022-06-06 NOTE — ED NOTES
"Present to ED via EMS with left chest wall pain which does not radiate. States "feels like my heart is racing." Discharged from another hospital on Friday with cardiology follow up for chest pain. Reports feeling fine on yesterday. Took Sharonda seltzer but no relief. Denies abd pain, n/v/d and SOB. All symptoms have resolved. No distress  "

## 2022-06-06 NOTE — Clinical Note
----- Message from Manjit Sanchesorman sent at 9/27/2021 12:56 PM EDT -----  Subject: Message to Provider    QUESTIONS  Information for Provider? pt called in to see if he needed an MRI before   he starts PT please call him to advise of this  ---------------------------------------------------------------------------  --------------  [address]  What is the best way for the office to contact you? OK to leave message on   voicemail  Preferred Call Back Phone Number? [phone]  ---------------------------------------------------------------------------  --------------  SCRIPT ANSWERS  Relationship to Patient?  Self The wire was inserted into the aorta.

## 2022-06-06 NOTE — ASSESSMENT & PLAN NOTE
S/p mRCA RAUL in 2011, subsequent LHC in 2018- patent stent   Loaded with Plavix in the ED, on asa, statin, BB, ARB  Presents with CP while eating, possibly intestinal angina   Initial Troponin 0.1- continue to trend   Ischemic evaluation this admission- modality to be determined following TTE and troponin trend   Heparin gtt

## 2022-06-06 NOTE — PHARMACY MED REC
"[facility]           Pharmacy  Admission Medication History     The home medication history was taken by Amanda Pickett.      Medication history obtained from Medications listed below were obtained from: Medical records    Based on information gathered for medication list, you may go to "Admission" then "Reconcile Home Medications" tabs to review and/or act upon those items.      The home medication list has been updated by the Pharmacy department.    Please read ALL comments highlighted in yellow.    Please address this information as you see fit.     Feel free to contact us if you have any questions or require assistance.        No current facility-administered medications on file prior to encounter.     Current Outpatient Medications on File Prior to Encounter   Medication Sig Dispense Refill    acetaminophen (TYLENOL) 325 MG tablet Take 325 mg by mouth as needed for Pain.      aspirin (ECOTRIN) 81 MG EC tablet Take 1 tablet (81 mg total) by mouth once daily.  11    atorvastatin (LIPITOR) 80 MG tablet Take 1 tablet (80 mg total) by mouth every evening. 90 tablet 3    blood sugar diagnostic (TRUE METRIX GLUCOSE TEST STRIP) Strp 1 strip by Misc.(Non-Drug; Combo Route) route 2 (two) times daily. 200 each 1    blood-glucose meter (TRUE METRIX GLUCOSE METER) Misc Test blood sugars twice daily 1 each 0    carvediloL (COREG) 25 MG tablet Take 1 tablet (25 mg total) by mouth 2 (two) times daily with meals. 60 tablet 11    cilostazol (PLETAL) 100 MG Tab Take 100 mg by mouth 2 (two) times daily.  3    cyanocobalamin (VITAMIN B-12) 1000 MCG tablet Take 1 tablet (1,000 mcg total) by mouth once daily. 90 tablet 4    ferrous sulfate 324 mg (65 mg iron) TbEC TAKE 1 TABLET (324 MG TOTAL) BY MOUTH ONCE DAILY. (Patient taking differently: Take 324 mg by mouth once daily.) 90 tablet 3    fish oil-omega-3 fatty acids 300-1,000 mg capsule Take 500 mg by mouth 2 (two) times daily.      gabapentin " (NEURONTIN) 300 MG capsule TAKE 1 CAPSULE BY MOUTH EVERY EVENING (Patient taking differently: Take 300 mg by mouth every evening.) 90 capsule 3    hydrALAZINE (APRESOLINE) 100 MG tablet Take 1 tablet (100 mg total) by mouth every 8 (eight) hours. 270 tablet 3    lancets (BD ULTRA FINE LANCETS) 33 gauge Misc 1 lancet by Misc.(Non-Drug; Combo Route) route 2 (two) times daily. 200 each 1    latanoprost 0.005 % ophthalmic solution Place 1 drop into both eyes every evening.  4    losartan (COZAAR) 100 MG tablet Take 1 tablet (100 mg total) by mouth once daily. 90 tablet 3    TRADJENTA 5 mg Tab tablet TAKE 1 TABLET BY MOUTH EVERY DAY (Patient taking differently: Take 5 mg by mouth once daily.) 90 tablet 1    vitamin D (VITAMIN D3) 1000 units Tab Take 1,000 Units by mouth once daily.         Please address this information as you see fit.  Feel free to contact us if you have any questions or require assistance.    Amanda Pickett  [phone]                  .

## 2022-06-06 NOTE — Clinical Note
The catheter was inserted into the ostium   right coronary artery. An angiography was performed of the right coronary arteries. Multiple views were taken. The angiography was performed via hand injection with 20 mL of contrast.

## 2022-06-06 NOTE — ED PROVIDER NOTES
Encounter Date: 6/6/2022       History     Chief Complaint   Patient presents with    Chest Pain     Brought to ED from home for cp. Cp began this am and is associated with palpitations. Pain is left anterior and does not radiates nothing improves or worsens. Pt denies sob or dizziness. Reports recent dc with cardiology f/u after cp admission.      The patient is an 87-year-old female who presents to emergency department with palpitations and left-sided chest pain.  The patient states her chest pain and palpitations started on Thursday, she was seen at [facility] and admitted overnight.  She was discharged on Friday after a lot of tests were run.  She felt fine on Friday and on Saturday after eating, her chest pain returned.  She took an [place] and after a while, her pain resolved.  She felt good on Sunday but this morning, she ate some oatmeal and her pain returned.  It is not resolved.  She states that time she feels like her heart is beating very fast, then resolves after some time.  She denies any shortness of breath diaphoresis or nausea.  She denies fever or cough.  No abdominal pain.  The patient has chronic swelling to her left lower extremity, she states she has stents in her left leg and also stents in her heart.        Review of patient's allergies indicates:   Allergen Reactions    Codeine Other (See Comments)     Jittery, lightheadedness, nausea  Other reaction(s): NAUSEA     Past Medical History:   Diagnosis Date    Anemia     Arthritis     Coronary artery disease     Diabetes mellitus     Diabetes mellitus type II     Diabetic retinopathy 08/22/2019    Dyslipidemia     Hypertension     Insomnia     PAD (peripheral artery disease)      Past Surgical History:   Procedure Laterality Date    angiagram  08/31/2018    CORONARY ANGIOPLASTY      RCA 4/2011 EJ    GALLBLADDER SURGERY      HYSTERECTOMY      PERIPHERAL ARTERIAL STENT GRAFT      Left lower extremity RCA       Family History   Problem Relation Age of Onset    Heart attack Mother      Social History     Tobacco Use    Smoking status: Former Smoker     Quit date: 2005     Years since quittin.4    Smokeless tobacco: Never Used   Substance Use Topics    Alcohol use: No    Drug use: No     Review of Systems   Constitutional: Negative for fever.   HENT: Negative for sore throat.    Respiratory: Negative for shortness of breath.    Cardiovascular: Positive for chest pain, palpitations and leg swelling.   Gastrointestinal: Negative for nausea.   Genitourinary: Negative for dysuria.   Musculoskeletal: Negative for back pain.   Skin: Negative for rash.   Neurological: Negative for weakness.   Hematological: Does not bruise/bleed easily.       Physical Exam     Initial Vitals [22 1024]   BP Pulse Resp Temp SpO2   133/62 85 18 99 °F (37.2 °C) 99 %      MAP       --         Physical Exam    Nursing note and vitals reviewed.  Constitutional: She appears well-developed and well-nourished.   HENT:   Head: Normocephalic and atraumatic.   Mouth/Throat: Oropharynx is clear and moist.   Eyes: Conjunctivae and EOM are normal. Pupils are equal, round, and reactive to light.   Neck: Neck supple.   Normal range of motion.  Cardiovascular: Normal rate, regular rhythm, normal heart sounds and intact distal pulses. Exam reveals no gallop and no friction rub.    No murmur heard.  Pulmonary/Chest: Breath sounds normal.   Abdominal: Abdomen is soft. Bowel sounds are normal. She exhibits no distension. There is no abdominal tenderness. There is no rebound and no guarding.   Musculoskeletal:         General: No tenderness or edema. Normal range of motion.      Cervical back: Normal range of motion and neck supple.     Lymphadenopathy:     She has no cervical adenopathy.   Neurological: She is alert and oriented to person, place, and time. She has normal strength and normal reflexes.   Skin: Skin is warm and dry.   Psychiatric:  She has a normal mood and affect. Her behavior is normal. Judgment and thought content normal.         ED Course   Procedures  Labs Reviewed   CBC W/ AUTO DIFFERENTIAL - Abnormal; Notable for the following components:       Result Value    RBC 3.39 (*)     Hemoglobin 10.7 (*)     Hematocrit 34.2 (*)      (*)     MCH 31.6 (*)     MCHC 31.3 (*)     Platelets 120 (*)     MPV 13.0 (*)     Gran % 76.8 (*)     Lymph % 15.1 (*)     All other components within normal limits   COMPREHENSIVE METABOLIC PANEL - Abnormal; Notable for the following components:    CO2 22 (*)     Glucose 229 (*)     BUN 32 (*)     Creatinine 1.7 (*)     Albumin 3.4 (*)     Alkaline Phosphatase 53 (*)     eGFR if  31 (*)     eGFR if non  27 (*)     All other components within normal limits   TROPONIN I - Abnormal; Notable for the following components:    Troponin I 0.118 (*)     All other components within normal limits   B-TYPE NATRIURETIC PEPTIDE - Abnormal; Notable for the following components:     (*)     All other components within normal limits   CBC W/ AUTO DIFFERENTIAL - Abnormal; Notable for the following components:    RBC 3.36 (*)     Hemoglobin 10.7 (*)     Hematocrit 33.2 (*)     MCV 99 (*)     MCH 31.8 (*)     Platelets 123 (*)     All other components within normal limits    Narrative:     Draw baseline aPTT prior to starting the heparin bolus or  infusion  (if patient is on warfarin prior to heparin therapy)   TROPONIN I   APTT   PROTIME-INR   TROPONIN I   PHOSPHORUS   LIPASE   TROPONIN I   SARS-COV-2 RDRP GENE   TYPE & SCREEN     EKG Readings: (Independently Interpreted)   Initial: 1028. Previous EKG: Compared with most recent EKG Previous EKG Date: 11/24/2020. Rhythm: Sinus Arrhythmia. Heart Rate: 84. ST Segments: Non-Specific ST Segment Depression. Other Impression: LVH     ECG Results          EKG 12-lead (In process)  Result time 06/06/22 15:06:30    In process by Interface, Lab In  [facility] (06/06/22 15:06:30)                 Narrative:    Test Reason : R07.9,    Vent. Rate : 084 BPM     Atrial Rate : 084 BPM     P-R Int : 180 ms          QRS Dur : 092 ms      QT Int : 376 ms       P-R-T Axes : 061 059 195 degrees     QTc Int : 444 ms    Sinus rhythm with Premature atrial complexes  Septal infarct ,age undetermined  ST and T wave abnormality, consider inferolateral ischemia  Abnormal ECG  When compared with ECG of 03-JUN-2022 15:50,  Premature atrial complexes are now Present  Septal infarct is now Present    Referred By: [email or web address]   SELF           Confirmed By:                             Imaging Results          X-Ray Chest AP Portable (Final result)  Result time 06/06/22 12:05:27    Final result by Braeden Sepulveda MD (06/06/22 12:05:27)                 Impression:      Prominent interstitial markings could relate to pulmonary vascular congestion/edema versus infectious or inflammatory etiology.    Suspect small bilateral pleural effusions.      Electronically signed by: Braeden Sepulveda  Date:    06/06/2022  Time:    12:05             Narrative:    EXAMINATION:  XR CHEST AP PORTABLE    CLINICAL HISTORY:  Chest Pain;    TECHNIQUE:  Single frontal view of the chest was performed.    COMPARISON:  Chest radiograph 06/23/2022    FINDINGS:  Monitoring leads overlie the chest.    Cardiomediastinal contours grossly unchanged. Enlargement the cardiac silhouette. Prominence of central pulmonary vasculature. Prominent interstitial markings noted.    No definite pneumothorax. Possible small pleural effusions.    No acute findings identified in the visualized abdomen. Osseous and soft tissue structures appear without definite acute change.    Grossly unchanged peripherally calcified ovoid nodule at the right lung base a back to at least 11/10/2020 CT of the abdomen pelvis.                                 Medications   heparin 25,000 units in dextrose 5% 250 mL (100 units/mL) infusion LOW INTENSITY  nomogram - [facility] (12 Units/kg/hr × 60.9 kg (Adjusted) Intravenous New Bag 6/6/22 1626)   heparin 25,000 units in dextrose 5% (100 units/ml) IV bolus from bag - ADDITIONAL PRN BOLUS - 60 units/kg (max bolus 4000 units) (has no administration in time range)   heparin 25,000 units in dextrose 5% (100 units/ml) IV bolus from bag - ADDITIONAL PRN BOLUS - 30 units/kg (max bolus 4000 units) (has no administration in time range)   clopidogreL tablet 300 mg (has no administration in time range)   aspirin EC tablet 81 mg (has no administration in time range)   atorvastatin tablet 80 mg (has no administration in time range)   carvediloL tablet 25 mg (25 mg Oral Given 6/6/22 1631)   cilostazoL tablet 100 mg (has no administration in time range)   cyanocobalamin tablet 1,000 mcg (has no administration in time range)   ferrous sulfate tablet 1 each (has no administration in time range)   gabapentin capsule 300 mg (has no administration in time range)   hydrALAZINE tablet 100 mg (100 mg Oral Given 6/6/22 1456)   latanoprost 0.005 % ophthalmic solution 1 drop (has no administration in time range)   losartan tablet 100 mg (has no administration in time range)   vitamin D 1000 units tablet 1,000 Units (has no administration in time range)   sodium chloride 0.9% flush 10 mL (has no administration in time range)   acetaminophen tablet 650 mg (has no administration in time range)   aspirin tablet 325 mg (325 mg Oral Given 6/6/22 1118)   heparin 25,000 units in dextrose 5% (100 units/ml) IV bolus from bag INITIAL BOLUS (max bolus 4000 units) (3,650 Units Intravenous Bolus from Bag 6/6/22 1215)                 ED Course as of 06/06/22 1637   Mon Jun 06, 2022   1215 The case was discussed with Dr. Vega cardiology.  He recommends Plavix 300 mg p.o. now and a heparin drip.  The case was discussed with [facility] and they will admit the patient for serial cardiac enzymes. [ST]      ED Course User Index  [ST] Mesha Balderas MD              Clinical Impression:   Final diagnoses:  [R07.9] Chest pain  [R77.8] Elevated troponin          ED Disposition Condition    Admit               Mesha Balderas MD  06/06/22 1989

## 2022-06-06 NOTE — Clinical Note
75 ml of contrast were injected throughout the case. 25 mL of contrast was the total wasted during the case. 100 mL was the total amount used during the case.

## 2022-06-06 NOTE — ED NOTES
Pharmacy called for Heparin bolus not populating when being scanning. Spoke with Marcella. Informed her Heparin Bolus is not populating on Alaris pump. Marcella unsure why bolus is not populating. Will tube new label.

## 2022-06-06 NOTE — CONSULTS
[facility] - Emergency Dept  Cardiology  Consult Note    Patient Name: Petra Zazueta  MRN: [id number]  Admission Date: 6/6/2022  Hospital Length of Stay: 0 days  Code Status: Full Code   Attending Provider: Mesha Balderas MD   Consulting Provider: Epi Patrick NP  Primary Care Physician: Polly Greer DO  Principal Problem:<principal problem not specified>    Patient information was obtained from patient, past medical records and ER records.     [facility]  Consult performed by: Epi Patrick NP  Consult ordered by: Freddie Jack DO        Subjective:     Chief Complaint:  Chest Pain     HPI:   Petra Zazueta is an 87-year-old female with CAD, PAD, DM, CKD, HLD, HTN. Patient presented to the ED with chest pain and palpitations. Patient reports pain only occurs with eating but is similar to the pain she had prior to her RCA stent. Pain is located left sided without radiation. This is her second admission in a week for CP. Patient noted to have chronic palpitations. Patient denies SOB, diaphoresis, [place], [place], syncope, dizziness. EKG SR without any ST elevation. Initial Troponin 0.1. Patient takes asa daily and was loaded with Plavix 300 mg. Chest pain free on exam.       Past Medical History:   Diagnosis Date    Anemia     Arthritis     Coronary artery disease     Diabetes mellitus     Diabetes mellitus type II     Diabetic retinopathy 08/22/2019    Dyslipidemia     Hypertension     Insomnia     PAD (peripheral artery disease)        Past Surgical History:   Procedure Laterality Date    angiagram  08/31/2018    CORONARY ANGIOPLASTY      RCA 4/2011 EJ    GALLBLADDER SURGERY      HYSTERECTOMY      PERIPHERAL ARTERIAL STENT GRAFT      Left lower extremity RCA        Review of patient's allergies indicates:   Allergen Reactions    Codeine Other (See Comments)     Jittery, lightheadedness, nausea  Other reaction(s): NAUSEA       No current facility-administered medications on  file prior to encounter.     Current Outpatient Medications on File Prior to Encounter   Medication Sig    acetaminophen (TYLENOL) 325 MG tablet Take 325 mg by mouth as needed for Pain.    aspirin (ECOTRIN) 81 MG EC tablet Take 1 tablet (81 mg total) by mouth once daily.    atorvastatin (LIPITOR) 80 MG tablet Take 1 tablet (80 mg total) by mouth every evening.    blood sugar diagnostic (TRUE METRIX GLUCOSE TEST STRIP) Strp 1 strip by Misc.(Non-Drug; Combo Route) route 2 (two) times daily.    blood-glucose meter (TRUE METRIX GLUCOSE METER) Misc Test blood sugars twice daily    carvediloL (COREG) 25 MG tablet Take 1 tablet (25 mg total) by mouth 2 (two) times daily with meals.    cilostazol (PLETAL) 100 MG Tab Take 100 mg by mouth 2 (two) times daily.    cyanocobalamin (VITAMIN B-12) 1000 MCG tablet Take 1 tablet (1,000 mcg total) by mouth once daily.    ferrous sulfate 324 mg (65 mg iron) TbEC TAKE 1 TABLET (324 MG TOTAL) BY MOUTH ONCE DAILY. (Patient taking differently: Take 324 mg by mouth once daily.)    fish oil-omega-3 fatty acids 300-1,000 mg capsule Take 500 mg by mouth 2 (two) times daily.    gabapentin (NEURONTIN) 300 MG capsule TAKE 1 CAPSULE BY MOUTH EVERY EVENING (Patient taking differently: Take 300 mg by mouth every evening.)    hydrALAZINE (APRESOLINE) 100 MG tablet Take 1 tablet (100 mg total) by mouth every 8 (eight) hours.    lancets (BD ULTRA FINE LANCETS) 33 gauge Misc 1 lancet by Misc.(Non-Drug; Combo Route) route 2 (two) times daily.    latanoprost 0.005 % ophthalmic solution Place 1 drop into both eyes every evening.    losartan (COZAAR) 100 MG tablet Take 1 tablet (100 mg total) by mouth once daily.    TRADJENTA 5 mg Tab tablet TAKE 1 TABLET BY MOUTH EVERY DAY (Patient taking differently: Take 5 mg by mouth once daily.)    vitamin D (VITAMIN D3) 1000 units Tab Take 1,000 Units by mouth once daily.     Family History       Problem Relation (Age of Onset)    Heart attack Mother           Tobacco Use    Smoking status: Former Smoker     Quit date: 2005     Years since quittin.4    Smokeless tobacco: Never Used   Substance and Sexual Activity    Alcohol use: No    Drug use: No    Sexual activity: Not Currently     Review of Systems   Constitutional: Negative. Negative for diaphoresis.   HENT: Negative.     Eyes: Negative.    Cardiovascular:  Positive for chest pain, claudication and palpitations. Negative for dyspnea on exertion, orthopnea, paroxysmal nocturnal dyspnea and syncope.   Respiratory: Negative.  Negative for cough and shortness of breath.    Endocrine: Negative.    Hematologic/Lymphatic: Negative.    Skin: Negative.    Musculoskeletal: Negative.    Gastrointestinal: Negative.  Negative for nausea and vomiting.   Genitourinary: Negative.    Neurological: Negative.    Psychiatric/Behavioral: Negative.     Allergic/Immunologic: Negative.    Objective:     Vital Signs (Most Recent):  Temp: 99 °F (37.2 °C) (22 1024)  Pulse: 85 (22 1024)  Resp: 18 (22 1024)  BP: 133/62 (22 1024)  SpO2: 99 % (22 1024) Vital Signs (24h Range):  Temp:  [99 °F (37.2 °C)] 99 °F (37.2 °C)  Pulse:  [85] 85  Resp:  [18] 18  SpO2:  [99 %] 99 %  BP: (133)/(62) 133/62     Weight: 77.1 kg (170 lb)  Body mass index is 31.09 kg/m².    SpO2: 99 %  O2 Device (Oxygen Therapy): room air    No intake or output data in the 24 hours ending 22 1315    Lines/Drains/Airways       Peripheral Intravenous Line  Duration                  Peripheral IV - Single Lumen 22 1053 20 G Right Antecubital <1 day                    Physical Exam  Constitutional:       General: She is not in acute distress.     Appearance: She is not diaphoretic.   HENT:      Head: Atraumatic.   Eyes:      General:         Right eye: No discharge.         Left eye: No discharge.   Cardiovascular:      Rate and Rhythm: Normal rate and regular rhythm.   Pulmonary:      Effort: Pulmonary effort is  normal.      Breath sounds: Normal breath sounds. No rales.   Abdominal:      General: Bowel sounds are normal.      Palpations: Abdomen is soft.   Musculoskeletal:      Right lower leg: No edema.      Left lower leg: No edema.   Skin:     General: Skin is warm and dry.      Capillary Refill: Capillary refill takes less than 2 seconds.   Neurological:      Mental Status: She is alert and oriented to person, place, and time.   Psychiatric:         Mood and Affect: Mood normal.         Behavior: Behavior normal.         Thought Content: Thought content normal.         Judgment: Judgment normal.       Significant Labs: BMP:   Recent Labs   Lab 06/06/22  1053   *      K 3.8      CO2 22*   BUN 32*   CREATININE 1.7*   CALCIUM 9.1   , CMP   Recent Labs   Lab 06/06/22  1053      K 3.8      CO2 22*   *   BUN 32*   CREATININE 1.7*   CALCIUM 9.1   PROT 6.8   ALBUMIN 3.4*   BILITOT 0.5   ALKPHOS 53*   AST 17   ALT 13   ANIONGAP 12   ESTGFRAFRICA 31*   EGFRNONAA 27*   , CBC   Recent Labs   Lab 06/06/22  1053   WBC 7.21   HGB 10.7*   HCT 34.2*   *   , INR No results for input(s): INR, PROTIME in the last 48 hours., Lipid Panel No results for input(s): CHOL, HDL, LDLCALC, TRIG, CHOLHDL in the last 48 hours., Troponin   Recent Labs   Lab 06/06/22  1053   TROPONINI 0.118*   , and All pertinent lab results from the last 24 hours have been reviewed.    Significant Imaging: Echocardiogram: Transthoracic echo (TTE) complete (Cupid Only): No results found for this or any previous visit.    Assessment and Plan:     Class 1 obesity due to excess calories with serious comorbidity and body mass index (BMI) of 32.0 to 32.9 in adult  Weight loss encouraged     Chest pain  Per CAD    Coronary artery disease involving native coronary artery of native heart without angina pectoris  S/p mRCA RAUL in 2011, subsequent LHC in 2018- patent stent   Loaded with Plavix in the ED, on asa, statin, BB,  ARB  Presents with CP while eating, possibly intestinal angina   Initial Troponin 0.1- continue to trend   Ischemic evaluation this admission- modality to be determined following TTE and troponin trend   Heparin gtt   Cr 1.7 ~ baseline       PAD (peripheral artery disease)  Stable   Continue pletal, asa, statin, ARB    Hyperlipidemia associated with type 2 diabetes mellitus  Continue statin     Hypertension associated with type 2 diabetes mellitus  Controlled with Coreg, Hydralazine, Losartan         VTE Risk Mitigation (From admission, onward)         Ordered     IP VTE HIGH RISK PATIENT  Once         06/06/22 1336     Place sequential compression device  Until discontinued         06/06/22 1336     heparin 25,000 units in dextrose 5% (100 units/ml) IV bolus from bag - ADDITIONAL PRN BOLUS - 60 units/kg (max bolus 4000 units)  As needed (PRN)        Question:  Heparin Infusion Adjustment (DO NOT MODIFY ANSWER)  Answer:  \Schedule [email or web address]\Images\Pharmacy\HeparinInfusions\heparin LOW INTENSITY nomogram for [facility] [id number]    06/06/22 1205     heparin 25,000 units in dextrose 5% (100 units/ml) IV bolus from bag - ADDITIONAL PRN BOLUS - 30 units/kg (max bolus 4000 units)  As needed (PRN)        Question:  Heparin Infusion Adjustment (DO NOT MODIFY ANSWER)  Answer:  \Schedule [email or web address]\Images\Pharmacy\HeparinInfusions\heparin LOW INTENSITY nomogram for [facility] [id number]    06/06/22 1205     heparin 25,000 units in dextrose 5% (100 units/ml) IV bolus from bag INITIAL BOLUS (max bolus 4000 units)  Once        Question:  Heparin Infusion Adjustment (DO NOT MODIFY ANSWER)  Answer:  \Schedule [email or web address]\Images\Pharmacy\HeparinInfusions\heparin LOW INTENSITY nomogram for [facility] [id number]    06/06/22 1205     heparin 25,000 units in dextrose 5% 250 mL (100 units/mL) infusion LOW INTENSITY nomogram - [facility]  Continuous        Question Answer Comment   Heparin Infusion Adjustment (DO NOT MODIFY ANSWER)  [email or web address]\Images\Pharmacy\HeparinInfusions\heparin LOW INTENSITY nomogram for [facility] [id number]    Begin at (in units/kg/hr) 12        06/06/22 7237                Thank you for your consult. I will follow-up with patient. Please contact us if you have any additional questions.    Epi Patrick NP  Cardiology   [facility] - Emergency Dept

## 2022-06-06 NOTE — PLAN OF CARE
06/06/22 1850   Admission   Initial VN Admission Questions Complete   Communication Issues? None   Shift   Virtual Nurse - Patient Verbalized Approval Of Camera Use   Safety/Activity   Patient Rounds visualized patient;placement of personal items at bedside;bed in low position;call light in patient/parent reach;clutter free environment maintained;ID band on

## 2022-06-06 NOTE — H&P
[facility] Hospital Medicine H&P Note     Admitting Team: [facility] Hospitalist Team B  Attending Physician: Florentin Castro MD  Resident: Jermaine  Intern: Gueor    Date of Admit: 6/6/2022    Chief Complaint     Intermittent Chest Pain x 5 days    Subjective:      History of Present Illness:  Petra Zazueta is a 87 y.o. AA female who has a past medical history of Anemia, Arthritis, Coronary artery disease s/p RAUL (mRCA RAUL in 2011), T2DM with neuropathy, CKD, Dyslipidemia, Hypertension, Insomnia, and PAD s/p 1 stent. The patient presented to [facility] on 6/6/2022 with a primary complaint of intermittent Chest Pain x 5 day.    The patient was in her usual state of health until Thursday when she reported to [facility] for complaints of L chest pain. Patient reports pain is sudden onset, 10/10, tight/crushing in quality, without radiation, aggravating or alleviating factors, and with associated palpitations. She states pain usually begins after eating but is similar to ACS pain she has had in the past. At the outside hospital, she was admitted and observed overnight before discharge the following day after resolution of her symptoms. Patient states she had return of her pain on Saturday while at home shortly after eating. Her symptoms resolved after taking christi seltzer, however, her symptoms returned again on the morning prior to arrival shortly after she had eaten oatmeal. She again attempted to take an christi seltzer, but her symptoms persisted prompting her to present to the ED. Patient denied headache, vision change, neck pain, arm pain, shortness of breath, syncope, dizziness, nausea, and vomiting.     In the ED, Troponin was 0.118, , and EKG was NSR with PACs, Q waves in V1/2, ST depression in V4 and T wave abnormalities in V4-6. Cardiology was consulted and patient received 325mg asa as well as Plavix 300mg before heparin gtt was started and patient was admitted to [facility]. Upon reassessment shortly after initiating the above treatment, patient stated her symptoms had resolved.    Past Medical History:  Past Medical History:   Diagnosis Date    Anemia     Arthritis     Coronary artery disease     Diabetes mellitus     Diabetes mellitus type II     Diabetic retinopathy 08/22/2019    Dyslipidemia     Hypertension     Insomnia     PAD (peripheral artery disease)        Past Surgical History:  Past Surgical History:   Procedure Laterality Date    angiagram  08/31/2018    CORONARY ANGIOPLASTY      RCA 4/2011 EJ    GALLBLADDER SURGERY      HYSTERECTOMY      PERIPHERAL ARTERIAL STENT GRAFT      Left lower extremity RCA        Allergies:  Review of patient's allergies indicates:   Allergen Reactions    Codeine Other (See Comments)     Jittery, lightheadedness, nausea  Other reaction(s): NAUSEA       Home Medications:  Prior to Admission medications    Medication Sig Start Date End Date Taking? Authorizing Provider   acetaminophen (TYLENOL) 325 MG tablet Take 325 mg by mouth as needed for Pain.   Yes Historical Provider   aspirin (ECOTRIN) 81 MG EC tablet Take 1 tablet (81 mg total) by mouth once daily. 1/26/22 1/26/23 Yes Polly Greer DO   atorvastatin (LIPITOR) 80 MG tablet Take 1 tablet (80 mg total) by mouth every evening. 10/22/21 10/22/22 Yes Arlin Bustamante MD   blood sugar diagnostic (TRUE METRIX GLUCOSE TEST STRIP) Strp 1 strip by Misc.(Non-Drug; Combo Route) route 2 (two) times daily. 1/22/19  Yes Polly Greer DO   blood-glucose meter (TRUE METRIX GLUCOSE METER) Misc Test blood sugars twice daily 1/22/19  Yes Polly Greer DO   carvediloL (COREG) 25 MG tablet Take 1 tablet (25 mg total) by mouth 2 (two) times daily with meals. 8/2/21 8/2/22 Yes Velvet Morales MD   cilostazol (PLETAL) 100 MG Tab Take 100 mg by mouth 2 (two) times daily. 2/27/16  Yes Historical Provider   cyanocobalamin (VITAMIN B-12) 1000 MCG tablet Take 1 tablet (1,000 mcg total) by  mouth once daily. 8/15/13  Yes Jose Juan Nath Jr., MD   ferrous sulfate 324 mg (65 mg iron) TbEC TAKE 1 TABLET (324 MG TOTAL) BY MOUTH ONCE DAILY.  Patient taking differently: Take 324 mg by mouth once daily. 10/18/21 10/18/22 Yes Velvet Morales MD   fish oil-omega-3 fatty acids 300-1,000 mg capsule Take 500 mg by mouth 2 (two) times daily.   Yes Historical Provider   gabapentin (NEURONTIN) 300 MG capsule TAKE 1 CAPSULE BY MOUTH EVERY EVENING  Patient taking differently: Take 300 mg by mouth every evening. 10/18/21  Yes Polly Greer DO   hydrALAZINE (APRESOLINE) 100 MG tablet Take 1 tablet (100 mg total) by mouth every 8 (eight) hours. 22 Yes Velvet Morales MD   lancets (BD ULTRA FINE LANCETS) 33 gauge Misc 1 lancet by Misc.(Non-Drug; Combo Route) route 2 (two) times daily. 19  Yes Polly Greer DO   latanoprost 0.005 % ophthalmic solution Place 1 drop into both eyes every evening. 19  Yes Historical Provider   losartan (COZAAR) 100 MG tablet Take 1 tablet (100 mg total) by mouth once daily. 21 Yes Velvet Morales MD   TRADJENTA 5 mg Tab tablet TAKE 1 TABLET BY MOUTH EVERY DAY  Patient taking differently: Take 5 mg by mouth once daily. 2/3/22  Yes Polly Greer DO   vitamin D (VITAMIN D3) 1000 units Tab Take 1,000 Units by mouth once daily.   Yes Historical Provider       Family History:  Family History   Problem Relation Age of Onset    Heart attack Mother        Social History:  Social History     Tobacco Use    Smoking status: Former Smoker     Quit date: 2005     Years since quittin.4    Smokeless tobacco: Never Used   Substance Use Topics    Alcohol use: No    Drug use: No       Review of Systems:  Review of Systems   Constitutional: Negative for chills and fever.   HENT: Negative for congestion, hearing loss and sore throat.    Eyes: Negative for blurred vision and redness.   Respiratory: Negative for cough, sputum production, shortness of  "breath and wheezing.    Cardiovascular: Positive for chest pain and palpitations. Negative for orthopnea and PND.   Gastrointestinal: Negative for abdominal pain, constipation, diarrhea, heartburn, nausea and vomiting.   Genitourinary: Negative for dysuria and urgency.   Musculoskeletal: Negative for joint pain and myalgias.   Skin: Negative for itching and rash.   Neurological: Negative for dizziness, focal weakness, seizures, loss of consciousness and headaches.   All other systems reviewed and are negative.    Health Maintaince :   Primary Care Physician: Dr. Greer    Immunizations:   TDap UTD  Flu UTD  Pna UTD  Covid UTD    Cancer Screening:  PAP: unknown last, denies history of abnormal results or AUB  MMG: UTD  Colonoscopy: Never Had     Objective:   Last 24 Hour Vital Signs:  BP  Min: 133/62  Max: 148/70  Temp  Av °F (37.2 °C)  Min: 99 °F (37.2 °C)  Max: 99 °F (37.2 °C)  Pulse  Av.5  Min: 68  Max: 85  Resp  Av  Min: 18  Max: 18  SpO2  Av.5 %  Min: 98 %  Max: 99 %  Height  Av' 2" (157.5 cm)  Min: 5' 2" (157.5 cm)  Max: 5' 2" (157.5 cm)  Weight  Av.1 kg (170 lb)  Min: 77.1 kg (170 lb)  Max: 77.1 kg (170 lb)  Body mass index is 31.09 kg/m².  No intake/output data recorded.    Physical Examination:  Physical Exam  Vitals and nursing note reviewed.   Constitutional:       General: She is not in acute distress.     Appearance: Normal appearance. She is not toxic-appearing.   HENT:      Head: Normocephalic and atraumatic.      Right Ear: External ear normal.      Left Ear: External ear normal.      Nose: Nose normal. No congestion or rhinorrhea.      Mouth/Throat:      Mouth: Mucous membranes are moist.      Pharynx: Oropharynx is clear. No posterior oropharyngeal erythema.   Eyes:      General:         Right eye: No discharge.         Left eye: No discharge.      Extraocular Movements: Extraocular movements intact.      Conjunctiva/sclera: Conjunctivae normal.      Pupils: Pupils are " equal, round, and reactive to light.   Cardiovascular:      Rate and Rhythm: Normal rate and regular rhythm.      Pulses: Normal pulses.      Heart sounds: Normal heart sounds. No murmur heard.  Pulmonary:      Effort: Pulmonary effort is normal. No respiratory distress.      Breath sounds: Normal breath sounds. No wheezing.   Abdominal:      General: Bowel sounds are normal. There is no distension.      Palpations: Abdomen is soft.      Tenderness: There is no abdominal tenderness. There is no guarding.   Musculoskeletal:      Cervical back: Normal range of motion. No rigidity or tenderness.      Right lower leg: No edema.      Left lower leg: No edema.   Lymphadenopathy:      Cervical: No cervical adenopathy.   Skin:     General: Skin is warm and dry.      Capillary Refill: Capillary refill takes less than 2 seconds.   Neurological:      General: No focal deficit present.      Mental Status: She is alert and oriented to person, place, and time.      Sensory: No sensory deficit.      Motor: No weakness.   Psychiatric:         Mood and Affect: Mood normal.         Behavior: Behavior normal.       Laboratory:  Most Recent Data:  CBC:   Lab Results   Component Value Date    WBC 7.21 06/06/2022    HGB 10.7 (L) 06/06/2022    HCT 34.2 (L) 06/06/2022     (L) 06/06/2022     (H) 06/06/2022    RDW 13.8 06/06/2022     BMP:   Lab Results   Component Value Date     06/06/2022    K 3.8 06/06/2022     06/06/2022    CO2 22 (L) 06/06/2022    BUN 32 (H) 06/06/2022    CREATININE 1.7 (H) 06/06/2022     (H) 06/06/2022    CALCIUM 9.1 06/06/2022     LFTs:   Lab Results   Component Value Date    PROT 6.8 06/06/2022    ALBUMIN 3.4 (L) 06/06/2022    BILITOT 0.5 06/06/2022    AST 17 06/06/2022    ALKPHOS 53 (L) 06/06/2022    ALT 13 06/06/2022     Coags:   Lab Results   Component Value Date    INR 1.0 06/03/2022     DM:   Lab Results   Component Value Date    HGBA1C 6.0 (H) 06/03/2022    CREATININE 1.7 (H)  06/06/2022     Cardiac:   Lab Results   Component Value Date    TROPONINI 0.118 (H) 06/06/2022     (H) 06/06/2022       Trended Lab Data:  Recent Labs   Lab 06/03/22  1555 06/04/22  0529 06/06/22  1053   WBC 7.57  --  7.21   HGB 10.7*  --  10.7*   HCT 33.5*  --  34.2*   *  --  120*   *  --  101*   RDW 13.6  --  13.8    143 143   K 4.4 4.1 3.8    113* 109   CO2 22* 22* 22*   BUN 36* 31* 32*   CREATININE 1.65* 1.48* 1.7*   * 115* 229*   PROT 7.3  --  6.8   ALBUMIN 4.1  --  3.4*   BILITOT 0.6  --  0.5   AST 24  --  17   ALKPHOS 54  --  53*   ALT 12  --  13       Trended Cardiac Data:  Recent Labs   Lab 06/03/22  1908 06/04/22  0529 06/06/22  1053   TROPONINI <0.012 0.018 0.118*   BNP  --   --  171*       Microbiology Data:  POCT Covid 19 Negative 6/6/22    Other Results:  EKG (my interpretation): NSR with PACs, Q waves in V1/2, ST depression in V4 and T wave abnormalities in V4-6; when compared to ECG from 6/3/22 new Q waves and ST depressions are apparent    Radiology:  Imaging Results          X-Ray Chest AP Portable (Final result)  Result time 06/06/22 12:05:27    Final result by Braeden Sepulveda MD (06/06/22 12:05:27)                 Impression:      Prominent interstitial markings could relate to pulmonary vascular congestion/edema versus infectious or inflammatory etiology.    Suspect small bilateral pleural effusions.      Electronically signed by: Braeden Sepulveda  Date:    06/06/2022  Time:    12:05             Narrative:    EXAMINATION:  XR CHEST AP PORTABLE    CLINICAL HISTORY:  Chest Pain;    TECHNIQUE:  Single frontal view of the chest was performed.    COMPARISON:  Chest radiograph 06/23/2022    FINDINGS:  Monitoring leads overlie the chest.    Cardiomediastinal contours grossly unchanged. Enlargement the cardiac silhouette. Prominence of central pulmonary vasculature. Prominent interstitial markings noted.    No definite pneumothorax. Possible small pleural  effusions.    No acute findings identified in the visualized abdomen. Osseous and soft tissue structures appear without definite acute change.    Grossly unchanged peripherally calcified ovoid nodule at the right lung base a back to at least 11/10/2020 CT of the abdomen pelvis.                                 Assessment:     Petra Zazueta is a 87 y.o. AA female who has a past medical history of Anemia, Arthritis, Coronary artery disease s/p RAUL, T2DM with neuropathy, CKD3, Dyslipidemia, Hypertension, Insomnia, and PAD s/p stent. The patient presented to [facility] on 6/6/2022 with a primary complaint of intermittent Chest Pain. ED workup significant for EKG changes noted above and elevated troponin. Patient started on NSTEMI  Protocol with heparin gtt. [facility] on board.    Plan:     NSTEMI  CAD   PAD  S/p mRCA RAUL in 2011, subsequent C in 2018- patent stent   S/p LLE angioplasty  Trp 0.118 -> 0.158 -> 0.170; , EKG as above; will continue to trend  Loaded with Plavix in the ED;   On asa, statin, BB, ARB at home; continued  [facility] consulted, appreciate further recs  Ischemic evaluation this admission- modality to be determined following TTE and troponin trend   Heparin gtt   Continued home Cilostazol 100mg qd     T2DM with neuropathy  Diet controlled, Last A1c 6.0 on 6/3/22  Accuchecks qid, will start LDSSI if needed    MANPREET on CKD3  Cr 1.7; baseline ~1.4; holding home losartan 100mg, repeat labs in AM  Will consider urine electrolyes +/- renal US if persistent    HTN  Controlled with Coreg 25mg qd, Hydralazine 100mg tid, [id number] qd;   holding losartan as below, continuing others    Dyslipidemia  Continued atorvastatin 80mg daily    Normocytic Anemia  H/h 10.7/33.2 MCV 99 on admisson  Continued home PO iron and b12 supplements    Code Status:     Azul Jack DO  [facility] HO-1    [facility] Hospitalist Pager numbers:   [facility] Hospitalist  Medicine Team A (Danitza/Tasha): [phone]  [facility] Hospitalist Medicine Team B (Gloria/Laron):  [phone]

## 2022-06-06 NOTE — ED NOTES
Heparin Bolus 3650 units administer starting at 1622. Bolus stopped at 1626. Pt received 4 minutes of Heaprin Bolus.

## 2022-06-06 NOTE — H&P (VIEW-ONLY)
[facility] - Emergency Dept  Cardiology  Consult Note    Patient Name: Petra Zazueta  MRN: [id number]  Admission Date: 6/6/2022  Hospital Length of Stay: 0 days  Code Status: Full Code   Attending Provider: Mesha Balderas MD   Consulting Provider: Epi Patrick NP  Primary Care Physician: Polly Greer DO  Principal Problem:<principal problem not specified>    Patient information was obtained from patient, past medical records and ER records.     [facility]  Consult performed by: Epi Patrick NP  Consult ordered by: Freddie Jack DO        Subjective:     Chief Complaint:  Chest Pain     HPI:   Petra Zazueta is an 87-year-old female with CAD, PAD, DM, CKD, HLD, HTN. Patient presented to the ED with chest pain and palpitations. Patient reports pain only occurs with eating but is similar to the pain she had prior to her RCA stent. Pain is located left sided without radiation. This is her second admission in a week for CP. Patient noted to have chronic palpitations. Patient denies SOB, diaphoresis, DUKE, NV, syncope, dizziness. EKG SR without any ST elevation. Initial Troponin 0.1. Patient takes asa daily and was loaded with Plavix 300 mg. Chest pain free on exam.       Past Medical History:   Diagnosis Date    Anemia     Arthritis     Coronary artery disease     Diabetes mellitus     Diabetes mellitus type II     Diabetic retinopathy 08/22/2019    Dyslipidemia     Hypertension     Insomnia     PAD (peripheral artery disease)        Past Surgical History:   Procedure Laterality Date    angiagram  08/31/2018    CORONARY ANGIOPLASTY      RCA 4/2011 EJ    GALLBLADDER SURGERY      HYSTERECTOMY      PERIPHERAL ARTERIAL STENT GRAFT      Left lower extremity RCA        Review of patient's allergies indicates:   Allergen Reactions    Codeine Other (See Comments)     Jittery, lightheadedness, nausea  Other reaction(s): NAUSEA       No current facility-administered medications on  file prior to encounter.     Current Outpatient Medications on File Prior to Encounter   Medication Sig    acetaminophen (TYLENOL) 325 MG tablet Take 325 mg by mouth as needed for Pain.    aspirin (ECOTRIN) 81 MG EC tablet Take 1 tablet (81 mg total) by mouth once daily.    atorvastatin (LIPITOR) 80 MG tablet Take 1 tablet (80 mg total) by mouth every evening.    blood sugar diagnostic (TRUE METRIX GLUCOSE TEST STRIP) Strp 1 strip by Misc.(Non-Drug; Combo Route) route 2 (two) times daily.    blood-glucose meter (TRUE METRIX GLUCOSE METER) Misc Test blood sugars twice daily    carvediloL (COREG) 25 MG tablet Take 1 tablet (25 mg total) by mouth 2 (two) times daily with meals.    cilostazol (PLETAL) 100 MG Tab Take 100 mg by mouth 2 (two) times daily.    cyanocobalamin (VITAMIN B-12) 1000 MCG tablet Take 1 tablet (1,000 mcg total) by mouth once daily.    ferrous sulfate 324 mg (65 mg iron) TbEC TAKE 1 TABLET (324 MG TOTAL) BY MOUTH ONCE DAILY. (Patient taking differently: Take 324 mg by mouth once daily.)    fish oil-omega-3 fatty acids 300-1,000 mg capsule Take 500 mg by mouth 2 (two) times daily.    gabapentin (NEURONTIN) 300 MG capsule TAKE 1 CAPSULE BY MOUTH EVERY EVENING (Patient taking differently: Take 300 mg by mouth every evening.)    hydrALAZINE (APRESOLINE) 100 MG tablet Take 1 tablet (100 mg total) by mouth every 8 (eight) hours.    lancets (BD ULTRA FINE LANCETS) 33 gauge Misc 1 lancet by Misc.(Non-Drug; Combo Route) route 2 (two) times daily.    latanoprost 0.005 % ophthalmic solution Place 1 drop into both eyes every evening.    losartan (COZAAR) 100 MG tablet Take 1 tablet (100 mg total) by mouth once daily.    TRADJENTA 5 mg Tab tablet TAKE 1 TABLET BY MOUTH EVERY DAY (Patient taking differently: Take 5 mg by mouth once daily.)    vitamin D (VITAMIN D3) 1000 units Tab Take 1,000 Units by mouth once daily.     Family History       Problem Relation (Age of Onset)    Heart attack Mother           Tobacco Use    Smoking status: Former Smoker     Quit date: 2005     Years since quittin.4    Smokeless tobacco: Never Used   Substance and Sexual Activity    Alcohol use: No    Drug use: No    Sexual activity: Not Currently     Review of Systems   Constitutional: Negative. Negative for diaphoresis.   HENT: Negative.     Eyes: Negative.    Cardiovascular:  Positive for chest pain, claudication and palpitations. Negative for dyspnea on exertion, orthopnea, paroxysmal nocturnal dyspnea and syncope.   Respiratory: Negative.  Negative for cough and shortness of breath.    Endocrine: Negative.    Hematologic/Lymphatic: Negative.    Skin: Negative.    Musculoskeletal: Negative.    Gastrointestinal: Negative.  Negative for nausea and vomiting.   Genitourinary: Negative.    Neurological: Negative.    Psychiatric/Behavioral: Negative.     Allergic/Immunologic: Negative.    Objective:     Vital Signs (Most Recent):  Temp: 99 °F (37.2 °C) (22 1024)  Pulse: 85 (22 1024)  Resp: 18 (22 1024)  BP: 133/62 (22 1024)  SpO2: 99 % (22 1024) Vital Signs (24h Range):  Temp:  [99 °F (37.2 °C)] 99 °F (37.2 °C)  Pulse:  [85] 85  Resp:  [18] 18  SpO2:  [99 %] 99 %  BP: (133)/(62) 133/62     Weight: 77.1 kg (170 lb)  Body mass index is 31.09 kg/m².    SpO2: 99 %  O2 Device (Oxygen Therapy): room air    No intake or output data in the 24 hours ending 22 1315    Lines/Drains/Airways       Peripheral Intravenous Line  Duration                  Peripheral IV - Single Lumen 22 1053 20 G Right Antecubital <1 day                    Physical Exam  Constitutional:       General: She is not in acute distress.     Appearance: She is not diaphoretic.   HENT:      Head: Atraumatic.   Eyes:      General:         Right eye: No discharge.         Left eye: No discharge.   Cardiovascular:      Rate and Rhythm: Normal rate and regular rhythm.   Pulmonary:      Effort: Pulmonary effort is  normal.      Breath sounds: Normal breath sounds. No rales.   Abdominal:      General: Bowel sounds are normal.      Palpations: Abdomen is soft.   Musculoskeletal:      Right lower leg: No edema.      Left lower leg: No edema.   Skin:     General: Skin is warm and dry.      Capillary Refill: Capillary refill takes less than 2 seconds.   Neurological:      Mental Status: She is alert and oriented to person, place, and time.   Psychiatric:         Mood and Affect: Mood normal.         Behavior: Behavior normal.         Thought Content: Thought content normal.         Judgment: Judgment normal.       Significant Labs: BMP:   Recent Labs   Lab 06/06/22  1053   *      K 3.8      CO2 22*   BUN 32*   CREATININE 1.7*   CALCIUM 9.1   , CMP   Recent Labs   Lab 06/06/22  1053      K 3.8      CO2 22*   *   BUN 32*   CREATININE 1.7*   CALCIUM 9.1   PROT 6.8   ALBUMIN 3.4*   BILITOT 0.5   ALKPHOS 53*   AST 17   ALT 13   ANIONGAP 12   ESTGFRAFRICA 31*   EGFRNONAA 27*   , CBC   Recent Labs   Lab 06/06/22  1053   WBC 7.21   HGB 10.7*   HCT 34.2*   *   , INR No results for input(s): INR, PROTIME in the last 48 hours., Lipid Panel No results for input(s): CHOL, HDL, LDLCALC, TRIG, CHOLHDL in the last 48 hours., Troponin   Recent Labs   Lab 06/06/22  1053   TROPONINI 0.118*   , and All pertinent lab results from the last 24 hours have been reviewed.    Significant Imaging: Echocardiogram: Transthoracic echo (TTE) complete (Cupid Only): No results found for this or any previous visit.    Assessment and Plan:     Class 1 obesity due to excess calories with serious comorbidity and body mass index (BMI) of 32.0 to 32.9 in adult  Weight loss encouraged     Chest pain  Per CAD    Coronary artery disease involving native coronary artery of native heart without angina pectoris  S/p mRCA RAUL in 2011, subsequent LHC in 2018- patent stent   Loaded with Plavix in the ED, on asa, statin, BB,  ARB  Presents with CP while eating, possibly intestinal angina   Initial Troponin 0.1- continue to trend   Ischemic evaluation this admission- modality to be determined following TTE and troponin trend   Heparin gtt   Cr 1.7 ~ baseline       PAD (peripheral artery disease)  Stable   Continue pletal, asa, statin, ARB    Hyperlipidemia associated with type 2 diabetes mellitus  Continue statin     Hypertension associated with type 2 diabetes mellitus  Controlled with Coreg, Hydralazine, Losartan         VTE Risk Mitigation (From admission, onward)         Ordered     IP VTE HIGH RISK PATIENT  Once         06/06/22 1336     Place sequential compression device  Until discontinued         06/06/22 1336     heparin 25,000 units in dextrose 5% (100 units/ml) IV bolus from bag - ADDITIONAL PRN BOLUS - 60 units/kg (max bolus 4000 units)  As needed (PRN)        Question:  Heparin Infusion Adjustment (DO NOT MODIFY ANSWER)  Answer:  \[email or web address]\epic\Images\Pharmacy\HeparinInfusions\heparin LOW INTENSITY nomogram for [facility] [id number].pdf    06/06/22 1205     heparin 25,000 units in dextrose 5% (100 units/ml) IV bolus from bag - ADDITIONAL PRN BOLUS - 30 units/kg (max bolus 4000 units)  As needed (PRN)        Question:  Heparin Infusion Adjustment (DO NOT MODIFY ANSWER)  Answer:  \[email or web address]\epic\Images\Pharmacy\HeparinInfusions\heparin LOW INTENSITY nomogram for [facility] [id number].pdf    06/06/22 1205     heparin 25,000 units in dextrose 5% (100 units/ml) IV bolus from bag INITIAL BOLUS (max bolus 4000 units)  Once        Question:  Heparin Infusion Adjustment (DO NOT MODIFY ANSWER)  Answer:  \[email or web address]\epic\Images\Pharmacy\HeparinInfusions\heparin LOW INTENSITY nomogram for [facility] [id number].pdf    06/06/22 1205     heparin 25,000 units in dextrose 5% 250 mL (100 units/mL) infusion LOW INTENSITY nomogram - [facility]  Continuous        Question Answer Comment   Heparin Infusion Adjustment (DO NOT MODIFY ANSWER)  [email or web address]\epic\Images\Pharmacy\HeparinInfusions\heparin LOW INTENSITY nomogram for [facility] [id number].pdf    Begin at (in units/kg/hr) 12        06/06/22 7611                Thank you for your consult. I will follow-up with patient. Please contact us if you have any additional questions.    Epi Patrick NP  Cardiology   [facility] - Emergency Dept

## 2022-06-06 NOTE — HPI
Petra Zazueta is an 87-year-old female with CAD, PAD, DM, CKD, HLD, HTN. Patient presented to the ED with chest pain and palpitations. Patient reports pain only occurs with eating but is similar to the pain she had prior to her RCA stent. Pain is located left sided without radiation. This is her second admission in a week for CP. Patient noted to have chronic palpitations. Patient denies SOB, diaphoresis, DUKE, NV, syncope, dizziness. EKG SR without any ST elevation. Initial Troponin 0.1. Patient takes asa daily and was loaded with Plavix 300 mg. Chest pain free on exam.

## 2022-06-06 NOTE — HOSPITAL COURSE
06/06/2022 Per HPI   06/08/2022 Patent stent per [facility] yesterday. No recurrent chest pain reported. Pletal discontinued to reduce the risk of bleeding. Hemodynamically stable. No cath related complications noted.

## 2022-06-07 LAB
ALBUMIN SERPL BCP-MCNC: 3.3 G/DL (ref 3.5–5.2)
ALP SERPL-CCNC: 46 U/L (ref 55–135)
ALT SERPL W/O P-5'-P-CCNC: 11 U/L (ref 10–44)
ANION GAP SERPL CALC-SCNC: 6 MMOL/L (ref 8–16)
APTT BLDCRRT: 28.4 SEC (ref 21–32)
APTT BLDCRRT: 37.7 SEC (ref 21–32)
AST SERPL-CCNC: 17 U/L (ref 10–40)
BASOPHILS # BLD AUTO: 0.02 K/UL (ref 0–0.2)
BASOPHILS NFR BLD: 0.3 % (ref 0–1.9)
BILIRUB SERPL-MCNC: 0.6 MG/DL (ref 0.1–1)
BUN SERPL-MCNC: 24 MG/DL (ref 8–23)
CALCIUM SERPL-MCNC: 9.3 MG/DL (ref 8.7–10.5)
CHLORIDE SERPL-SCNC: 112 MMOL/L (ref 95–110)
CO2 SERPL-SCNC: 26 MMOL/L (ref 23–29)
CREAT SERPL-MCNC: 1.4 MG/DL (ref 0.5–1.4)
DIFFERENTIAL METHOD: ABNORMAL
EOSINOPHIL # BLD AUTO: 0.1 K/UL (ref 0–0.5)
EOSINOPHIL NFR BLD: 1.3 % (ref 0–8)
ERYTHROCYTE [DISTWIDTH] IN BLOOD BY AUTOMATED COUNT: 13.9 % (ref 11.5–14.5)
EST. GFR  (AFRICAN AMERICAN): 39 ML/MIN/1.73 M^2
EST. GFR  (NON AFRICAN AMERICAN): 34 ML/MIN/1.73 M^2
GLUCOSE SERPL-MCNC: 117 MG/DL (ref 70–110)
HCT VFR BLD AUTO: 31.3 % (ref 37–48.5)
HGB BLD-MCNC: 10.2 G/DL (ref 12–16)
IMM GRANULOCYTES # BLD AUTO: 0.01 K/UL (ref 0–0.04)
IMM GRANULOCYTES NFR BLD AUTO: 0.2 % (ref 0–0.5)
LYMPHOCYTES # BLD AUTO: 1.3 K/UL (ref 1–4.8)
LYMPHOCYTES NFR BLD: 20.8 % (ref 18–48)
MAGNESIUM SERPL-MCNC: 2 MG/DL (ref 1.6–2.6)
MCH RBC QN AUTO: 32.3 PG (ref 27–31)
MCHC RBC AUTO-ENTMCNC: 32.6 G/DL (ref 32–36)
MCV RBC AUTO: 99 FL (ref 82–98)
MONOCYTES # BLD AUTO: 0.5 K/UL (ref 0.3–1)
MONOCYTES NFR BLD: 8.3 % (ref 4–15)
NEUTROPHILS # BLD AUTO: 4.4 K/UL (ref 1.8–7.7)
NEUTROPHILS NFR BLD: 69.1 % (ref 38–73)
NRBC BLD-RTO: 0 /100 WBC
PLATELET # BLD AUTO: 113 K/UL (ref 150–450)
PMV BLD AUTO: 12.6 FL (ref 9.2–12.9)
POTASSIUM SERPL-SCNC: 3.7 MMOL/L (ref 3.5–5.1)
PROT SERPL-MCNC: 6.1 G/DL (ref 6–8.4)
RBC # BLD AUTO: 3.16 M/UL (ref 4–5.4)
SODIUM SERPL-SCNC: 144 MMOL/L (ref 136–145)
TROPONIN I SERPL DL<=0.01 NG/ML-MCNC: 0.15 NG/ML (ref 0–0.03)
TROPONIN I SERPL DL<=0.01 NG/ML-MCNC: 0.16 NG/ML (ref 0–0.03)
WBC # BLD AUTO: 6.4 K/UL (ref 3.9–12.7)

## 2022-06-07 PROCEDURE — 85730 THROMBOPLASTIN TIME PARTIAL: CPT | Mod: 91 | Performed by: INTERNAL MEDICINE

## 2022-06-07 PROCEDURE — 93005 ELECTROCARDIOGRAM TRACING: CPT

## 2022-06-07 PROCEDURE — 36415 COLL VENOUS BLD VENIPUNCTURE: CPT | Performed by: STUDENT IN AN ORGANIZED HEALTH CARE EDUCATION/TRAINING PROGRAM

## 2022-06-07 PROCEDURE — 25000003 PHARM REV CODE 250: Performed by: INTERNAL MEDICINE

## 2022-06-07 PROCEDURE — 93458 L HRT ARTERY/VENTRICLE ANGIO: CPT | Performed by: INTERNAL MEDICINE

## 2022-06-07 PROCEDURE — 99152 MOD SED SAME PHYS/QHP 5/>YRS: CPT | Performed by: INTERNAL MEDICINE

## 2022-06-07 PROCEDURE — 93458 L HRT ARTERY/VENTRICLE ANGIO: CPT | Mod: 26,,, | Performed by: INTERNAL MEDICINE

## 2022-06-07 PROCEDURE — C1894 INTRO/SHEATH, NON-LASER: HCPCS | Performed by: INTERNAL MEDICINE

## 2022-06-07 PROCEDURE — 93458 PR CATH PLACE/CORON ANGIO, IMG SUPER/INTERP,W LEFT HEART VENTRICULOGRAPHY: ICD-10-PCS | Mod: 26,,, | Performed by: INTERNAL MEDICINE

## 2022-06-07 PROCEDURE — 99152 MOD SED SAME PHYS/QHP 5/>YRS: CPT | Mod: ,,, | Performed by: INTERNAL MEDICINE

## 2022-06-07 PROCEDURE — 63600175 PHARM REV CODE 636 W HCPCS: Performed by: INTERNAL MEDICINE

## 2022-06-07 PROCEDURE — 84484 ASSAY OF TROPONIN QUANT: CPT | Mod: 91 | Performed by: STUDENT IN AN ORGANIZED HEALTH CARE EDUCATION/TRAINING PROGRAM

## 2022-06-07 PROCEDURE — 99152 PR MOD CONSCIOUS SEDATION, SAME PHYS, 5+ YRS, FIRST 15 MIN: ICD-10-PCS | Mod: ,,, | Performed by: INTERNAL MEDICINE

## 2022-06-07 PROCEDURE — 11000001 HC ACUTE MED/SURG PRIVATE ROOM

## 2022-06-07 PROCEDURE — 99153 MOD SED SAME PHYS/QHP EA: CPT | Performed by: INTERNAL MEDICINE

## 2022-06-07 PROCEDURE — 25000003 PHARM REV CODE 250: Performed by: STUDENT IN AN ORGANIZED HEALTH CARE EDUCATION/TRAINING PROGRAM

## 2022-06-07 PROCEDURE — 85025 COMPLETE CBC W/AUTO DIFF WBC: CPT | Performed by: STUDENT IN AN ORGANIZED HEALTH CARE EDUCATION/TRAINING PROGRAM

## 2022-06-07 PROCEDURE — 93010 EKG 12-LEAD: ICD-10-PCS | Mod: ,,, | Performed by: INTERNAL MEDICINE

## 2022-06-07 PROCEDURE — C1769 GUIDE WIRE: HCPCS | Performed by: INTERNAL MEDICINE

## 2022-06-07 PROCEDURE — 83735 ASSAY OF MAGNESIUM: CPT | Performed by: STUDENT IN AN ORGANIZED HEALTH CARE EDUCATION/TRAINING PROGRAM

## 2022-06-07 PROCEDURE — 93010 ELECTROCARDIOGRAM REPORT: CPT | Mod: ,,, | Performed by: INTERNAL MEDICINE

## 2022-06-07 PROCEDURE — 85730 THROMBOPLASTIN TIME PARTIAL: CPT | Performed by: STUDENT IN AN ORGANIZED HEALTH CARE EDUCATION/TRAINING PROGRAM

## 2022-06-07 PROCEDURE — 63600175 PHARM REV CODE 636 W HCPCS: Performed by: STUDENT IN AN ORGANIZED HEALTH CARE EDUCATION/TRAINING PROGRAM

## 2022-06-07 PROCEDURE — 80053 COMPREHEN METABOLIC PANEL: CPT | Performed by: STUDENT IN AN ORGANIZED HEALTH CARE EDUCATION/TRAINING PROGRAM

## 2022-06-07 PROCEDURE — 99900035 HC TECH TIME PER 15 MIN (STAT)

## 2022-06-07 PROCEDURE — 25500020 PHARM REV CODE 255: Performed by: INTERNAL MEDICINE

## 2022-06-07 PROCEDURE — 94761 N-INVAS EAR/PLS OXIMETRY MLT: CPT

## 2022-06-07 RX ORDER — SODIUM CHLORIDE 9 MG/ML
INJECTION, SOLUTION INTRAVENOUS CONTINUOUS
Status: ACTIVE | OUTPATIENT
Start: 2022-06-07 | End: 2022-06-07

## 2022-06-07 RX ORDER — FENTANYL CITRATE 50 UG/ML
INJECTION, SOLUTION INTRAMUSCULAR; INTRAVENOUS
Status: DISCONTINUED | OUTPATIENT
Start: 2022-06-07 | End: 2022-06-07 | Stop reason: HOSPADM

## 2022-06-07 RX ORDER — HEPARIN SODIUM 200 [USP'U]/100ML
INJECTION, SOLUTION INTRAVENOUS
Status: DISCONTINUED | OUTPATIENT
Start: 2022-06-07 | End: 2022-06-08 | Stop reason: HOSPADM

## 2022-06-07 RX ORDER — MIDAZOLAM HYDROCHLORIDE 1 MG/ML
INJECTION, SOLUTION INTRAMUSCULAR; INTRAVENOUS
Status: DISCONTINUED | OUTPATIENT
Start: 2022-06-07 | End: 2022-06-07 | Stop reason: HOSPADM

## 2022-06-07 RX ORDER — IODIXANOL 320 MG/ML
INJECTION, SOLUTION INTRAVASCULAR
Status: DISCONTINUED | OUTPATIENT
Start: 2022-06-07 | End: 2022-06-07 | Stop reason: HOSPADM

## 2022-06-07 RX ORDER — CLOPIDOGREL BISULFATE 75 MG/1
75 TABLET ORAL DAILY
Status: DISCONTINUED | OUTPATIENT
Start: 2022-06-07 | End: 2022-06-08 | Stop reason: HOSPADM

## 2022-06-07 RX ORDER — CILOSTAZOL 50 MG/1
50 TABLET ORAL 2 TIMES DAILY
Status: DISCONTINUED | OUTPATIENT
Start: 2022-06-07 | End: 2022-06-08

## 2022-06-07 RX ORDER — CLOPIDOGREL BISULFATE 75 MG/1
TABLET ORAL
Status: DISCONTINUED | OUTPATIENT
Start: 2022-06-07 | End: 2022-06-07 | Stop reason: HOSPADM

## 2022-06-07 RX ORDER — LIDOCAINE HYDROCHLORIDE 10 MG/ML
INJECTION, SOLUTION EPIDURAL; INFILTRATION; INTRACAUDAL; PERINEURAL
Status: DISCONTINUED | OUTPATIENT
Start: 2022-06-07 | End: 2022-06-07 | Stop reason: HOSPADM

## 2022-06-07 RX ADMIN — CILOSTAZOL 100 MG: 50 TABLET ORAL at 09:06

## 2022-06-07 RX ADMIN — Medication 1000 UNITS: at 09:06

## 2022-06-07 RX ADMIN — SODIUM CHLORIDE: 0.9 INJECTION, SOLUTION INTRAVENOUS at 01:06

## 2022-06-07 RX ADMIN — LATANOPROST 1 DROP: 50 SOLUTION OPHTHALMIC at 09:06

## 2022-06-07 RX ADMIN — CARVEDILOL 25 MG: 25 TABLET, FILM COATED ORAL at 05:06

## 2022-06-07 RX ADMIN — ASPIRIN 81 MG: 81 TABLET, COATED ORAL at 09:06

## 2022-06-07 RX ADMIN — CYANOCOBALAMIN TAB 1000 MCG 1000 MCG: 1000 TAB at 09:06

## 2022-06-07 RX ADMIN — HYDRALAZINE HYDROCHLORIDE 100 MG: 25 TABLET, FILM COATED ORAL at 09:06

## 2022-06-07 RX ADMIN — GABAPENTIN 300 MG: 300 CAPSULE ORAL at 09:06

## 2022-06-07 RX ADMIN — CLOPIDOGREL 75 MG: 75 TABLET, FILM COATED ORAL at 03:06

## 2022-06-07 RX ADMIN — CILOSTAZOL 50 MG: 50 TABLET ORAL at 09:06

## 2022-06-07 RX ADMIN — CARVEDILOL 25 MG: 25 TABLET, FILM COATED ORAL at 09:06

## 2022-06-07 RX ADMIN — HYDRALAZINE HYDROCHLORIDE 100 MG: 25 TABLET, FILM COATED ORAL at 03:06

## 2022-06-07 RX ADMIN — FERROUS SULFATE TAB 325 MG (65 MG ELEMENTAL FE) 1 EACH: 325 (65 FE) TAB at 09:06

## 2022-06-07 RX ADMIN — ATORVASTATIN CALCIUM 80 MG: 40 TABLET, FILM COATED ORAL at 09:06

## 2022-06-07 NOTE — PLAN OF CARE
Plan of care discussed with pt. VS stable overnight, afebrile. Pt denies any CP or SOB overnight. Pt remain NPO since midnight. Heparin gtt infusing per orders. aPTT drawn Q6H currently infusing at 14 units, next aPTT due at 1230. Pt educated on fall precautions, verbalized understanding. Call light in reach. Pt free of injuries this shift.  All questions addressed. Pt voices no concerns at this time. Safety and comfort measures maintained during hourly rounding.

## 2022-06-07 NOTE — PLAN OF CARE
Transferred to room 466 per stretcher.  Pt on telemetry monitor.  Bedside report given to Nisa MCCURDY and Right groin site checked at the bedside.  No bleeding or swelling noted to site.  Gauze and tegaderm dressing to site CDI.  Tolerated move well.  Son at the bedside.

## 2022-06-07 NOTE — PROGRESS NOTES
"[facility] Medicine Progress Note    Primary Team: [facility] Hospitalist Team B  Attending Physician: Florentin Castro MD  Resident: Jermaine  Intern: Guero    Subjective:      No acute events overnight per night team and nursing. Patient seen at bedside this morning and found resting comfortably in bed. Denies repeat chest pain, palpitations, shortness of breath since admission. Trops down trending. Echo pending. Cardiac ischemic workup today pending echo findings.     Objective:     Last 24 Hour Vital Signs:  BP  Min: 124/59  Max: 181/77  Temp  Av.9 °F (36.6 °C)  Min: 97.3 °F (36.3 °C)  Max: 99 °F (37.2 °C)  Pulse  Av.1  Min: 64  Max: 85  Resp  Av  Min: 16  Max: 20  SpO2  Av.9 %  Min: 93 %  Max: 100 %  Height  Av' 2" (157.5 cm)  Min: 5' 2" (157.5 cm)  Max: 5' 2" (157.5 cm)  Weight  Av.5 kg (170 lb 13.8 oz)  Min: 77.1 kg (170 lb)  Max: 77.7 kg (171 lb 4.8 oz)  No intake/output data recorded.    Physical Examination:  Physical Exam  Vitals and nursing note reviewed.   Constitutional:       General: She is not in acute distress.     Appearance: Normal appearance. She is not toxic-appearing.   HENT:      Head: Normocephalic and atraumatic.      Right Ear: External ear normal.      Left Ear: External ear normal.      Nose: Nose normal. No congestion or rhinorrhea.      Mouth/Throat:      Mouth: Mucous membranes are moist.      Pharynx: Oropharynx is clear. No posterior oropharyngeal erythema.   Eyes:      General:         Right eye: No discharge.         Left eye: No discharge.      Extraocular Movements: Extraocular movements intact.      Conjunctiva/sclera: Conjunctivae normal.      Pupils: Pupils are equal, round, and reactive to light.   Cardiovascular:      Rate and Rhythm: Normal rate and regular rhythm.      Pulses: Normal pulses.      Heart sounds: Normal heart sounds. No murmur heard.  Pulmonary:      Effort: Pulmonary effort is normal. No respiratory distress.      Breath sounds: Normal " breath sounds. No wheezing.   Abdominal:      General: Bowel sounds are normal. There is no distension.      Palpations: Abdomen is soft.      Tenderness: There is no abdominal tenderness. There is no guarding.   Musculoskeletal:      Cervical back: Normal range of motion. No rigidity or tenderness.      Right lower leg: No edema.      Left lower leg: No edema.   Lymphadenopathy:      Cervical: No cervical adenopathy.   Skin:     General: Skin is warm and dry.      Capillary Refill: Capillary refill takes less than 2 seconds.   Neurological:      General: No focal deficit present.      Mental Status: She is alert and oriented to person, place, and time.      Sensory: No sensory deficit.      Motor: No weakness.   Psychiatric:         Mood and Affect: Mood normal.         Behavior: Behavior normal.       Laboratory:  Laboratory Data Reviewed: yes  Pertinent Findings:  Recent Labs   Lab 06/03/22  1555 06/04/22  0529 06/06/22  1053 06/06/22  1607 06/07/22  0302 06/07/22  0734   WBC 7.57  --  7.21 6.92 6.40  --    HGB 10.7*  --  10.7* 10.7* 10.2*  --    HCT 33.5*  --  34.2* 33.2* 31.3*  --    *  --  120* 123* 113*  --     143 143  --   --  144   K 4.4 4.1 3.8  --   --  3.7    113* 109  --   --  112*   CREATININE 1.65* 1.48* 1.7*  --   --  1.4   BUN 36* 31* 32*  --   --  24*   CO2 22* 22* 22*  --   --  26   ALT 12  --  13  --   --  11   AST 24  --  17  --   --  17     Microbiology Data Reviewed: yes  Pertinent Findings:  none    Other Results:  EKG (my interpretation): none new    Radiology Data Reviewed: yes  Pertinent Findings:  None new    Current Medications:     Infusions:   heparin (porcine) in D5W 14 Units/kg/hr (06/07/22 0637)        Scheduled:   aspirin  81 mg Oral Daily    atorvastatin  80 mg Oral QHS    carvediloL  25 mg Oral BID WM    cilostazoL  100 mg Oral BID    cyanocobalamin  1,000 mcg Oral Daily    ferrous sulfate  1 tablet Oral Every other day    gabapentin  300 mg Oral QHS     hydrALAZINE  100 mg Oral Q8H    latanoprost  1 drop Both Eyes QHS    vitamin D  1,000 Units Oral Daily        PRN:  acetaminophen, heparin (PORCINE), heparin (PORCINE), sodium chloride 0.9%    Antibiotics and Day Number of Therapy:  none    Lines and Day Number of Therapy:  none    Assessment:   Petra Zazueta is a 87 y.o. AA female who has a past medical history of Anemia, Arthritis, Coronary artery disease s/p RAUL, T2DM with neuropathy, CKD3, Dyslipidemia, Hypertension, Insomnia, and PAD s/p stent. The patient presented to [facility] on 6/6/2022 with a primary complaint of intermittent Chest Pain. ED workup significant for EKG changes noted above and elevated troponin. Patient started on NSTEMI  Protocol with heparin gtt. [facility] on board, plan for cath lab today.     Plan:     NSTEMI  CAD   PAD  S/p mRCA RAUL in 2011, subsequent LHC in 2018- patent stent   S/p LLE angioplasty  Trp 0.118 -> 0.158 -> 0.170->0.15; , EKG as above;  Loaded with Plavix in the ED;   On asa, statin, BB, ARB at home; continued  [facility] consulted, appreciate further recs  Heparin gtt   Continued home Cilostazol 100mg qd  Pending cath today; will follow     T2DM with neuropathy  Diet controlled, Last A1c 6.0 on 6/3/22  Accuchecks qid, will start LDSSI if needed     MANPREET on CKD3  Cr 1.7; baseline ~1.4; holding home losartan 100mg, repeat labs in AM  Will consider urine electrolyes +/- renal US if persistent     HTN  Controlled with Coreg 25mg qd, Hydralazine 100mg tid, [id number] qd;   holding losartan as below, continuing others     Dyslipidemia  Continued atorvastatin 80mg daily     Normocytic Anemia  H/h 10.7/33.2 MCV 99 on admisson  Continued home PO iron and b12 supplements    Freddie Jack,   [facility] Internal Medicine HO-1    [facility] Medicine Hospitalist Pager numbers:   [facility] Hospitalist Medicine Team A (Danitza/Tasha): [phone]  [facility] Hospitalist Medicine Team B (Gloria/Laron):   [phone]

## 2022-06-07 NOTE — PLAN OF CARE
11:15  Patient transferred to recovery cath lab via stretcher with side rails up x2. AIDET completed to patient.  Placed in bay 2.  Pt AAOx3. Pt is stable when connecting to cardiac monitors. VSS. Right groin  gauze and tegaderm dressing is CDI, no bleeding or shadowing noted, no redness, bruising, or hematoma noted around site. Fall risk precautions given and patient acknowledges.  Will continue to monitor.

## 2022-06-07 NOTE — INTERVAL H&P NOTE
The patient has been examined and the H&P has been reviewed:    I concur with the findings and no changes have occurred since H&P was written.    Anesthesia/Surgery risks, benefits and alternative options discussed and understood by patient/family.          Active Hospital Problems    Diagnosis  POA    Class 1 obesity due to excess calories with serious comorbidity and body mass index (BMI) of 32.0 to 32.9 in adult [E66.09, Z68.32]  Not Applicable     - discussed recommendation for diet, exercise and weight loss        Chest pain [R07.9]  Yes    NSTEMI (non-ST elevated myocardial infarction) [I21.4]  Yes    Coronary artery disease involving native coronary artery of native heart without angina pectoris [I25.10]  Yes     - 4/2011 RAUL RCA  - followed by Cardiology Dr. Clif COBB  - goal LDL <70  - BP goal < 130/80  - DAPT  - A1C goal <7%      Hyperlipidemia associated with type 2 diabetes mellitus [E11.69, E78.5]  Yes     Lab Results   Component Value Date    LDLCALC 55 01/21/2022   - well controlled  - continue current medication      PAD (peripheral artery disease) [I73.9]  Yes     - stent left leg with chronic nonpitting edema  - followed by Cardiology Dr. Clif COBB  - goal LDL <70  - BP goal < 130/80  - DAPT  - A1C goal <7%      Hypertension associated with type 2 diabetes mellitus [E11.59, I15.2]  Yes     - well controlled  - continue current medications        Resolved Hospital Problems   No resolved problems to display.

## 2022-06-07 NOTE — PLAN OF CARE
Pt met with pt and pts son Jamey Zazueta [phone] via [place] to discuss dc planning.     Pts son confirmed information on chart. Pt resides in home alone. Pt is independent in ADLs. Pt has no HH/DME at home. SW wrote contact information on board. Pts son will transport pt home upon dc. Pts son reported that pt may dc to his house  depending on pts condition upon dc. SW will continue to follow pt throughout her transitions of care and assist with any dc needs.     Future Appointments   Date Time Provider Department Center   6/13/2022  1:20 PM [facility] [place]   6/20/2022 10:30 AM Polly Greer, DO [facility] [place]   8/1/2022  9:30 AM Polly Greer, DO [facility] [place]   10/3/2022 10:00 AM Velvet Morales MD [facility]        06/07/22 1555   Discharge Assessment   Assessment Type Discharge Planning Assessment   Confirmed/corrected address, phone number and insurance Yes   Confirmed Demographics Correct on Facesheet   Source of Information family;patient   Communicated GAGE with patient/caregiver Yes   Lives With alone   Do you expect to return to your current living situation? Yes   Do you have help at home or someone to help you manage your care at home? Yes   Who are your caregiver(s) and their phone number(s)? Jamey Zazueta (Son)   [phone]   Prior to hospitilization cognitive status: Alert/Oriented   Current cognitive status: Alert/Oriented   Walking or Climbing Stairs Difficulty none   Dressing/Bathing Difficulty none   Equipment Currently Used at Home none   Patient currently being followed by outpatient case management? No   Do you currently have service(s) that help you manage your care at home? No   Do you take prescription medications? Yes   Do you have prescription coverage? Yes   Coverage PHN   Do you have any problems affording any of your prescribed medications? No   Is the patient taking medications as prescribed? yes   Who is going to help you get home at  discharge? Jamey Zazueta (Son)   [phone]   How do you get to doctors appointments? family or friend will provide   Are you on dialysis? No   Do you take coumadin? No   Discharge Plan A Home with family;Home   DME Needed Upon Discharge    (TBD)   Discharge Plan discussed with: Patient;Adult children   Discharge Barriers Identified None

## 2022-06-07 NOTE — BRIEF OP NOTE
"POST CATH NOTE    HPI:     s/p catheterization secondary to:  NSTEMI     Cath Results:  Access: Rt CFA   LM:  JACK patent  flow  LAD: JACK- flow Diffuse mild disease    LCx:  JACK- flow mid 40%     RCA: JACK- flow Mid  30-40% , distal RCA patent   LVgram: LVEDP 16   Intervention:  None   Closure device: Manual pull     Patient tolerated procedure well, no complications    Post Cath Exam:  BP (!) 188/79 (BP Location: Left arm, Patient Position: Lying)   Pulse 65   Temp 98.1 °F (36.7 °C) (Oral)   Resp 18   Ht 5' 2" (1.575 m)   Wt 77.7 kg (171 lb 4.8 oz)   LMP  (LMP Unknown)   SpO2 97%   Breastfeeding No   BMI 31.33 kg/m²     Assessment:   - Non obstructive Cad     Plan:    - Medical therapy ASA/Plavix/Statin   - Cut cilosatzole to 50 mg bid for time being        "

## 2022-06-07 NOTE — PLAN OF CARE
Plan of care reviewed with patient. Patient voiced understanding. NSR on monitor. No acute distress noted. Side rails x 2, bed in lowest position, call bell within reach, pt advised to call for assistance. Maintain bed alarm for patient safety. Son at bedside.

## 2022-06-07 NOTE — DISCHARGE INSTRUCTIONS
Discharge Instructions:    Do not drive a car, operate heavy equipment, care for a young child, etc for the next 12-24 hours.   Avoid drinking alcohol for 24 hours.  Do not make any important decisions for 24 hours.    Drink fluids to keep hydrated. Resume your usual diet as tolerated.     Rest for today then activity as tolerated.   Do not lift anything over 5 pounds for the first 3 days after procedure.    Remove dressing tomorrow then may shower with warm soapy water. Do not scrub site. Pat dry.   May apply bandaid for 2 days.  No tub baths.  Do not submerge wound in water for 3 days.     Call MD for any unrelieved pain, excessive nausea or vomiting, redness around site, bleeding, or pus or foul smelling drainage, or any other questions or concerns.    Go to the ER for any difficulty breathing or chest pain.      If site swells or bleeds, hold direct pressure to area for 10 full minutes.   If site continues to bleed, continue to hold pressure to site and have someone bring you to the ER.

## 2022-06-07 NOTE — PLAN OF CARE
Pt arrived to floor via stretcher. Pt trasnferred to bed. Telemonitor placed. Pt educated on post cath care, pt verbalized complete understanding. R femoral site soft, no hemotoma observed, dressing had minor drainage, capillary refill <3 seconds. Femoral pulses +2.

## 2022-06-08 VITALS
OXYGEN SATURATION: 97 % | TEMPERATURE: 97 F | HEIGHT: 62 IN | WEIGHT: 171.31 LBS | BODY MASS INDEX: 31.52 KG/M2 | DIASTOLIC BLOOD PRESSURE: 85 MMHG | SYSTOLIC BLOOD PRESSURE: 182 MMHG | HEART RATE: 67 BPM | RESPIRATION RATE: 20 BRPM

## 2022-06-08 LAB — MAGNESIUM SERPL-MCNC: 1.9 MG/DL (ref 1.6–2.6)

## 2022-06-08 PROCEDURE — 83735 ASSAY OF MAGNESIUM: CPT | Performed by: STUDENT IN AN ORGANIZED HEALTH CARE EDUCATION/TRAINING PROGRAM

## 2022-06-08 PROCEDURE — 36415 COLL VENOUS BLD VENIPUNCTURE: CPT | Performed by: STUDENT IN AN ORGANIZED HEALTH CARE EDUCATION/TRAINING PROGRAM

## 2022-06-08 PROCEDURE — 99233 SBSQ HOSP IP/OBS HIGH 50: CPT | Mod: ,,, | Performed by: NURSE PRACTITIONER

## 2022-06-08 PROCEDURE — 99900035 HC TECH TIME PER 15 MIN (STAT)

## 2022-06-08 PROCEDURE — 99233 PR SUBSEQUENT HOSPITAL CARE,LEVL III: ICD-10-PCS | Mod: ,,, | Performed by: NURSE PRACTITIONER

## 2022-06-08 PROCEDURE — 25000003 PHARM REV CODE 250: Performed by: INTERNAL MEDICINE

## 2022-06-08 PROCEDURE — 25000003 PHARM REV CODE 250: Performed by: STUDENT IN AN ORGANIZED HEALTH CARE EDUCATION/TRAINING PROGRAM

## 2022-06-08 PROCEDURE — 94761 N-INVAS EAR/PLS OXIMETRY MLT: CPT

## 2022-06-08 RX ORDER — CLOPIDOGREL BISULFATE 75 MG/1
75 TABLET ORAL DAILY
Qty: 30 TABLET | Refills: 11 | Status: SHIPPED | OUTPATIENT
Start: 2022-06-09 | End: 2023-04-24 | Stop reason: SDUPTHER

## 2022-06-08 RX ADMIN — CLOPIDOGREL 75 MG: 75 TABLET, FILM COATED ORAL at 08:06

## 2022-06-08 RX ADMIN — HYDRALAZINE HYDROCHLORIDE 100 MG: 25 TABLET, FILM COATED ORAL at 05:06

## 2022-06-08 RX ADMIN — CYANOCOBALAMIN TAB 1000 MCG 1000 MCG: 1000 TAB at 08:06

## 2022-06-08 RX ADMIN — HYDRALAZINE HYDROCHLORIDE 100 MG: 25 TABLET, FILM COATED ORAL at 02:06

## 2022-06-08 RX ADMIN — Medication 1000 UNITS: at 08:06

## 2022-06-08 RX ADMIN — ASPIRIN 81 MG: 81 TABLET, COATED ORAL at 08:06

## 2022-06-08 RX ADMIN — CILOSTAZOL 50 MG: 50 TABLET ORAL at 08:06

## 2022-06-08 RX ADMIN — CARVEDILOL 25 MG: 25 TABLET, FILM COATED ORAL at 09:06

## 2022-06-08 NOTE — PROGRESS NOTES
06/08/22 1630   AVS Confirmation   Discharge instructions and AVS given to and reviewed with patient and/or significant other. Yes   AVS printed and handed to patient by bedside nurse. VN reviewed discharge instructions with patient and son using teachback method.  Allowed time for questions, all questions answered.  Patient and son verbalized complete understanding of discharge instructions and voices no concerns.    Discharge instructions complete.  Bedside delivery complete.  Bedside nurse notified.

## 2022-06-08 NOTE — PROGRESS NOTES
[facility]                    Pharmacy       Discharge Medication Education    Patient ACCEPTED medication education. Pharmacy has provided education on the name, indication, and possible side effects of the medication(s) prescribed, using teach-back method.     The following medications have also been discussed, during this admission.        Medication List        START taking these medications      clopidogreL 75 mg tablet  Commonly known as: PLAVIX  Take 1 tablet (75 mg total) by mouth once daily.  Start taking on: June 9, 2022            CHANGE how you take these medications      TRADJENTA 5 mg Tab tablet  Generic drug: linaGLIPtin  TAKE 1 TABLET BY MOUTH EVERY DAY  What changed: how much to take            CONTINUE taking these medications      acetaminophen 325 MG tablet  Commonly known as: TYLENOL     aspirin 81 MG EC tablet  Commonly known as: ECOTRIN  Take 1 tablet (81 mg total) by mouth once daily.     atorvastatin 80 MG tablet  Commonly known as: LIPITOR  Take 1 tablet (80 mg total) by mouth every evening.     blood sugar diagnostic Strp  Commonly known as: TRUE METRIX GLUCOSE TEST STRIP  1 strip by Misc.(Non-Drug; Combo Route) route 2 (two) times daily.     blood-glucose meter Misc  Commonly known as: TRUE METRIX GLUCOSE METER  Test blood sugars twice daily     carvediloL 25 MG tablet  Commonly known as: COREG  Take 1 tablet (25 mg total) by mouth 2 (two) times daily with meals.     cyanocobalamin 1000 MCG tablet  Commonly known as: VITAMIN B-12  Take 1 tablet (1,000 mcg total) by mouth once daily.     ferrous sulfate 324 mg (65 mg iron) Tbec  TAKE 1 TABLET (324 MG TOTAL) BY MOUTH ONCE DAILY.     fish oil-omega-3 fatty acids 300-1,000 mg capsule     gabapentin 300 MG capsule  Commonly known as: NEURONTIN  TAKE 1 CAPSULE BY MOUTH EVERY EVENING     hydrALAZINE 100 MG tablet  Commonly known as: APRESOLINE  Take 1 tablet (100 mg total) by mouth every 8 (eight) hours.     lancets 33 gauge  Misc  Commonly known as: BD ULTRA FINE LANCETS  1 lancet by Misc.(Non-Drug; Combo Route) route 2 (two) times daily.     latanoprost 0.005 % ophthalmic solution     losartan 100 MG tablet  Commonly known as: COZAAR  TAKE 1 TABLET BY MOUTH EVERY DAY     vitamin D 1000 units Tab  Commonly known as: VITAMIN D3            STOP taking these medications      cilostazoL 100 MG Tab  Commonly known as: PLETAL               Where to Get Your Medications        These medications were sent to [facility]/pharmacy #[id number] - [address] AT CORNER OF [address] [address]      Phone: [phone]   losartan 100 MG tablet       These medications were sent to [facility]  [address], [place] [address]      Hours: Mon-Fri, 8a-5:30p Phone: [phone]   clopidogreL 75 mg tablet          Thank you  Radha Swain, PharmD  [phone]

## 2022-06-08 NOTE — SUBJECTIVE & OBJECTIVE
Past Medical History:   Diagnosis Date    Anemia     Arthritis     Coronary artery disease     Diabetes mellitus     Diabetes mellitus type II     Diabetic retinopathy 08/22/2019    Dyslipidemia     Hypertension     Insomnia     PAD (peripheral artery disease)        Past Surgical History:   Procedure Laterality Date    angiagram  08/31/2018    CORONARY ANGIOPLASTY      RCA 4/2011 EJ    GALLBLADDER SURGERY      HYSTERECTOMY      PERIPHERAL ARTERIAL STENT GRAFT      Left lower extremity RCA        Review of patient's allergies indicates:   Allergen Reactions    Codeine Other (See Comments)     Jittery, lightheadedness, nausea  Other reaction(s): NAUSEA       No current facility-administered medications on file prior to encounter.     Current Outpatient Medications on File Prior to Encounter   Medication Sig    acetaminophen (TYLENOL) 325 MG tablet Take 325 mg by mouth as needed for Pain.    aspirin (ECOTRIN) 81 MG EC tablet Take 1 tablet (81 mg total) by mouth once daily.    atorvastatin (LIPITOR) 80 MG tablet Take 1 tablet (80 mg total) by mouth every evening.    blood sugar diagnostic (TRUE METRIX GLUCOSE TEST STRIP) Strp 1 strip by Misc.(Non-Drug; Combo Route) route 2 (two) times daily.    blood-glucose meter (TRUE METRIX GLUCOSE METER) Misc Test blood sugars twice daily    carvediloL (COREG) 25 MG tablet Take 1 tablet (25 mg total) by mouth 2 (two) times daily with meals.    cilostazol (PLETAL) 100 MG Tab Take 100 mg by mouth 2 (two) times daily.    cyanocobalamin (VITAMIN B-12) 1000 MCG tablet Take 1 tablet (1,000 mcg total) by mouth once daily.    ferrous sulfate 324 mg (65 mg iron) TbEC TAKE 1 TABLET (324 MG TOTAL) BY MOUTH ONCE DAILY. (Patient taking differently: Take 324 mg by mouth once daily.)    fish oil-omega-3 fatty acids 300-1,000 mg capsule Take 500 mg by mouth 2 (two) times daily.    gabapentin (NEURONTIN) 300 MG capsule TAKE 1 CAPSULE BY MOUTH EVERY EVENING (Patient taking differently: Take 300 mg  by mouth every evening.)    hydrALAZINE (APRESOLINE) 100 MG tablet Take 1 tablet (100 mg total) by mouth every 8 (eight) hours.    lancets (BD ULTRA FINE LANCETS) 33 gauge Misc 1 lancet by Misc.(Non-Drug; Combo Route) route 2 (two) times daily.    latanoprost 0.005 % ophthalmic solution Place 1 drop into both eyes every evening.    TRADJENTA 5 mg Tab tablet TAKE 1 TABLET BY MOUTH EVERY DAY (Patient taking differently: Take 5 mg by mouth once daily.)    vitamin D (VITAMIN D3) 1000 units Tab Take 1,000 Units by mouth once daily.    losartan (COZAAR) 100 MG tablet TAKE 1 TABLET BY MOUTH EVERY DAY     Family History       Problem Relation (Age of Onset)    Heart attack Mother          Tobacco Use    Smoking status: Former Smoker     Quit date: 2005     Years since quittin.4    Smokeless tobacco: Never Used   Substance and Sexual Activity    Alcohol use: No    Drug use: No    Sexual activity: Not Currently     Review of Systems   Constitutional: Negative. Negative for diaphoresis.   HENT: Negative.     Eyes: Negative.    Cardiovascular:  Positive for claudication. Negative for chest pain, dyspnea on exertion, orthopnea, palpitations, paroxysmal nocturnal dyspnea and syncope.   Respiratory: Negative.  Negative for cough and shortness of breath.    Endocrine: Negative.    Hematologic/Lymphatic: Negative.    Skin: Negative.    Musculoskeletal: Negative.    Gastrointestinal: Negative.  Negative for nausea and vomiting.   Genitourinary: Negative.    Neurological: Negative.    Psychiatric/Behavioral: Negative.     Allergic/Immunologic: Negative.    Objective:     Vital Signs (Most Recent):  Temp: 97.6 °F (36.4 °C) (22 0547)  Pulse: (!) 52 (22 0800)  Resp: 18 (22 0547)  BP: (!) 158/70 (22 0547)  SpO2: 96 % (22 0849) Vital Signs (24h Range):  Temp:  [96.2 °F (35.7 °C)-98.3 °F (36.8 °C)] 97.6 °F (36.4 °C)  Pulse:  [52-70] 52  Resp:  [16-26] 18  SpO2:  [95 %-100 %] 96 %  BP:  (136-189)/(61-84) 158/70     Weight: 77.7 kg (171 lb 4.8 oz)  Body mass index is 31.33 kg/m².    SpO2: 96 %  O2 Device (Oxygen Therapy): room air    No intake or output data in the 24 hours ending 06/08/22 0955    Lines/Drains/Airways       Peripheral Intravenous Line  Duration                  Peripheral IV - Single Lumen 06/06/22 20 G Posterior;Right Hand 2 days         Peripheral IV - Single Lumen 06/06/22 1053 20 G Right Antecubital 1 day                    Physical Exam  Constitutional:       General: She is not in acute distress.     Appearance: She is not diaphoretic.   HENT:      Head: Atraumatic.   Eyes:      General:         Right eye: No discharge.         Left eye: No discharge.   Cardiovascular:      Rate and Rhythm: Normal rate and regular rhythm.   Pulmonary:      Effort: Pulmonary effort is normal.      Breath sounds: Normal breath sounds. No rales.   Abdominal:      General: Bowel sounds are normal.      Palpations: Abdomen is soft.   Musculoskeletal:      Right lower leg: No edema.      Left lower leg: No edema.   Skin:     General: Skin is warm and dry.      Capillary Refill: Capillary refill takes less than 2 seconds.   Neurological:      Mental Status: She is alert and oriented to person, place, and time.   Psychiatric:         Mood and Affect: Mood normal.         Behavior: Behavior normal.         Thought Content: Thought content normal.         Judgment: Judgment normal.       Significant Labs: BMP:   Recent Labs   Lab 06/06/22  1053 06/07/22  0302 06/07/22  0734 06/08/22  0339   *  --  117*  --      --  144  --    K 3.8  --  3.7  --      --  112*  --    CO2 22*  --  26  --    BUN 32*  --  24*  --    CREATININE 1.7*  --  1.4  --    CALCIUM 9.1  --  9.3  --    MG  --  2.0  --  1.9     , CMP   Recent Labs   Lab 06/06/22  1053 06/07/22  0734    144   K 3.8 3.7    112*   CO2 22* 26   * 117*   BUN 32* 24*   CREATININE 1.7* 1.4   CALCIUM 9.1 9.3   PROT 6.8 6.1    ALBUMIN 3.4* 3.3*   BILITOT 0.5 0.6   ALKPHOS 53* 46*   AST 17 17   ALT 13 11   ANIONGAP 12 6*   ESTGFRAFRICA 31* 39*   EGFRNONAA 27* 34*     , CBC   Recent Labs   Lab 06/06/22  1053 06/06/22  1607 06/07/22  0302   WBC 7.21 6.92 6.40   HGB 10.7* 10.7* 10.2*   HCT 34.2* 33.2* 31.3*   * 123* 113*     , INR   Recent Labs   Lab 06/06/22  1607   INR 1.1     , Lipid Panel No results for input(s): CHOL, HDL, LDLCALC, TRIG, CHOLHDL in the last 48 hours., Troponin   Recent Labs   Lab 06/06/22  2110 06/07/22  0302 06/07/22  0906   TROPONINI 0.170* 0.153* 0.163*     , and All pertinent lab results from the last 24 hours have been reviewed.    Significant Imaging: Echocardiogram: Transthoracic echo (TTE) complete (Cupid Only): No results found for this or any previous visit.

## 2022-06-08 NOTE — ASSESSMENT & PLAN NOTE
S/p mRCA RAUL in 2011, subsequent LHC in 2018 and 06/07/2022 noting patent stent  Continue asa, plavix, statin, Coreg, and resume ARB

## 2022-06-08 NOTE — DISCHARGE SUMMARY
[facility] Medicine Discharge Summary    Primary Team: [facility] Hospitalist Team B  Attending Physician: Gloria  Resident: Jermaine  Intern: Guero    Date of Admit: 6/6/2022  Date of Discharge: 6/8/2022    Discharge to: Home  Condition: Stable    Discharge Diagnoses     Patient Active Problem List   Diagnosis    Hypertension associated with type 2 diabetes mellitus    Hyperlipidemia associated with type 2 diabetes mellitus    PAD (peripheral artery disease)    Coronary artery disease involving native coronary artery of native heart without angina pectoris    History of gout    Stage 3b chronic kidney disease    NSTEMI (non-ST elevated myocardial infarction)    Secondary hyperparathyroidism of renal origin    Chronic diastolic heart failure    Chest pain    Class 1 obesity due to excess calories with serious comorbidity and body mass index (BMI) of 32.0 to 32.9 in adult    Type 2 diabetes mellitus with stage 3b chronic kidney disease, without long-term current use of insulin     Consultants and Procedures     Consultants:  Cardiology    Procedures:   Left Heart Catheterization  Access: Rt CFA   LM:  JACK patent  flow  LAD: JACK- flow Diffuse mild disease  LCx:  JACK- flow mid 40%   RCA: JACK- flow Mid  30-40% , distal RCA patent   LVgram: LVEDP 16   Intervention:  None   Closure device: Manual pull   Patient tolerated procedure well, no complications    Imaging:  CXR Chest AP Portable  Impression:  Prominent interstitial markings could relate to pulmonary vascular congestion/edema versus infectious or inflammatory etiology.  Suspect small bilateral pleural effusions    Brief History of Present Illness      Petra Zazueta is a 87 y.o. AA female who has a past medical history of Anemia, Arthritis, Coronary artery disease s/p RAUL (mRCA RAUL in 2011), T2DM with neuropathy, CKD, Dyslipidemia, Hypertension, Insomnia, and PAD s/p 1 stent. The patient presented to [facility] on 6/6/2022 with a  primary complaint of intermittent Chest Pain x 5 day.     The patient was in her usual state of health until Thursday when she reported to [facility] for complaints of L chest pain. Patient reported pain was sudden onset, 10/10, tight/crushing in quality, without radiation, aggravating or alleviating factors, and with associated palpitations. She stated pain usually began after eating but was similar to ACS pain she has had in the past. At the outside hospital, she was admitted and observed overnight before discharge the following day after resolution of her symptoms. Patient stated she had return of her pain on Saturday while at home shortly after eating. Her symptoms resolved after taking christi seltzer, however, her symptoms returned again on the morning prior to admission shortly after she had eaten oatmeal. She again attempted to take an christi seltzer, but her symptoms persisted prompting her to present to the ED.    For the full HPI please refer to the History & Physical from this admission.    Objective     Vitals:    06/08/22 1202 06/08/22 1505 06/08/22 1508 06/08/22 1612   BP:  (!) 180/79  (!) 182/85   BP Location:  Right arm  Left arm   Patient Position:  Lying     Pulse:  65  67   Resp:  20     Temp:  97.1 °F (36.2 °C)     TempSrc:  Oral     SpO2: 99% 97% 97%    Weight:       Height:         Physical Exam  Vitals and nursing note reviewed.   Constitutional:       General: She is not in acute distress.     Appearance: Normal appearance. She is not toxic-appearing.   HENT:      Head: Normocephalic and atraumatic.      Right Ear: External ear normal.      Left Ear: External ear normal.      Nose: Nose normal. No congestion or rhinorrhea.      Mouth/Throat:      Mouth: Mucous membranes are moist.      Pharynx: Oropharynx is clear. No posterior oropharyngeal erythema.   Eyes:      General:         Right eye: No discharge.         Left eye: No discharge.      Extraocular Movements: Extraocular  movements intact.      Conjunctiva/sclera: Conjunctivae normal.      Pupils: Pupils are equal, round, and reactive to light.   Cardiovascular:      Rate and Rhythm: Normal rate and regular rhythm.      Pulses: Normal pulses.      Heart sounds: Normal heart sounds. No murmur heard.  Pulmonary:      Effort: Pulmonary effort is normal. No respiratory distress.      Breath sounds: Normal breath sounds. No wheezing.   Abdominal:      General: Bowel sounds are normal. There is no distension.      Palpations: Abdomen is soft.      Tenderness: There is no abdominal tenderness. There is no guarding.   Musculoskeletal:      Cervical back: Normal range of motion. No rigidity or tenderness.      Right lower leg: No edema.      Left lower leg: No edema.   Lymphadenopathy:      Cervical: No cervical adenopathy.   Skin:     General: Skin is warm and dry.      Capillary Refill: Capillary refill takes less than 2 seconds.   Neurological:      General: No focal deficit present.      Mental Status: She is alert and oriented to person, place, and time.      Sensory: No sensory deficit.      Motor: No weakness.   Psychiatric:         Mood and Affect: Mood normal.         Behavior: Behavior normal.       Recent Labs   Lab 06/03/22  1555 06/04/22  0529 06/06/22  1053 06/06/22  1607 06/07/22  0302 06/07/22  0734   WBC 7.57  --  7.21 6.92 6.40  --    HGB 10.7*  --  10.7* 10.7* 10.2*  --    HCT 33.5*  --  34.2* 33.2* 31.3*  --    *  --  120* 123* 113*  --     143 143  --   --  144   K 4.4 4.1 3.8  --   --  3.7    113* 109  --   --  112*   CREATININE 1.65* 1.48* 1.7*  --   --  1.4   BUN 36* 31* 32*  --   --  24*   CO2 22* 22* 22*  --   --  26   ALT 12  --  13  --   --  11   AST 24  --  17  --   --  17       Hospital Course By Problem with Pertinent Findings     NSTEMI  CAD   PAD  S/p mRCA RAUL in 2011, subsequent C in 2018- patent stent   S/p LLE angioplasty  Trp 0.118 -> 0.158 -> 0.170->0.15; , EKG NSR with PAC,  Septal and inferolateral ST and T wave changes worse than previous ECG from June 03 2022  Loaded with Plavix in the ED; stabilized on heparin gtt  On asa, statin, BB, ARB at home; continued during admission; doses below  [facility] consulted, L Heart Cath findings above, non obstructive CAD  Recommended to continue medication management with ASA/Plavix/Statin/BB; stopped cilostazol on discharge and restarted plavix 75mg qd    T2DM with neuropathy  Diet controlled, Last A1c 6.0 on 6/3/22  Accuchecks qid, glucose controlled during admission     MANPREET on CKD3  Cr 1.7; baseline ~1.4; holding home losartan 100mg, repeat labs in AM  Improved to 1.4 at discharge     HTN  Controlled with Coreg 25mg qd, Hydralazine 100mg tid, [id number] qd;   Held Losartan on admission for MANPREET, restarted on discharge     Dyslipidemia  Continued atorvastatin 80mg daily     Normocytic Anemia  H/h 10.7/33.2 MCV 99 on admisson  Continued home PO iron and b12 supplements    Discharge Medications        Medication List      START taking these medications    clopidogreL 75 mg tablet  Commonly known as: PLAVIX  Take 1 tablet (75 mg total) by mouth once daily.  Start taking on: June 9, 2022        CHANGE how you take these medications    TRADJENTA 5 mg Tab tablet  Generic drug: linaGLIPtin  TAKE 1 TABLET BY MOUTH EVERY DAY  What changed: how much to take        CONTINUE taking these medications    acetaminophen 325 MG tablet  Commonly known as: TYLENOL     aspirin 81 MG EC tablet  Commonly known as: ECOTRIN  Take 1 tablet (81 mg total) by mouth once daily.     atorvastatin 80 MG tablet  Commonly known as: LIPITOR  Take 1 tablet (80 mg total) by mouth every evening.     blood sugar diagnostic Strp  Commonly known as: TRUE METRIX GLUCOSE TEST STRIP  1 strip by Misc.(Non-Drug; Combo Route) route 2 (two) times daily.     blood-glucose meter Misc  Commonly known as: TRUE METRIX GLUCOSE METER  Test blood sugars twice daily     carvediloL 25 MG  tablet  Commonly known as: COREG  Take 1 tablet (25 mg total) by mouth 2 (two) times daily with meals.     cyanocobalamin 1000 MCG tablet  Commonly known as: VITAMIN B-12  Take 1 tablet (1,000 mcg total) by mouth once daily.     ferrous sulfate 324 mg (65 mg iron) Tbec  TAKE 1 TABLET (324 MG TOTAL) BY MOUTH ONCE DAILY.     fish oil-omega-3 fatty acids 300-1,000 mg capsule     gabapentin 300 MG capsule  Commonly known as: NEURONTIN  TAKE 1 CAPSULE BY MOUTH EVERY EVENING     hydrALAZINE 100 MG tablet  Commonly known as: APRESOLINE  Take 1 tablet (100 mg total) by mouth every 8 (eight) hours.     lancets 33 gauge Misc  Commonly known as: BD ULTRA FINE LANCETS  1 lancet by Misc.(Non-Drug; Combo Route) route 2 (two) times daily.     latanoprost 0.005 % ophthalmic solution     losartan 100 MG tablet  Commonly known as: COZAAR  TAKE 1 TABLET BY MOUTH EVERY DAY     vitamin D 1000 units Tab  Commonly known as: VITAMIN D3        STOP taking these medications    cilostazoL 100 MG Tab  Commonly known as: PLETAL           Where to Get Your Medications      These medications were sent to [facility]/pharmacy #[id number] - [address] AT [address] OF [address] [address]    Phone: [phone]   · losartan 100 MG tablet     These medications were sent to [facility]  [address] [address], [facility] [address]    Hours: Mon-Fri, 8a-5:30p Phone: [phone]   · clopidogreL 75 mg tablet         Discharge Information:   Diet:  Diabetic/Cardiac/Renal    Physical Activity:  As tolerated             Instructions:  1. Take all medications as prescribed  2. Keep all follow-up appointments  3. Return to the hospital or call your primary care physicians if any worsening symptoms such as fever, chest pain, shortness of breath, return of symptoms, or any other concerns.    Follow-Up Appointments:     Follow-up Information     Kiet Vega MD Follow up.    Specialties:  Cardiology, Interventional Cardiology  Why: As needed, If symptoms worsen  Contact information:  [address]  [address]  [facility] [address]  [phone]             Polly Greer DO. Call in 1 week(s).    Specialties: Family Medicine, Internal Medicine  Contact information:  [address]   [address]   [facility] [address] [phone]                       Patient rounded on and discussed with attending physician, Dr. Castro.    Freddie Jack DO  [facility] Internal [facility], [facility] Hospitalist Team B

## 2022-06-08 NOTE — PLAN OF CARE
Pt will dc with no needs noted. Pts son is at bedside and will transport pt home. SW will continue to follow pt throughout her transitions of care and assist with any dc needs.     Future Appointments   Date Time Provider Department Center   6/13/2022  1:20 PM Doe Johns III, MD [facility] CARDIO [place]   6/20/2022 10:30 AM Polly Greer, DO [facility] [place]   8/1/2022  9:30 AM Polly Greer, DO [facility] [place]   10/3/2022 10:00 AM Velvet Morales MD [facility]        06/08/22 1526   Final Note   Assessment Type Final Discharge Note   Anticipated Discharge Disposition Home   Hospital Resources/Appts/Education Provided Appointments scheduled by Navigator/Coordinator   Post-Acute Status   Discharge Delays None known at this time

## 2022-06-08 NOTE — PROGRESS NOTES
[facility]  Cardiology  Progress Note    Patient Name: Petra Zazueta  MRN: [id number]  Admission Date: 6/6/2022  Hospital Length of Stay: 2 days  Code Status: Full Code   Attending Physician: Florentin Castro MD   Primary Care Physician: Polly Greer DO  Expected Discharge Date:   Principal Problem:<principal problem not specified>    Subjective:     Hospital Course:   06/06/2022 Per HPI   06/08/2022 Patent stent per C yesterday. No recurrent chest pain reported. Pletal discontinued to reduce the risk of bleeding. Hemodynamically stable. No cath related complications noted.       Past Medical History:   Diagnosis Date    Anemia     Arthritis     Coronary artery disease     Diabetes mellitus     Diabetes mellitus type II     Diabetic retinopathy 08/22/2019    Dyslipidemia     Hypertension     Insomnia     PAD (peripheral artery disease)        Past Surgical History:   Procedure Laterality Date    angiagram  08/31/2018    CORONARY ANGIOPLASTY      RCA 4/2011 EJ    GALLBLADDER SURGERY      HYSTERECTOMY      PERIPHERAL ARTERIAL STENT GRAFT      Left lower extremity RCA        Review of patient's allergies indicates:   Allergen Reactions    Codeine Other (See Comments)     Jittery, lightheadedness, nausea  Other reaction(s): NAUSEA       No current facility-administered medications on file prior to encounter.     Current Outpatient Medications on File Prior to Encounter   Medication Sig    acetaminophen (TYLENOL) 325 MG tablet Take 325 mg by mouth as needed for Pain.    aspirin (ECOTRIN) 81 MG EC tablet Take 1 tablet (81 mg total) by mouth once daily.    atorvastatin (LIPITOR) 80 MG tablet Take 1 tablet (80 mg total) by mouth every evening.    blood sugar diagnostic (TRUE METRIX GLUCOSE TEST STRIP) Strp 1 strip by Misc.(Non-Drug; Combo Route) route 2 (two) times daily.    blood-glucose meter (TRUE METRIX GLUCOSE METER) Misc Test blood sugars twice daily    carvediloL (COREG) 25 MG tablet Take  1 tablet (25 mg total) by mouth 2 (two) times daily with meals.    cilostazol (PLETAL) 100 MG Tab Take 100 mg by mouth 2 (two) times daily.    cyanocobalamin (VITAMIN B-12) 1000 MCG tablet Take 1 tablet (1,000 mcg total) by mouth once daily.    ferrous sulfate 324 mg (65 mg iron) TbEC TAKE 1 TABLET (324 MG TOTAL) BY MOUTH ONCE DAILY. (Patient taking differently: Take 324 mg by mouth once daily.)    fish oil-omega-3 fatty acids 300-1,000 mg capsule Take 500 mg by mouth 2 (two) times daily.    gabapentin (NEURONTIN) 300 MG capsule TAKE 1 CAPSULE BY MOUTH EVERY EVENING (Patient taking differently: Take 300 mg by mouth every evening.)    hydrALAZINE (APRESOLINE) 100 MG tablet Take 1 tablet (100 mg total) by mouth every 8 (eight) hours.    lancets ([employer] ULTRA FINE LANCETS) 33 gauge Misc 1 lancet by Misc.(Non-Drug; Combo Route) route 2 (two) times daily.    latanoprost 0.005 % ophthalmic solution Place 1 drop into both eyes every evening.    TRADJENTA 5 mg Tab tablet TAKE 1 TABLET BY MOUTH EVERY DAY (Patient taking differently: Take 5 mg by mouth once daily.)    vitamin D (VITAMIN D3) 1000 units Tab Take 1,000 Units by mouth once daily.    losartan (COZAAR) 100 MG tablet TAKE 1 TABLET BY MOUTH EVERY DAY     Family History       Problem Relation (Age of Onset)    Heart attack Mother          Tobacco Use    Smoking status: Former Smoker     Quit date: 2005     Years since quittin.4    Smokeless tobacco: Never Used   Substance and Sexual Activity    Alcohol use: No    Drug use: No    Sexual activity: Not Currently     Review of Systems   Constitutional: Negative. Negative for diaphoresis.   HENT: Negative.     Eyes: Negative.    Cardiovascular:  Positive for claudication. Negative for chest pain, dyspnea on exertion, orthopnea, palpitations, paroxysmal nocturnal dyspnea and syncope.   Respiratory: Negative.  Negative for cough and shortness of breath.    Endocrine: Negative.    Hematologic/Lymphatic:  Negative.    Skin: Negative.    Musculoskeletal: Negative.    Gastrointestinal: Negative.  Negative for nausea and vomiting.   Genitourinary: Negative.    Neurological: Negative.    Psychiatric/Behavioral: Negative.     Allergic/Immunologic: Negative.    Objective:     Vital Signs (Most Recent):  Temp: 97.6 °F (36.4 °C) (06/08/22 0547)  Pulse: (!) 52 (06/08/22 0800)  Resp: 18 (06/08/22 0547)  BP: (!) 158/70 (06/08/22 0547)  SpO2: 96 % (06/08/22 0849) Vital Signs (24h Range):  Temp:  [96.2 °F (35.7 °C)-98.3 °F (36.8 °C)] 97.6 °F (36.4 °C)  Pulse:  [52-70] 52  Resp:  [16-26] 18  SpO2:  [95 %-100 %] 96 %  BP: (136-189)/(61-84) 158/70     Weight: 77.7 kg (171 lb 4.8 oz)  Body mass index is 31.33 kg/m².    SpO2: 96 %  O2 Device (Oxygen Therapy): room air    No intake or output data in the 24 hours ending 06/08/22 0955    Lines/Drains/Airways       Peripheral Intravenous Line  Duration                  Peripheral IV - Single Lumen 06/06/22 20 G Posterior;Right Hand 2 days         Peripheral IV - Single Lumen 06/06/22 1053 20 G Right Antecubital 1 day                    Physical Exam  Constitutional:       General: She is not in acute distress.     Appearance: She is not diaphoretic.   HENT:      Head: Atraumatic.   Eyes:      General:         Right eye: No discharge.         Left eye: No discharge.   Cardiovascular:      Rate and Rhythm: Normal rate and regular rhythm.   Pulmonary:      Effort: Pulmonary effort is normal.      Breath sounds: Normal breath sounds. No rales.   Abdominal:      General: Bowel sounds are normal.      Palpations: Abdomen is soft.   Musculoskeletal:      Right lower leg: No edema.      Left lower leg: No edema.   Skin:     General: Skin is warm and dry.      Capillary Refill: Capillary refill takes less than 2 seconds.   Neurological:      Mental Status: She is alert and oriented to person, place, and time.   Psychiatric:         Mood and Affect: Mood normal.         Behavior: Behavior normal.          Thought Content: Thought content normal.         Judgment: Judgment normal.       Significant Labs: BMP:   Recent Labs   Lab 06/06/22  1053 06/07/22  0302 06/07/22  0734 06/08/22  0339   *  --  117*  --      --  144  --    K 3.8  --  3.7  --      --  112*  --    CO2 22*  --  26  --    BUN 32*  --  24*  --    CREATININE 1.7*  --  1.4  --    CALCIUM 9.1  --  9.3  --    MG  --  2.0  --  1.9     , CMP   Recent Labs   Lab 06/06/22  1053 06/07/22  0734    144   K 3.8 3.7    112*   CO2 22* 26   * 117*   BUN 32* 24*   CREATININE 1.7* 1.4   CALCIUM 9.1 9.3   PROT 6.8 6.1   ALBUMIN 3.4* 3.3*   BILITOT 0.5 0.6   ALKPHOS 53* 46*   AST 17 17   ALT 13 11   ANIONGAP 12 6*   ESTGFRAFRICA 31* 39*   EGFRNONAA 27* 34*     , CBC   Recent Labs   Lab 06/06/22  1053 06/06/22  1607 06/07/22  0302   WBC 7.21 6.92 6.40   HGB 10.7* 10.7* 10.2*   HCT 34.2* 33.2* 31.3*   * 123* 113*     , INR   Recent Labs   Lab 06/06/22  1607   INR 1.1     , Lipid Panel No results for input(s): CHOL, HDL, LDLCALC, TRIG, CHOLHDL in the last 48 hours., Troponin   Recent Labs   Lab 06/06/22  2110 06/07/22  0302 06/07/22  0906   TROPONINI 0.170* 0.153* 0.163*     , and All pertinent lab results from the last 24 hours have been reviewed.    Significant Imaging: Echocardiogram: Transthoracic echo (TTE) complete (Cupid Only): No results found for this or any previous visit.    Assessment and Plan:     Brief HPI: Patient seen this morning on rounds without cardiac complaint. No cath related complications noted.     Class 1 obesity due to excess calories with serious comorbidity and body mass index (BMI) of 32.0 to 32.9 in adult  Weight loss encouraged     Chest pain  Per CAD    NSTEMI (non-ST elevated myocardial infarction)  Negative for obstructive CAD per [facility] this admission   Type II in setting of CKD    Coronary artery disease involving native coronary artery of native heart without angina pectoris  S/p mRCA RAUL in  2011, subsequent LHC in 2018 and 06/07/2022 noting patent stent  Continue asa, plavix, statin, Coreg, and resume ARB         PAD (peripheral artery disease)  Stable   Continue Plavix, asa, statin, ARB  Pletal discontinued to decrease risk of bleeding     Hyperlipidemia associated with type 2 diabetes mellitus  Continue statin     Hypertension associated with type 2 diabetes mellitus  Controlled with Coreg, Hydralazine, Losartan         VTE Risk Mitigation (From admission, onward)         Ordered     heparin infusion 1,000 units/500 ml in 0.9% NaCl (pressure line flush)  Intra-op continuous PRN         06/07/22 1200     IP VTE HIGH RISK PATIENT  Once         06/06/22 1336     Place sequential compression device  Until discontinued         06/06/22 1336                Epi Patrick NP  Cardiology  [place] [facility]

## 2022-06-08 NOTE — PLAN OF CARE
Problem: Adult Inpatient Plan of Care  Goal: Plan of Care Review  Outcome: Ongoing, Progressing   Updated care plan.  Chart reviewed.

## 2022-06-08 NOTE — NURSING
Pt current blood pressure 182/85 HR 67. Pt is asymptomatic.MD Bates notified and is ok for discharge.

## 2022-06-13 ENCOUNTER — OFFICE VISIT (OUTPATIENT)
Dept: CARDIOLOGY | Facility: CLINIC | Age: 87
End: 2022-06-13
Payer: MEDICARE

## 2022-06-13 VITALS
HEART RATE: 59 BPM | DIASTOLIC BLOOD PRESSURE: 76 MMHG | OXYGEN SATURATION: 96 % | HEIGHT: 62 IN | SYSTOLIC BLOOD PRESSURE: 134 MMHG | WEIGHT: 170.31 LBS | BODY MASS INDEX: 31.34 KG/M2

## 2022-06-13 DIAGNOSIS — I50.32 CHRONIC DIASTOLIC HEART FAILURE: ICD-10-CM

## 2022-06-13 DIAGNOSIS — I73.9 PAD (PERIPHERAL ARTERY DISEASE): ICD-10-CM

## 2022-06-13 DIAGNOSIS — N18.32 STAGE 3B CHRONIC KIDNEY DISEASE: ICD-10-CM

## 2022-06-13 DIAGNOSIS — E11.59 HYPERTENSION ASSOCIATED WITH TYPE 2 DIABETES MELLITUS: ICD-10-CM

## 2022-06-13 DIAGNOSIS — E11.42 TYPE 2 DIABETES MELLITUS WITH DIABETIC POLYNEUROPATHY, WITHOUT LONG-TERM CURRENT USE OF INSULIN: ICD-10-CM

## 2022-06-13 DIAGNOSIS — R07.9 CHEST PAIN, UNSPECIFIED TYPE: ICD-10-CM

## 2022-06-13 DIAGNOSIS — I15.2 HYPERTENSION ASSOCIATED WITH TYPE 2 DIABETES MELLITUS: ICD-10-CM

## 2022-06-13 DIAGNOSIS — E78.5 HYPERLIPIDEMIA ASSOCIATED WITH TYPE 2 DIABETES MELLITUS: ICD-10-CM

## 2022-06-13 DIAGNOSIS — I25.10 CORONARY ARTERY DISEASE INVOLVING NATIVE CORONARY ARTERY OF NATIVE HEART WITHOUT ANGINA PECTORIS: ICD-10-CM

## 2022-06-13 DIAGNOSIS — N25.81 SECONDARY HYPERPARATHYROIDISM OF RENAL ORIGIN: ICD-10-CM

## 2022-06-13 DIAGNOSIS — K30 INDIGESTION: ICD-10-CM

## 2022-06-13 DIAGNOSIS — E66.09 CLASS 1 OBESITY DUE TO EXCESS CALORIES WITH SERIOUS COMORBIDITY AND BODY MASS INDEX (BMI) OF 31.0 TO 31.9 IN ADULT: ICD-10-CM

## 2022-06-13 DIAGNOSIS — I21.4 NSTEMI (NON-ST ELEVATED MYOCARDIAL INFARCTION): ICD-10-CM

## 2022-06-13 DIAGNOSIS — E11.69 HYPERLIPIDEMIA ASSOCIATED WITH TYPE 2 DIABETES MELLITUS: ICD-10-CM

## 2022-06-13 PROCEDURE — 99214 OFFICE O/P EST MOD 30 MIN: CPT | Mod: S$GLB,,, | Performed by: INTERNAL MEDICINE

## 2022-06-13 PROCEDURE — 3288F FALL RISK ASSESSMENT DOCD: CPT | Mod: CPTII,S$GLB,, | Performed by: INTERNAL MEDICINE

## 2022-06-13 PROCEDURE — 1111F DSCHRG MED/CURRENT MED MERGE: CPT | Mod: CPTII,S$GLB,, | Performed by: INTERNAL MEDICINE

## 2022-06-13 PROCEDURE — 3288F PR FALLS RISK ASSESSMENT DOCUMENTED: ICD-10-PCS | Mod: CPTII,S$GLB,, | Performed by: INTERNAL MEDICINE

## 2022-06-13 PROCEDURE — 1159F MED LIST DOCD IN RCRD: CPT | Mod: CPTII,S$GLB,, | Performed by: INTERNAL MEDICINE

## 2022-06-13 PROCEDURE — 1101F PT FALLS ASSESS-DOCD LE1/YR: CPT | Mod: CPTII,S$GLB,, | Performed by: INTERNAL MEDICINE

## 2022-06-13 PROCEDURE — 1126F AMNT PAIN NOTED NONE PRSNT: CPT | Mod: CPTII,S$GLB,, | Performed by: INTERNAL MEDICINE

## 2022-06-13 PROCEDURE — 1111F PR DISCHARGE MEDS RECONCILED W/ CURRENT OUTPATIENT MED LIST: ICD-10-PCS | Mod: CPTII,S$GLB,, | Performed by: INTERNAL MEDICINE

## 2022-06-13 PROCEDURE — 1126F PR PAIN SEVERITY QUANTIFIED, NO PAIN PRESENT: ICD-10-PCS | Mod: CPTII,S$GLB,, | Performed by: INTERNAL MEDICINE

## 2022-06-13 PROCEDURE — 1159F PR MEDICATION LIST DOCUMENTED IN MEDICAL RECORD: ICD-10-PCS | Mod: CPTII,S$GLB,, | Performed by: INTERNAL MEDICINE

## 2022-06-13 PROCEDURE — 99214 PR OFFICE/OUTPT VISIT, EST, LEVL IV, 30-39 MIN: ICD-10-PCS | Mod: S$GLB,,, | Performed by: INTERNAL MEDICINE

## 2022-06-13 PROCEDURE — 99999 PR PBB SHADOW E&M-EST. PATIENT-LVL IV: CPT | Mod: PBBFAC,,, | Performed by: INTERNAL MEDICINE

## 2022-06-13 PROCEDURE — 99999 PR PBB SHADOW E&M-EST. PATIENT-LVL IV: ICD-10-PCS | Mod: PBBFAC,,, | Performed by: INTERNAL MEDICINE

## 2022-06-13 PROCEDURE — 1101F PR PT FALLS ASSESS DOC 0-1 FALLS W/OUT INJ PAST YR: ICD-10-PCS | Mod: CPTII,S$GLB,, | Performed by: INTERNAL MEDICINE

## 2022-06-13 NOTE — ASSESSMENT & PLAN NOTE
Pt denies claudication.  Was previously followed by outside cardiologist.  S/p stenting of LLE.  - continue medical therapy  - encouraged risk factor and lifestyle modifications

## 2022-06-13 NOTE — PROGRESS NOTES
Subjective:    Patient ID:  Petra Zazueta is a 87 y.o. female who presents for follow-up of Hospital Follow Up      PCP/Referring: Polly Greer DO     Pt is a 88 yo F w/ PMH of CAD s/p PCI to mRCA w/ RAUL 2011, DM2 w/ hgba1c 5.8%, HTN, HLD, and PAD s/p stenting of LLE 2016 who presents for f/u of CAD s/p hospitailzation.  She was last seen 10/4/2021 and mentioned that she was doing well and continued on medical therapy.  She was hospitalized 6/6/2022-6/8/2022 for cp and mildly elevated trop 2/2 demand ischemia had an angiogram which noted nonobstructive CAD and was discharged home and she noted continued episodes of cp a/w eating however was not referred to GI and was setup with cardiology f/u.  She mentions that she has not had any further episodes of cp as she has changed her diet.  She cotninues to have chronic LLE edema which has been present x4+ years following her stenting of her LLE and tried compression stockings in the past but had difficultly taking them on and off.  She denies continued astorga however has not been as active due to the weather.  She denies cp, orthopnea, PND, presyncope, LOC, and claudication.  She notes compliance w/ meds and denies side effects.  She does not exercise regularly however is active w/ cleaning up her house and yard and denies limitations.  She monitors her BP at home and has been running 130-150s/60-90.         Past Medical History:   Diagnosis Date    Anemia     Arthritis     Coronary artery disease     Diabetes mellitus     Diabetes mellitus type II     Diabetic retinopathy 08/22/2019    Dyslipidemia     Hypertension     Insomnia     PAD (peripheral artery disease)      Past Surgical History:   Procedure Laterality Date    angiagram  08/31/2018    CORONARY ANGIOGRAPHY N/A 6/7/2022    Procedure: ANGIOGRAM, CORONARY ARTERY;  Surgeon: Kiet Vega MD;  Location: [facility] CATH LAB/EP;  Service: Cardiology;  Laterality: N/A;    CORONARY ANGIOPLASTY      RCA 4/2011  EJ    GALLBLADDER SURGERY      HYSTERECTOMY      LEFT HEART CATHETERIZATION Left 2022    Procedure: Left heart cath;  Surgeon: Kiet Vega MD;  Location: [facility] CATH LAB/EP;  Service: Cardiology;  Laterality: Left;    PERIPHERAL ARTERIAL STENT GRAFT      Left lower extremity RCA      Social History     Socioeconomic History    Marital status:     Number of children: 2   Tobacco Use    Smoking status: Former Smoker     Quit date: 2005     Years since quittin.4    Smokeless tobacco: Never Used   Substance and Sexual Activity    Alcohol use: No    Drug use: No    Sexual activity: Not Currently   Social History Narrative    Lives alone in [place]. Has 2 sons. Darell Concepcion lives in [place] and cares for her. He is mPOA. Other son lives in [place]. Quit drinking years ago. [place].      Family History   Problem Relation Age of Onset    Heart attack Mother        Review of patient's allergies indicates:   Allergen Reactions    Codeine Other (See Comments)     Jittery, lightheadedness, nausea  Other reaction(s): NAUSEA       Medication List with Changes/Refills   Current Medications    ACETAMINOPHEN (TYLENOL) 325 MG TABLET    Take 325 mg by mouth as needed for Pain.    ASPIRIN (ECOTRIN) 81 MG EC TABLET    Take 1 tablet (81 mg total) by mouth once daily.    ATORVASTATIN (LIPITOR) 80 MG TABLET    Take 1 tablet (80 mg total) by mouth every evening.    BLOOD SUGAR DIAGNOSTIC (TRUE METRIX GLUCOSE TEST STRIP) STRP    1 strip by Misc.(Non-Drug; Combo Route) route 2 (two) times daily.    BLOOD-GLUCOSE METER (TRUE METRIX GLUCOSE METER) MISC    Test blood sugars twice daily    CARVEDILOL (COREG) 25 MG TABLET    Take 1 tablet (25 mg total) by mouth 2 (two) times daily with meals.    CLOPIDOGREL (PLAVIX) 75 MG TABLET    Take 1 tablet (75 mg total) by mouth once daily.    CYANOCOBALAMIN (VITAMIN B-12) 1000 MCG TABLET    Take 1 tablet (1,000 mcg total) by mouth once daily.    FERROUS SULFATE 324 MG (65  "MG IRON) TBEC    TAKE 1 TABLET (324 MG TOTAL) BY MOUTH ONCE DAILY.    FISH OIL-OMEGA-3 FATTY ACIDS 300-1,000 MG CAPSULE    Take 500 mg by mouth 2 (two) times daily.    GABAPENTIN (NEURONTIN) 300 MG CAPSULE    TAKE 1 CAPSULE BY MOUTH EVERY EVENING    HYDRALAZINE (APRESOLINE) 100 MG TABLET    Take 1 tablet (100 mg total) by mouth every 8 (eight) hours.    LANCETS (BD ULTRA FINE LANCETS) 33 GAUGE MISC    1 lancet by Misc.(Non-Drug; Combo Route) route 2 (two) times daily.    LATANOPROST 0.005 % OPHTHALMIC SOLUTION    Place 1 drop into both eyes every evening.    LOSARTAN (COZAAR) 100 MG TABLET    TAKE 1 TABLET BY MOUTH EVERY DAY    TRADJENTA 5 MG TAB TABLET    TAKE 1 TABLET BY MOUTH EVERY DAY    VITAMIN D (VITAMIN D3) 1000 UNITS TAB    Take 1,000 Units by mouth once daily.       Review of Systems   Constitutional: Negative for diaphoresis and fever.   HENT: Negative for congestion and hearing loss.    Eyes: Negative for blurred vision and pain.   Cardiovascular: Positive for leg swelling. Negative for chest pain, claudication, dyspnea on exertion, near-syncope, palpitations and syncope.   Respiratory: Negative for shortness of breath and sleep disturbances due to breathing.    Hematologic/Lymphatic: Negative for bleeding problem. Does not bruise/bleed easily.   Skin: Negative for color change and poor wound healing.   Gastrointestinal: Negative for abdominal pain and nausea.   Genitourinary: Negative for bladder incontinence and flank pain.   Neurological: Negative for focal weakness and light-headedness.        Objective:   /76   Pulse (!) 59   Ht 5' 2" (1.575 m)   Wt 77.2 kg (170 lb 4.8 oz)   LMP  (LMP Unknown)   SpO2 96%   BMI 31.15 kg/m²    Physical Exam  Constitutional:       Appearance: She is well-developed. She is not diaphoretic.   HENT:      Head: Normocephalic and atraumatic.   Eyes:      General: No scleral icterus.     Pupils: Pupils are equal, round, and reactive to light.   Neck:      " Vascular: No JVD.   Cardiovascular:      Rate and Rhythm: Normal rate and regular rhythm.      Pulses: Intact distal pulses.      Heart sounds: S1 normal and S2 normal. Murmur heard.     No friction rub. No gallop.      Comments: Systolic murmur  Pulmonary:      Effort: Pulmonary effort is normal. No respiratory distress.      Breath sounds: Normal breath sounds. No wheezing or rales.   Chest:      Chest wall: No tenderness.   Abdominal:      General: Bowel sounds are normal. There is no distension.      Palpations: Abdomen is soft. There is no mass.      Tenderness: There is no abdominal tenderness. There is no rebound.   Musculoskeletal:         General: No tenderness. Normal range of motion.      Cervical back: Normal range of motion and neck supple.      Left lower leg: Edema present.      Comments: LLE chronic nonpitting edema   Skin:     General: Skin is warm and dry.      Coloration: Skin is not pale.   Neurological:      Mental Status: She is alert and oriented to person, place, and time.      Coordination: Coordination normal.      Deep Tendon Reflexes: Reflexes normal.   Psychiatric:         Behavior: Behavior normal.         Judgment: Judgment normal.           EC2020- reviewed.  NSR, T wave abnormality w/ possible inferolateral ischemia.      ETT: 12/15/2020- reviewed.    Conclusion         The EKG portion of this study is negative for ischemia.    The patient reported no chest pain during the stress test.    There were no arrhythmias during stress.    The exercise capacity was moderately impaired.    ECG Stress Nuclear portion of this study will be reported separately.   FINDINGS:  There is appropriate wall motion.  There is no significant fixed or reversible perfusion defect.  The stress and rest end-diastolic volumes measure 83 and 81 mL respectively.  The stress and rest end systolic findings measure 14 and 17 mL respectively.  The stress and rest ejection fraction measure 83 and 79%  respectively.     Impression:     No acute findings.      [facility]: 6/7/2022- reviewed.   Summary       · The pre-procedure left ventricular end diastolic pressure was 16.  · The estimated blood loss was none.  · There was non-obstructive coronary artery disease..       Assessment:       1. Chest pain, unspecified type    2. Hyperlipidemia associated with type 2 diabetes mellitus    3. PAD (peripheral artery disease)    4. Coronary artery disease involving native coronary artery of native heart without angina pectoris    5. NSTEMI (non-ST elevated myocardial infarction)    6. Chronic diastolic heart failure    7. Stage 3b chronic kidney disease    8. Hypertension associated with type 2 diabetes mellitus    9. Type 2 diabetes mellitus with diabetic polyneuropathy, without long-term current use of insulin    10. Secondary hyperparathyroidism of renal origin    11. Class 1 obesity due to excess calories with serious comorbidity and body mass index (BMI) of 31.0 to 31.9 in adult    12. Indigestion         Plan:         Hyperlipidemia associated with type 2 diabetes mellitus  Controlled.  Goal LDL < 70, last 55 1/2022.  Compliant w/ meds.    - continue medical therapy  - encouraged risk factor and lifestyle modifications     PAD (peripheral artery disease)  Pt denies claudication.  Was previously followed by outside cardiologist.  S/p stenting of LLE.  - continue medical therapy  - encouraged risk factor and lifestyle modifications     Coronary artery disease involving native coronary artery of native heart without angina pectoris  CCS 0.  Pt compliant w/ meds.  S/p RAUL to RCA.   - negative ETT MPI for ischemia and unchanged coronary angiogram  - continue medical therapy  - encouraged risk factor and lifestyle modifications     NSTEMI (non-ST elevated myocardial infarction)  Demand ischemia and not ACS.      Chronic diastolic heart failure  Stable.  Continue medical therapy.      Stage 3b chronic kidney disease  Followed by  renal.      Hypertension associated with type 2 diabetes mellitus  Controlled.  Goal BP < 140/90.  Pt compliant w/ meds.    - continue medical therapy  - pt to monitor BP at home and record  - encouraged risk factor and lifestyle modifications     Type 2 diabetes mellitus with diabetic polyneuropathy, without long-term current use of insulin  Followed by PCP.      Secondary hyperparathyroidism of renal origin  Followed by renal.      Class 1 obesity due to excess calories with serious comorbidity and body mass index (BMI) of 31.0 to 31.9 in adult  BMI 31.  Pt aware of health complications a/w obesity and desires to lose weight.    - encouraged lifestyle modifications (diet, exercise, and weight loss)     Indigestion  If pt continues w/ epigastric pain a/w eating will plan to refer to GI for further evaluation.        Total duration of face to face visit time 30 minutes.  Total time spent counseling greater than fifty percent of total visit time.  Counseling included discussion regarding imaging findings, diagnosis, possibilities, treatment options, risks and benefits.  The patient had many questions regarding the options and long-term effects      Doe Johns M.D.  Interventional Cardiology                HPI

## 2022-06-13 NOTE — ASSESSMENT & PLAN NOTE
CCS 0.  Pt compliant w/ meds.  S/p RAUL to RCA.   - negative ETT MPI for ischemia and unchanged coronary angiogram  - continue medical therapy  - encouraged risk factor and lifestyle modifications

## 2022-06-13 NOTE — ASSESSMENT & PLAN NOTE
Controlled.  Goal LDL < 70, last 55 1/2022.  Compliant w/ meds.    - continue medical therapy  - encouraged risk factor and lifestyle modifications

## 2022-06-13 NOTE — ASSESSMENT & PLAN NOTE
Controlled.  Goal BP < 140/90.  Pt compliant w/ meds.    - continue medical therapy  - pt to monitor BP at home and record  - encouraged risk factor and lifestyle modifications

## 2022-06-13 NOTE — ASSESSMENT & PLAN NOTE
BMI 31.  Pt aware of health complications a/w obesity and desires to lose weight.    - encouraged lifestyle modifications (diet, exercise, and weight loss)

## 2022-06-20 ENCOUNTER — OFFICE VISIT (OUTPATIENT)
Dept: FAMILY MEDICINE | Facility: CLINIC | Age: 87
End: 2022-06-20
Payer: MEDICARE

## 2022-06-20 VITALS
OXYGEN SATURATION: 98 % | HEART RATE: 56 BPM | DIASTOLIC BLOOD PRESSURE: 62 MMHG | HEIGHT: 62 IN | BODY MASS INDEX: 30.73 KG/M2 | SYSTOLIC BLOOD PRESSURE: 136 MMHG | WEIGHT: 167 LBS

## 2022-06-20 DIAGNOSIS — I21.4 NSTEMI (NON-ST ELEVATED MYOCARDIAL INFARCTION): Primary | ICD-10-CM

## 2022-06-20 DIAGNOSIS — E11.42 TYPE 2 DIABETES MELLITUS WITH DIABETIC POLYNEUROPATHY, WITHOUT LONG-TERM CURRENT USE OF INSULIN: ICD-10-CM

## 2022-06-20 DIAGNOSIS — E11.59 HYPERTENSION ASSOCIATED WITH TYPE 2 DIABETES MELLITUS: ICD-10-CM

## 2022-06-20 DIAGNOSIS — D69.6 THROMBOCYTOPENIA: ICD-10-CM

## 2022-06-20 DIAGNOSIS — E66.09 CLASS 1 OBESITY DUE TO EXCESS CALORIES WITH SERIOUS COMORBIDITY AND BODY MASS INDEX (BMI) OF 31.0 TO 31.9 IN ADULT: ICD-10-CM

## 2022-06-20 DIAGNOSIS — N18.32 STAGE 3B CHRONIC KIDNEY DISEASE: ICD-10-CM

## 2022-06-20 DIAGNOSIS — I50.32 CHRONIC DIASTOLIC HEART FAILURE: ICD-10-CM

## 2022-06-20 DIAGNOSIS — N25.81 SECONDARY HYPERPARATHYROIDISM OF RENAL ORIGIN: ICD-10-CM

## 2022-06-20 DIAGNOSIS — E11.51 TYPE 2 DIABETES MELLITUS WITH DIABETIC PERIPHERAL ANGIOPATHY WITHOUT GANGRENE, WITHOUT LONG-TERM CURRENT USE OF INSULIN: ICD-10-CM

## 2022-06-20 DIAGNOSIS — E11.22 TYPE 2 DIABETES MELLITUS WITH STAGE 3B CHRONIC KIDNEY DISEASE, WITHOUT LONG-TERM CURRENT USE OF INSULIN: ICD-10-CM

## 2022-06-20 DIAGNOSIS — E78.5 HYPERLIPIDEMIA ASSOCIATED WITH TYPE 2 DIABETES MELLITUS: ICD-10-CM

## 2022-06-20 DIAGNOSIS — I25.10 CORONARY ARTERY DISEASE INVOLVING NATIVE CORONARY ARTERY OF NATIVE HEART WITHOUT ANGINA PECTORIS: ICD-10-CM

## 2022-06-20 DIAGNOSIS — I73.9 PAD (PERIPHERAL ARTERY DISEASE): ICD-10-CM

## 2022-06-20 DIAGNOSIS — E11.69 HYPERLIPIDEMIA ASSOCIATED WITH TYPE 2 DIABETES MELLITUS: ICD-10-CM

## 2022-06-20 DIAGNOSIS — I15.2 HYPERTENSION ASSOCIATED WITH TYPE 2 DIABETES MELLITUS: ICD-10-CM

## 2022-06-20 DIAGNOSIS — N18.32 TYPE 2 DIABETES MELLITUS WITH STAGE 3B CHRONIC KIDNEY DISEASE, WITHOUT LONG-TERM CURRENT USE OF INSULIN: ICD-10-CM

## 2022-06-20 PROBLEM — K30 INDIGESTION: Status: RESOLVED | Noted: 2022-06-13 | Resolved: 2022-06-20

## 2022-06-20 PROBLEM — R07.9 CHEST PAIN: Status: RESOLVED | Noted: 2018-08-30 | Resolved: 2022-06-20

## 2022-06-20 PROCEDURE — 99215 OFFICE O/P EST HI 40 MIN: CPT | Mod: S$GLB,,, | Performed by: FAMILY MEDICINE

## 2022-06-20 PROCEDURE — 1111F PR DISCHARGE MEDS RECONCILED W/ CURRENT OUTPATIENT MED LIST: ICD-10-PCS | Mod: CPTII,S$GLB,, | Performed by: FAMILY MEDICINE

## 2022-06-20 PROCEDURE — 1159F MED LIST DOCD IN RCRD: CPT | Mod: CPTII,S$GLB,, | Performed by: FAMILY MEDICINE

## 2022-06-20 PROCEDURE — 3288F FALL RISK ASSESSMENT DOCD: CPT | Mod: CPTII,S$GLB,, | Performed by: FAMILY MEDICINE

## 2022-06-20 PROCEDURE — 1101F PR PT FALLS ASSESS DOC 0-1 FALLS W/OUT INJ PAST YR: ICD-10-PCS | Mod: CPTII,S$GLB,, | Performed by: FAMILY MEDICINE

## 2022-06-20 PROCEDURE — 1160F PR REVIEW ALL MEDS BY PRESCRIBER/CLIN PHARMACIST DOCUMENTED: ICD-10-PCS | Mod: CPTII,S$GLB,, | Performed by: FAMILY MEDICINE

## 2022-06-20 PROCEDURE — 99499 RISK ADDL DX/OHS AUDIT: ICD-10-PCS | Mod: S$GLB,,, | Performed by: FAMILY MEDICINE

## 2022-06-20 PROCEDURE — 99499 UNLISTED E&M SERVICE: CPT | Mod: S$GLB,,, | Performed by: FAMILY MEDICINE

## 2022-06-20 PROCEDURE — 1126F PR PAIN SEVERITY QUANTIFIED, NO PAIN PRESENT: ICD-10-PCS | Mod: CPTII,S$GLB,, | Performed by: FAMILY MEDICINE

## 2022-06-20 PROCEDURE — 1159F PR MEDICATION LIST DOCUMENTED IN MEDICAL RECORD: ICD-10-PCS | Mod: CPTII,S$GLB,, | Performed by: FAMILY MEDICINE

## 2022-06-20 PROCEDURE — 1126F AMNT PAIN NOTED NONE PRSNT: CPT | Mod: CPTII,S$GLB,, | Performed by: FAMILY MEDICINE

## 2022-06-20 PROCEDURE — 99215 PR OFFICE/OUTPT VISIT, EST, LEVL V, 40-54 MIN: ICD-10-PCS | Mod: S$GLB,,, | Performed by: FAMILY MEDICINE

## 2022-06-20 PROCEDURE — 1160F RVW MEDS BY RX/DR IN RCRD: CPT | Mod: CPTII,S$GLB,, | Performed by: FAMILY MEDICINE

## 2022-06-20 PROCEDURE — 99999 PR PBB SHADOW E&M-EST. PATIENT-LVL V: CPT | Mod: PBBFAC,,, | Performed by: FAMILY MEDICINE

## 2022-06-20 PROCEDURE — 3288F PR FALLS RISK ASSESSMENT DOCUMENTED: ICD-10-PCS | Mod: CPTII,S$GLB,, | Performed by: FAMILY MEDICINE

## 2022-06-20 PROCEDURE — 1111F DSCHRG MED/CURRENT MED MERGE: CPT | Mod: CPTII,S$GLB,, | Performed by: FAMILY MEDICINE

## 2022-06-20 PROCEDURE — 1101F PT FALLS ASSESS-DOCD LE1/YR: CPT | Mod: CPTII,S$GLB,, | Performed by: FAMILY MEDICINE

## 2022-06-20 PROCEDURE — 99999 PR PBB SHADOW E&M-EST. PATIENT-LVL V: ICD-10-PCS | Mod: PBBFAC,,, | Performed by: FAMILY MEDICINE

## 2022-06-20 NOTE — PROGRESS NOTES
"FAMILY MEDICINE  [facility]    Reason for visit:   Chief Complaint   Patient presents with    Follow-up       SUBJECTIVE: Petra Zazueta is a 87 y.o. female  - with obesity, CAD (with stents in place), chronic kidney disease stage 4, secondary hyperparathyroidism, type 2 diabetes, pEFHF, PAD with history of stent and chronic left distal leg swelling, history of gout and hypertension presents for hospital follow-up     Nephrology Dr. Morales  Cardiology Dr. Johns    Petra Zazueta was recently admitted to [facility] from 6/6/22 until 6/8/22 for chest pain 2/2 NSTEMI. she was discharged without home health.     Today she presents alone and reports that she is doing well. She is staying with her son while she recovers. She saw her Cardiologist 6/13/22 and he reported that mildly elevated troponin was secondary to demand ischemia.  Angiogram showed nonobstructive CAD.  She was started on Plavix during her hospitalization.    Cardiology in hospitalization notes reviewed.    Hospital Summary:  "Petra Zazueta is a 87 y.o. AA female who has a past medical history of Anemia, Arthritis, Coronary artery disease s/p RAUL (mRCA RAUL in 2011), T2DM with neuropathy, CKD, Dyslipidemia, Hypertension, Insomnia, and PAD s/p 1 stent. The patient presented to [facility] on 6/6/2022 with a primary complaint of intermittent Chest Pain x 5 day.  The patient was in her usual state of health until Thursday when she reported to [facility] for complaints of L chest pain. Patient reported pain was sudden onset, 10/10, tight/crushing in quality, without radiation, aggravating or alleviating factors, and with associated palpitations. She stated pain usually began after eating but was similar to ACS pain she has had in the past. At the outside hospital, she was admitted and observed overnight before discharge the following day after resolution of her symptoms. Patient stated she had " "return of her pain on Saturday while at home shortly after eating. Her symptoms resolved after taking christi seltzer, however, her symptoms returned again on the morning prior to admission shortly after she had eaten oatmeal. She again attempted to take an christi seltzer, but her symptoms persisted prompting her to present to the ED.  Procedures:   Left Heart Catheterization  Access: Rt CFA   LM:  JACK patent  flow  LAD: JACK- flow Diffuse mild disease  LCx:  JACK- flow mid 40%   RCA: JACK- flow Mid  30-40% , distal RCA patent   LVgram: LVEDP 16   Intervention:  None   Closure device: Manual pull   Patient tolerated procedure well, no complications  NSTEMI  CAD   PAD  S/p mRCA RAUL in 2011, subsequent LHC in 2018- patent stent   S/p LLE angioplasty  Trp 0.118 -> 0.158 -> 0.170->0.15; , EKG NSR with PAC, Septal and inferolateral ST and T wave changes worse than previous ECG from June 03 2022  Loaded with Plavix in the ED; stabilized on heparin gtt  On asa, statin, BB, ARB at home; continued during admission; doses below  [facility] consulted, L Heart Cath findings above, non obstructive CAD  Recommended to continue medication management with ASA/Plavix/Statin/BB; stopped cilostazol on discharge and restarted plavix 75mg qd  T2DM with neuropathy  Diet controlled, Last A1c 6.0 on 6/3/22  Accuchecks qid, glucose controlled during admission  MANPREET on CKD3  Cr 1.7; baseline ~1.4; holding home losartan 100mg, repeat labs in AM  Improved to 1.4 at discharge  HTN  Controlled with Coreg 25mg qd, Hydralazine 100mg tid, [id number] qd;   Held Losartan on admission for MANPREET, restarted on discharge  Dyslipidemia  Continued atorvastatin 80mg daily  Normocytic Anemia  H/h 10.7/33.2 MCV 99 on admisson  Continued home PO iron and b12 supplements"    1. Diabetes Type 2     Current treatment regimen:   linaGLIPtin (TRADJENTA) 5 mg Tab tablet, Take 1 tablet (5 mg total) by mouth once daily., Disp: 90 tablet, Rfl: 1     Prior " medication:   Glimepiride - stopped due to hypoglycemia  pioglitazone (ACTOS) 30 MG tablet  - stopped due to pEFHF      Side effects from treatment: denies   Complications of diabetes: neuropathy     Glucometer: yes  Glucose monitoring: weekly    Lab Results             HGBA1C                   6.0 (H)             06/03/2022              Lab Results                 HGBA1C                   6.1 (H)             01/21/2022                          Low dose statin: atorvastatin 80 mg daily  Last eye exam: 2/12/2020 Dr. García  Last foot exam: 1/26/2022      Vaccines:   Influenza: up to date   Pneumovax: 23 5/25/18  Prevnar 13: 1/13/2017  Covid-19 up to date           Review of Systems   All other systems reviewed and are negative.      HEALTH MAINTENANCE:   Health Maintenance   Topic Date Due    Eye Exam  03/08/2022    Hemoglobin A1c  12/03/2022    Lipid Panel  01/21/2023    Foot Exam  01/26/2023    TETANUS VACCINE  07/26/2031     Health Maintenance Topics with due status: Not Due       Topic Last Completion Date    TETANUS VACCINE 07/26/2021    Diabetes Urine Screening 12/08/2021    Lipid Panel 01/21/2022    Foot Exam 01/26/2022    Hemoglobin A1c 06/03/2022     Health Maintenance Due   Topic Date Due    COVID-19 Vaccine (4 - Booster for Pfizer series) 02/12/2022    Eye Exam  03/08/2022       HISTORY:   Past Medical History:   Diagnosis Date    Anemia     Arthritis     Coronary artery disease     Diabetes mellitus     Diabetes mellitus type II     Diabetic retinopathy 08/22/2019    Dyslipidemia     Hypertension     Insomnia     PAD (peripheral artery disease)        Past Surgical History:   Procedure Laterality Date    angiagram  08/31/2018    CORONARY ANGIOGRAPHY N/A 6/7/2022    Procedure: ANGIOGRAM, CORONARY ARTERY;  Surgeon: Kiet Vega MD;  Location[facility] LAB[facility];  Service: Cardiology;  Laterality: N/A;    CORONARY ANGIOPLASTY      RCA 4/2011 EJ    GALLBLADDER SURGERY       HYSTERECTOMY      LEFT HEART CATHETERIZATION Left 2022    Procedure: Left heart cath;  Surgeon: Kiet Vega MD;  Location: [facility] CATH LAB/EP;  Service: Cardiology;  Laterality: Left;    PERIPHERAL ARTERIAL STENT GRAFT      Left lower extremity RCA        Family History   Problem Relation Age of Onset    Heart attack Mother        Social History     Tobacco Use    Smoking status: Former Smoker     Quit date: 2005     Years since quittin.4    Smokeless tobacco: Never Used   Substance Use Topics    Alcohol use: No    Drug use: No       Social History     Social History Narrative    Lives alone in [place]. Has 2 sons. Darell Concepcion lives in [place] and cares for her. He is mPOA. Other son lives in [place]. Quit drinking years ago. [place].        ALLERGIES:   Review of patient's allergies indicates:   Allergen Reactions    Codeine Other (See Comments)     Jittery, lightheadedness, nausea  Other reaction(s): NAUSEA       MEDS:     Current Outpatient Medications:     acetaminophen (TYLENOL) 325 MG tablet, Take 325 mg by mouth as needed for Pain., Disp: , Rfl:     aspirin (ECOTRIN) 81 MG EC tablet, Take 1 tablet (81 mg total) by mouth once daily., Disp: , Rfl: 11    atorvastatin (LIPITOR) 80 MG tablet, Take 1 tablet (80 mg total) by mouth every evening., Disp: 90 tablet, Rfl: 3    blood sugar diagnostic (TRUE METRIX GLUCOSE TEST STRIP) Strp, 1 strip by Misc.(Non-Drug; Combo Route) route 2 (two) times daily., Disp: 200 each, Rfl: 1    blood-glucose meter (TRUE METRIX GLUCOSE METER) Misc, Test blood sugars twice daily, Disp: 1 each, Rfl: 0    carvediloL (COREG) 25 MG tablet, Take 1 tablet (25 mg total) by mouth 2 (two) times daily with meals., Disp: 60 tablet, Rfl: 11    clopidogreL (PLAVIX) 75 mg tablet, Take 1 tablet (75 mg total) by mouth once daily., Disp: 30 tablet, Rfl: 11    cyanocobalamin (VITAMIN B-12) 1000 MCG tablet, Take 1 tablet (1,000 mcg total) by mouth once daily., Disp: 90  "tablet, Rfl: 4    ferrous sulfate 324 mg (65 mg iron) TbEC, TAKE 1 TABLET (324 MG TOTAL) BY MOUTH ONCE DAILY. (Patient taking differently: Take 324 mg by mouth once daily.), Disp: 90 tablet, Rfl: 3    fish oil-omega-3 fatty acids 300-1,000 mg capsule, Take 500 mg by mouth 2 (two) times daily., Disp: , Rfl:     gabapentin (NEURONTIN) 300 MG capsule, TAKE 1 CAPSULE BY MOUTH EVERY EVENING (Patient taking differently: Take 300 mg by mouth every evening.), Disp: 90 capsule, Rfl: 3    hydrALAZINE (APRESOLINE) 100 MG tablet, Take 1 tablet (100 mg total) by mouth every 8 (eight) hours., Disp: 270 tablet, Rfl: 3    lancets (BD ULTRA FINE LANCETS) 33 gauge Misc, 1 lancet by Misc.(Non-Drug; Combo Route) route 2 (two) times daily., Disp: 200 each, Rfl: 1    latanoprost 0.005 % ophthalmic solution, Place 1 drop into both eyes every evening., Disp: , Rfl: 4    losartan (COZAAR) 100 MG tablet, TAKE 1 TABLET BY MOUTH EVERY DAY, Disp: 90 tablet, Rfl: 3    TRADJENTA 5 mg Tab tablet, TAKE 1 TABLET BY MOUTH EVERY DAY (Patient taking differently: Take 5 mg by mouth once daily.), Disp: 90 tablet, Rfl: 1    vitamin D (VITAMIN D3) 1000 units Tab, Take 1,000 Units by mouth once daily., Disp: , Rfl:       Vital signs:   Vitals:    06/20/22 1010 06/20/22 1015   BP: (!) 148/58 136/62   Pulse: (!) 52 (!) 56   SpO2: 98%    Weight: 75.8 kg (167 lb)    Height: 5' 2" (1.575 m)      Body mass index is 30.54 kg/m².  PHYSICAL EXAM:     Physical Exam  Constitutional:       General: She is not in acute distress.  Cardiovascular:      Rate and Rhythm: Normal rate and regular rhythm.      Heart sounds: Normal heart sounds. No murmur heard.    No friction rub. No gallop.   Pulmonary:      Effort: Pulmonary effort is normal.      Breath sounds: Normal breath sounds. No wheezing, rhonchi or rales.   Abdominal:      General: Bowel sounds are normal. There is no distension.      Palpations: Abdomen is soft.      Tenderness: There is no abdominal " tenderness.   Musculoskeletal:      Cervical back: Neck supple.      Right lower leg: No edema.      Left lower leg: No edema.   Neurological:      Mental Status: She is alert.           PHQ4 = Score: 0    PERTINENT RESULTS:   No visits with results within 1 Week(s) from this visit.   Latest known visit with results is:   Admission on 06/06/2022, Discharged on 06/08/2022   Component Date Value Ref Range Status    WBC 06/06/2022 7.21  3.90 - 12.70 K/uL Final    RBC 06/06/2022 3.39 (A) 4.00 - 5.40 M/uL Final    Hemoglobin 06/06/2022 10.7 (A) 12.0 - 16.0 g/dL Final    Hematocrit 06/06/2022 34.2 (A) 37.0 - 48.5 % Final    MCV 06/06/2022 101 (A) 82 - 98 fL Final    MCH 06/06/2022 31.6 (A) 27.0 - 31.0 pg Final    MCHC 06/06/2022 31.3 (A) 32.0 - 36.0 g/dL Final    RDW 06/06/2022 13.8  11.5 - 14.5 % Final    Platelets 06/06/2022 120 (A) 150 - 450 K/uL Final    MPV 06/06/2022 13.0 (A) 9.2 - 12.9 fL Final    Immature Granulocytes 06/06/2022 0.4  0.0 - 0.5 % Final    Gran # (ANC) 06/06/2022 5.5  1.8 - 7.7 K/uL Final    Immature Grans (Abs) 06/06/2022 0.03  0.00 - 0.04 K/uL Final    Comment: Mild elevation in immature granulocytes is non specific and   can be seen in a variety of conditions including stress response,   acute inflammation, trauma and pregnancy. Correlation with other   laboratory and clinical findings is essential.      Lymph # 06/06/2022 1.1  1.0 - 4.8 K/uL Final    Mono # 06/06/2022 0.5  0.3 - 1.0 K/uL Final    Eos # 06/06/2022 0.1  0.0 - 0.5 K/uL Final    Baso # 06/06/2022 0.03  0.00 - 0.20 K/uL Final    nRBC 06/06/2022 0  0 /100 WBC Final    Gran % 06/06/2022 76.8 (A) 38.0 - 73.0 % Final    Lymph % 06/06/2022 15.1 (A) 18.0 - 48.0 % Final    Mono % 06/06/2022 6.5  4.0 - 15.0 % Final    Eosinophil % 06/06/2022 0.8  0.0 - 8.0 % Final    Basophil % 06/06/2022 0.4  0.0 - 1.9 % Final    Differential Method 06/06/2022 Automated   Final    Sodium 06/06/2022 143  136 - 145 mmol/L Final     Potassium 06/06/2022 3.8  3.5 - 5.1 mmol/L Final    Chloride 06/06/2022 109  95 - 110 mmol/L Final    CO2 06/06/2022 22 (A) 23 - 29 mmol/L Final    Glucose 06/06/2022 229 (A) 70 - 110 mg/dL Final    BUN 06/06/2022 32 (A) 8 - 23 mg/dL Final    Creatinine 06/06/2022 1.7 (A) 0.5 - 1.4 mg/dL Final    Calcium 06/06/2022 9.1  8.7 - 10.5 mg/dL Final    Total Protein 06/06/2022 6.8  6.0 - 8.4 g/dL Final    Albumin 06/06/2022 3.4 (A) 3.5 - 5.2 g/dL Final    Total Bilirubin 06/06/2022 0.5  0.1 - 1.0 mg/dL Final    Comment: For infants and newborns, interpretation of results should be based  on gestational age, weight and in agreement with clinical  observations.    Premature Infant recommended reference ranges:  Up to 24 hours.............<8.0 mg/dL  Up to 48 hours............<12.0 mg/dL  3-5 days..................<15.0 mg/dL  6-29 days.................<15.0 mg/dL      Alkaline Phosphatase 06/06/2022 53 (A) 55 - 135 U/L Final    AST 06/06/2022 17  10 - 40 U/L Final    ALT 06/06/2022 13  10 - 44 U/L Final    Anion Gap 06/06/2022 12  8 - 16 mmol/L Final    eGFR if  06/06/2022 31 (A) >60 mL/min/1.73 m^2 Final    eGFR if non  06/06/2022 27 (A) >60 mL/min/1.73 m^2 Final    Comment: Calculation used to obtain the estimated glomerular filtration  rate (eGFR) is the CKD-EPI equation.       Troponin I 06/06/2022 0.118 (A) 0.000 - 0.026 ng/mL Final    Comment: The reference interval for Troponin I represents the 99th percentile   cutoff   for our facility and is consistent with 3rd generation assay   performance.      BNP 06/06/2022 171 (A) 0 - 99 pg/mL Final    Values of less than 100 pg/ml are consistent with non-CHF populations.    aPTT 06/06/2022 28.2  21.0 - 32.0 sec Final    Comment: aPTT therapeutic range = 39-69 seconds  LOT^050^APTT FSL^[id number]      Prothrombin Time 06/06/2022 11.1  9.0 - 12.5 sec Final    INR 06/06/2022 1.1  0.8 - 1.2 Final    Comment: Coumadin Therapy:  2.0  - 3.0 for INR for all indicators except mechanical heart valves  and antiphospholipid syndromes which should use 2.5 - 3.5.  LOT^040^PT Inn^[id number]      WBC 06/06/2022 6.92  3.90 - 12.70 K/uL Final    RBC 06/06/2022 3.36 (A) 4.00 - 5.40 M/uL Final    Hemoglobin 06/06/2022 10.7 (A) 12.0 - 16.0 g/dL Final    Hematocrit 06/06/2022 33.2 (A) 37.0 - 48.5 % Final    MCV 06/06/2022 99 (A) 82 - 98 fL Final    MCH 06/06/2022 31.8 (A) 27.0 - 31.0 pg Final    MCHC 06/06/2022 32.2  32.0 - 36.0 g/dL Final    RDW 06/06/2022 13.9  11.5 - 14.5 % Final    Platelets 06/06/2022 123 (A) 150 - 450 K/uL Final    MPV 06/06/2022 12.5  9.2 - 12.9 fL Final    Immature Granulocytes 06/06/2022 0.3  0.0 - 0.5 % Final    Gran # (ANC) 06/06/2022 4.7  1.8 - 7.7 K/uL Final    Immature Grans (Abs) 06/06/2022 0.02  0.00 - 0.04 K/uL Final    Comment: Mild elevation in immature granulocytes is non specific and   can be seen in a variety of conditions including stress response,   acute inflammation, trauma and pregnancy. Correlation with other   laboratory and clinical findings is essential.      Lymph # 06/06/2022 1.5  1.0 - 4.8 K/uL Final    Mono # 06/06/2022 0.6  0.3 - 1.0 K/uL Final    Eos # 06/06/2022 0.1  0.0 - 0.5 K/uL Final    Baso # 06/06/2022 0.01  0.00 - 0.20 K/uL Final    nRBC 06/06/2022 0  0 /100 WBC Final    Gran % 06/06/2022 67.5  38.0 - 73.0 % Final    Lymph % 06/06/2022 21.7  18.0 - 48.0 % Final    Mono % 06/06/2022 9.2  4.0 - 15.0 % Final    Eosinophil % 06/06/2022 1.2  0.0 - 8.0 % Final    Basophil % 06/06/2022 0.1  0.0 - 1.9 % Final    Differential Method 06/06/2022 Automated   Final    Group & Rh 06/06/2022 A POS   Final    Indirect Иван 06/06/2022 NEG   Final    Troponin I 06/06/2022 0.158 (A) 0.000 - 0.026 ng/mL Final    Comment: The reference interval for Troponin I represents the 99th percentile   cutoff   for our facility and is consistent with 3rd generation assay   performance.      Phosphorus  06/06/2022 3.2  2.7 - 4.5 mg/dL Final    POC Rapid COVID 06/06/2022 Negative  Negative Final     Acceptable 06/06/2022 Yes   Final    Lipase 06/06/2022 38  4 - 60 U/L Final    Troponin I 06/06/2022 0.170 (A) 0.000 - 0.026 ng/mL Final    Comment: The reference interval for Troponin I represents the 99th percentile   cutoff   for our facility and is consistent with 3rd generation assay   performance.      aPTT 06/06/2022 40.3 (A) 21.0 - 32.0 sec Final    Comment: aPTT therapeutic range = 39-69 seconds  LOT^050^APTT FSL^[id number]      aPTT 06/07/2022 37.7 (A) 21.0 - 32.0 sec Final    Comment: aPTT therapeutic range = 39-69 seconds  LOT^050^APTT FSL^[id number]      WBC 06/07/2022 6.40  3.90 - 12.70 K/uL Final    RBC 06/07/2022 3.16 (A) 4.00 - 5.40 M/uL Final    Hemoglobin 06/07/2022 10.2 (A) 12.0 - 16.0 g/dL Final    Hematocrit 06/07/2022 31.3 (A) 37.0 - 48.5 % Final    MCV 06/07/2022 99 (A) 82 - 98 fL Final    MCH 06/07/2022 32.3 (A) 27.0 - 31.0 pg Final    MCHC 06/07/2022 32.6  32.0 - 36.0 g/dL Final    RDW 06/07/2022 13.9  11.5 - 14.5 % Final    Platelets 06/07/2022 113 (A) 150 - 450 K/uL Final    MPV 06/07/2022 12.6  9.2 - 12.9 fL Final    Immature Granulocytes 06/07/2022 0.2  0.0 - 0.5 % Final    Gran # (ANC) 06/07/2022 4.4  1.8 - 7.7 K/uL Final    Immature Grans (Abs) 06/07/2022 0.01  0.00 - 0.04 K/uL Final    Comment: Mild elevation in immature granulocytes is non specific and   can be seen in a variety of conditions including stress response,   acute inflammation, trauma and pregnancy. Correlation with other   laboratory and clinical findings is essential.      Lymph # 06/07/2022 1.3  1.0 - 4.8 K/uL Final    Mono # 06/07/2022 0.5  0.3 - 1.0 K/uL Final    Eos # 06/07/2022 0.1  0.0 - 0.5 K/uL Final    Baso # 06/07/2022 0.02  0.00 - 0.20 K/uL Final    nRBC 06/07/2022 0  0 /100 WBC Final    Gran % 06/07/2022 69.1  38.0 - 73.0 % Final    Lymph % 06/07/2022 20.8  18.0 - 48.0 % Final     Mono % 06/07/2022 8.3  4.0 - 15.0 % Final    Eosinophil % 06/07/2022 1.3  0.0 - 8.0 % Final    Basophil % 06/07/2022 0.3  0.0 - 1.9 % Final    Differential Method 06/07/2022 Automated   Final    Troponin I 06/07/2022 0.153 (A) 0.000 - 0.026 ng/mL Final    Comment: The reference interval for Troponin I represents the 99th percentile   cutoff   for our facility and is consistent with 3rd generation assay   performance.      Magnesium 06/07/2022 2.0  1.6 - 2.6 mg/dL Final    Sodium 06/07/2022 144  136 - 145 mmol/L Final    Potassium 06/07/2022 3.7  3.5 - 5.1 mmol/L Final    Chloride 06/07/2022 112 (A) 95 - 110 mmol/L Final    CO2 06/07/2022 26  23 - 29 mmol/L Final    Glucose 06/07/2022 117 (A) 70 - 110 mg/dL Final    BUN 06/07/2022 24 (A) 8 - 23 mg/dL Final    Creatinine 06/07/2022 1.4  0.5 - 1.4 mg/dL Final    Calcium 06/07/2022 9.3  8.7 - 10.5 mg/dL Final    Total Protein 06/07/2022 6.1  6.0 - 8.4 g/dL Final    Albumin 06/07/2022 3.3 (A) 3.5 - 5.2 g/dL Final    Total Bilirubin 06/07/2022 0.6  0.1 - 1.0 mg/dL Final    Comment: For infants and newborns, interpretation of results should be based  on gestational age, weight and in agreement with clinical  observations.    Premature Infant recommended reference ranges:  Up to 24 hours.............<8.0 mg/dL  Up to 48 hours............<12.0 mg/dL  3-5 days..................<15.0 mg/dL  6-29 days.................<15.0 mg/dL      Alkaline Phosphatase 06/07/2022 46 (A) 55 - 135 U/L Final    AST 06/07/2022 17  10 - 40 U/L Final    ALT 06/07/2022 11  10 - 44 U/L Final    Anion Gap 06/07/2022 6 (A) 8 - 16 mmol/L Final    eGFR if  06/07/2022 39 (A) >60 mL/min/1.73 m^2 Final    eGFR if non  06/07/2022 34 (A) >60 mL/min/1.73 m^2 Final    Comment: Calculation used to obtain the estimated glomerular filtration  rate (eGFR) is the CKD-EPI equation.       Troponin I 06/07/2022 0.163 (A) 0.000 - 0.026 ng/mL Final    Comment:  The reference interval for Troponin I represents the 99th percentile   cutoff   for our facility and is consistent with 3rd generation assay   performance.      aPTT 06/07/2022 28.4  21.0 - 32.0 sec Final    Comment: aPTT therapeutic range = 39-69 seconds  LOT^050^APTT FSL^[id number]      Magnesium 06/08/2022 1.9  1.6 - 2.6 mg/dL Final     Results for orders placed or performed during the hospital encounter of 06/06/22   EKG 12-LEAD on arrival to floor    Collection Time: 06/07/22  2:09 PM    Narrative    Test Reason : I21.4,    Vent. Rate : 066 BPM     Atrial Rate : 066 BPM     P-R Int : 198 ms          QRS Dur : 094 ms      QT Int : 416 ms       P-R-T Axes : 057 045 158 degrees     QTc Int : 436 ms    Sinus rhythm with Premature atrial complexes  Septal infarct ,age undetermined  T wave abnormality, consider inferolateral ischemia  Abnormal ECG  since the previous tracing, ST changes anterolateral leads improved   Confirmed by Tiffany Welsh MD ([id number]) on 6/14/2022 9:05:11 AM    Referred By: [email or web address]   SELF           Confirmed By:Tiffany Welsh MD     Cardiac catheterization  · The pre-procedure left ventricular end diastolic pressure was 16.  · The estimated blood loss was none.  · There was non-obstructive coronary artery disease..     - Will continue medical therapy     The procedure log was documented by Documenter: Janell Odell,  and   verified by Kiet Vega MD.    Date: 6/8/2022  Time: 10:23 AM    No results found for this or any previous visit.    Study date: 6/7/22       Patient Information    Name MRN Description   Petra Zazueta [id number] 87 y.o. female       Physicians    Panel Physicians Referring Physician Case Authorizing Physician   Kiet Vega MD (Primary) Aaareferral Self Kiet Vega MD       Indications    NSTEMI (non-ST elevated myocardial infarction) [I21.4 (ICD-10-CM)]   Other forms of angina pectoris [I20.8 (ICD-10-CM)]       Summary       · The pre-procedure  left ventricular end diastolic pressure was 16.  · The estimated blood loss was none.  · There was non-obstructive coronary artery disease..     - Will continue medical therapy      The procedure log was documented by Documenter: RT Chuy and verified by Kiet Vega MD.     Date: 6/8/2022  Time: 10:23 AM       ASSESSMENT/PLAN:  Problem List Items Addressed This Visit        Cardiac/Vascular    Chronic diastolic heart failure    Overview     - compensated  - continue with current medical therapy  - ARB discontinued during hospitalization 06/2022 secondary to recurrent hyperkalemia           Coronary artery disease involving native coronary artery of native heart without angina pectoris    Overview     - 4/2011 RAUL RCA  - 06/08/2022 nonobstructive coronary artery disease continue medical therapy  - followed by Cardiology  - goal LDL <70  - BP goal < 130/80  - DAPT  - A1C goal <7%           Hyperlipidemia associated with type 2 diabetes mellitus    Overview     Lab Results   Component Value Date    LDLCALC 55 01/21/2022   - well controlled  - continue current medication           RESOLVED: NSTEMI (non-ST elevated myocardial infarction) - Primary    Overview     - demand ischemia and not acute coronary syndrome per Cardiology           PAD (peripheral artery disease)    Overview     - stent left leg with chronic nonpitting edema  - followed by Cardiology Dr. Clif COBB  - goal LDL <70  - BP goal < 130/80  - DAPT  - A1C goal <7%              Renal/    Stage 3b chronic kidney disease    Overview     - followed by Nephrology  Lab Results   Component Value Date    CREATININE 1.28 06/02/2021     - baseline creatinine 1.2-1.4  - baseline GFR 39-44  - stable              Hematology    Thrombocytopenia    Overview     Lab Results   Component Value Date     (L) 06/07/2022     - chronic and stable  - on dual anti-platelet secondary to CAD  - monitor              Endocrine    Class 1 obesity due to  excess calories with serious comorbidity and body mass index (BMI) of 31.0 to 31.9 in adult    Overview     - discussed recommendation for diet, exercise and weight loss           Hypertension associated with type 2 diabetes mellitus    Overview     - well controlled  - continue current medications           Secondary hyperparathyroidism of renal origin    Overview     Lab Results   Component Value Date    .0 (H) 12/19/2019    CALCIUM 9.7 09/26/2020    PHOS 3.7 09/26/2020     - followed by Nephrology           Type 2 diabetes mellitus with diabetic polyneuropathy, without long-term current use of insulin    Overview     Lab Results   Component Value Date    LABA1C 7.2 (H) 05/25/2018    HGBA1C 6.1 (H) 01/21/2022                Relevant Orders    Hemoglobin A1C    Type 2 diabetes mellitus with diabetic peripheral angiopathy without gangrene, without long-term current use of insulin    Overview     Lab Results   Component Value Date    LABA1C 7.2 (H) 05/25/2018    HGBA1C 6.1 (H) 01/21/2022                Relevant Orders    Hemoglobin A1C    Type 2 diabetes mellitus with stage 3b chronic kidney disease, without long-term current use of insulin    Overview     Lab Results   Component Value Date    LABA1C 7.2 (H) 05/25/2018    HGBA1C 5.7 (H) 01/15/2021     - well controlled  - continue current medication  - repeat A1C due tod           Relevant Orders    Hemoglobin A1C          ORDERS:   Orders Placed This Encounter    Hemoglobin A1C       Vaccines recommended:  COVID-19 booster    Follow-up in 3-6 months with labs or sooner if any concerns.      This note is dictated using the M*Modal Fluency Direct word recognition program. There are word recognition mistakes that are occasionally missed on review.    Dr. Polly Greer D.O.   Family Medicine

## 2022-08-04 DIAGNOSIS — N18.32 TYPE 2 DIABETES MELLITUS WITH STAGE 3B CHRONIC KIDNEY DISEASE, WITHOUT LONG-TERM CURRENT USE OF INSULIN: ICD-10-CM

## 2022-08-04 DIAGNOSIS — E11.22 TYPE 2 DIABETES MELLITUS WITH STAGE 3B CHRONIC KIDNEY DISEASE, WITHOUT LONG-TERM CURRENT USE OF INSULIN: ICD-10-CM

## 2022-08-04 DIAGNOSIS — E11.42 TYPE 2 DIABETES MELLITUS WITH DIABETIC POLYNEUROPATHY, WITHOUT LONG-TERM CURRENT USE OF INSULIN: ICD-10-CM

## 2022-08-04 DIAGNOSIS — E11.51 TYPE 2 DIABETES MELLITUS WITH DIABETIC PERIPHERAL ANGIOPATHY WITHOUT GANGRENE, WITHOUT LONG-TERM CURRENT USE OF INSULIN: ICD-10-CM

## 2022-08-04 RX ORDER — LINAGLIPTIN 5 MG/1
TABLET, FILM COATED ORAL
Qty: 90 TABLET | Refills: 1 | Status: SHIPPED | OUTPATIENT
Start: 2022-08-04 | End: 2023-02-16

## 2022-08-04 NOTE — TELEPHONE ENCOUNTER
Refill Decision Note   Petra Zazueta  is requesting a refill authorization.  Brief Assessment and Rationale for Refill:  Approve     Medication Therapy Plan:       Medication Reconciliation Completed: No   Comments:     No Care Gaps recommended.     Note composed:12:23 PM 08/04/2022

## 2022-08-04 NOTE — TELEPHONE ENCOUNTER
No new care gaps identified.  [facility] Embedded Care Gaps. Reference number: [id number]. 8/04/2022   12:12:01 AM CDT

## 2022-11-09 DIAGNOSIS — I25.10 CORONARY ARTERY DISEASE INVOLVING NATIVE CORONARY ARTERY OF NATIVE HEART WITHOUT ANGINA PECTORIS: ICD-10-CM

## 2022-11-09 RX ORDER — ATORVASTATIN CALCIUM 80 MG/1
80 TABLET, FILM COATED ORAL NIGHTLY
Qty: 90 TABLET | Refills: 3 | Status: SHIPPED | OUTPATIENT
Start: 2022-11-09 | End: 2023-04-18 | Stop reason: SDUPTHER

## 2022-11-09 NOTE — TELEPHONE ENCOUNTER
No new care gaps identified.  [facility] Embedded Care Gaps. Reference number: [id number]. 11/09/2022   2:59:30 PM CST

## 2022-11-21 ENCOUNTER — OFFICE VISIT (OUTPATIENT)
Dept: FAMILY MEDICINE | Facility: CLINIC | Age: 87
End: 2022-11-21
Payer: MEDICARE

## 2022-11-21 VITALS
OXYGEN SATURATION: 99 % | WEIGHT: 157 LBS | DIASTOLIC BLOOD PRESSURE: 56 MMHG | SYSTOLIC BLOOD PRESSURE: 122 MMHG | BODY MASS INDEX: 28.89 KG/M2 | HEIGHT: 62 IN | HEART RATE: 61 BPM | RESPIRATION RATE: 15 BRPM

## 2022-11-21 DIAGNOSIS — E11.42 TYPE 2 DIABETES MELLITUS WITH DIABETIC POLYNEUROPATHY, WITHOUT LONG-TERM CURRENT USE OF INSULIN: ICD-10-CM

## 2022-11-21 DIAGNOSIS — E11.59 HYPERTENSION ASSOCIATED WITH TYPE 2 DIABETES MELLITUS: ICD-10-CM

## 2022-11-21 DIAGNOSIS — E11.51 TYPE 2 DIABETES MELLITUS WITH DIABETIC PERIPHERAL ANGIOPATHY WITHOUT GANGRENE, WITHOUT LONG-TERM CURRENT USE OF INSULIN: ICD-10-CM

## 2022-11-21 DIAGNOSIS — D69.6 THROMBOCYTOPENIA: ICD-10-CM

## 2022-11-21 DIAGNOSIS — N18.32 TYPE 2 DIABETES MELLITUS WITH STAGE 3B CHRONIC KIDNEY DISEASE, WITHOUT LONG-TERM CURRENT USE OF INSULIN: Primary | ICD-10-CM

## 2022-11-21 DIAGNOSIS — E11.22 TYPE 2 DIABETES MELLITUS WITH STAGE 3B CHRONIC KIDNEY DISEASE, WITHOUT LONG-TERM CURRENT USE OF INSULIN: Primary | ICD-10-CM

## 2022-11-21 DIAGNOSIS — I15.2 HYPERTENSION ASSOCIATED WITH TYPE 2 DIABETES MELLITUS: ICD-10-CM

## 2022-11-21 DIAGNOSIS — N18.32 STAGE 3B CHRONIC KIDNEY DISEASE: ICD-10-CM

## 2022-11-21 PROCEDURE — 99214 OFFICE O/P EST MOD 30 MIN: CPT | Mod: S$GLB,,, | Performed by: FAMILY MEDICINE

## 2022-11-21 PROCEDURE — 3288F PR FALLS RISK ASSESSMENT DOCUMENTED: ICD-10-PCS | Mod: CPTII,S$GLB,, | Performed by: FAMILY MEDICINE

## 2022-11-21 PROCEDURE — 99999 PR PBB SHADOW E&M-EST. PATIENT-LVL IV: ICD-10-PCS | Mod: PBBFAC,,, | Performed by: FAMILY MEDICINE

## 2022-11-21 PROCEDURE — 99214 PR OFFICE/OUTPT VISIT, EST, LEVL IV, 30-39 MIN: ICD-10-PCS | Mod: S$GLB,,, | Performed by: FAMILY MEDICINE

## 2022-11-21 PROCEDURE — 99499 RISK ADDL DX/OHS AUDIT: ICD-10-PCS | Mod: S$GLB,,, | Performed by: FAMILY MEDICINE

## 2022-11-21 PROCEDURE — 1160F PR REVIEW ALL MEDS BY PRESCRIBER/CLIN PHARMACIST DOCUMENTED: ICD-10-PCS | Mod: CPTII,S$GLB,, | Performed by: FAMILY MEDICINE

## 2022-11-21 PROCEDURE — 1101F PT FALLS ASSESS-DOCD LE1/YR: CPT | Mod: CPTII,S$GLB,, | Performed by: FAMILY MEDICINE

## 2022-11-21 PROCEDURE — 1159F PR MEDICATION LIST DOCUMENTED IN MEDICAL RECORD: ICD-10-PCS | Mod: CPTII,S$GLB,, | Performed by: FAMILY MEDICINE

## 2022-11-21 PROCEDURE — 3288F FALL RISK ASSESSMENT DOCD: CPT | Mod: CPTII,S$GLB,, | Performed by: FAMILY MEDICINE

## 2022-11-21 PROCEDURE — 99999 PR PBB SHADOW E&M-EST. PATIENT-LVL IV: CPT | Mod: PBBFAC,,, | Performed by: FAMILY MEDICINE

## 2022-11-21 PROCEDURE — 1101F PR PT FALLS ASSESS DOC 0-1 FALLS W/OUT INJ PAST YR: ICD-10-PCS | Mod: CPTII,S$GLB,, | Performed by: FAMILY MEDICINE

## 2022-11-21 PROCEDURE — 1160F RVW MEDS BY RX/DR IN RCRD: CPT | Mod: CPTII,S$GLB,, | Performed by: FAMILY MEDICINE

## 2022-11-21 PROCEDURE — 99499 UNLISTED E&M SERVICE: CPT | Mod: S$GLB,,, | Performed by: FAMILY MEDICINE

## 2022-11-21 PROCEDURE — 1126F AMNT PAIN NOTED NONE PRSNT: CPT | Mod: CPTII,S$GLB,, | Performed by: FAMILY MEDICINE

## 2022-11-21 PROCEDURE — 1159F MED LIST DOCD IN RCRD: CPT | Mod: CPTII,S$GLB,, | Performed by: FAMILY MEDICINE

## 2022-11-21 PROCEDURE — 1126F PR PAIN SEVERITY QUANTIFIED, NO PAIN PRESENT: ICD-10-PCS | Mod: CPTII,S$GLB,, | Performed by: FAMILY MEDICINE

## 2022-11-21 RX ORDER — LATANOPROST 50 UG/ML
1 SOLUTION/ DROPS OPHTHALMIC NIGHTLY
COMMUNITY
Start: 2022-11-10

## 2022-11-21 NOTE — PROGRESS NOTES
FAMILY MEDICINE  [facility]    Reason for visit:   Chief Complaint   Patient presents with    Follow-up    Diabetes       HPI: Petra Zazueta is a 87 y.o. female  - with obesity, CAD (with stents in place), chronic kidney disease stage 4, secondary hyperparathyroidism, type 2 diabetes, pEFHF, PAD with history of stent and chronic left distal leg swelling, history of gout and hypertension presents for follow-up diabetes     Nephrology Dr. Morales  Cardiology Dr. Johns    Today she reports she is doing well and denies any concerns or complaints.    1. Diabetes Type 2     Current treatment regimen:   linaGLIPtin (TRADJENTA) 5 mg Tab tablet, Take 1 tablet (5 mg total) by mouth once daily., Disp: 90 tablet, Rfl: 1     Prior medication:   Glimepiride - stopped due to hypoglycemia  pioglitazone (ACTOS) 30 MG tablet  - stopped due to pEFHF      Side effects from treatment: denies   Complications of diabetes: neuropathy     Glucometer: yes  Glucose monitoring: weekly    Lab Results                 HGBA1C                   5.5                 11/18/2022              Lab Results             HGBA1C                   6.0 (H)             06/03/2022                          Low dose statin: atorvastatin 80 mg daily  Last eye exam: 5/18/22 Dr. García  Last foot exam: 1/26/2022       Vaccines:   Influenza: up to date   Pneumovax: 23 5/25/18  Prevnar 13: 1/13/2017  Covid-19 booster due        Review of Systems   All other systems reviewed and are negative.    ALLERGIES:   Review of patient's allergies indicates:   Allergen Reactions    Codeine Other (See Comments)     Jittery, lightheadedness, nausea  Other reaction(s): NAUSEA       MEDS:     Current Outpatient Medications:     acetaminophen (TYLENOL) 325 MG tablet, Take 325 mg by mouth as needed for Pain., Disp: , Rfl:     aspirin (ECOTRIN) 81 MG EC tablet, Take 1 tablet (81 mg total) by mouth once daily., Disp: , Rfl: 11    atorvastatin (LIPITOR) 80 MG tablet, Take 1  "tablet (80 mg total) by mouth every evening., Disp: 90 tablet, Rfl: 3    blood sugar diagnostic (TRUE METRIX GLUCOSE TEST STRIP) Strp, 1 strip by Misc.(Non-Drug; Combo Route) route 2 (two) times daily., Disp: 200 each, Rfl: 1    blood-glucose meter (TRUE METRIX GLUCOSE METER) Misc, Test blood sugars twice daily, Disp: 1 each, Rfl: 0    carvediloL (COREG) 25 MG tablet, TAKE 1 TABLET BY MOUTH TWICE A DAY WITH FOOD, Disp: 180 tablet, Rfl: 3    clopidogreL (PLAVIX) 75 mg tablet, Take 1 tablet (75 mg total) by mouth once daily., Disp: 30 tablet, Rfl: 11    gabapentin (NEURONTIN) 300 MG capsule, TAKE 1 CAPSULE BY MOUTH EVERY EVENING, Disp: 90 capsule, Rfl: 3    lancets (BD ULTRA FINE LANCETS) 33 gauge Misc, 1 lancet by Misc.(Non-Drug; Combo Route) route 2 (two) times daily., Disp: 200 each, Rfl: 1    latanoprost 0.005 % ophthalmic solution, Place 1 drop into both eyes every evening., Disp: , Rfl:     losartan (COZAAR) 100 MG tablet, TAKE 1 TABLET BY MOUTH EVERY DAY, Disp: 90 tablet, Rfl: 3    TRADJENTA 5 mg Tab tablet, TAKE 1 TABLET BY MOUTH EVERY DAY, Disp: 90 tablet, Rfl: 1    hydrALAZINE (APRESOLINE) 100 MG tablet, Take 1 tablet (100 mg total) by mouth every 8 (eight) hours., Disp: 270 tablet, Rfl: 3    Vital signs:   Vitals:    11/21/22 1040   BP: (!) 122/56   BP Location: Left arm   Patient Position: Sitting   BP Method: Medium (Manual)   Pulse: 61   Resp: 15   SpO2: 99%   Weight: 71.2 kg (157 lb)   Height: 5' 2" (1.575 m)     Body mass index is 28.72 kg/m².    PHYSICAL EXAM:     Physical Exam  Constitutional:       General: She is not in acute distress.  Cardiovascular:      Rate and Rhythm: Normal rate and regular rhythm.      Heart sounds: Murmur heard.   Systolic murmur is present with a grade of 3/6.     No friction rub. No gallop.   Pulmonary:      Effort: Pulmonary effort is normal.      Breath sounds: Normal breath sounds. No wheezing, rhonchi or rales.   Musculoskeletal:      Cervical back: Neck supple.      " Right lower leg: No edema.      Left lower leg: No edema.   Neurological:      Mental Status: She is alert.         PHQ4 = PHQ-4 Score: 0    PERTINENT RESULTS:   Lab Visit on 11/18/2022   Component Date Value Ref Range Status    Hemoglobin A1C 11/18/2022 5.5  4.0 - 5.6 % Final    Comment: ADA Screening Guidelines:  5.7-6.4%  Consistent with prediabetes  >or=6.5%  Consistent with diabetes    High levels of fetal hemoglobin interfere with the HbA1C  assay. Heterozygous hemoglobin variants (HbS, HgC, etc)do  not significantly interfere with this assay.   However, presence of multiple variants may affect accuracy.      Estimated Avg Glucose 11/18/2022 111  68 - 131 mg/dL Final     BMP  Lab Results   Component Value Date     09/29/2022    K 4.5 09/29/2022     09/29/2022    CO2 24 09/29/2022    BUN 38 (H) 09/29/2022    CREATININE 1.73 (H) 09/29/2022    CALCIUM 9.9 09/29/2022    ANIONGAP 6 (L) 06/07/2022    EGFRNORACEVR 28 (L) 09/29/2022     Lab Results   Component Value Date    ALT 5 (L) 09/29/2022    AST 14 09/29/2022    ALKPHOS 46 (L) 06/07/2022    BILITOT 0.5 09/29/2022     Lab Results   Component Value Date    CHOL 124 01/21/2022    CHOL 105 12/08/2021    CHOL 167 01/15/2021     Lab Results   Component Value Date    HDL 53 01/21/2022    HDL 43 (L) 12/08/2021    HDL 53 01/15/2021     Lab Results   Component Value Date    LDLCALC 55 01/21/2022    LDLCALC 45 12/08/2021    LDLCALC 94.6 01/15/2021     Lab Results   Component Value Date    TRIG 84 01/21/2022    TRIG 83 12/08/2021    TRIG 97 01/15/2021     Lab Results   Component Value Date    CHOLHDL 2.3 01/21/2022    CHOLHDL 2.4 12/08/2021    CHOLHDL 31.7 01/15/2021         ASSESSMENT/PLAN:  1. Type 2 diabetes mellitus with stage 3b chronic kidney disease, without long-term current use of insulin  Overview:  Lab Results   Component Value Date    LABA1C 7.2 (H) 05/25/2018    HGBA1C 5.5 11/18/2022     - well controlled  - continue current management plan   -  patient encouraged to notify me with any changes      2. Type 2 diabetes mellitus with diabetic peripheral angiopathy without gangrene, without long-term current use of insulin  Overview:  Lab Results   Component Value Date    LABA1C 7.2 (H) 05/25/2018    HGBA1C 5.5 11/18/2022           3. Type 2 diabetes mellitus with diabetic polyneuropathy, without long-term current use of insulin  Overview:  Lab Results   Component Value Date    LABA1C 7.2 (H) 05/25/2018    HGBA1C 5.5 11/18/2022           4. Hypertension associated with type 2 diabetes mellitus  Overview:  - well controlled  - continue current medications      5. Thrombocytopenia  Overview:  Lab Results   Component Value Date     (L) 09/29/2022     - chronic and stable  - on dual anti-platelet secondary to CAD  - monitor      6. Stage 3b chronic kidney disease  Overview:  - followed by Nephrology  Lab Results   Component Value Date    CREATININE 1.73 (H) 09/29/2022     - baseline creatinine 1.2-1.4  - baseline GFR 39-44  - stable        Vaccines recommended: covid19 booster    Today I discussed that I will no longer be practicing at [facility] effective 1/2023. My new location will be at [facility]. We assisted Petra Zazueta in establishing with a new provider for follow-up. I encouraged Petra Zazueta to notify me with an questions or concerns prior to my departure.     Follow-up in 3 months with new provider or sooner if any concerns.    This note is dictated using the M*Modal Fluency Direct word recognition program. There are word recognition mistakes that are occasionally missed on review.    Dr. Polly Greer D.O.   Family Medicine

## 2022-12-02 ENCOUNTER — TELEPHONE (OUTPATIENT)
Dept: FAMILY MEDICINE | Facility: CLINIC | Age: 87
End: 2022-12-02
Payer: MEDICARE

## 2022-12-06 ENCOUNTER — IMMUNIZATION (OUTPATIENT)
Dept: FAMILY MEDICINE | Facility: CLINIC | Age: 87
End: 2022-12-06
Payer: MEDICARE

## 2022-12-06 DIAGNOSIS — Z23 NEED FOR VACCINATION: Primary | ICD-10-CM

## 2022-12-06 PROCEDURE — 91312 COVID-19, MRNA, LNP-S, BIVALENT BOOSTER, PF, 30 MCG/0.3 ML DOSE: CPT | Mod: S$GLB,,, | Performed by: FAMILY MEDICINE

## 2022-12-06 PROCEDURE — 91312 COVID-19, MRNA, LNP-S, BIVALENT BOOSTER, PF, 30 MCG/0.3 ML DOSE: ICD-10-PCS | Mod: S$GLB,,, | Performed by: FAMILY MEDICINE

## 2022-12-06 PROCEDURE — 0124A COVID-19, MRNA, LNP-S, BIVALENT BOOSTER, PF, 30 MCG/0.3 ML DOSE: CPT | Mod: PBBFAC | Performed by: FAMILY MEDICINE

## 2022-12-28 ENCOUNTER — PATIENT MESSAGE (OUTPATIENT)
Dept: FAMILY MEDICINE | Facility: CLINIC | Age: 87
End: 2022-12-28
Payer: MEDICARE

## 2022-12-29 NOTE — PROGRESS NOTES
"VIRTUAL/TELEMEDICINE VISIT   FAMILY MEDICINE  [facility]    The patient location is: [place]  The chief complaint leading to consultation is: "heartburn"  Visit type: Virtual visit with synchronous audio and video   Total time spent: 30 minute  Each patient to whom he or she provides medical services by telemedicine is:  (1) informed of the relationship between the physician and patient and the respective role of any other health care provider with respect to management of the patient; and (2) notified that he or she may decline to receive medical services by telemedicine and may withdraw from such care at any time.    Reason for visit:   Chief Complaint   Patient presents with    Gastroesophageal Reflux       SUBJECTIVE: Petra Zazueta is a 87 y.o. female  - with obesity, CAD (with stents in place), chronic kidney disease stage 4, secondary hyperparathyroidism, type 2 diabetes, pEFHF, PAD with history of stent and chronic left distal leg swelling, history of gout and hypertension presents for concerns for indigestion     Nephrology Dr. Morales  Cardiology Dr. Johns    Her son Darell is assisting with the visit     Petra Zazueta reports concerns for indigestion and heartburn.  She reports symptoms started about 1 month ago and currently she is taking over-the-counter Tums with only mild relief.  She reports symptoms are with every meal.  She reports that it feels like her food is going down or digesting and she has a burning in the back her throat.  She also has related shortness of breath and chest tightness with symptoms.  She reports symptoms are only present after meals.  She denies any chest pain, difficulty breathing, dyspnea on exertion or leg swelling at other times during the day.  She reports otherwise she is able to do her normal daily activities.  She denies any abdominal pain or unintentional weight loss.  She denies any dark or black tarry stool, bright blood per rectum " are nausea or vomiting.    She does have an appointment 2023 to establish with new primary care physician      Review of Systems   All other systems reviewed and are negative.      HISTORY:   Past Medical History:   Diagnosis Date    Anemia     Arthritis     Coronary artery disease     Diabetes mellitus     Diabetes mellitus type II     Diabetic retinopathy 2019    Dyslipidemia     Hypertension     Insomnia     PAD (peripheral artery disease)        Past Surgical History:   Procedure Laterality Date    angiagram  2018    CORONARY ANGIOGRAPHY N/A 2022    Procedure: ANGIOGRAM, CORONARY ARTERY;  Surgeon: Kiet Vega MD;  Location: [facility] CATH LAB/EP;  Service: Cardiology;  Laterality: N/A;    CORONARY ANGIOPLASTY      RCA 2011 EJ    GALLBLADDER SURGERY      HYSTERECTOMY      LEFT HEART CATHETERIZATION Left 2022    Procedure: Left heart cath;  Surgeon: Kiet Vega MD;  Location: [facility] CATH LAB/EP;  Service: Cardiology;  Laterality: Left;    PERIPHERAL ARTERIAL STENT GRAFT      Left lower extremity RCA        Family History   Problem Relation Age of Onset    Heart attack Mother        Social History     Tobacco Use    Smoking status: Former     Types: Cigarettes     Quit date: 2005     Years since quittin.0    Smokeless tobacco: Never   Substance Use Topics    Alcohol use: No    Drug use: No       Social History     Social History Narrative    Lives alone in [place]. Has 2 sons. Darell Concepcion lives in [place] and cares for her. He is mPOA. Other son lives in [place]. Quit drinking years ago. [place].        ALLERGIES:   Review of patient's allergies indicates:   Allergen Reactions    Codeine Other (See Comments)     Jittery, lightheadedness, nausea  Other reaction(s): NAUSEA       MEDS:     Current Outpatient Medications:     acetaminophen (TYLENOL) 325 MG tablet, Take 325 mg by mouth as needed for Pain., Disp: , Rfl:     aspirin (ECOTRIN) 81 MG EC tablet, Take 1 tablet (81 mg  total) by mouth once daily., Disp: , Rfl: 11    atorvastatin (LIPITOR) 80 MG tablet, Take 1 tablet (80 mg total) by mouth every evening., Disp: 90 tablet, Rfl: 3    carvediloL (COREG) 25 MG tablet, TAKE 1 TABLET BY MOUTH TWICE A DAY WITH FOOD, Disp: 180 tablet, Rfl: 3    clopidogreL (PLAVIX) 75 mg tablet, Take 1 tablet (75 mg total) by mouth once daily., Disp: 30 tablet, Rfl: 11    ferrous sulfate 324 mg (65 mg iron) TbEC, TAKE 1 TABLET (324 MG TOTAL) BY MOUTH ONCE DAILY., Disp: 90 tablet, Rfl: 3    hydrALAZINE (APRESOLINE) 100 MG tablet, Take 1 tablet (100 mg total) by mouth every 8 (eight) hours., Disp: 270 tablet, Rfl: 3    latanoprost 0.005 % ophthalmic solution, Place 1 drop into both eyes every evening., Disp: , Rfl:     losartan (COZAAR) 100 MG tablet, TAKE 1 TABLET BY MOUTH EVERY DAY, Disp: 90 tablet, Rfl: 3    TRADJENTA 5 mg Tab tablet, TAKE 1 TABLET BY MOUTH EVERY DAY, Disp: 90 tablet, Rfl: 1    blood sugar diagnostic (TRUE METRIX GLUCOSE TEST STRIP) Strp, 1 strip by Misc.(Non-Drug; Combo Route) route 2 (two) times daily., Disp: 200 each, Rfl: 1    blood-glucose meter (TRUE METRIX GLUCOSE METER) Misc, Test blood sugars twice daily, Disp: 1 each, Rfl: 0    gabapentin (NEURONTIN) 300 MG capsule, TAKE 1 CAPSULE BY MOUTH EVERY EVENING, Disp: 90 capsule, Rfl: 3    lancets (BD ULTRA FINE LANCETS) 33 gauge Misc, 1 lancet by Misc.(Non-Drug; Combo Route) route 2 (two) times daily., Disp: 200 each, Rfl: 1    pantoprazole (PROTONIX) 40 MG tablet, Take 1 tablet (40 mg total) by mouth once daily., Disp: 30 tablet, Rfl: 0    Vital signs:   There were no vitals filed for this visit.  There is no height or weight on file to calculate BMI.    PHYSICAL EXAM:     Physical Exam  Constitutional:       General: She is not in acute distress.     Appearance: She is not toxic-appearing.   Pulmonary:      Effort: Pulmonary effort is normal. No respiratory distress.   Abdominal:      Palpations: Abdomen is soft.   Neurological:       Mental Status: She is alert.   Psychiatric:         Speech: Speech normal.         PHQ4 = No data recorded    PERTINENT RESULTS:   No visits with results within 1 Week(s) from this visit.   Latest known visit with results is:   Lab Visit on 11/18/2022   Component Date Value Ref Range Status    Hemoglobin A1C 11/18/2022 5.5  4.0 - 5.6 % Final    Comment: ADA Screening Guidelines:  5.7-6.4%  Consistent with prediabetes  >or=6.5%  Consistent with diabetes    High levels of fetal hemoglobin interfere with the HbA1C  assay. Heterozygous hemoglobin variants (HbS, HgC, etc)do  not significantly interfere with this assay.   However, presence of multiple variants may affect accuracy.      Estimated Avg Glucose 11/18/2022 111  68 - 131 mg/dL Final     CMP  Sodium   Date Value Ref Range Status   09/29/2022 141 135 - 146 mmol/L Final     Potassium   Date Value Ref Range Status   09/29/2022 4.5 3.5 - 5.3 mmol/L Final     Chloride   Date Value Ref Range Status   09/29/2022 108 98 - 110 mmol/L Final     CO2   Date Value Ref Range Status   09/29/2022 24 20 - 32 mmol/L Final     Glucose   Date Value Ref Range Status   09/29/2022 121 (H) 65 - 99 mg/dL Final     Comment:                   Fasting reference interval     For someone without known diabetes, a glucose value  between 100 and 125 mg/dL is consistent with  prediabetes and should be confirmed with a  follow-up test.          BUN   Date Value Ref Range Status   09/29/2022 38 (H) 7 - 25 mg/dL Final     Creatinine   Date Value Ref Range Status   09/29/2022 1.73 (H) 0.60 - 0.95 mg/dL Final     Calcium   Date Value Ref Range Status   09/29/2022 9.9 8.6 - 10.4 mg/dL Final     Total Protein   Date Value Ref Range Status   09/29/2022 6.9 6.1 - 8.1 g/dL Final     Albumin   Date Value Ref Range Status   09/29/2022 4.1 3.6 - 5.1 g/dL Final     Total Bilirubin   Date Value Ref Range Status   09/29/2022 0.5 0.2 - 1.2 mg/dL Final     Alkaline Phosphatase   Date Value Ref Range Status    06/07/2022 46 (L) 55 - 135 U/L Final     AST   Date Value Ref Range Status   09/29/2022 14 10 - 35 U/L Final     ALT   Date Value Ref Range Status   09/29/2022 5 (L) 6 - 29 U/L Final     Anion Gap   Date Value Ref Range Status   06/07/2022 6 (L) 8 - 16 mmol/L Final     eGFR   Date Value Ref Range Status   09/29/2022 28 (L) > OR = 60 mL/min/1.73m2 Final     Comment:     The eGFR is based on the CKD-EPI 2021 equation. To calculate   the new eGFR from a previous Creatinine or Cystatin C  result, go to https://www.kidney.org/professionals/  kdoqi/gfr%5Fcalculator         ASSESSMENT/PLAN:  1. Gastroesophageal reflux disease, unspecified whether esophagitis present  Overview:  - counseling on GERD  - recommend trial Pantoprazole 40 mg daily x1 month  - recommend GI evaluation     Orders:  -     pantoprazole (PROTONIX) 40 MG tablet; Take 1 tablet (40 mg total) by mouth once daily.  Dispense: 30 tablet; Refill: 0  -     Ambulatory referral/consult to Gastroenterology; Future; Expected date: 01/05/2023    2. Esophageal dysphagia  Overview:  - recommend GI evaluation      3. Shortness of breath  Overview:  - no related with any events but eating so suspect related with GERD  - discussed if any worsening , proceed to the ER      4. Chronic diastolic heart failure  Overview:  - appears compensated but difficult to assess on virtual visit  - continue with current medical therapy  - ARB discontinued during hospitalization 06/2022 secondary to recurrent hyperkalemia      5. Stage 4 chronic kidney disease  Overview:  - followed by Nephrology  Lab Results   Component Value Date    CREATININE 1.73 (H) 09/29/2022     - baseline creatinine 1.2-1.4  - baseline GFR 39-44  - stable      6. Type 2 diabetes mellitus with diabetic polyneuropathy, without long-term current use of insulin  Overview:  Lab Results   Component Value Date    LABA1C 7.2 (H) 05/25/2018    HGBA1C 5.5 11/18/2022           7. Type 2 diabetes mellitus with diabetic  peripheral angiopathy without gangrene, without long-term current use of insulin  Overview:  Lab Results   Component Value Date    LABA1C 7.2 (H) 05/25/2018    HGBA1C 5.5 11/18/2022           8. Secondary hyperparathyroidism of renal origin  Overview:  Lab Results   Component Value Date    .0 (H) 12/19/2019    CALCIUM 9.7 09/26/2020    PHOS 3.7 09/26/2020     - followed by Nephrology      9. Thrombocytopenia  Overview:  Lab Results   Component Value Date     (L) 09/29/2022     - chronic and stable  - on dual anti-platelet secondary to CAD  - monitor              ORDERS:   Orders Placed This Encounter    Ambulatory referral/consult to Gastroenterology    pantoprazole (PROTONIX) 40 MG tablet       Vaccines recommended: up to date     Follow-up in 1-2 months with new PCP already scheduled or sooner if any concerns.      This note is dictated using the M*Modal Fluency Direct word recognition program. There are word recognition mistakes that are occasionally missed on review.    Dr. Polly Greer D.O.   Family Medicine

## 2023-01-03 ENCOUNTER — OFFICE VISIT (OUTPATIENT)
Dept: PRIMARY CARE CLINIC | Facility: CLINIC | Age: 88
End: 2023-01-03
Attending: FAMILY MEDICINE
Payer: MEDICARE

## 2023-01-03 DIAGNOSIS — N18.4 STAGE 4 CHRONIC KIDNEY DISEASE: ICD-10-CM

## 2023-01-03 DIAGNOSIS — R06.02 SHORTNESS OF BREATH: ICD-10-CM

## 2023-01-03 DIAGNOSIS — E11.51 TYPE 2 DIABETES MELLITUS WITH DIABETIC PERIPHERAL ANGIOPATHY WITHOUT GANGRENE, WITHOUT LONG-TERM CURRENT USE OF INSULIN: ICD-10-CM

## 2023-01-03 DIAGNOSIS — R13.19 ESOPHAGEAL DYSPHAGIA: ICD-10-CM

## 2023-01-03 DIAGNOSIS — I50.32 CHRONIC DIASTOLIC HEART FAILURE: ICD-10-CM

## 2023-01-03 DIAGNOSIS — D69.6 THROMBOCYTOPENIA: ICD-10-CM

## 2023-01-03 DIAGNOSIS — E11.42 TYPE 2 DIABETES MELLITUS WITH DIABETIC POLYNEUROPATHY, WITHOUT LONG-TERM CURRENT USE OF INSULIN: ICD-10-CM

## 2023-01-03 DIAGNOSIS — N25.81 SECONDARY HYPERPARATHYROIDISM OF RENAL ORIGIN: ICD-10-CM

## 2023-01-03 DIAGNOSIS — K21.9 GASTROESOPHAGEAL REFLUX DISEASE, UNSPECIFIED WHETHER ESOPHAGITIS PRESENT: Primary | ICD-10-CM

## 2023-01-03 PROCEDURE — 1160F RVW MEDS BY RX/DR IN RCRD: CPT | Mod: CPTII,95,, | Performed by: FAMILY MEDICINE

## 2023-01-03 PROCEDURE — 1159F PR MEDICATION LIST DOCUMENTED IN MEDICAL RECORD: ICD-10-PCS | Mod: CPTII,95,, | Performed by: FAMILY MEDICINE

## 2023-01-03 PROCEDURE — 99214 PR OFFICE/OUTPT VISIT, EST, LEVL IV, 30-39 MIN: ICD-10-PCS | Mod: 95,,, | Performed by: FAMILY MEDICINE

## 2023-01-03 PROCEDURE — 1160F PR REVIEW ALL MEDS BY PRESCRIBER/CLIN PHARMACIST DOCUMENTED: ICD-10-PCS | Mod: CPTII,95,, | Performed by: FAMILY MEDICINE

## 2023-01-03 PROCEDURE — 1159F MED LIST DOCD IN RCRD: CPT | Mod: CPTII,95,, | Performed by: FAMILY MEDICINE

## 2023-01-03 PROCEDURE — 99214 OFFICE O/P EST MOD 30 MIN: CPT | Mod: 95,,, | Performed by: FAMILY MEDICINE

## 2023-01-03 RX ORDER — PANTOPRAZOLE SODIUM 40 MG/1
40 TABLET, DELAYED RELEASE ORAL DAILY
Qty: 30 TABLET | Refills: 0 | Status: SHIPPED | OUTPATIENT
Start: 2023-01-03 | End: 2023-01-25

## 2023-01-16 PROBLEM — I50.33 ACUTE ON CHRONIC DIASTOLIC CONGESTIVE HEART FAILURE: Status: ACTIVE | Noted: 2023-01-16

## 2023-01-18 PROBLEM — I50.33 ACUTE ON CHRONIC DIASTOLIC CONGESTIVE HEART FAILURE: Status: RESOLVED | Noted: 2023-01-16 | Resolved: 2023-01-18

## 2023-01-23 ENCOUNTER — OFFICE VISIT (OUTPATIENT)
Dept: CARDIOLOGY | Facility: CLINIC | Age: 88
End: 2023-01-23
Payer: MEDICARE

## 2023-01-23 VITALS
SYSTOLIC BLOOD PRESSURE: 140 MMHG | HEIGHT: 62 IN | BODY MASS INDEX: 30.71 KG/M2 | OXYGEN SATURATION: 97 % | HEART RATE: 61 BPM | DIASTOLIC BLOOD PRESSURE: 62 MMHG | WEIGHT: 166.88 LBS

## 2023-01-23 DIAGNOSIS — I25.10 CORONARY ARTERY DISEASE INVOLVING NATIVE CORONARY ARTERY OF NATIVE HEART WITHOUT ANGINA PECTORIS: ICD-10-CM

## 2023-01-23 DIAGNOSIS — E11.42 TYPE 2 DIABETES MELLITUS WITH DIABETIC POLYNEUROPATHY, WITHOUT LONG-TERM CURRENT USE OF INSULIN: ICD-10-CM

## 2023-01-23 DIAGNOSIS — I50.32 CHRONIC DIASTOLIC HEART FAILURE: Primary | ICD-10-CM

## 2023-01-23 DIAGNOSIS — E11.59 HYPERTENSION ASSOCIATED WITH TYPE 2 DIABETES MELLITUS: ICD-10-CM

## 2023-01-23 DIAGNOSIS — R60.0 EDEMA OF LEFT LOWER EXTREMITY: ICD-10-CM

## 2023-01-23 DIAGNOSIS — E78.5 HYPERLIPIDEMIA ASSOCIATED WITH TYPE 2 DIABETES MELLITUS: ICD-10-CM

## 2023-01-23 DIAGNOSIS — N25.81 SECONDARY HYPERPARATHYROIDISM OF RENAL ORIGIN: ICD-10-CM

## 2023-01-23 DIAGNOSIS — E11.69 HYPERLIPIDEMIA ASSOCIATED WITH TYPE 2 DIABETES MELLITUS: ICD-10-CM

## 2023-01-23 DIAGNOSIS — I15.2 HYPERTENSION ASSOCIATED WITH TYPE 2 DIABETES MELLITUS: ICD-10-CM

## 2023-01-23 DIAGNOSIS — K21.9 GASTROESOPHAGEAL REFLUX DISEASE, UNSPECIFIED WHETHER ESOPHAGITIS PRESENT: ICD-10-CM

## 2023-01-23 DIAGNOSIS — I73.9 PAD (PERIPHERAL ARTERY DISEASE): ICD-10-CM

## 2023-01-23 DIAGNOSIS — N18.4 STAGE 4 CHRONIC KIDNEY DISEASE: ICD-10-CM

## 2023-01-23 PROCEDURE — 1126F PR PAIN SEVERITY QUANTIFIED, NO PAIN PRESENT: ICD-10-PCS | Mod: CPTII,S$GLB,,

## 2023-01-23 PROCEDURE — 1111F PR DISCHARGE MEDS RECONCILED W/ CURRENT OUTPATIENT MED LIST: ICD-10-PCS | Mod: CPTII,S$GLB,,

## 2023-01-23 PROCEDURE — 1159F MED LIST DOCD IN RCRD: CPT | Mod: CPTII,S$GLB,,

## 2023-01-23 PROCEDURE — 1126F AMNT PAIN NOTED NONE PRSNT: CPT | Mod: CPTII,S$GLB,,

## 2023-01-23 PROCEDURE — 1101F PR PT FALLS ASSESS DOC 0-1 FALLS W/OUT INJ PAST YR: ICD-10-PCS | Mod: CPTII,S$GLB,,

## 2023-01-23 PROCEDURE — 1101F PT FALLS ASSESS-DOCD LE1/YR: CPT | Mod: CPTII,S$GLB,,

## 2023-01-23 PROCEDURE — 99214 PR OFFICE/OUTPT VISIT, EST, LEVL IV, 30-39 MIN: ICD-10-PCS | Mod: S$GLB,,,

## 2023-01-23 PROCEDURE — 3288F PR FALLS RISK ASSESSMENT DOCUMENTED: ICD-10-PCS | Mod: CPTII,S$GLB,,

## 2023-01-23 PROCEDURE — 1160F PR REVIEW ALL MEDS BY PRESCRIBER/CLIN PHARMACIST DOCUMENTED: ICD-10-PCS | Mod: CPTII,S$GLB,,

## 2023-01-23 PROCEDURE — 1111F DSCHRG MED/CURRENT MED MERGE: CPT | Mod: CPTII,S$GLB,,

## 2023-01-23 PROCEDURE — 1159F PR MEDICATION LIST DOCUMENTED IN MEDICAL RECORD: ICD-10-PCS | Mod: CPTII,S$GLB,,

## 2023-01-23 PROCEDURE — 99999 PR PBB SHADOW E&M-EST. PATIENT-LVL IV: CPT | Mod: PBBFAC,,,

## 2023-01-23 PROCEDURE — 1160F RVW MEDS BY RX/DR IN RCRD: CPT | Mod: CPTII,S$GLB,,

## 2023-01-23 PROCEDURE — 3288F FALL RISK ASSESSMENT DOCD: CPT | Mod: CPTII,S$GLB,,

## 2023-01-23 PROCEDURE — 99999 PR PBB SHADOW E&M-EST. PATIENT-LVL IV: ICD-10-PCS | Mod: PBBFAC,,,

## 2023-01-23 PROCEDURE — 99214 OFFICE O/P EST MOD 30 MIN: CPT | Mod: S$GLB,,,

## 2023-01-23 RX ORDER — FUROSEMIDE 20 MG/1
20 TABLET ORAL DAILY
Qty: 30 TABLET | Refills: 2 | Status: SHIPPED | OUTPATIENT
Start: 2023-01-23 | End: 2023-04-24 | Stop reason: SDUPTHER

## 2023-01-23 NOTE — PROGRESS NOTES
Subjective:    Patient ID:  Petra Zazueta is a 87 y.o. female who presents for follow-up of Hospital Follow Up (Pt was in ED on 1/16/2023 for chest pain)      PCP/Referring: Polly Greer,      HPI: Pt is a 88 yo F w/ PMH of CAD s/p PCI to mRCA w/ RAUL 2011, DM2 w/ hgba1c 5.8%, HTN, HLD, and PAD s/p stenting of LLE 2016 who presents for f/u of CAD s/p hospitailzation.  She was last seen 10/4/2021 and mentioned that she was doing well and continued on medical therapy.  She was hospitalized 6/6/2022-6/8/2022 for cp and mildly elevated trop 2/2 demand ischemia had an angiogram which noted nonobstructive CAD and was discharged home and she noted continued episodes of cp a/w eating however was not referred to GI and was setup with cardiology f/u.  She mentions that she has not had any further episodes of cp as she has changed her diet.  She cotninues to have chronic LLE edema which has been present x4+ years following her stenting of her LLE and tried compression stockings in the past but had difficultly taking them on and off.  She denies continued astorga however has not been as active due to the weather.  She denies cp, orthopnea, PND, presyncope, LOC, and claudication.  She notes compliance w/ meds and denies side effects.  She does not exercise regularly however is active w/ cleaning up her house and yard and denies limitations.  She monitors her BP at home and has been running 130-150s/60-90.       Interim Hx: 1/23/23: Patient presents today for f/u appt post hospital admission  She presents with her son who is active in her care. She was last seen by Dr. Johns on 6/13/22 and was continued on medical therapy. She was recently admitted 1/16/23-1/18/23 w c/o SOB. BP elevated w SBP >200 in ED.  Chest x-ray with bilateral infiltrates suggestive of CHF. D-dimer was elevated but CT angiogram of the chest with no evidence of pulmonary embolism. Echocardiogram shows preserved ejection fraction of 75% and grade 2 diastolic  dysfunction. She received IV furosemide w diuresis until euvolemic and transitioned to oral furosemide.   She was discharged home on medical therapy w new meds: furosemide 20 mg and losartan 100 mg w Cardiology f/u. She reports doing well since discharge. She reports some dizziness at times but denies syncope. She denies cp, orthopnea, PND, LOC, and claudication. She notes compliance w/ meds and denies side effects. She reports dietary non-compliance with low salt diet.  She does not exercise regularly however is active w/ cleaning up her house and yard and denies limitations. She wants to get back driving.  She monitors her BP at home and has been running 140-150s/60-90.       Past Medical History:   Diagnosis Date    Acute on chronic diastolic congestive heart failure 2023    Anemia     Arthritis     Coronary artery disease     Diabetes mellitus     Diabetes mellitus type II     Diabetic retinopathy 2019    Dyslipidemia     Hypertension     Insomnia     PAD (peripheral artery disease)      Past Surgical History:   Procedure Laterality Date    angiagram  2018    CORONARY ANGIOGRAPHY N/A 2022    Procedure: ANGIOGRAM, CORONARY ARTERY;  Surgeon: Kiet Vega MD;  Location: [facility] CATH LAB/EP;  Service: Cardiology;  Laterality: N/A;    CORONARY ANGIOPLASTY      RCA 2011 EJ    GALLBLADDER SURGERY      HYSTERECTOMY      LEFT HEART CATHETERIZATION Left 2022    Procedure: Left heart cath;  Surgeon: Kiet Vega MD;  Location: [facility] CATH LAB/EP;  Service: Cardiology;  Laterality: Left;    PERIPHERAL ARTERIAL STENT GRAFT      Left lower extremity RCA      Social History     Socioeconomic History    Marital status:     Number of children: 2   Tobacco Use    Smoking status: Former     Types: Cigarettes     Quit date: 2005     Years since quittin.0    Smokeless tobacco: Never   Substance and Sexual Activity    Alcohol use: No    Drug use: No    Sexual activity: Not Currently    Social History Narrative    Lives alone in [place]. Has 2 sons. Darell Concepcion lives in [place] and cares for her. He is mPOA. Other son lives in [place]. Quit drinking years ago. Gardens.      Family History   Problem Relation Age of Onset    Heart attack Mother        Review of patient's allergies indicates:   Allergen Reactions    Codeine Other (See Comments)     Jittery, lightheadedness, nausea  Other reaction(s): NAUSEA       Medication List with Changes/Refills   Current Medications    ACETAMINOPHEN (TYLENOL) 325 MG TABLET    Take 325 mg by mouth as needed for Pain.    ASPIRIN (ECOTRIN) 81 MG EC TABLET    Take 1 tablet (81 mg total) by mouth once daily.    ATORVASTATIN (LIPITOR) 80 MG TABLET    Take 1 tablet (80 mg total) by mouth every evening.    BLOOD SUGAR DIAGNOSTIC (TRUE METRIX GLUCOSE TEST STRIP) STRP    1 strip by Misc.(Non-Drug; Combo Route) route 2 (two) times daily.    BLOOD-GLUCOSE METER (TRUE METRIX GLUCOSE METER) MISC    Test blood sugars twice daily    CARVEDILOL (COREG) 25 MG TABLET    TAKE 1 TABLET BY MOUTH TWICE A DAY WITH FOOD    CLOPIDOGREL (PLAVIX) 75 MG TABLET    Take 1 tablet (75 mg total) by mouth once daily.    FERROUS SULFATE 324 MG (65 MG IRON) TBEC    TAKE 1 TABLET (324 MG TOTAL) BY MOUTH ONCE DAILY.    FUROSEMIDE (LASIX) 20 MG TABLET    Take 1 tablet (20 mg total) by mouth once daily.    GABAPENTIN (NEURONTIN) 300 MG CAPSULE    TAKE 1 CAPSULE BY MOUTH EVERY EVENING    HYDRALAZINE (APRESOLINE) 10 MG TABLET    Take 10 mg by mouth every 8 (eight) hours.    LANCETS (BD ULTRA FINE LANCETS) 33 GAUGE MISC    1 lancet by Misc.(Non-Drug; Combo Route) route 2 (two) times daily.    LATANOPROST 0.005 % OPHTHALMIC SOLUTION    Place 1 drop into both eyes every evening.    LOSARTAN (COZAAR) 100 MG TABLET    TAKE 1 TABLET BY MOUTH EVERY DAY    NIFEDIPINE (ADALAT CC) 30 MG TBSR    Take 1 tablet (30 mg total) by mouth once daily.    PANTOPRAZOLE (PROTONIX) 40 MG TABLET    Take 1 tablet (40 mg total) by  "mouth once daily.    POLYETHYLENE GLYCOL (GLYCOLAX) 17 GRAM PWPK    Take 17 g by mouth once daily. for 10 days    TRADJENTA 5 MG TAB TABLET    TAKE 1 TABLET BY MOUTH EVERY DAY       Review of Systems   Constitutional: Negative for diaphoresis and fever.   HENT:  Negative for congestion and hearing loss.    Eyes:  Negative for blurred vision and pain.   Cardiovascular:  Positive for leg swelling. Negative for chest pain, claudication, dyspnea on exertion, near-syncope, palpitations and syncope.   Respiratory:  Negative for shortness of breath and sleep disturbances due to breathing.    Hematologic/Lymphatic: Negative for bleeding problem. Does not bruise/bleed easily.   Skin:  Negative for color change and poor wound healing.   Gastrointestinal:  Negative for abdominal pain and nausea.   Genitourinary:  Negative for bladder incontinence and flank pain.   Neurological:  Negative for focal weakness and light-headedness.      Objective:   BP (!) 140/62 (BP Location: Left arm, Patient Position: Sitting, BP Method: Large (Manual))   Pulse 61   Ht 5' 2" (1.575 m)   Wt 75.7 kg (166 lb 14.4 oz)   LMP  (LMP Unknown)   SpO2 97%   BMI 30.53 kg/m²    Physical Exam  Constitutional:       Appearance: She is well-developed. She is not diaphoretic.   HENT:      Head: Normocephalic and atraumatic.   Eyes:      General: No scleral icterus.     Pupils: Pupils are equal, round, and reactive to light.   Neck:      Vascular: No JVD.   Cardiovascular:      Rate and Rhythm: Normal rate and regular rhythm.      Pulses: Intact distal pulses.           Radial pulses are 2+ on the right side and 2+ on the left side.        Dorsalis pedis pulses are 2+ on the right side and 2+ on the left side.        Posterior tibial pulses are 2+ on the right side and 2+ on the left side.      Heart sounds: S1 normal and S2 normal. Murmur heard.     No friction rub. No gallop.      Comments: Systolic murmur  Chronic LLE edema   Pulmonary:      Effort: " Pulmonary effort is normal. No respiratory distress.      Breath sounds: Normal breath sounds. No wheezing or rales.   Chest:      Chest wall: No tenderness.   Abdominal:      General: Bowel sounds are normal. There is no distension.      Palpations: Abdomen is soft. There is no mass.      Tenderness: There is no abdominal tenderness. There is no rebound.   Musculoskeletal:         General: No tenderness. Normal range of motion.      Cervical back: Normal range of motion and neck supple.      Left lower leg: Edema present.      Comments: LLE chronic nonpitting edema   Skin:     General: Skin is warm and dry.      Coloration: Skin is not pale.   Neurological:      Mental Status: She is alert and oriented to person, place, and time.      Coordination: Coordination normal.      Deep Tendon Reflexes: Reflexes normal.   Psychiatric:         Behavior: Behavior normal.         Judgment: Judgment normal.      TTE 23:   Summary    The left ventricle is normal in size with concentric hypertrophy and hyperdynamic systolic function.  The estimated ejection fraction is 75%.  Grade II left ventricular diastolic dysfunction.  Normal right ventricular size with normal right ventricular systolic function.  Mild to moderate left atrial enlargement.  Mild mitral regurgitation.  Mild tricuspid regurgitation.  Mild pulmonic regurgitation.  Mild right atrial enlargement.  Small anterior pericardial effusion. Effusion is fluid.  Normal central venous pressure (3 mmHg).  The estimated PA systolic pressure is 21 mmHg.  EKG 23 reviewed :   Normal sinus rhythm with sinus arrhythmia,T wave abnormality, consider inferolateral ischemia      EC2020- reviewed.  NSR, T wave abnormality w/ possible inferolateral ischemia.      ETT: 12/15/2020- reviewed.    Conclusion         The EKG portion of this study is negative for ischemia.    The patient reported no chest pain during the stress test.    There were no arrhythmias during  stress.    The exercise capacity was moderately impaired.    ECG Stress Nuclear portion of this study will be reported separately.   FINDINGS:  There is appropriate wall motion.  There is no significant fixed or reversible perfusion defect.  The stress and rest end-diastolic volumes measure 83 and 81 mL respectively.  The stress and rest end systolic findings measure 14 and 17 mL respectively.  The stress and rest ejection fraction measure 83 and 79% respectively.     Impression:     No acute findings.      [facility]: 6/7/2022- reviewed.   Summary       The pre-procedure left ventricular end diastolic pressure was 16.  The estimated blood loss was none.  There was non-obstructive coronary artery disease..       Assessment:       1. Coronary artery disease involving native coronary artery of native heart without angina pectoris    2. Hyperlipidemia associated with type 2 diabetes mellitus    3. PAD (peripheral artery disease)    4. Stage 4 chronic kidney disease    5. Hypertension associated with type 2 diabetes mellitus    6. Type 2 diabetes mellitus with diabetic polyneuropathy, without long-term current use of insulin    7. Secondary hyperparathyroidism of renal origin    8. Chronic diastolic heart failure    9. Gastroesophageal reflux disease, unspecified whether esophagitis present    10. Edema of left lower extremity           Plan:         Coronary artery disease involving native coronary artery of native heart without angina pectoris  CCS 0.  Pt compliant w/ meds.  S/p RAUL to RCA.   - negative ETT MPI for ischemia and unchanged coronary angiogram  - continue medical therapy  - encouraged risk factor and lifestyle modifications     Hyperlipidemia associated with type 2 diabetes mellitus  Controlled.  Goal LDL < 70, last 55 1/2022.  Compliant w/ meds.    - continue medical therapy  - encouraged risk factor and lifestyle modifications     PAD (peripheral artery disease)  Pt denies claudication.  Was previously  followed by outside cardiologist.  S/p stenting of LLE.  - continue medical therapy  - encouraged risk factor and lifestyle modifications     Stage 4 chronic kidney disease  Followed by renal.      Hypertension associated with type 2 diabetes mellitus  Controlled.  Goal BP < 140/90.  Pt compliant w/ meds.    - continue medical therapy  - pt to monitor BP at home and record  - encouraged risk factor and lifestyle modifications     Type 2 diabetes mellitus with diabetic polyneuropathy, without long-term current use of insulin  Followed by PCP.    -continue medical therapy    Secondary hyperparathyroidism of renal origin  Followed by renal.    -continue medical therapy     Chronic diastolic heart failure  -Recent hospital admission    -TTE 1/17/23: LVEF 75% w Grade II LV diastolic dysfunction   -Continue medical therapy- furosemide  20 mg, carvedilol 25 mg, losartan 100 mg , hydralazine 10 mg  -weight self daily - notify if > 3 pound gain in 1 day or 5 pound gain in 1 week   -discussed lifestyle modifications: low salt diet, exercise, weight loss     Gastroesophageal reflux disease  -Followed by PCP  -continue medical therapy     Edema of left lower extremity  Pt notes chronic following PTA of her LLE.  Pt was unable make her last appt for compression stockings and will reschedule soon.    - thigh high compression stockings      Total duration of face to face visit time 30 minutes.  Total time spent counseling greater than fifty percent of total visit time.  Counseling included discussion regarding imaging findings, diagnosis, possibilities, treatment options, risks and benefits.  The patient had many questions regarding the options and long-term effects      Lenin Akhtar NP  Cardiology                 HPI

## 2023-01-23 NOTE — ASSESSMENT & PLAN NOTE
-Recent hospital admission    -TTE 1/17/23: LVEF 75% w Grade II LV diastolic dysfunction   -Continue medical therapy- furosemide  20 mg, carvedilol 25 mg, losartan 100 mg , hydralazine 10 mg  -weight self daily - notify if > 3 pound gain in 1 day or 5 pound gain in 1 week   -discussed lifestyle modifications: low salt diet, exercise, weight loss

## 2023-01-23 NOTE — ASSESSMENT & PLAN NOTE
Pt notes chronic following PTA of her LLE.  Pt was unable make her last appt for compression stockings and will reschedule soon.    - thigh high compression stockings

## 2023-01-24 ENCOUNTER — PATIENT MESSAGE (OUTPATIENT)
Dept: CARDIOLOGY | Facility: CLINIC | Age: 88
End: 2023-01-24
Payer: MEDICARE

## 2023-01-25 ENCOUNTER — OFFICE VISIT (OUTPATIENT)
Dept: FAMILY MEDICINE | Facility: CLINIC | Age: 88
End: 2023-01-25
Payer: MEDICARE

## 2023-01-25 VITALS
TEMPERATURE: 98 F | OXYGEN SATURATION: 98 % | DIASTOLIC BLOOD PRESSURE: 64 MMHG | HEIGHT: 62 IN | SYSTOLIC BLOOD PRESSURE: 122 MMHG | BODY MASS INDEX: 30.51 KG/M2 | HEART RATE: 57 BPM | WEIGHT: 165.81 LBS

## 2023-01-25 DIAGNOSIS — I50.32 CHRONIC DIASTOLIC HEART FAILURE: ICD-10-CM

## 2023-01-25 DIAGNOSIS — N18.32 TYPE 2 DIABETES MELLITUS WITH STAGE 3B CHRONIC KIDNEY DISEASE, WITHOUT LONG-TERM CURRENT USE OF INSULIN: ICD-10-CM

## 2023-01-25 DIAGNOSIS — E78.5 HYPERLIPIDEMIA ASSOCIATED WITH TYPE 2 DIABETES MELLITUS: ICD-10-CM

## 2023-01-25 DIAGNOSIS — Z09 HOSPITAL DISCHARGE FOLLOW-UP: Primary | ICD-10-CM

## 2023-01-25 DIAGNOSIS — I73.9 PAD (PERIPHERAL ARTERY DISEASE): ICD-10-CM

## 2023-01-25 DIAGNOSIS — E11.59 HYPERTENSION ASSOCIATED WITH TYPE 2 DIABETES MELLITUS: ICD-10-CM

## 2023-01-25 DIAGNOSIS — E66.09 CLASS 1 OBESITY DUE TO EXCESS CALORIES WITH SERIOUS COMORBIDITY AND BODY MASS INDEX (BMI) OF 31.0 TO 31.9 IN ADULT: ICD-10-CM

## 2023-01-25 DIAGNOSIS — E11.22 TYPE 2 DIABETES MELLITUS WITH STAGE 3B CHRONIC KIDNEY DISEASE, WITHOUT LONG-TERM CURRENT USE OF INSULIN: ICD-10-CM

## 2023-01-25 DIAGNOSIS — I25.10 CORONARY ARTERY DISEASE INVOLVING NATIVE CORONARY ARTERY OF NATIVE HEART WITHOUT ANGINA PECTORIS: ICD-10-CM

## 2023-01-25 DIAGNOSIS — E11.69 HYPERLIPIDEMIA ASSOCIATED WITH TYPE 2 DIABETES MELLITUS: ICD-10-CM

## 2023-01-25 DIAGNOSIS — N18.4 STAGE 4 CHRONIC KIDNEY DISEASE: ICD-10-CM

## 2023-01-25 DIAGNOSIS — I15.2 HYPERTENSION ASSOCIATED WITH TYPE 2 DIABETES MELLITUS: ICD-10-CM

## 2023-01-25 PROCEDURE — 99214 OFFICE O/P EST MOD 30 MIN: CPT | Mod: S$GLB,,, | Performed by: FAMILY MEDICINE

## 2023-01-25 PROCEDURE — 99214 PR OFFICE/OUTPT VISIT, EST, LEVL IV, 30-39 MIN: ICD-10-PCS | Mod: S$GLB,,, | Performed by: FAMILY MEDICINE

## 2023-01-25 PROCEDURE — 1101F PT FALLS ASSESS-DOCD LE1/YR: CPT | Mod: CPTII,S$GLB,, | Performed by: FAMILY MEDICINE

## 2023-01-25 PROCEDURE — 1126F PR PAIN SEVERITY QUANTIFIED, NO PAIN PRESENT: ICD-10-PCS | Mod: CPTII,S$GLB,, | Performed by: FAMILY MEDICINE

## 2023-01-25 PROCEDURE — 1101F PR PT FALLS ASSESS DOC 0-1 FALLS W/OUT INJ PAST YR: ICD-10-PCS | Mod: CPTII,S$GLB,, | Performed by: FAMILY MEDICINE

## 2023-01-25 PROCEDURE — 1111F DSCHRG MED/CURRENT MED MERGE: CPT | Mod: CPTII,S$GLB,, | Performed by: FAMILY MEDICINE

## 2023-01-25 PROCEDURE — 1126F AMNT PAIN NOTED NONE PRSNT: CPT | Mod: CPTII,S$GLB,, | Performed by: FAMILY MEDICINE

## 2023-01-25 PROCEDURE — 1111F PR DISCHARGE MEDS RECONCILED W/ CURRENT OUTPATIENT MED LIST: ICD-10-PCS | Mod: CPTII,S$GLB,, | Performed by: FAMILY MEDICINE

## 2023-01-25 PROCEDURE — 3288F PR FALLS RISK ASSESSMENT DOCUMENTED: ICD-10-PCS | Mod: CPTII,S$GLB,, | Performed by: FAMILY MEDICINE

## 2023-01-25 PROCEDURE — 99999 PR PBB SHADOW E&M-EST. PATIENT-LVL IV: ICD-10-PCS | Mod: PBBFAC,,, | Performed by: FAMILY MEDICINE

## 2023-01-25 PROCEDURE — 99999 PR PBB SHADOW E&M-EST. PATIENT-LVL IV: CPT | Mod: PBBFAC,,, | Performed by: FAMILY MEDICINE

## 2023-01-25 PROCEDURE — 1159F PR MEDICATION LIST DOCUMENTED IN MEDICAL RECORD: ICD-10-PCS | Mod: CPTII,S$GLB,, | Performed by: FAMILY MEDICINE

## 2023-01-25 PROCEDURE — 1159F MED LIST DOCD IN RCRD: CPT | Mod: CPTII,S$GLB,, | Performed by: FAMILY MEDICINE

## 2023-01-25 PROCEDURE — 3288F FALL RISK ASSESSMENT DOCD: CPT | Mod: CPTII,S$GLB,, | Performed by: FAMILY MEDICINE

## 2023-01-26 ENCOUNTER — PATIENT MESSAGE (OUTPATIENT)
Dept: CARDIOLOGY | Facility: CLINIC | Age: 88
End: 2023-01-26
Payer: MEDICARE

## 2023-01-27 NOTE — PROGRESS NOTES
(Portions of this note were dictated using voice recognition software and may contain dictation related errors in spelling/grammar/syntax not found on text review)    CC:   Chief Complaint   Patient presents with    Hospital Follow Up     CHF       HPI: 87 y.o. female, pt of Dr Greer,  presented for hospital discharge follow up visit accompanied with son. She has medical hx significant for chronic diastolic CHF, CAD, PAD, type 2 diabetes mellitus, HTN, dyslipidemia, diabetic retinopathy. She was recently admitted in hospital with exertional dyspnea of 3 days duration and LE edema. On initial presentation to ED SBP > 200. Work up was done, CXR shows bilateral infiltrates suggestive of CHF, d dimer was elevated but CT angiogram negative for pulmonary embolism. Pt was given IV lasix which improved her symptoms. She was admitted on telemetry, was started on aggressive diuresis with 40 mg lasix bid, she diuresed well with net fluid balance of - 2.2 L in 24 hours. Pt breathing improved and oxygen was weaned off as tolerated, respiratory therapy showed minimal desaturation of 94 % with exertion at room air. Cr was mild elevated and dose of lasix was reduced lasix 20 mg. Echocardiogram was done which showed EF of 75 % with grade 2 diastolic dysfunction. She was discharged in stable condition. Pt had out patient cardiology follow up on 1/23 (Lenin brooke NP). Pt has been doing ok, reports occasional morning dizziness which is getting better, she is currently taking hydralazine 100 mg tid, coreg 25 mg, losartan 100 mg, nifedipine 30 mg, cardiology advised to monitor BP at home and in case of lower readings will decrease the dose of hydralazine. She denies having any other symptoms or concerns.     Past Medical History:   Diagnosis Date    Acute on chronic diastolic congestive heart failure 1/16/2023    Anemia     Arthritis     Coronary artery disease     Diabetes mellitus     Diabetes mellitus type II     Diabetic retinopathy  2019    Dyslipidemia     Hypertension     Insomnia     PAD (peripheral artery disease)        Past Surgical History:   Procedure Laterality Date    angiagram  2018    CORONARY ANGIOGRAPHY N/A 2022    Procedure: ANGIOGRAM, CORONARY ARTERY;  Surgeon: Kiet Vega MD;  Location: [facility] CATH LAB/EP;  Service: Cardiology;  Laterality: N/A;    CORONARY ANGIOPLASTY      RCA 2011 EJ    GALLBLADDER SURGERY      HYSTERECTOMY      LEFT HEART CATHETERIZATION Left 2022    Procedure: Left heart cath;  Surgeon: Kiet Vega MD;  Location: [facility] CATH LAB/EP;  Service: Cardiology;  Laterality: Left;    PERIPHERAL ARTERIAL STENT GRAFT      Left lower extremity RCA        Family History   Problem Relation Age of Onset    Heart attack Mother        Social History     Tobacco Use    Smoking status: Former     Types: Cigarettes     Quit date: 2005     Years since quittin.0    Smokeless tobacco: Never   Substance Use Topics    Alcohol use: No    Drug use: No       Lab Results   Component Value Date    WBC 6.85 2023    HGB 11.5 (L) 2023    HCT 35.7 (L) 2023     (H) 2023     2023    CHOL 124 2022    TRIG 84 2022    HDL 53 2022    ALT 24 2023    AST 34 2023    BILITOT 0.7 2023    ALKPHOS 67 2023     2023    K 4.0 2023     2023    CREATININE 1.74 (H) 2023    ESTGFRAFRICA 39 (A) 2022    EGFRNONAA 34 (A) 2022    CALCIUM 9.3 2023    ALBUMIN 4.1 2023    BUN 29 (H) 2023    CO2 27 2023    TSH 0.732 2018    INR 1.1 2022    LABA1C 7.2 (H) 2018    HGBA1C 5.5 2022    MICROALBUR 5.8 2021    MICALBCREAT 69 (H) 2021    LDLCALC 55 2022     2023    [id number] 37 2019             Vital signs reviewed  PE:   APPEARANCE: Well nourished, well developed, in no acute distress.    HEAD: Normocephalic,  atraumatic.  EYES: EOMI.  Conjunctivae noninjected.  NOSE: Mucosa pink. Airway clear.  MOUTH & THROAT: No tonsillar enlargement. No pharyngeal erythema or exudate.   NECK: Supple with no cervical lymphadenopathy.    CHEST: Good inspiratory effort. Lungs clear to auscultation with no wheezes or crackles.  CARDIOVASCULAR: Normal S1, S2. No rubs, systolic murmur present  ABDOMEN: Bowel sounds normal. Not distended. Soft. No tenderness or masses. No organomegaly.  EXTREMITIES: chronic LLE edema, no cyanosis, or clubbing.    Review of Systems   Constitutional:  Negative for chills, fatigue and fever.   HENT:  Negative for nasal congestion, ear discharge, ear pain, rhinorrhea, sinus pressure/congestion, sore throat, tinnitus, trouble swallowing and voice change.    Respiratory:  Negative for cough, shortness of breath and wheezing.    Cardiovascular:  Positive for leg swelling. Negative for chest pain and palpitations.   Gastrointestinal: Negative.    Genitourinary: Negative.    Musculoskeletal: Negative.    Neurological: Negative.    Psychiatric/Behavioral: Negative.     All other systems reviewed and are negative.    IMPRESSION  1. Hospital discharge follow-up    2. Type 2 diabetes mellitus with stage 3b chronic kidney disease, without long-term current use of insulin    3. Hypertension associated with type 2 diabetes mellitus    4. Class 1 obesity due to excess calories with serious comorbidity and body mass index (BMI) of 31.0 to 31.9 in adult    5. Stage 4 chronic kidney disease    6. PAD (peripheral artery disease)    7. Coronary artery disease involving native coronary artery of native heart without angina pectoris    8. Hyperlipidemia associated with type 2 diabetes mellitus    9. Chronic diastolic heart failure            PLAN      1. Hospital discharge follow-up      2. Type 2 diabetes mellitus with stage 3b chronic kidney disease, without long-term current use of insulin    Hba1c 5.5 (11/2022)    Continue  tradjenta 5 mg daily        3. Hypertension associated with type 2 diabetes mellitus  initial bp low, repeat 122/64    Advised to monitor bp at home  If runs low inform cardiology to reduce dose of hydralazine  Continue current meds for now      4. Class 1 obesity due to excess calories with serious comorbidity and body mass index (BMI) of 31.0 to 31.9 in adult    BMI 30    Counseling provided on healthy lifestyle, encouraged to do moderate intensity regular exercise 30 minutes every day 5 days a week, to include vegetables and fruits and cut back on saturated fats and carbohydrates.       5. Stage 4 chronic kidney disease    GFR 28    Followed by nephrology ( Dr Morales), most recent visit 1/9/2023    Avoid nephrotoxic agents      6. PAD (peripheral artery disease)    7. Coronary artery disease involving native coronary artery of native heart without angina pectoris    On ASA, plavix, statin and beta blocker      8. Hyperlipidemia associated with type 2 diabetes mellitus    Continue lipitor 80 mg      9. Chronic diastolic heart failure    Echo reviewed- EF 75 % with grade 2 diastolic dysfunction    Continue lasix 20 mg         SCREENINGS      Immunizations:     UTD with covid, flu, zoster and pneumonia vaccines      Age/demographic appropriate health maintenance:    Health Maintenance Due   Topic Date Due    Lipid Panel  01/21/2023    Diabetes Urine Screening  12/08/2022         Transitional Care Note    Family and/or Caretaker present at visit?  Yes, son  Diagnostic tests reviewed/disposition: I have reviewed all diagnostic tests  Disease/illness education: yes  Home health/community services discussion/referrals: pt has no home health  Establishment or re-establishment of referral orders for community resources:   Discussion with other health care providers: not needed         Jayy Juarez

## 2023-02-06 ENCOUNTER — OFFICE VISIT (OUTPATIENT)
Dept: GASTROENTEROLOGY | Facility: CLINIC | Age: 88
End: 2023-02-06
Payer: MEDICARE

## 2023-02-06 VITALS
WEIGHT: 170.13 LBS | BODY MASS INDEX: 31.11 KG/M2 | DIASTOLIC BLOOD PRESSURE: 84 MMHG | SYSTOLIC BLOOD PRESSURE: 162 MMHG

## 2023-02-06 DIAGNOSIS — R13.19 ESOPHAGEAL DYSPHAGIA: Primary | ICD-10-CM

## 2023-02-06 PROCEDURE — 1126F AMNT PAIN NOTED NONE PRSNT: CPT | Mod: CPTII,S$GLB,, | Performed by: INTERNAL MEDICINE

## 2023-02-06 PROCEDURE — 1160F PR REVIEW ALL MEDS BY PRESCRIBER/CLIN PHARMACIST DOCUMENTED: ICD-10-PCS | Mod: CPTII,S$GLB,, | Performed by: INTERNAL MEDICINE

## 2023-02-06 PROCEDURE — 99204 PR OFFICE/OUTPT VISIT, NEW, LEVL IV, 45-59 MIN: ICD-10-PCS | Mod: S$GLB,,, | Performed by: INTERNAL MEDICINE

## 2023-02-06 PROCEDURE — 1111F PR DISCHARGE MEDS RECONCILED W/ CURRENT OUTPATIENT MED LIST: ICD-10-PCS | Mod: CPTII,S$GLB,, | Performed by: INTERNAL MEDICINE

## 2023-02-06 PROCEDURE — 99999 PR PBB SHADOW E&M-EST. PATIENT-LVL V: ICD-10-PCS | Mod: PBBFAC,,, | Performed by: INTERNAL MEDICINE

## 2023-02-06 PROCEDURE — 1159F PR MEDICATION LIST DOCUMENTED IN MEDICAL RECORD: ICD-10-PCS | Mod: CPTII,S$GLB,, | Performed by: INTERNAL MEDICINE

## 2023-02-06 PROCEDURE — 99999 PR PBB SHADOW E&M-EST. PATIENT-LVL V: CPT | Mod: PBBFAC,,, | Performed by: INTERNAL MEDICINE

## 2023-02-06 PROCEDURE — 3288F PR FALLS RISK ASSESSMENT DOCUMENTED: ICD-10-PCS | Mod: CPTII,S$GLB,, | Performed by: INTERNAL MEDICINE

## 2023-02-06 PROCEDURE — 1159F MED LIST DOCD IN RCRD: CPT | Mod: CPTII,S$GLB,, | Performed by: INTERNAL MEDICINE

## 2023-02-06 PROCEDURE — 1101F PR PT FALLS ASSESS DOC 0-1 FALLS W/OUT INJ PAST YR: ICD-10-PCS | Mod: CPTII,S$GLB,, | Performed by: INTERNAL MEDICINE

## 2023-02-06 PROCEDURE — 99204 OFFICE O/P NEW MOD 45 MIN: CPT | Mod: S$GLB,,, | Performed by: INTERNAL MEDICINE

## 2023-02-06 PROCEDURE — 3288F FALL RISK ASSESSMENT DOCD: CPT | Mod: CPTII,S$GLB,, | Performed by: INTERNAL MEDICINE

## 2023-02-06 PROCEDURE — 1101F PT FALLS ASSESS-DOCD LE1/YR: CPT | Mod: CPTII,S$GLB,, | Performed by: INTERNAL MEDICINE

## 2023-02-06 PROCEDURE — 1160F RVW MEDS BY RX/DR IN RCRD: CPT | Mod: CPTII,S$GLB,, | Performed by: INTERNAL MEDICINE

## 2023-02-06 PROCEDURE — 1126F PR PAIN SEVERITY QUANTIFIED, NO PAIN PRESENT: ICD-10-PCS | Mod: CPTII,S$GLB,, | Performed by: INTERNAL MEDICINE

## 2023-02-06 PROCEDURE — 1111F DSCHRG MED/CURRENT MED MERGE: CPT | Mod: CPTII,S$GLB,, | Performed by: INTERNAL MEDICINE

## 2023-02-06 RX ORDER — OMEPRAZOLE 40 MG/1
40 CAPSULE, DELAYED RELEASE ORAL DAILY
Qty: 90 CAPSULE | Refills: 3 | Status: SHIPPED | OUTPATIENT
Start: 2023-02-06 | End: 2024-01-25

## 2023-02-07 ENCOUNTER — PATIENT MESSAGE (OUTPATIENT)
Dept: CARDIOLOGY | Facility: CLINIC | Age: 88
End: 2023-02-07
Payer: MEDICARE

## 2023-02-07 NOTE — PROGRESS NOTES
Subjective:       Patient ID: Petra Zazueta is a 87 y.o. female.    Chief Complaint: Gastroesophageal Reflux    86 yo F referred for GERD.  She states that symptoms have only been present for a month or so.  She feels like food sticks in her lower chest and she has to drink a lot of water in order to get it to go down.  It does go down, she does not vomit.  She was given Protonix which helped with her symptoms but made her feel dizzy and SOB so she stopped taking it.    Review of Systems   Constitutional:  Negative for chills and fever.   HENT:  Positive for trouble swallowing.    Eyes:  Negative for photophobia and visual disturbance.   Respiratory:  Negative for shortness of breath and wheezing.    Cardiovascular:  Negative for chest pain, palpitations and leg swelling.   Gastrointestinal:  Positive for reflux.   Genitourinary:  Negative for dysuria and hematuria.   Musculoskeletal:  Negative for joint swelling and myalgias.   Integumentary:  Negative for color change and rash.   Neurological:  Negative for dizziness and speech difficulty.   Psychiatric/Behavioral:  Negative for confusion and hallucinations.        Objective:      BP (!) 162/84 (BP Location: Left arm, Patient Position: Sitting, BP Method: Medium (Manual))   Wt 77.2 kg (170 lb 1.6 oz)   LMP  (LMP Unknown)   BMI 31.11 kg/m²     Physical Exam  Constitutional:       Appearance: Normal appearance. She is well-developed. She is not ill-appearing.   HENT:      Head: Normocephalic and atraumatic.   Eyes:      Extraocular Movements: Extraocular movements intact.      Pupils: Pupils are equal, round, and reactive to light.   Pulmonary:      Effort: Pulmonary effort is normal. No respiratory distress.   Abdominal:      General: There is no distension.      Palpations: Abdomen is soft.   Musculoskeletal:         General: No signs of injury. Normal range of motion.      Cervical back: Normal range of motion and neck supple.   Neurological:      General:  No focal deficit present.      Mental Status: She is alert and oriented to person, place, and time.   Psychiatric:         Mood and Affect: Mood normal.         Behavior: Behavior normal.         Thought Content: Thought content normal.         Judgment: Judgment normal.       Lab Results   Component Value Date    WBC 6.85 01/18/2023    HGB 11.5 (L) 01/18/2023    HCT 35.7 (L) 01/18/2023     (H) 01/18/2023     01/18/2023     CTA was independently visualized and reviewed by me and showed mild pulmonary edema, atherosclerosis, no esophageal issues.    Assessment:       Problem List Items Addressed This Visit          GI    Gastroesophageal reflux disease - Primary    Relevant Medications    omeprazole (PRILOSEC) 40 MG capsule         Plan:       Esophageal dysphagia  -     omeprazole (PRILOSEC) 40 MG capsule; Take 1 capsule (40 mg total) by mouth once daily.  Dispense: 90 capsule; Refill: 3  -     [place] Upper GI; Future; Expected date: 02/06/2023  -     She looks good clinically but has heart and pulmonary issues therefore we will not plan for EGD unless UGI series shows something concerning or in need of EGD

## 2023-02-14 ENCOUNTER — HOSPITAL ENCOUNTER (OUTPATIENT)
Dept: RADIOLOGY | Facility: HOSPITAL | Age: 88
Discharge: HOME | End: 2023-02-14
Attending: INTERNAL MEDICINE
Payer: MEDICARE

## 2023-02-14 DIAGNOSIS — R13.19 ESOPHAGEAL DYSPHAGIA: ICD-10-CM

## 2023-02-14 PROCEDURE — 74240 FL UPPER GI: ICD-10-PCS | Mod: 26,,, | Performed by: RADIOLOGY

## 2023-02-14 PROCEDURE — 25500020 PHARM REV CODE 255: Performed by: INTERNAL MEDICINE

## 2023-02-14 PROCEDURE — 74240 X-RAY XM UPR GI TRC 1CNTRST: CPT | Mod: 26,,, | Performed by: RADIOLOGY

## 2023-02-14 PROCEDURE — 74240 X-RAY XM UPR GI TRC 1CNTRST: CPT | Mod: TC,FY

## 2023-02-14 PROCEDURE — A9698 NON-RAD CONTRAST MATERIALNOC: HCPCS | Performed by: INTERNAL MEDICINE

## 2023-02-14 RX ADMIN — BARIUM SULFATE 355 ML: 0.6 SUSPENSION ORAL at 09:02

## 2023-02-14 RX ADMIN — BARIUM SULFATE 137 ML: 980 POWDER, FOR SUSPENSION ORAL at 09:02

## 2023-04-24 ENCOUNTER — OFFICE VISIT (OUTPATIENT)
Dept: CARDIOLOGY | Facility: CLINIC | Age: 88
End: 2023-04-24
Payer: MEDICARE

## 2023-04-24 VITALS
BODY MASS INDEX: 30.18 KG/M2 | DIASTOLIC BLOOD PRESSURE: 66 MMHG | WEIGHT: 164 LBS | OXYGEN SATURATION: 97 % | SYSTOLIC BLOOD PRESSURE: 140 MMHG | HEART RATE: 60 BPM | HEIGHT: 62 IN

## 2023-04-24 DIAGNOSIS — I25.10 CORONARY ARTERY DISEASE INVOLVING NATIVE CORONARY ARTERY OF NATIVE HEART WITHOUT ANGINA PECTORIS: ICD-10-CM

## 2023-04-24 DIAGNOSIS — E11.69 HYPERLIPIDEMIA ASSOCIATED WITH TYPE 2 DIABETES MELLITUS: ICD-10-CM

## 2023-04-24 DIAGNOSIS — N25.81 SECONDARY HYPERPARATHYROIDISM OF RENAL ORIGIN: ICD-10-CM

## 2023-04-24 DIAGNOSIS — N18.4 STAGE 4 CHRONIC KIDNEY DISEASE: ICD-10-CM

## 2023-04-24 DIAGNOSIS — E11.42 TYPE 2 DIABETES MELLITUS WITH DIABETIC POLYNEUROPATHY, WITHOUT LONG-TERM CURRENT USE OF INSULIN: ICD-10-CM

## 2023-04-24 DIAGNOSIS — E78.5 HYPERLIPIDEMIA ASSOCIATED WITH TYPE 2 DIABETES MELLITUS: ICD-10-CM

## 2023-04-24 DIAGNOSIS — K21.9 GASTROESOPHAGEAL REFLUX DISEASE, UNSPECIFIED WHETHER ESOPHAGITIS PRESENT: ICD-10-CM

## 2023-04-24 DIAGNOSIS — E11.59 HYPERTENSION ASSOCIATED WITH TYPE 2 DIABETES MELLITUS: ICD-10-CM

## 2023-04-24 DIAGNOSIS — I15.2 HYPERTENSION ASSOCIATED WITH TYPE 2 DIABETES MELLITUS: ICD-10-CM

## 2023-04-24 DIAGNOSIS — I50.32 CHRONIC DIASTOLIC HEART FAILURE: ICD-10-CM

## 2023-04-24 DIAGNOSIS — R60.0 EDEMA OF LEFT LOWER EXTREMITY: ICD-10-CM

## 2023-04-24 DIAGNOSIS — I73.9 PAD (PERIPHERAL ARTERY DISEASE): ICD-10-CM

## 2023-04-24 PROCEDURE — 1126F AMNT PAIN NOTED NONE PRSNT: CPT | Mod: CPTII,S$GLB,,

## 2023-04-24 PROCEDURE — 99999 PR PBB SHADOW E&M-EST. PATIENT-LVL III: CPT | Mod: PBBFAC,,,

## 2023-04-24 PROCEDURE — 3288F FALL RISK ASSESSMENT DOCD: CPT | Mod: CPTII,S$GLB,,

## 2023-04-24 PROCEDURE — 99999 PR PBB SHADOW E&M-EST. PATIENT-LVL III: ICD-10-PCS | Mod: PBBFAC,,,

## 2023-04-24 PROCEDURE — 1101F PT FALLS ASSESS-DOCD LE1/YR: CPT | Mod: CPTII,S$GLB,,

## 2023-04-24 PROCEDURE — 1160F PR REVIEW ALL MEDS BY PRESCRIBER/CLIN PHARMACIST DOCUMENTED: ICD-10-PCS | Mod: CPTII,S$GLB,,

## 2023-04-24 PROCEDURE — 99214 OFFICE O/P EST MOD 30 MIN: CPT | Mod: S$GLB,,,

## 2023-04-24 PROCEDURE — 1159F MED LIST DOCD IN RCRD: CPT | Mod: CPTII,S$GLB,,

## 2023-04-24 PROCEDURE — 1160F RVW MEDS BY RX/DR IN RCRD: CPT | Mod: CPTII,S$GLB,,

## 2023-04-24 PROCEDURE — 1101F PR PT FALLS ASSESS DOC 0-1 FALLS W/OUT INJ PAST YR: ICD-10-PCS | Mod: CPTII,S$GLB,,

## 2023-04-24 PROCEDURE — 1159F PR MEDICATION LIST DOCUMENTED IN MEDICAL RECORD: ICD-10-PCS | Mod: CPTII,S$GLB,,

## 2023-04-24 PROCEDURE — 3288F PR FALLS RISK ASSESSMENT DOCUMENTED: ICD-10-PCS | Mod: CPTII,S$GLB,,

## 2023-04-24 PROCEDURE — 1126F PR PAIN SEVERITY QUANTIFIED, NO PAIN PRESENT: ICD-10-PCS | Mod: CPTII,S$GLB,,

## 2023-04-24 PROCEDURE — 99214 PR OFFICE/OUTPT VISIT, EST, LEVL IV, 30-39 MIN: ICD-10-PCS | Mod: S$GLB,,,

## 2023-04-24 RX ORDER — FUROSEMIDE 20 MG/1
20 TABLET ORAL DAILY
Qty: 30 TABLET | Refills: 11 | Status: ON HOLD | OUTPATIENT
Start: 2023-04-24 | End: 2023-07-31 | Stop reason: HOSPADM

## 2023-04-24 RX ORDER — CLOPIDOGREL BISULFATE 75 MG/1
75 TABLET ORAL DAILY
Qty: 30 TABLET | Refills: 11 | Status: SHIPPED | OUTPATIENT
Start: 2023-04-24 | End: 2023-06-22 | Stop reason: SDUPTHER

## 2023-04-24 NOTE — PROGRESS NOTES
Subjective:    Patient ID:  Petra Zazueta is a 88 y.o. female who presents for follow-up of Follow-up (3 month f/u/NO ISSUES)      PCP/Referring: Polly Greer, DO     HPI: Pt is a 86 yo F w/ PMH of CAD s/p PCI to mRCA w/ RAUL 2011, DM2 w/ hgba1c 5.8%, HTN, HLD, and PAD s/p stenting of LLE 2016 who presents for f/u of CAD s/p hospitailzation.  She was last seen 10/4/2021 and mentioned that she was doing well and continued on medical therapy.  She was hospitalized 6/6/2022-6/8/2022 for cp and mildly elevated trop 2/2 demand ischemia had an angiogram which noted nonobstructive CAD and was discharged home and she noted continued episodes of cp a/w eating however was not referred to GI and was setup with cardiology f/u.  She mentions that she has not had any further episodes of cp as she has changed her diet.  She cotninues to have chronic LLE edema which has been present x4+ years following her stenting of her LLE and tried compression stockings in the past but had difficultly taking them on and off.  She denies continued astorga however has not been as active due to the weather.  She denies cp, orthopnea, PND, presyncope, LOC, and claudication.  She notes compliance w/ meds and denies side effects.  She does not exercise regularly however is active w/ cleaning up her house and yard and denies limitations.  She monitors her BP at home and has been running 130-150s/60-90.      1/23/23: Patient presents today for f/u appt post hospital admission  She presents with her son who is active in her care. She was last seen by Dr. Johns on 6/13/22 and was continued on medical therapy. She was recently admitted 1/16/23-1/18/23 w c/o SOB. BP elevated w SBP >200 in ED.  Chest x-ray with bilateral infiltrates suggestive of CHF. D-dimer was elevated but CT angiogram of the chest with no evidence of pulmonary embolism. Echocardiogram shows preserved ejection fraction of 75% and grade 2 diastolic dysfunction. She received IV furosemide w  diuresis until euvolemic and transitioned to oral furosemide.   She was discharged home on medical therapy w new meds: furosemide 20 mg and losartan 100 mg w Cardiology f/u. She reports doing well since discharge. She reports some dizziness at times but denies syncope. She denies cp, orthopnea, PND, LOC, and claudication. She notes compliance w/ meds and denies side effects. She reports dietary non-compliance with low salt diet.  She does not exercise regularly however is active w/ cleaning up her house and yard and denies limitations. She wants to get back driving.  She monitors her BP at home and has been running 140-150s/60-90.     Interim Hx: 4/24/23: Patient presents today for f/u appt. She was last seen on 1/23/23 post admission 2/2 CHF exacerbation and was continued on medical therapy.She reports doing well since last visit. She denies CP, orthopnea, PND, LOC, and claudication. She notes compliance w/ meds and denies side effects. She reports dietary intermittent compliance with low salt diet.  She does not exercise regularly however is active w/ cleaning up her house and yard and denies limitations. She wants to get back driving. She monitors her BP at home and has been running 140-150s/60-80s.       Past Medical History:   Diagnosis Date    Acute on chronic diastolic congestive heart failure 1/16/2023    Anemia     Arthritis     Coronary artery disease     Diabetes mellitus     Diabetes mellitus type II     Diabetic retinopathy 08/22/2019    Dyslipidemia     Hypertension     Insomnia     PAD (peripheral artery disease)      Past Surgical History:   Procedure Laterality Date    angiagram  08/31/2018    CORONARY ANGIOGRAPHY N/A 6/7/2022    Procedure: ANGIOGRAM, CORONARY ARTERY;  Surgeon: Kiet Veag MD;  Location: [facility] CATH LAB/EP;  Service: Cardiology;  Laterality: N/A;    CORONARY ANGIOPLASTY      RCA 4/2011 EJ    GALLBLADDER SURGERY      HYSTERECTOMY      LEFT HEART CATHETERIZATION Left 6/7/2022     Procedure: Left heart cath;  Surgeon: Kiet Vega MD;  Location: [facility] CATH LAB/EP;  Service: Cardiology;  Laterality: Left;    PERIPHERAL ARTERIAL STENT GRAFT      Left lower extremity RCA      Social History     Socioeconomic History    Marital status:     Number of children: 2   Tobacco Use    Smoking status: Former     Types: Cigarettes     Quit date: 2005     Years since quittin.3    Smokeless tobacco: Never   Substance and Sexual Activity    Alcohol use: No    Drug use: No    Sexual activity: Not Currently   Social History Narrative    Lives alone in [place]. Has 2 sons. Darell Concepcion lives in [place] and cares for her. He is mPOA. Other son lives in [place]. Quit drinking years ago. [place].      Family History   Problem Relation Age of Onset    Heart attack Mother        Review of patient's allergies indicates:   Allergen Reactions    Codeine Other (See Comments)     Jittery, lightheadedness, nausea  Other reaction(s): NAUSEA       Medication List with Changes/Refills   Current Medications    ACETAMINOPHEN (TYLENOL) 325 MG TABLET    Take 325 mg by mouth as needed for Pain.    ASPIRIN (ECOTRIN) 81 MG EC TABLET    Take 1 tablet (81 mg total) by mouth once daily.    ATORVASTATIN (LIPITOR) 80 MG TABLET    Take 1 tablet (80 mg total) by mouth every evening.    BLOOD SUGAR DIAGNOSTIC (TRUE METRIX GLUCOSE TEST STRIP) STRP    1 strip by Misc.(Non-Drug; Combo Route) route 2 (two) times daily.    BLOOD-GLUCOSE METER (TRUE METRIX GLUCOSE METER) MISC    Test blood sugars twice daily    CARVEDILOL (COREG) 25 MG TABLET    TAKE 1 TABLET BY MOUTH TWICE A DAY WITH FOOD    CLOPIDOGREL (PLAVIX) 75 MG TABLET    Take 1 tablet (75 mg total) by mouth once daily.    FERROUS SULFATE 324 MG (65 MG IRON) TBEC    TAKE 1 TABLET (324 MG TOTAL) BY MOUTH ONCE DAILY.    FUROSEMIDE (LASIX) 20 MG TABLET    Take 1 tablet (20 mg total) by mouth once daily.    GABAPENTIN (NEURONTIN) 300 MG CAPSULE    TAKE 1 CAPSULE BY MOUTH  "EVERY EVENING    HYDRALAZINE (APRESOLINE) 10 MG TABLET    Take 10 mg by mouth every 8 (eight) hours. Taking 100mg    LANCETS (BD ULTRA FINE LANCETS) 33 GAUGE MISC    1 lancet by Misc.(Non-Drug; Combo Route) route 2 (two) times daily.    LATANOPROST 0.005 % OPHTHALMIC SOLUTION    Place 1 drop into both eyes every evening.    LINAGLIPTIN (TRADJENTA) 5 MG TAB TABLET    Take 1 tablet (5 mg total) by mouth once daily.    LOSARTAN (COZAAR) 100 MG TABLET    TAKE 1 TABLET BY MOUTH EVERY DAY    NIFEDIPINE (ADALAT CC) 60 MG TBSR    Take 1 tablet (60 mg total) by mouth once daily.    OMEPRAZOLE (PRILOSEC) 40 MG CAPSULE    Take 1 capsule (40 mg total) by mouth once daily.    PANTOPRAZOLE (PROTONIX) 40 MG TABLET    TAKE 1 TABLET BY MOUTH EVERY DAY       Review of Systems   Constitutional: Negative for diaphoresis and fever.   HENT:  Negative for congestion and hearing loss.    Eyes:  Negative for blurred vision and pain.   Cardiovascular:  Positive for leg swelling. Negative for chest pain, claudication, dyspnea on exertion, near-syncope, palpitations and syncope.   Respiratory:  Negative for shortness of breath and sleep disturbances due to breathing.    Hematologic/Lymphatic: Negative for bleeding problem. Does not bruise/bleed easily.   Skin:  Negative for color change and poor wound healing.   Gastrointestinal:  Negative for abdominal pain and nausea.   Genitourinary:  Negative for bladder incontinence and flank pain.   Neurological:  Negative for focal weakness and light-headedness.      Objective:   BP (!) 140/66 (BP Location: Left arm, Patient Position: Sitting, BP Method: Small (Manual))   Pulse 60   Ht 5' 2" (1.575 m)   Wt 74.4 kg (164 lb)   LMP  (LMP Unknown)   SpO2 97%   BMI 30.00 kg/m²    Physical Exam  Constitutional:       Appearance: She is well-developed. She is not diaphoretic.   HENT:      Head: Normocephalic and atraumatic.   Eyes:      General: No scleral icterus.     Pupils: Pupils are equal, round, " and reactive to light.   Neck:      Vascular: No JVD.   Cardiovascular:      Rate and Rhythm: Normal rate and regular rhythm.      Pulses: Intact distal pulses.           Radial pulses are 2+ on the right side and 2+ on the left side.        Dorsalis pedis pulses are 2+ on the right side and 2+ on the left side.        Posterior tibial pulses are 2+ on the right side and 2+ on the left side.      Heart sounds: S1 normal and S2 normal. Murmur heard.     No friction rub. No gallop.      Comments: Systolic murmur  Chronic LLE edema   Pulmonary:      Effort: Pulmonary effort is normal. No respiratory distress.      Breath sounds: Normal breath sounds. No wheezing or rales.   Chest:      Chest wall: No tenderness.   Abdominal:      General: Bowel sounds are normal. There is no distension.      Palpations: Abdomen is soft. There is no mass.      Tenderness: There is no abdominal tenderness. There is no rebound.   Musculoskeletal:         General: No tenderness. Normal range of motion.      Cervical back: Normal range of motion and neck supple.      Left lower leg: Edema present.      Comments: LLE chronic nonpitting edema   Skin:     General: Skin is warm and dry.      Coloration: Skin is not pale.   Neurological:      Mental Status: She is alert and oriented to person, place, and time.      Coordination: Coordination normal.      Deep Tendon Reflexes: Reflexes normal.   Psychiatric:         Behavior: Behavior normal.         Judgment: Judgment normal.      TTE 1/17/23:   Summary    The left ventricle is normal in size with concentric hypertrophy and hyperdynamic systolic function.  The estimated ejection fraction is 75%.  Grade II left ventricular diastolic dysfunction.  Normal right ventricular size with normal right ventricular systolic function.  Mild to moderate left atrial enlargement.  Mild mitral regurgitation.  Mild tricuspid regurgitation.  Mild pulmonic regurgitation.  Mild right atrial enlargement.  Small  anterior pericardial effusion. Effusion is fluid.  Normal central venous pressure (3 mmHg).  The estimated PA systolic pressure is 21 mmHg.  EKG 23 reviewed :   Normal sinus rhythm with sinus arrhythmia,T wave abnormality, consider inferolateral ischemia      EC2020- reviewed.  NSR, T wave abnormality w/ possible inferolateral ischemia.      ETT: 12/15/2020- reviewed.    Conclusion         The EKG portion of this study is negative for ischemia.    The patient reported no chest pain during the stress test.    There were no arrhythmias during stress.    The exercise capacity was moderately impaired.    ECG Stress Nuclear portion of this study will be reported separately.   FINDINGS:  There is appropriate wall motion.  There is no significant fixed or reversible perfusion defect.  The stress and rest end-diastolic volumes measure 83 and 81 mL respectively.  The stress and rest end systolic findings measure 14 and 17 mL respectively.  The stress and rest ejection fraction measure 83 and 79% respectively.     Impression:     No acute findings.      [facility]: 2022- reviewed.   Summary       The pre-procedure left ventricular end diastolic pressure was 16.  The estimated blood loss was none.  There was non-obstructive coronary artery disease..       Assessment:       1. Chronic diastolic heart failure    2. Coronary artery disease involving native coronary artery of native heart without angina pectoris    3. Hyperlipidemia associated with type 2 diabetes mellitus    4. PAD (peripheral artery disease)    5. Secondary hyperparathyroidism of renal origin    6. Stage 4 chronic kidney disease    7. Type 2 diabetes mellitus with diabetic polyneuropathy, without long-term current use of insulin    8. Gastroesophageal reflux disease, unspecified whether esophagitis present    9. Edema of left lower extremity             Plan:         Chronic diastolic heart failure  -Recent hospital admission    -TTE 23: LVEF 75%  w Grade II LV diastolic dysfunction   -Continue medical therapy- furosemide  20 mg, carvedilol 25 mg, losartan 100 mg , hydralazine 10 mg  -weight self daily - notify if > 3 pound gain in 1 day or 5 pound gain in 1 week   -discussed lifestyle modifications: low salt diet, exercise, weight loss     Coronary artery disease involving native coronary artery of native heart without angina pectoris  CCS 0.  Pt compliant w/ meds.  S/p RAUL to RCA.   - negative ETT MPI for ischemia and unchanged coronary angiogram  - continue medical therapy  - encouraged risk factor and lifestyle modifications     Hyperlipidemia associated with type 2 diabetes mellitus  Controlled.  Goal LDL < 70, last 55 1/2022.  Compliant w/ meds.    - continue medical therapy  - encouraged risk factor and lifestyle modifications     PAD (peripheral artery disease)  Pt denies claudication.  Was previously followed by outside cardiologist.  S/p stenting of LLE.  - continue medical therapy  - encouraged risk factor and lifestyle modifications     Secondary hyperparathyroidism of renal origin  Followed by renal.    -continue medical therapy     Stage 4 chronic kidney disease  Followed by renal.      Type 2 diabetes mellitus with diabetic polyneuropathy, without long-term current use of insulin  Followed by PCP.    -continue medical therapy    Gastroesophageal reflux disease  -Followed by PCP  -continue medical therapy     Edema of left lower extremity  Pt notes chronic following PTA of her LLE.  Pt was unable make her last appt for compression stockings and will reschedule soon.    - thigh high compression stockings        Total duration of face to face visit time 30 minutes.  Total time spent counseling greater than fifty percent of total visit time.  Counseling included discussion regarding imaging findings, diagnosis, possibilities, treatment options, risks and benefits.  The patient had many questions regarding the options and long-term  effects      Lenin Akhtar,NP  Cardiology                 HPI

## 2023-06-22 DIAGNOSIS — I25.10 CORONARY ARTERY DISEASE INVOLVING NATIVE CORONARY ARTERY OF NATIVE HEART WITHOUT ANGINA PECTORIS: ICD-10-CM

## 2023-06-23 RX ORDER — CLOPIDOGREL BISULFATE 75 MG/1
75 TABLET ORAL DAILY
Qty: 30 TABLET | Refills: 11 | Status: SHIPPED | OUTPATIENT
Start: 2023-06-23 | End: 2024-06-22

## 2023-07-24 ENCOUNTER — OFFICE VISIT (OUTPATIENT)
Dept: CARDIOLOGY | Facility: CLINIC | Age: 88
End: 2023-07-24
Payer: MEDICARE

## 2023-07-24 VITALS
BODY MASS INDEX: 29.72 KG/M2 | HEIGHT: 62 IN | OXYGEN SATURATION: 96 % | HEART RATE: 55 BPM | DIASTOLIC BLOOD PRESSURE: 68 MMHG | WEIGHT: 161.5 LBS | SYSTOLIC BLOOD PRESSURE: 139 MMHG

## 2023-07-24 DIAGNOSIS — E11.59 HYPERTENSION ASSOCIATED WITH TYPE 2 DIABETES MELLITUS: ICD-10-CM

## 2023-07-24 DIAGNOSIS — K21.9 GASTROESOPHAGEAL REFLUX DISEASE, UNSPECIFIED WHETHER ESOPHAGITIS PRESENT: ICD-10-CM

## 2023-07-24 DIAGNOSIS — N18.4 STAGE 4 CHRONIC KIDNEY DISEASE: ICD-10-CM

## 2023-07-24 DIAGNOSIS — I50.32 CHRONIC DIASTOLIC HEART FAILURE: ICD-10-CM

## 2023-07-24 DIAGNOSIS — N18.4 TYPE 2 DIABETES MELLITUS WITH STAGE 4 CHRONIC KIDNEY DISEASE, WITHOUT LONG-TERM CURRENT USE OF INSULIN: ICD-10-CM

## 2023-07-24 DIAGNOSIS — I73.9 PAD (PERIPHERAL ARTERY DISEASE): ICD-10-CM

## 2023-07-24 DIAGNOSIS — E66.09 CLASS 1 OBESITY DUE TO EXCESS CALORIES WITH SERIOUS COMORBIDITY AND BODY MASS INDEX (BMI) OF 31.0 TO 31.9 IN ADULT: ICD-10-CM

## 2023-07-24 DIAGNOSIS — I15.2 HYPERTENSION ASSOCIATED WITH TYPE 2 DIABETES MELLITUS: ICD-10-CM

## 2023-07-24 DIAGNOSIS — R60.0 EDEMA OF LEFT LOWER EXTREMITY: ICD-10-CM

## 2023-07-24 DIAGNOSIS — E78.5 HYPERLIPIDEMIA ASSOCIATED WITH TYPE 2 DIABETES MELLITUS: ICD-10-CM

## 2023-07-24 DIAGNOSIS — E11.42 TYPE 2 DIABETES MELLITUS WITH DIABETIC POLYNEUROPATHY, WITHOUT LONG-TERM CURRENT USE OF INSULIN: ICD-10-CM

## 2023-07-24 DIAGNOSIS — E11.69 HYPERLIPIDEMIA ASSOCIATED WITH TYPE 2 DIABETES MELLITUS: ICD-10-CM

## 2023-07-24 DIAGNOSIS — E11.22 TYPE 2 DIABETES MELLITUS WITH STAGE 4 CHRONIC KIDNEY DISEASE, WITHOUT LONG-TERM CURRENT USE OF INSULIN: ICD-10-CM

## 2023-07-24 DIAGNOSIS — I25.10 CORONARY ARTERY DISEASE INVOLVING NATIVE CORONARY ARTERY OF NATIVE HEART WITHOUT ANGINA PECTORIS: ICD-10-CM

## 2023-07-24 PROCEDURE — 1159F MED LIST DOCD IN RCRD: CPT | Mod: CPTII,S$GLB,,

## 2023-07-24 PROCEDURE — 1160F PR REVIEW ALL MEDS BY PRESCRIBER/CLIN PHARMACIST DOCUMENTED: ICD-10-PCS | Mod: CPTII,S$GLB,,

## 2023-07-24 PROCEDURE — 3288F FALL RISK ASSESSMENT DOCD: CPT | Mod: CPTII,S$GLB,,

## 2023-07-24 PROCEDURE — 1126F AMNT PAIN NOTED NONE PRSNT: CPT | Mod: CPTII,S$GLB,,

## 2023-07-24 PROCEDURE — 1126F PR PAIN SEVERITY QUANTIFIED, NO PAIN PRESENT: ICD-10-PCS | Mod: CPTII,S$GLB,,

## 2023-07-24 PROCEDURE — 1159F PR MEDICATION LIST DOCUMENTED IN MEDICAL RECORD: ICD-10-PCS | Mod: CPTII,S$GLB,,

## 2023-07-24 PROCEDURE — 99214 OFFICE O/P EST MOD 30 MIN: CPT | Mod: S$GLB,,,

## 2023-07-24 PROCEDURE — 3288F PR FALLS RISK ASSESSMENT DOCUMENTED: ICD-10-PCS | Mod: CPTII,S$GLB,,

## 2023-07-24 PROCEDURE — 99999 PR PBB SHADOW E&M-EST. PATIENT-LVL III: CPT | Mod: PBBFAC,,,

## 2023-07-24 PROCEDURE — 1101F PT FALLS ASSESS-DOCD LE1/YR: CPT | Mod: CPTII,S$GLB,,

## 2023-07-24 PROCEDURE — 1160F RVW MEDS BY RX/DR IN RCRD: CPT | Mod: CPTII,S$GLB,,

## 2023-07-24 PROCEDURE — 1101F PR PT FALLS ASSESS DOC 0-1 FALLS W/OUT INJ PAST YR: ICD-10-PCS | Mod: CPTII,S$GLB,,

## 2023-07-24 PROCEDURE — 99999 PR PBB SHADOW E&M-EST. PATIENT-LVL III: ICD-10-PCS | Mod: PBBFAC,,,

## 2023-07-24 PROCEDURE — 99214 PR OFFICE/OUTPT VISIT, EST, LEVL IV, 30-39 MIN: ICD-10-PCS | Mod: S$GLB,,,

## 2023-07-24 NOTE — PROGRESS NOTES
Subjective:    Patient ID:  Petra Zazueta is a 88 y.o. female who presents for follow-up of No chief complaint on file.      PCP/Referring: Polly Greer DO     HPI: Pt is a 88 yo F w/ PMH of CAD s/p PCI to mRCA w/ RAUL 2011, DM2 w/ hgba1c 5.8%, HTN, HLD, and PAD s/p stenting of LLE 2016 who presents for f/u appt and HF management. She was last seen on 4/24/23 for f/u post admission 2/2 CHF exacerbation and was continued on medical therapy. She presents with granddaughter. She reports doing well since last visit. She has been increasing her activity with family. She denies CP, orthopnea, PND, LOC, and claudication. She notes compliance w/ meds and denies side effects. She reports dietary intermittent compliance with low salt diet.  She does not exercise regularly however is active w/ cleaning up her house and yard and denies limitations. She has increased her activity and denies limitations. She wants to get back driving. She monitors her BP at home and has been running 140-150s/60-80s.       Past Medical History:   Diagnosis Date    Acute on chronic diastolic congestive heart failure 1/16/2023    Anemia     Arthritis     Coronary artery disease     Diabetes mellitus     Diabetes mellitus type II     Diabetic retinopathy 08/22/2019    Dyslipidemia     Hypertension     Insomnia     PAD (peripheral artery disease)      Past Surgical History:   Procedure Laterality Date    angiagram  08/31/2018    CORONARY ANGIOGRAPHY N/A 6/7/2022    Procedure: ANGIOGRAM, CORONARY ARTERY;  Surgeon: Kiet Vega MD;  Location: [facility] CATH LAB/EP;  Service: Cardiology;  Laterality: N/A;    CORONARY ANGIOPLASTY      RCA 4/2011 EJ    GALLBLADDER SURGERY      HYSTERECTOMY      LEFT HEART CATHETERIZATION Left 6/7/2022    Procedure: Left heart cath;  Surgeon: Kiet Vega MD;  Location: [facility] CATH LAB/EP;  Service: Cardiology;  Laterality: Left;    PERIPHERAL ARTERIAL STENT GRAFT      Left lower extremity RCA      Social History      Socioeconomic History    Marital status:     Number of children: 2   Tobacco Use    Smoking status: Former     Types: Cigarettes     Quit date: 2005     Years since quittin.5    Smokeless tobacco: Never   Substance and Sexual Activity    Alcohol use: No    Drug use: No    Sexual activity: Not Currently   Social History Narrative    Lives alone in [place]. Has 2 sons. Darell Concepcion lives in [place] and cares for her. He is mPOA. Other son lives in [place]. Quit drinking years ago. [place].      Family History   Problem Relation Age of Onset    Heart attack Mother        Review of patient's allergies indicates:   Allergen Reactions    Codeine Other (See Comments)     Jittery, lightheadedness, nausea  Other reaction(s): NAUSEA       Medication List with Changes/Refills   Current Medications    ACETAMINOPHEN (TYLENOL) 325 MG TABLET    Take 325 mg by mouth as needed for Pain.    ASPIRIN (ECOTRIN) 81 MG EC TABLET    Take 1 tablet (81 mg total) by mouth once daily.    ATORVASTATIN (LIPITOR) 80 MG TABLET    Take 1 tablet (80 mg total) by mouth every evening.    BLOOD SUGAR DIAGNOSTIC (TRUE METRIX GLUCOSE TEST STRIP) STRP    1 strip by Misc.(Non-Drug; Combo Route) route 2 (two) times daily.    BLOOD-GLUCOSE METER (TRUE METRIX GLUCOSE METER) MISC    Test blood sugars twice daily    CARVEDILOL (COREG) 25 MG TABLET    TAKE 1 TABLET BY MOUTH TWICE A DAY WITH FOOD    CLOPIDOGREL (PLAVIX) 75 MG TABLET    Take 1 tablet (75 mg total) by mouth once daily.    FERROUS SULFATE 324 MG (65 MG IRON) TBEC    TAKE 1 TABLET (324 MG TOTAL) BY MOUTH ONCE DAILY.    FUROSEMIDE (LASIX) 20 MG TABLET    Take 1 tablet (20 mg total) by mouth once daily.    GABAPENTIN (NEURONTIN) 300 MG CAPSULE    TAKE 1 CAPSULE BY MOUTH EVERY EVENING    HYDRALAZINE (APRESOLINE) 10 MG TABLET    Take 10 mg by mouth every 8 (eight) hours. Taking 100mg    LANCETS (BD ULTRA FINE LANCETS) 33 GAUGE MISC    1 lancet by Misc.(Non-Drug; Combo Route) route 2  "(two) times daily.    LATANOPROST 0.005 % OPHTHALMIC SOLUTION    Place 1 drop into both eyes every evening.    LINAGLIPTIN (TRADJENTA) 5 MG TAB TABLET    Take 1 tablet (5 mg total) by mouth once daily.    LOSARTAN (COZAAR) 100 MG TABLET    TAKE 1 TABLET BY MOUTH EVERY DAY    NIFEDIPINE (ADALAT CC) 60 MG TBSR    Take 1 tablet (60 mg total) by mouth once daily.    OMEPRAZOLE (PRILOSEC) 40 MG CAPSULE    Take 1 capsule (40 mg total) by mouth once daily.    PANTOPRAZOLE (PROTONIX) 40 MG TABLET    TAKE 1 TABLET BY MOUTH EVERY DAY       Review of Systems   Constitutional: Negative for diaphoresis and fever.   HENT:  Negative for congestion and hearing loss.    Eyes:  Negative for blurred vision and pain.   Cardiovascular:  Positive for leg swelling. Negative for chest pain, claudication, dyspnea on exertion, near-syncope, palpitations and syncope.   Respiratory:  Negative for shortness of breath and sleep disturbances due to breathing.    Hematologic/Lymphatic: Negative for bleeding problem. Does not bruise/bleed easily.   Skin:  Negative for color change and poor wound healing.   Gastrointestinal:  Negative for abdominal pain and nausea.   Genitourinary:  Negative for bladder incontinence and flank pain.   Neurological:  Negative for focal weakness and light-headedness.      Objective:   /68 (BP Location: Right arm)   Pulse (!) 55   Ht 5' 2" (1.575 m)   Wt 73.3 kg (161 lb 8 oz)   LMP  (LMP Unknown)   SpO2 96%   BMI 29.54 kg/m²    Physical Exam  Constitutional:       Appearance: She is well-developed. She is not diaphoretic.   HENT:      Head: Normocephalic and atraumatic.   Eyes:      General: No scleral icterus.     Pupils: Pupils are equal, round, and reactive to light.   Neck:      Vascular: No JVD.   Cardiovascular:      Rate and Rhythm: Normal rate and regular rhythm.      Pulses: Intact distal pulses.           Radial pulses are 2+ on the right side and 2+ on the left side.        Dorsalis pedis pulses " are 2+ on the right side and 2+ on the left side.        Posterior tibial pulses are 2+ on the right side and 2+ on the left side.      Heart sounds: S1 normal and S2 normal. Murmur heard.     No friction rub. No gallop.      Comments: Systolic murmur  Chronic LLE edema   Pulmonary:      Effort: Pulmonary effort is normal. No respiratory distress.      Breath sounds: Normal breath sounds. No wheezing or rales.   Chest:      Chest wall: No tenderness.   Abdominal:      General: Bowel sounds are normal. There is no distension.      Palpations: Abdomen is soft. There is no mass.      Tenderness: There is no abdominal tenderness. There is no rebound.   Musculoskeletal:         General: No tenderness. Normal range of motion.      Cervical back: Normal range of motion and neck supple.      Right lower le+ Edema present.      Left lower le+ Edema present.      Comments: LLE chronic nonpitting edema   Skin:     General: Skin is warm and dry.      Coloration: Skin is not pale.   Neurological:      Mental Status: She is alert and oriented to person, place, and time.      Coordination: Coordination normal.      Deep Tendon Reflexes: Reflexes normal.   Psychiatric:         Behavior: Behavior normal.         Judgment: Judgment normal.      TTE 23:   Summary    The left ventricle is normal in size with concentric hypertrophy and hyperdynamic systolic function.  The estimated ejection fraction is 75%.  Grade II left ventricular diastolic dysfunction.  Normal right ventricular size with normal right ventricular systolic function.  Mild to moderate left atrial enlargement.  Mild mitral regurgitation.  Mild tricuspid regurgitation.  Mild pulmonic regurgitation.  Mild right atrial enlargement.  Small anterior pericardial effusion. Effusion is fluid.  Normal central venous pressure (3 mmHg).  The estimated PA systolic pressure is 21 mmHg.  EKG 23 reviewed :   Normal sinus rhythm with sinus arrhythmia,T wave  abnormality, consider inferolateral ischemia      EC2020- reviewed.  NSR, T wave abnormality w/ possible inferolateral ischemia.      ETT: 12/15/2020- reviewed.    Conclusion         The EKG portion of this study is negative for ischemia.    The patient reported no chest pain during the stress test.    There were no arrhythmias during stress.    The exercise capacity was moderately impaired.    ECG Stress Nuclear portion of this study will be reported separately.   FINDINGS:  There is appropriate wall motion.  There is no significant fixed or reversible perfusion defect.  The stress and rest end-diastolic volumes measure 83 and 81 mL respectively.  The stress and rest end systolic findings measure 14 and 17 mL respectively.  The stress and rest ejection fraction measure 83 and 79% respectively.     Impression:     No acute findings.      [facility]: 2022- reviewed.   Summary       The pre-procedure left ventricular end diastolic pressure was 16.  The estimated blood loss was none.  There was non-obstructive coronary artery disease..       Assessment:       1. Chronic diastolic heart failure    2. Coronary artery disease involving native coronary artery of native heart without angina pectoris    3. Hyperlipidemia associated with type 2 diabetes mellitus    4. PAD (peripheral artery disease)    5. Stage 4 chronic kidney disease    6. Class 1 obesity due to excess calories with serious comorbidity and body mass index (BMI) of 31.0 to 31.9 in adult    7. Hypertension associated with type 2 diabetes mellitus    8. Type 2 diabetes mellitus with stage 4 chronic kidney disease, without long-term current use of insulin    9. Type 2 diabetes mellitus with diabetic polyneuropathy, without long-term current use of insulin    10. Gastroesophageal reflux disease, unspecified whether esophagitis present    11. Edema of left lower extremity               Plan:         Chronic diastolic heart failure  -TTE 23: LVEF 75% w  Grade II LV diastolic dysfunction   -Continue medical therapy- furosemide  20 mg, carvedilol 25 mg, losartan 100 mg , hydralazine 10 mg  -weight self daily - notify if > 3 pound gain in 1 day or 5 pound gain in 1 week   -discussed lifestyle modifications: low salt diet, exercise, weight loss     Coronary artery disease involving native coronary artery of native heart without angina pectoris  CCS 0.  Pt compliant w/ meds.  S/p RAUL to RCA.   - negative ETT MPI for ischemia and unchanged coronary angiogram  - continue medical therapy  - encouraged risk factor and lifestyle modifications     Hyperlipidemia associated with type 2 diabetes mellitus  Controlled.  Goal LDL < 70, last 55 1/2022.  Compliant w/ meds.    - continue medical therapy  - encouraged risk factor and lifestyle modifications     PAD (peripheral artery disease)  Pt denies claudication.  Was previously followed by outside cardiologist.  S/p stenting of LLE.  - continue medical therapy-DAPT, statin   - encouraged risk factor and lifestyle modifications     Stage 4 chronic kidney disease  Followed by renal.      Class 1 obesity due to excess calories with serious comorbidity and body mass index (BMI) of 31.0 to 31.9 in adult  -Pt aware of health complications a/w obesity and desires to lose weight.    - encouraged lifestyle modifications (diet, exercise, and weight loss)     Hypertension associated with type 2 diabetes mellitus  Controlled.  Goal BP < 140/90.  Pt compliant w/ meds.    - continue medical therapy  - pt to monitor BP at home and record  - encouraged risk factor and lifestyle modifications     Type 2 diabetes mellitus with stage 4 chronic kidney disease, without long-term current use of insulin  -Followed by PCP and Nephrology     Type 2 diabetes mellitus with diabetic polyneuropathy, without long-term current use of insulin  Followed by PCP.    -continue medical therapy    Gastroesophageal reflux disease  -Followed by PCP  -continue medical  therapy     Edema of left lower extremity  Pt notes chronic following PTA of her LLE.  Pt was unable make her last appt for compression stockings and will reschedule soon.    - thigh high compression stockings          Total duration of face to face visit time 30 minutes.  Total time spent counseling greater than fifty percent of total visit time.  Counseling included discussion regarding imaging findings, diagnosis, possibilities, treatment options, risks and benefits.  The patient had many questions regarding the options and long-term effects      Lenin Akhtar,DEE  Cardiology                 HPI

## 2023-07-24 NOTE — ASSESSMENT & PLAN NOTE
-TTE 1/17/23: LVEF 75% w Grade II LV diastolic dysfunction   -Continue medical therapy- furosemide  20 mg, carvedilol 25 mg, losartan 100 mg , hydralazine 10 mg  -weight self daily - notify if > 3 pound gain in 1 day or 5 pound gain in 1 week   -discussed lifestyle modifications: low salt diet, exercise, weight loss

## 2023-07-24 NOTE — ASSESSMENT & PLAN NOTE
Pt denies claudication.  Was previously followed by outside cardiologist.  S/p stenting of LLE.  - continue medical therapy-DAPT, statin   - encouraged risk factor and lifestyle modifications

## 2023-07-24 NOTE — ASSESSMENT & PLAN NOTE
-Pt aware of health complications a/w obesity and desires to lose weight.    - encouraged lifestyle modifications (diet, exercise, and weight loss)

## 2023-07-28 ENCOUNTER — HOSPITAL ENCOUNTER (INPATIENT)
Facility: HOSPITAL | Age: 88
LOS: 3 days | Discharge: HOME HEALTH | DRG: 389 | End: 2023-07-31
Attending: STUDENT IN AN ORGANIZED HEALTH CARE EDUCATION/TRAINING PROGRAM | Admitting: STUDENT IN AN ORGANIZED HEALTH CARE EDUCATION/TRAINING PROGRAM
Payer: MEDICARE

## 2023-07-28 DIAGNOSIS — R07.9 CHEST PAIN: ICD-10-CM

## 2023-07-28 DIAGNOSIS — K56.609 SBO (SMALL BOWEL OBSTRUCTION): ICD-10-CM

## 2023-07-28 DIAGNOSIS — N18.9 ACUTE KIDNEY INJURY SUPERIMPOSED ON CHRONIC KIDNEY DISEASE: Primary | ICD-10-CM

## 2023-07-28 DIAGNOSIS — N17.9 ACUTE KIDNEY INJURY SUPERIMPOSED ON CHRONIC KIDNEY DISEASE: Primary | ICD-10-CM

## 2023-07-28 PROCEDURE — 25000003 PHARM REV CODE 250: Performed by: STUDENT IN AN ORGANIZED HEALTH CARE EDUCATION/TRAINING PROGRAM

## 2023-07-28 PROCEDURE — 11000001 HC ACUTE MED/SURG PRIVATE ROOM

## 2023-07-28 RX ORDER — IBUPROFEN 200 MG
24 TABLET ORAL
Status: DISCONTINUED | OUTPATIENT
Start: 2023-07-28 | End: 2023-07-31 | Stop reason: HOSPADM

## 2023-07-28 RX ORDER — HYDRALAZINE HYDROCHLORIDE 25 MG/1
100 TABLET, FILM COATED ORAL EVERY 8 HOURS
Status: DISCONTINUED | OUTPATIENT
Start: 2023-07-28 | End: 2023-07-29

## 2023-07-28 RX ORDER — ACETAMINOPHEN 325 MG/1
650 TABLET ORAL EVERY 8 HOURS PRN
Status: DISCONTINUED | OUTPATIENT
Start: 2023-07-28 | End: 2023-07-31 | Stop reason: HOSPADM

## 2023-07-28 RX ORDER — PROCHLORPERAZINE EDISYLATE 5 MG/ML
5 INJECTION INTRAMUSCULAR; INTRAVENOUS EVERY 6 HOURS PRN
Status: DISCONTINUED | OUTPATIENT
Start: 2023-07-28 | End: 2023-07-31 | Stop reason: HOSPADM

## 2023-07-28 RX ORDER — LATANOPROST 50 UG/ML
1 SOLUTION/ DROPS OPHTHALMIC NIGHTLY
Status: DISCONTINUED | OUTPATIENT
Start: 2023-07-28 | End: 2023-07-31 | Stop reason: HOSPADM

## 2023-07-28 RX ORDER — LANOLIN ALCOHOL/MO/W.PET/CERES
800 CREAM (GRAM) TOPICAL
Status: DISCONTINUED | OUTPATIENT
Start: 2023-07-28 | End: 2023-07-31 | Stop reason: HOSPADM

## 2023-07-28 RX ORDER — MAG HYDROX/ALUMINUM HYD/SIMETH 200-200-20
30 SUSPENSION, ORAL (FINAL DOSE FORM) ORAL 4 TIMES DAILY PRN
Status: DISCONTINUED | OUTPATIENT
Start: 2023-07-28 | End: 2023-07-31 | Stop reason: HOSPADM

## 2023-07-28 RX ORDER — SODIUM,POTASSIUM PHOSPHATES 280-250MG
2 POWDER IN PACKET (EA) ORAL
Status: DISCONTINUED | OUTPATIENT
Start: 2023-07-28 | End: 2023-07-31 | Stop reason: HOSPADM

## 2023-07-28 RX ORDER — GLUCAGON 1 MG
1 KIT INJECTION
Status: DISCONTINUED | OUTPATIENT
Start: 2023-07-28 | End: 2023-07-31 | Stop reason: HOSPADM

## 2023-07-28 RX ORDER — SODIUM CHLORIDE 0.9 % (FLUSH) 0.9 %
10 SYRINGE (ML) INJECTION EVERY 12 HOURS PRN
Status: DISCONTINUED | OUTPATIENT
Start: 2023-07-28 | End: 2023-07-31 | Stop reason: HOSPADM

## 2023-07-28 RX ORDER — NIFEDIPINE 30 MG/1
90 TABLET, EXTENDED RELEASE ORAL DAILY
Status: DISCONTINUED | OUTPATIENT
Start: 2023-07-29 | End: 2023-07-29

## 2023-07-28 RX ORDER — CLOPIDOGREL BISULFATE 75 MG/1
75 TABLET ORAL DAILY
Status: DISCONTINUED | OUTPATIENT
Start: 2023-07-29 | End: 2023-07-31 | Stop reason: HOSPADM

## 2023-07-28 RX ORDER — SODIUM CHLORIDE 9 MG/ML
INJECTION, SOLUTION INTRAVENOUS CONTINUOUS
Status: DISCONTINUED | OUTPATIENT
Start: 2023-07-28 | End: 2023-07-31

## 2023-07-28 RX ORDER — ATORVASTATIN CALCIUM 40 MG/1
80 TABLET, FILM COATED ORAL NIGHTLY
Status: DISCONTINUED | OUTPATIENT
Start: 2023-07-28 | End: 2023-07-31 | Stop reason: HOSPADM

## 2023-07-28 RX ORDER — NALOXONE HCL 0.4 MG/ML
0.02 VIAL (ML) INJECTION
Status: DISCONTINUED | OUTPATIENT
Start: 2023-07-28 | End: 2023-07-31 | Stop reason: HOSPADM

## 2023-07-28 RX ORDER — IPRATROPIUM BROMIDE AND ALBUTEROL SULFATE 2.5; .5 MG/3ML; MG/3ML
3 SOLUTION RESPIRATORY (INHALATION) EVERY 4 HOURS PRN
Status: DISCONTINUED | OUTPATIENT
Start: 2023-07-28 | End: 2023-07-31 | Stop reason: HOSPADM

## 2023-07-28 RX ORDER — TALC
6 POWDER (GRAM) TOPICAL NIGHTLY PRN
Status: DISCONTINUED | OUTPATIENT
Start: 2023-07-28 | End: 2023-07-31 | Stop reason: HOSPADM

## 2023-07-28 RX ORDER — IBUPROFEN 200 MG
16 TABLET ORAL
Status: DISCONTINUED | OUTPATIENT
Start: 2023-07-28 | End: 2023-07-31 | Stop reason: HOSPADM

## 2023-07-28 RX ORDER — ASPIRIN 81 MG/1
81 TABLET ORAL DAILY
Status: DISCONTINUED | OUTPATIENT
Start: 2023-07-29 | End: 2023-07-31 | Stop reason: HOSPADM

## 2023-07-28 RX ORDER — SIMETHICONE 80 MG
1 TABLET,CHEWABLE ORAL 4 TIMES DAILY PRN
Status: DISCONTINUED | OUTPATIENT
Start: 2023-07-28 | End: 2023-07-31 | Stop reason: HOSPADM

## 2023-07-28 RX ORDER — ONDANSETRON 2 MG/ML
4 INJECTION INTRAMUSCULAR; INTRAVENOUS EVERY 8 HOURS PRN
Status: DISCONTINUED | OUTPATIENT
Start: 2023-07-28 | End: 2023-07-31 | Stop reason: HOSPADM

## 2023-07-28 RX ORDER — ACETAMINOPHEN 325 MG/1
650 TABLET ORAL EVERY 4 HOURS PRN
Status: DISCONTINUED | OUTPATIENT
Start: 2023-07-28 | End: 2023-07-31 | Stop reason: HOSPADM

## 2023-07-28 RX ORDER — INSULIN ASPART 100 [IU]/ML
0-5 INJECTION, SOLUTION INTRAVENOUS; SUBCUTANEOUS
Status: DISCONTINUED | OUTPATIENT
Start: 2023-07-28 | End: 2023-07-31 | Stop reason: HOSPADM

## 2023-07-28 RX ORDER — CARVEDILOL 12.5 MG/1
25 TABLET ORAL 2 TIMES DAILY WITH MEALS
Status: DISCONTINUED | OUTPATIENT
Start: 2023-07-29 | End: 2023-07-31 | Stop reason: HOSPADM

## 2023-07-28 RX ADMIN — SODIUM CHLORIDE: 9 INJECTION, SOLUTION INTRAVENOUS at 09:07

## 2023-07-28 RX ADMIN — ATORVASTATIN CALCIUM 80 MG: 40 TABLET, FILM COATED ORAL at 09:07

## 2023-07-29 LAB
ALBUMIN SERPL BCP-MCNC: 3.2 G/DL (ref 3.5–5.2)
ANION GAP SERPL CALC-SCNC: 14 MMOL/L (ref 8–16)
ANION GAP SERPL CALC-SCNC: 15 MMOL/L (ref 8–16)
BASOPHILS # BLD AUTO: 0.02 K/UL (ref 0–0.2)
BASOPHILS NFR BLD: 0.5 % (ref 0–1.9)
BILIRUB UR QL STRIP: NEGATIVE
BUN SERPL-MCNC: 70 MG/DL (ref 8–23)
BUN SERPL-MCNC: 72 MG/DL (ref 8–23)
CALCIUM SERPL-MCNC: 8.3 MG/DL (ref 8.7–10.5)
CALCIUM SERPL-MCNC: 8.4 MG/DL (ref 8.7–10.5)
CHLORIDE SERPL-SCNC: 104 MMOL/L (ref 95–110)
CHLORIDE SERPL-SCNC: 105 MMOL/L (ref 95–110)
CLARITY UR: CLEAR
CO2 SERPL-SCNC: 21 MMOL/L (ref 23–29)
CO2 SERPL-SCNC: 24 MMOL/L (ref 23–29)
COLOR UR: YELLOW
CREAT SERPL-MCNC: 3 MG/DL (ref 0.5–1.4)
CREAT SERPL-MCNC: 3.2 MG/DL (ref 0.5–1.4)
CREAT UR-MCNC: 152.4 MG/DL (ref 15–325)
CREAT UR-MCNC: 152.4 MG/DL (ref 15–325)
DIFFERENTIAL METHOD: ABNORMAL
EOSINOPHIL # BLD AUTO: 0 K/UL (ref 0–0.5)
EOSINOPHIL NFR BLD: 0.2 % (ref 0–8)
EOSINOPHIL URNS QL WRIGHT STN: NORMAL
ERYTHROCYTE [DISTWIDTH] IN BLOOD BY AUTOMATED COUNT: 13 % (ref 11.5–14.5)
EST. GFR  (NO RACE VARIABLE): 14 ML/MIN/1.73 M^2
EST. GFR  (NO RACE VARIABLE): 15 ML/MIN/1.73 M^2
GLUCOSE SERPL-MCNC: 105 MG/DL (ref 70–110)
GLUCOSE SERPL-MCNC: 123 MG/DL (ref 70–110)
GLUCOSE UR QL STRIP: NEGATIVE
HCT VFR BLD AUTO: 34.6 % (ref 37–48.5)
HGB BLD-MCNC: 10.8 G/DL (ref 12–16)
HGB UR QL STRIP: NEGATIVE
IMM GRANULOCYTES # BLD AUTO: 0.01 K/UL (ref 0–0.04)
IMM GRANULOCYTES NFR BLD AUTO: 0.2 % (ref 0–0.5)
KETONES UR QL STRIP: NEGATIVE
LEUKOCYTE ESTERASE UR QL STRIP: NEGATIVE
LYMPHOCYTES # BLD AUTO: 0.8 K/UL (ref 1–4.8)
LYMPHOCYTES NFR BLD: 17.4 % (ref 18–48)
MAGNESIUM SERPL-MCNC: 1.4 MG/DL (ref 1.6–2.6)
MCH RBC QN AUTO: 32.2 PG (ref 27–31)
MCHC RBC AUTO-ENTMCNC: 31.2 G/DL (ref 32–36)
MCV RBC AUTO: 103 FL (ref 82–98)
MONOCYTES # BLD AUTO: 0.7 K/UL (ref 0.3–1)
MONOCYTES NFR BLD: 16.1 % (ref 4–15)
NEUTROPHILS # BLD AUTO: 2.9 K/UL (ref 1.8–7.7)
NEUTROPHILS NFR BLD: 65.6 % (ref 38–73)
NITRITE UR QL STRIP: NEGATIVE
NRBC BLD-RTO: 0 /100 WBC
PH UR STRIP: 5 [PH] (ref 5–8)
PHOSPHATE SERPL-MCNC: 4.6 MG/DL (ref 2.7–4.5)
PHOSPHATE SERPL-MCNC: 5.2 MG/DL (ref 2.7–4.5)
PLATELET # BLD AUTO: 130 K/UL (ref 150–450)
PMV BLD AUTO: 12.8 FL (ref 9.2–12.9)
POCT GLUCOSE: 116 MG/DL (ref 70–110)
POTASSIUM SERPL-SCNC: 3.4 MMOL/L (ref 3.5–5.1)
POTASSIUM SERPL-SCNC: 3.6 MMOL/L (ref 3.5–5.1)
PROT UR QL STRIP: ABNORMAL
PROT UR-MCNC: 22 MG/DL
PROT/CREAT UR: 0.14 MG/G{CREAT} (ref 0–0.2)
RBC # BLD AUTO: 3.35 M/UL (ref 4–5.4)
SODIUM SERPL-SCNC: 141 MMOL/L (ref 136–145)
SODIUM SERPL-SCNC: 142 MMOL/L (ref 136–145)
SODIUM UR-SCNC: 62 MMOL/L (ref 20–250)
SP GR UR STRIP: 1.02 (ref 1–1.03)
URN SPEC COLLECT METH UR: ABNORMAL
UROBILINOGEN UR STRIP-ACNC: NEGATIVE EU/DL
WBC # BLD AUTO: 4.36 K/UL (ref 3.9–12.7)

## 2023-07-29 PROCEDURE — 36415 COLL VENOUS BLD VENIPUNCTURE: CPT | Performed by: INTERNAL MEDICINE

## 2023-07-29 PROCEDURE — 83735 ASSAY OF MAGNESIUM: CPT | Performed by: STUDENT IN AN ORGANIZED HEALTH CARE EDUCATION/TRAINING PROGRAM

## 2023-07-29 PROCEDURE — 36415 COLL VENOUS BLD VENIPUNCTURE: CPT | Performed by: STUDENT IN AN ORGANIZED HEALTH CARE EDUCATION/TRAINING PROGRAM

## 2023-07-29 PROCEDURE — 99223 PR INITIAL HOSPITAL CARE,LEVL III: ICD-10-PCS | Mod: ,,, | Performed by: SURGERY

## 2023-07-29 PROCEDURE — 81003 URINALYSIS AUTO W/O SCOPE: CPT | Performed by: INTERNAL MEDICINE

## 2023-07-29 PROCEDURE — 85025 COMPLETE CBC W/AUTO DIFF WBC: CPT | Performed by: STUDENT IN AN ORGANIZED HEALTH CARE EDUCATION/TRAINING PROGRAM

## 2023-07-29 PROCEDURE — 25000003 PHARM REV CODE 250: Performed by: STUDENT IN AN ORGANIZED HEALTH CARE EDUCATION/TRAINING PROGRAM

## 2023-07-29 PROCEDURE — 80069 RENAL FUNCTION PANEL: CPT | Performed by: INTERNAL MEDICINE

## 2023-07-29 PROCEDURE — 84100 ASSAY OF PHOSPHORUS: CPT | Performed by: STUDENT IN AN ORGANIZED HEALTH CARE EDUCATION/TRAINING PROGRAM

## 2023-07-29 PROCEDURE — 84300 ASSAY OF URINE SODIUM: CPT | Performed by: INTERNAL MEDICINE

## 2023-07-29 PROCEDURE — 87205 SMEAR GRAM STAIN: CPT | Performed by: INTERNAL MEDICINE

## 2023-07-29 PROCEDURE — 63600175 PHARM REV CODE 636 W HCPCS: Performed by: STUDENT IN AN ORGANIZED HEALTH CARE EDUCATION/TRAINING PROGRAM

## 2023-07-29 PROCEDURE — 99223 1ST HOSP IP/OBS HIGH 75: CPT | Mod: ,,, | Performed by: SURGERY

## 2023-07-29 PROCEDURE — 99900035 HC TECH TIME PER 15 MIN (STAT)

## 2023-07-29 PROCEDURE — 82570 ASSAY OF URINE CREATININE: CPT | Performed by: INTERNAL MEDICINE

## 2023-07-29 PROCEDURE — 84540 ASSAY OF URINE/UREA-N: CPT | Performed by: INTERNAL MEDICINE

## 2023-07-29 PROCEDURE — 80048 BASIC METABOLIC PNL TOTAL CA: CPT | Performed by: STUDENT IN AN ORGANIZED HEALTH CARE EDUCATION/TRAINING PROGRAM

## 2023-07-29 PROCEDURE — 11000001 HC ACUTE MED/SURG PRIVATE ROOM

## 2023-07-29 RX ORDER — MUPIROCIN 20 MG/G
OINTMENT TOPICAL 2 TIMES DAILY
Status: DISCONTINUED | OUTPATIENT
Start: 2023-07-29 | End: 2023-07-31 | Stop reason: HOSPADM

## 2023-07-29 RX ORDER — MAGNESIUM SULFATE HEPTAHYDRATE 40 MG/ML
2 INJECTION, SOLUTION INTRAVENOUS ONCE
Status: COMPLETED | OUTPATIENT
Start: 2023-07-29 | End: 2023-07-29

## 2023-07-29 RX ADMIN — MAGNESIUM SULFATE HEPTAHYDRATE 2 G: 40 INJECTION, SOLUTION INTRAVENOUS at 10:07

## 2023-07-29 RX ADMIN — CLOPIDOGREL BISULFATE 75 MG: 75 TABLET ORAL at 08:07

## 2023-07-29 RX ADMIN — ATORVASTATIN CALCIUM 80 MG: 40 TABLET, FILM COATED ORAL at 09:07

## 2023-07-29 RX ADMIN — MUPIROCIN: 20 OINTMENT TOPICAL at 09:07

## 2023-07-29 RX ADMIN — SODIUM CHLORIDE: 9 INJECTION, SOLUTION INTRAVENOUS at 04:07

## 2023-07-29 RX ADMIN — ASPIRIN 81 MG: 81 TABLET, COATED ORAL at 08:07

## 2023-07-29 RX ADMIN — LATANOPROST 1 DROP: 50 SOLUTION OPHTHALMIC at 09:07

## 2023-07-29 RX ADMIN — MUPIROCIN: 20 OINTMENT TOPICAL at 10:07

## 2023-07-29 NOTE — ASSESSMENT & PLAN NOTE
Patient with acute kidney injury/acute renal failure likely due to pre-renal azotemia due to IVVD MANPREET is currently stable. Baseline creatinine 1.5 - Labs reviewed- Renal function/electrolytes with Estimated Creatinine Clearance: 11.3 mL/min (A) (based on SCr of 3.2 mg/dL (H)). according to latest data. Monitor urine output and serial BMP and adjust therapy as needed. Avoid nephrotoxins and renally dose meds for GFR listed above.    IVF  Hold home lasix, losartan   - Nephrology consulted, appreciate recommendations

## 2023-07-29 NOTE — NURSING
[facility] MEWS TRIGGER FOLLOW UP       MEWS Monitoring, Score is: 2  Indication for review: BP    Bedside Nurse, Ashley contacted, no concerns verbalized at this time, instructed to call [phone] for further concerns or assistance.    Primary RN held AM scheduled hydralazine dose. Recheck BP. MAP maintained >65. Care ongoing.

## 2023-07-29 NOTE — NURSING
Pt arrived on unit via Navin, awake alert and oriented. IV site noted; saline lock. Pt ambulated to bathroom; remained free from fall/injury and SOB. BM x1. Pt placed on 2L O2 NC. Vitals assessed. 0/10 pain. Pt oriented to room. Bed locked and low position. Bed alarm set. Call light within reach. Safety measures maintained. Will cont to monitor

## 2023-07-29 NOTE — HPI
This is an 88-year-old female with a past medical history of CAD, HFpEF (EF: 75%, GIIDD), hyperlipidemia, type 2 diabetes, CKD 4, peripheral artery disease, who presents with vomiting.     Patient presents with emesis, abdominal pain and decreased po intake that started about a week prior to presentation. Associated symptoms include abdominal distention and a productive cough. She is having bowel movements and is passing gas. She has chronic left lower extremity swelling that has not changed.     In the ED, the patient was tachypneic, hypertensive and was placed on 3 L of supplemental oxygen for comfort.  Labs were remarkable for an elevated creatinine (2.45 from a baseline of 1.5), hypomagnesemia (1.5).  CT abdomen and pelvis showed findings consistent with obstruction versus ileus, and bibasilar atelectasis versus consolidative changes.  She was given 500 cc of NS, Zofran 4 mg IV, morphine 2 mg IV and an NG-tube was placed with 2L immediate output.  She was admitted for further management. Upon arrival, NG tube had been already removed.

## 2023-07-29 NOTE — NURSING
Pt sitting on edge of bed; no complaints. Vitals assessed. BP 90/55 HR 65; scheduled Hydralazine 100mg not given; MD aware. Cont IVF. Tele sitter at bedside. Call light within reach. Safety measures maintained. Will cont to monitor.

## 2023-07-29 NOTE — PROGRESS NOTES
[facility] Medicine  Progress Note    Patient Name: Petra Zazueta  MRN: [id number]  Patient Class: IP- Inpatient   Admission Date: 7/28/2023  Length of Stay: 1 days  Attending Physician: Kareem Francois MD  Primary Care Provider: Edel Shepard MD        Subjective:     Principal Problem:SBO (small bowel obstruction)        HPI:  This is an 88-year-old female with a past medical history of CAD, HFpEF (EF: 75%, GIIDD), hyperlipidemia, type 2 diabetes, CKD 4, peripheral artery disease, who presents with vomiting.     Patient presents with emesis, abdominal pain and decreased po intake that started about a week prior to presentation. Associated symptoms include abdominal distention and a productive cough. She is having bowel movements and is passing gas. She has chronic left lower extremity swelling that has not changed.     In the ED, the patient was tachypneic, hypertensive and was placed on 3 L of supplemental oxygen for comfort.  Labs were remarkable for an elevated creatinine (2.45 from a baseline of 1.5), hypomagnesemia (1.5).  CT abdomen and pelvis showed findings consistent with obstruction versus ileus, and bibasilar atelectasis versus consolidative changes.  She was given 500 cc of NS, Zofran 4 mg IV, morphine 2 mg IV and an NG-tube was placed with 2L immediate output.  She was admitted for further management. Upon arrival, NG tube had been already removed.       Overview/Hospital Course:  No notes on file    Interval History: feeling better this morning, no flatus or BM today. NGT placed    Review of Systems  Objective:     Vital Signs (Most Recent):  Temp: 98.3 °F (36.8 °C) (07/29/23 1537)  Pulse: 62 (07/29/23 1537)  Resp: 18 (07/29/23 1537)  BP: (!) 117/56 (07/29/23 1537)  SpO2: (!) 92 % (07/29/23 1537) Vital Signs (24h Range):  Temp:  [98 °F (36.7 °C)-99.5 °F (37.5 °C)] 98.3 °F (36.8 °C)  Pulse:  [62-80] 62  Resp:  [18-22] 18  SpO2:  [85 %-100 %] 92 %  BP: ()/(46-64) 117/56      Weight: 69.3 kg (152 lb 12.5 oz)  Body mass index is 27.94 kg/m².  No intake or output data in the 24 hours ending 07/29/23 1549      Physical Exam  Vitals and nursing note reviewed.   Constitutional:       General: She is not in acute distress.     Appearance: Normal appearance.   HENT:      Nose: Nose normal.      Mouth/Throat:      Mouth: Mucous membranes are moist.   Cardiovascular:      Rate and Rhythm: Normal rate and regular rhythm.   Pulmonary:      Effort: Pulmonary effort is normal. No respiratory distress.   Abdominal:      General: Abdomen is flat. There is no distension.      Palpations: Abdomen is soft.      Tenderness: There is no abdominal tenderness.   Musculoskeletal:         General: Normal range of motion.      Left lower leg: Edema present.   Skin:     General: Skin is warm and dry.   Neurological:      General: No focal deficit present.      Mental Status: She is alert.             Significant Labs: All pertinent labs within the past 24 hours have been reviewed.    Significant Imaging: I have reviewed all pertinent imaging results/findings within the past 24 hours.      Assessment/Plan:      * SBO (small bowel obstruction)  Presented with abdominal pain, vomiting  CT abdomen and pelvis showed findings consistent with obstruction versus ileus, and bibasilar atelectasis versus consolidative changes  S/p NG tube with 2L removed in the ED. Subsequently, NG tube was removed- unclear when/why. Replaced today.    Plan:   Surgery consult  Keep NPO  IVF  Pain control       Acute kidney injury superimposed on chronic kidney disease  Patient with acute kidney injury/acute renal failure likely due to pre-renal azotemia due to IVVD MANPREET is currently stable. Baseline creatinine 1.5 - Labs reviewed- Renal function/electrolytes with Estimated Creatinine Clearance: 11.3 mL/min (A) (based on SCr of 3.2 mg/dL (H)). according to latest data. Monitor urine output and serial BMP and adjust therapy as needed.  Avoid nephrotoxins and renally dose meds for GFR listed above.    IVF  Hold home lasix, losartan   - Nephrology consulted, appreciate recommendations    Type 2 diabetes mellitus with stage 4 chronic kidney disease, without long-term current use of insulin  Patient's FSGs are controlled on current medication regimen.  Last A1c reviewed-   Lab Results   Component Value Date    LABA1C 7.2 (H) 05/25/2018    HGBA1C 5.5 11/18/2022     Most recent fingerstick glucose reviewed- No results for input(s): POCTGLUCOSE in the last 24 hours.  Current correctional scale  Low  Maintain anti-hyperglycemic dose as follows-   Antihyperglycemics (From admission, onward)    Start     Stop Route Frequency Ordered    07/28/23 2202  insulin aspart U-100 pen 0-5 Units         -- SubQ Before meals & nightly PRN 07/28/23 2103        Hold Oral hypoglycemics while patient is in the hospital.    Chronic diastolic heart failure  Results for orders placed during the hospital encounter of 01/16/23    Echo    Interpretation Summary  · The left ventricle is normal in size with concentric hypertrophy and hyperdynamic systolic function.  · The estimated ejection fraction is 75%.  · Grade II left ventricular diastolic dysfunction.  · Normal right ventricular size with normal right ventricular systolic function.  · Mild to moderate left atrial enlargement.  · Mild mitral regurgitation.  · Mild tricuspid regurgitation.  · Mild pulmonic regurgitation.  · Mild right atrial enlargement.  · Small anterior pericardial effusion. Effusion is fluid.  · Normal central venous pressure (3 mmHg).  · The estimated PA systolic pressure is 21 mmHg.  No acute issues.     Coronary artery disease involving native coronary artery of native heart without angina pectoris  S/p stenting in 2011  Resume home medications    PAD (peripheral artery disease)  Chronic left lower extremity swelling. S/p stenting Resume home medications       Hyperlipidemia associated with type 2  diabetes mellitus  Resume statin    Hypertension associated with type 2 diabetes mellitus  Hold hydralazine and nifedipine  - ok for carvedilol for now, may need to decrease      VTE Risk Mitigation (From admission, onward)         Ordered     IP VTE HIGH RISK PATIENT  Once         07/28/23 2103     Place sequential compression device  Until discontinued         07/28/23 2103                Discharge Planning   GAGE:      Code Status: Full Code   Is the patient medically ready for discharge?:     Reason for patient still in hospital (select all that apply): Treatment  Discharge Plan A: Home (Follow-up)                  Kareem Francois MD  Department of Hospital Medicine   [facility] ER Surg

## 2023-07-29 NOTE — HPI
Petra Zazueta is an 88yoF with a past medical history significant for HTN, HLD, DM2, CKD IV, CAD and HFpEF who presented to an outside hospital with complaints of vomiting. She describes a week-long history of abdominal discomfort, decreased PO intake, and several bouts of emesis. This prompted her presentation. In the outside emergency room, her work up demonstrated a worsened MANPREET and a CT scan with distended small intestine with mild tapering of the small bowel distally. She had an NG tube placed at the outside ED and it put out 2L. It was removed for unknown reasons. She continues with bowel movements. She was transferred to [facility] for surgical evaluation.     On my exam, the patient is resting comfortably in bed. Her abdomen is soft, non-distended and largely non-tender. She reports ongoing bowel function. She has no NG tube. Her CT scan is concerning for a small bowel obstruction, which I explained to her. Plan of care discussed with the patient.

## 2023-07-29 NOTE — NURSING
[facility]  Nurses Note -- 4 Eyes      7/29/2023       Skin assessed on: Q Shift      [x] No Pressure Injuries Present    [x]Prevention Measures Documented    [] Yes LDA  for Pressure Injury Previously documented     [] Yes New Pressure Injury Discovered   [] LDA for New Pressure Injury Added      Attending RN:  Dana Sanchez RN     Second RN:  Ashley DONOHUE LPN

## 2023-07-29 NOTE — ASSESSMENT & PLAN NOTE
Presented with abdominal pain, vomiting  CT abdomen and pelvis showed findings consistent with obstruction versus ileus, and bibasilar atelectasis versus consolidative changes  S/p NG tube with 2L removed in the ED. Subsequently, NG tube was removed- unclear when/why. Replaced today.    Plan:   Surgery consult  Keep NPO  IVF  Pain control

## 2023-07-29 NOTE — NURSING
NGT placed per order. Imaging reviewed by Dr. Rubalcava. NGT pulled back 8c m per , no re-imaging needed at this time. LIWS started per .

## 2023-07-29 NOTE — ASSESSMENT & PLAN NOTE
Patient with acute kidney injury/acute renal failure likely due to pre-renal azotemia due to IVVD MANPREET is currently stable. Baseline creatinine 1.5 - Labs reviewed- Renal function/electrolytes with Estimated Creatinine Clearance: 14.8 mL/min (A) (based on SCr of 2.45 mg/dL (H)). according to latest data. Monitor urine output and serial BMP and adjust therapy as needed. Avoid nephrotoxins and renally dose meds for GFR listed above.    IVF  Hold home lasix, losartan

## 2023-07-29 NOTE — SUBJECTIVE & OBJECTIVE
Interval History: feeling better this morning, no flatus or BM today. NGT placed    Review of Systems  Objective:     Vital Signs (Most Recent):  Temp: 98.3 °F (36.8 °C) (07/29/23 1537)  Pulse: 62 (07/29/23 1537)  Resp: 18 (07/29/23 1537)  BP: (!) 117/56 (07/29/23 1537)  SpO2: (!) 92 % (07/29/23 1537) Vital Signs (24h Range):  Temp:  [98 °F (36.7 °C)-99.5 °F (37.5 °C)] 98.3 °F (36.8 °C)  Pulse:  [62-80] 62  Resp:  [18-22] 18  SpO2:  [85 %-100 %] 92 %  BP: ()/(46-64) 117/56     Weight: 69.3 kg (152 lb 12.5 oz)  Body mass index is 27.94 kg/m².  No intake or output data in the 24 hours ending 07/29/23 1549      Physical Exam  Vitals and nursing note reviewed.   Constitutional:       General: She is not in acute distress.     Appearance: Normal appearance.   HENT:      Nose: Nose normal.      Mouth/Throat:      Mouth: Mucous membranes are moist.   Cardiovascular:      Rate and Rhythm: Normal rate and regular rhythm.   Pulmonary:      Effort: Pulmonary effort is normal. No respiratory distress.   Abdominal:      General: Abdomen is flat. There is no distension.      Palpations: Abdomen is soft.      Tenderness: There is no abdominal tenderness.   Musculoskeletal:         General: Normal range of motion.      Left lower leg: Edema present.   Skin:     General: Skin is warm and dry.   Neurological:      General: No focal deficit present.      Mental Status: She is alert.             Significant Labs: All pertinent labs within the past 24 hours have been reviewed.    Significant Imaging: I have reviewed all pertinent imaging results/findings within the past 24 hours.

## 2023-07-29 NOTE — CONSULTS
[facility] Surg  General Surgery  Consult Note    Patient Name: Petra Zazueta  MRN: [id number]  Code Status: Full Code  Admission Date: 7/28/2023  Hospital Length of Stay: 1 days  Attending Physician: Kareem Francois MD  Primary Care Provider: Edel Shepard MD    Patient information was obtained from patient and past medical records.     Inpatient consult to General Surgery  Consult performed by: Rasheed Rubalcava MD  Consult ordered by: Abbe Lazo MD        Subjective:     Principal Problem: SBO (small bowel obstruction)    History of Present Illness: Petra Zazueta is an 88yoF with a past medical history significant for HTN, HLD, DM2, CKD IV, CAD and HFpEF who presented to an outside hospital with complaints of vomiting. She describes a week-long history of abdominal discomfort, decreased PO intake, and several bouts of emesis. This prompted her presentation. In the outside emergency room, her work up demonstrated a worsened MANPREET and a CT scan with distended small intestine with mild tapering of the small bowel distally. She had an NG tube placed at the outside ED and it put out 2L. It was removed for unknown reasons. She continues with bowel movements. She was transferred to [facility] for surgical evaluation.     On my exam, the patient is resting comfortably in bed. Her abdomen is soft, non-distended and largely non-tender. She reports ongoing bowel function. She has no NG tube. Her CT scan is concerning for a small bowel obstruction, which I explained to her. Plan of care discussed with the patient.       Current Facility-Administered Medications on File Prior to Encounter   Medication    [COMPLETED] LIDOcaine (PF) 40 mg/mL (4 %) injection 5 mL    [COMPLETED] morphine injection 2 mg    [COMPLETED] ondansetron injection 4 mg    [COMPLETED] sodium chloride 0.9% bolus 500 mL 500 mL     Current Outpatient Medications on File Prior to Encounter   Medication Sig    acetaminophen (TYLENOL) 325 MG tablet  Take 325 mg by mouth as needed for Pain.    aspirin (ECOTRIN) 81 MG EC tablet Take 81 mg by mouth once daily.    atorvastatin (LIPITOR) 80 MG tablet Take 1 tablet (80 mg total) by mouth every evening.    blood sugar diagnostic (TRUE METRIX GLUCOSE TEST STRIP) Strp 1 strip by Misc.(Non-Drug; Combo Route) route 2 (two) times daily.    blood-glucose meter (TRUE METRIX GLUCOSE METER) Misc Test blood sugars twice daily    carvediloL (COREG) 25 MG tablet TAKE 1 TABLET BY MOUTH TWICE A DAY WITH FOOD (Patient taking differently: Take 25 mg by mouth 2 (two) times daily with meals.)    clopidogreL (PLAVIX) 75 mg tablet Take 1 tablet (75 mg total) by mouth once daily.    ferrous sulfate 324 mg (65 mg iron) TbEC TAKE 1 TABLET (324 MG TOTAL) BY MOUTH ONCE DAILY. (Patient taking differently: Take 324 mg by mouth once daily.)    furosemide (LASIX) 20 MG tablet Take 1 tablet (20 mg total) by mouth once daily.    gabapentin (NEURONTIN) 300 MG capsule TAKE 1 CAPSULE BY MOUTH EVERY EVENING (Patient taking differently: Take 300 mg by mouth every evening.)    hydrALAZINE (APRESOLINE) 100 MG tablet Take 100 mg by mouth every 8 (eight) hours.    lancets (BD ULTRA FINE LANCETS) 33 gauge Misc 1 lancet by Misc.(Non-Drug; Combo Route) route 2 (two) times daily.    latanoprost 0.005 % ophthalmic solution Place 1 drop into both eyes every evening.    linaGLIPtin (TRADJENTA) 5 mg Tab tablet Take 1 tablet (5 mg total) by mouth once daily.    losartan (COZAAR) 100 MG tablet TAKE 1 TABLET BY MOUTH EVERY DAY (Patient taking differently: Take 100 mg by mouth once daily.)    NIFEdipine (ADALAT CC) 90 MG TbSR Take 90 mg by mouth once daily.    omeprazole (PRILOSEC) 40 MG capsule Take 1 capsule (40 mg total) by mouth once daily.    [DISCONTINUED] cilostazol (PLETAL) 100 MG Tab Take 100 mg by mouth 2 (two) times daily.       Review of patient's allergies indicates:   Allergen Reactions    Codeine Other (See Comments)     Oliver,  lightheadedness, nausea  Other reaction(s): NAUSEA       Past Medical History:   Diagnosis Date    Acute on chronic diastolic congestive heart failure 2023    Anemia     Arthritis     Coronary artery disease     Diabetes mellitus     Diabetes mellitus type II     Diabetic retinopathy 2019    Dyslipidemia     Hypertension     Insomnia     PAD (peripheral artery disease)      Past Surgical History:   Procedure Laterality Date    angiagram  2018    CORONARY ANGIOGRAPHY N/A 2022    Procedure: ANGIOGRAM, CORONARY ARTERY;  Surgeon: Kiet Vega MD;  Location: [facility] CATH LAB/EP;  Service: Cardiology;  Laterality: N/A;    CORONARY ANGIOPLASTY      RCA 2011 EJ    GALLBLADDER SURGERY      HYSTERECTOMY      LEFT HEART CATHETERIZATION Left 2022    Procedure: Left heart cath;  Surgeon: Kiet Vega MD;  Location: [facility] CATH LAB/EP;  Service: Cardiology;  Laterality: Left;    PERIPHERAL ARTERIAL STENT GRAFT      Left lower extremity RCA      Family History       Problem Relation (Age of Onset)    Heart attack Mother          Tobacco Use    Smoking status: Former     Current packs/day: 0.00     Types: Cigarettes     Quit date: 2005     Years since quittin.6    Smokeless tobacco: Never   Substance and Sexual Activity    Alcohol use: No    Drug use: No    Sexual activity: Not Currently     Review of Systems   Constitutional:  Positive for activity change and appetite change. Negative for chills, fatigue and fever.   HENT: Negative.     Eyes: Negative.    Respiratory:  Negative for cough, choking and shortness of breath.    Cardiovascular:  Negative for chest pain.   Gastrointestinal:  Positive for abdominal pain, nausea and vomiting. Negative for abdominal distention, constipation and diarrhea.   Endocrine: Negative.    Genitourinary: Negative.    Skin: Negative.      Objective:     Vital Signs (Most Recent):  Temp: 98.9 °F (37.2 °C) (23 0700)  Pulse: 69  (07/29/23 0700)  Resp: 19 (07/29/23 0700)  BP: (!) 110/53 (07/29/23 0700)  SpO2: 96 % (07/29/23 0700) Vital Signs (24h Range):  Temp:  [97.5 °F (36.4 °C)-99.5 °F (37.5 °C)] 98.9 °F (37.2 °C)  Pulse:  [63-80] 69  Resp:  [19-26] 19  SpO2:  [85 %-100 %] 96 %  BP: ()/(46-72) 110/53     Weight: 69.3 kg (152 lb 12.5 oz)  Body mass index is 27.94 kg/m².     Physical Exam  Vitals and nursing note reviewed.   Constitutional:       General: She is not in acute distress.     Appearance: Normal appearance.   HENT:      Nose: Nose normal.      Mouth/Throat:      Mouth: Mucous membranes are moist.   Cardiovascular:      Rate and Rhythm: Normal rate and regular rhythm.   Pulmonary:      Effort: Pulmonary effort is normal. No respiratory distress.   Abdominal:      General: Abdomen is flat. There is no distension.      Palpations: Abdomen is soft.      Tenderness: There is no abdominal tenderness.   Musculoskeletal:         General: Normal range of motion.      Left lower leg: Edema present.   Skin:     General: Skin is warm and dry.   Neurological:      General: No focal deficit present.      Mental Status: She is alert.            I have reviewed all pertinent lab results within the past 24 hours.  CBC:   Recent Labs   Lab 07/29/23  0507   WBC 4.36   RBC 3.35*   HGB 10.8*   HCT 34.6*   *   *   MCH 32.2*   MCHC 31.2*     CMP:   Recent Labs   Lab 07/28/23  1123 07/29/23  0507   * 123*   CALCIUM 10.2 8.3*   ALBUMIN 4.7  --    PROT 8.6*  --     141   K 3.5 3.4*   CO2 22* 21*    105   BUN 57* 70*   CREATININE 2.45* 3.2*   ALKPHOS 110  --    ALT 22  --    AST 27  --    BILITOT 0.7  --        Significant Diagnostics:  I have reviewed all pertinent imaging results/findings within the past 24 hours.      Assessment/Plan:     * SBO (small bowel obstruction)  Petra Zazueta is an 88yoF who presents with a week-long history of abdominal discomfort, decrease PO intake, and emesis. Her work-up concerning  for a small bowel obstruction. General surgery consulted for evaluation.     - patient seen and examined this morning  - labs and imaging reviewed   - On my review of the CT scan, there is gastric and proximal small bowel dilation with distal tapering of the small bowel to normal caliber at the pelvic inlet   - NG tube previously placed at OSH with reported 2L output  - would recommend replacement of NG tube for further decompression   - plan to decompress for 12-24 hours   - gastrograffin challenge either 7/30 or 7/31 pending NG tube output  - patient with ongoing bowel function  - NPO  - rest of care per primary; please call with questions      VTE Risk Mitigation (From admission, onward)         Ordered     IP VTE HIGH RISK PATIENT  Once         07/28/23 2103     Place sequential compression device  Until discontinued         07/28/23 2103                Thank you for your consult. I will follow-up with patient. Please contact us if you have any additional questions.    Rasheed Rubalcava MD  General Surgery  [facility] [place] - Med Surg

## 2023-07-29 NOTE — ASSESSMENT & PLAN NOTE
Presented with abdominal pain, vomiting  CT abdomen and pelvis showed findings consistent with obstruction versus ileus, and bibasilar atelectasis versus consolidative changes  S/p NG tube with 2L removed in the ED. Subsequently, NG tube was removed- unclear when/why.     Plan:   Surgery consult  Keep NPO  IVF  Pain control   NG tube if vomiting or symptoms worsen

## 2023-07-29 NOTE — PLAN OF CARE
Case Management Assessment     PCP: Edel Shepard MD  Pharmacy: [facility]  Patient Arrived From: Home  Existing Help at Home: kasandra Weiss  Barriers to Discharge: None    Discharge Plan:    A. Home; follow-up   B. TBD    Independent at home alone; son lives near and assists if needed; no current medical services or equipment used; no needs reported.        07/29/23 1543   Discharge Assessment   Assessment Type Discharge Planning Assessment   Confirmed/corrected address, phone number and insurance Yes   Confirmed Demographics Correct on Facesheet   Source of Information patient;health record   Communicated GAGE with patient/caregiver Date not available/Unable to determine   Reason For Admission SBO   People in Home alone   Facility Arrived From: Home   Do you expect to return to your current living situation? Yes   Do you have help at home or someone to help you manage your care at home? Yes   Who are your caregiver(s) and their phone number(s)? Abhishek-son: [phone]; lives near   Prior to hospitilization cognitive status: Alert/Oriented   Current cognitive status: Alert/Oriented   Home Accessibility wheelchair accessible   Home Layout Able to live on 1st floor   Equipment Currently Used at Home none   Readmission within 30 days? No   Patient currently being followed by outpatient case management? No   Do you currently have service(s) that help you manage your care at home? No   Do you take prescription medications? Yes   Do you have prescription coverage? Yes   Coverage PHN   Do you have any problems affording any of your prescribed medications? No   Is the patient taking medications as prescribed? yes   Who is going to help you get home at discharge? Marcel   How do you get to doctors appointments? car, drives self   Are you on dialysis? No   Do you take coumadin? No   Discharge Plan A Home  (Follow-up)   Discharge Plan B Other  (TBD)   DME Needed Upon Discharge  other (see comments)  (TBD)   Discharge  Plan discussed with: Patient   Transition of Care Barriers None     SW Role explained to patient; two patient identifiers recognized; SW contact information placed on Communication board. Discussed patient managing health care at home and discussed discharge plans A and B; determined who would be helping patient at home with recovery: Abhishek-son, will help with recovery at home.

## 2023-07-29 NOTE — ASSESSMENT & PLAN NOTE
Results for orders placed during the hospital encounter of 01/16/23    Echo    Interpretation Summary  · The left ventricle is normal in size with concentric hypertrophy and hyperdynamic systolic function.  · The estimated ejection fraction is 75%.  · Grade II left ventricular diastolic dysfunction.  · Normal right ventricular size with normal right ventricular systolic function.  · Mild to moderate left atrial enlargement.  · Mild mitral regurgitation.  · Mild tricuspid regurgitation.  · Mild pulmonic regurgitation.  · Mild right atrial enlargement.  · Small anterior pericardial effusion. Effusion is fluid.  · Normal central venous pressure (3 mmHg).  · The estimated PA systolic pressure is 21 mmHg.  No acute issues.

## 2023-07-29 NOTE — CONSULTS
[facility]  Nephrology  Consult Note    Patient Name: Petra Zazueta  MRN: [id number]  Admission Date: 7/28/2023  Hospital Length of Stay: 1 days  Attending Provider: Kareem Francois MD   Primary Care Physician: Edel Shepard MD  Principal Problem:SBO (small bowel obstruction)  Date of service 7/29/2023  Inpatient consult to Nephrology  Consult performed by: Velvet Morales MD  Consult ordered by: Kareem Francois MD  Reason for consult: MANPREET        Subjective:     HPI: 87yo F w/ PMH NBT3b-3 who presented to [facility] w/ abdominal pain and emesis that began about 1 week ago.  Vomited 1 time and 1 loose bowel movement the night before presentation.  She reports dark urine prior to arrival, but its clearing up since admission.  Was passing gas and having bowel movements.  Associated bowel distention.  Since NGT placed, the nausea and vomiting have improved.  She is receiving IVF as well. Labs notable for Cr 2.45 worsened to 3.2 this morning.  Imaging notable for obstruction vs ileus.    Chart and outside records reviewed including pertinent progress notes, radiology reports, labs from [facility].    Past Medical History:   Diagnosis Date    Acute on chronic diastolic congestive heart failure 1/16/2023    Anemia     Arthritis     Coronary artery disease     Diabetes mellitus     Diabetes mellitus type II     Diabetic retinopathy 08/22/2019    Dyslipidemia     Hypertension     Insomnia     PAD (peripheral artery disease)        Past Surgical History:   Procedure Laterality Date    angiagram  08/31/2018    CORONARY ANGIOGRAPHY N/A 6/7/2022    Procedure: ANGIOGRAM, CORONARY ARTERY;  Surgeon: Kiet Vega MD;  Location: [facility] CATH LAB/EP;  Service: Cardiology;  Laterality: N/A;    CORONARY ANGIOPLASTY      RCA 4/2011 EJ    GALLBLADDER SURGERY      HYSTERECTOMY      LEFT HEART CATHETERIZATION Left 6/7/2022    Procedure: Left heart cath;  Surgeon: Kiet Vega MD;   Location: [facility] CATH LAB/EP;  Service: Cardiology;  Laterality: Left;    PERIPHERAL ARTERIAL STENT GRAFT      Left lower extremity RCA        Review of patient's allergies indicates:   Allergen Reactions    Codeine Other (See Comments)     Jittery, lightheadedness, nausea  Other reaction(s): NAUSEA     Current Facility-Administered Medications   Medication Frequency    0.9%  NaCl infusion Continuous    acetaminophen tablet 650 mg Q8H PRN    acetaminophen tablet 650 mg Q4H PRN    albuterol-ipratropium 2.5 mg-0.5 mg/3 mL nebulizer solution 3 mL Q4H PRN    aluminum-magnesium hydroxide-simethicone 200-200-20 mg/5 mL suspension 30 mL QID PRN    aspirin EC tablet 81 mg Daily    atorvastatin tablet 80 mg QHS    carvediloL tablet 25 mg BID WM    clopidogreL tablet 75 mg Daily    dextrose 50% injection 12.5 g PRN    dextrose 50% injection 25 g PRN    glucagon (human recombinant) injection 1 mg PRN    glucose chewable tablet 16 g PRN    glucose chewable tablet 24 g PRN    hydrALAZINE tablet 100 mg Q8H    insulin aspart U-100 pen 0-5 Units QID (AC + HS) PRN    latanoprost 0.005 % ophthalmic solution 1 drop QHS    magnesium oxide tablet 800 mg PRN    magnesium oxide tablet 800 mg PRN    magnesium sulfate 2g in water 50mL IVPB (premix) Once    melatonin tablet 6 mg Nightly PRN    mupirocin 2 % ointment BID    naloxone 0.4 mg/mL injection 0.02 mg PRN    NIFEdipine 24 hr tablet 90 mg Daily    ondansetron injection 4 mg Q8H PRN    potassium bicarbonate disintegrating tablet 35 mEq PRN    potassium bicarbonate disintegrating tablet 50 mEq PRN    potassium bicarbonate disintegrating tablet 60 mEq PRN    potassium, sodium phosphates 280-160-250 mg packet 2 packet PRN    potassium, sodium phosphates 280-160-250 mg packet 2 packet PRN    potassium, sodium phosphates 280-160-250 mg packet 2 packet PRN    prochlorperazine injection Soln 5 mg Q6H PRN    simethicone chewable tablet 80 mg QID PRN    sodium chloride 0.9% flush 10 mL Q12H PRN      Family History       Problem Relation (Age of Onset)    Heart attack Mother          Tobacco Use    Smoking status: Former     Current packs/day: 0.00     Types: Cigarettes     Quit date: 2005     Years since quittin.6    Smokeless tobacco: Never   Substance and Sexual Activity    Alcohol use: No    Drug use: No    Sexual activity: Not Currently     Review of Systems   All other systems reviewed and are negative.    Objective:     Vital Signs (Most Recent):  Temp: 98.9 °F (37.2 °C) (23 07)  Pulse: 69 (23)  Resp: 19 (23)  BP: (!) 110/53 (23)  SpO2: 96 % (23) Vital Signs (24h Range):  Temp:  [98 °F (36.7 °C)-99.5 °F (37.5 °C)] 98.9 °F (37.2 °C)  Pulse:  [63-80] 69  Resp:  [19-25] 19  SpO2:  [85 %-100 %] 96 %  BP: ()/(46-71) 110/53     Weight: 69.3 kg (152 lb 12.5 oz) (23 1950)  Body mass index is 27.94 kg/m².  Body surface area is 1.74 meters squared.    No intake/output data recorded.    Physical Exam  Vitals and nursing note reviewed.   Constitutional:       General: She is not in acute distress.     Appearance: Normal appearance. She is well-developed. She is not ill-appearing or diaphoretic.   HENT:      Head: Normocephalic and atraumatic.      Nose:      Comments: NGT  Eyes:      General: No scleral icterus.        Right eye: No discharge.         Left eye: No discharge.   Cardiovascular:      Rate and Rhythm: Normal rate and regular rhythm.      Heart sounds: Normal heart sounds. No murmur heard.     No friction rub.   Pulmonary:      Effort: Pulmonary effort is normal. No respiratory distress.      Breath sounds: Normal breath sounds. No wheezing or rales.   Abdominal:      General: There is no distension.      Palpations: Abdomen is soft. There is no mass.      Tenderness: There is no abdominal tenderness.   Musculoskeletal:         General: No deformity.      Right lower leg: No edema.      Left lower leg: No edema.   Skin:      General: Skin is warm and dry.      Findings: No erythema or rash.   Neurological:      Mental Status: She is alert and oriented to person, place, and time.   Psychiatric:         Mood and Affect: Mood normal.         Behavior: Behavior normal.         Thought Content: Thought content normal.         Significant Labs:  CBC:   Recent Labs   Lab 07/29/23  0507   WBC 4.36   RBC 3.35*   HGB 10.8*   HCT 34.6*   *   *   MCH 32.2*   MCHC 31.2*     CMP:   Recent Labs   Lab 07/28/23  1123 07/29/23  0507   * 123*   CALCIUM 10.2 8.3*   ALBUMIN 4.7  --    PROT 8.6*  --     141   K 3.5 3.4*   CO2 22* 21*    105   BUN 57* 70*   CREATININE 2.45* 3.2*   ALKPHOS 110  --    ALT 22  --    AST 27  --    BILITOT 0.7  --        All labs within the past 24 hours have been reviewed.    Significant Imaging:  X-Ray: Reviewed  CT: Reviewed    Assessment/Plan:     Active Diagnoses:    Diagnosis Date Noted POA    PRINCIPAL PROBLEM:  SBO (small bowel obstruction) [K56.609] 07/28/2023 Yes    Acute kidney injury superimposed on chronic kidney disease [N17.9, N18.9] 07/28/2023 Unknown    Type 2 diabetes mellitus with stage 4 chronic kidney disease, without long-term current use of insulin [E11.22, N18.4] 01/20/2020 Yes    Chronic diastolic heart failure [I50.32] 08/23/2018 Yes    Coronary artery disease involving native coronary artery of native heart without angina pectoris [I25.10]  Yes    PAD (peripheral artery disease) [I73.9] 12/26/2012 Yes    Hypertension associated with type 2 diabetes mellitus [E11.59, I15.2] 12/26/2012 Yes    Hyperlipidemia associated with type 2 diabetes mellitus [E11.69, E78.5] 12/26/2012 Yes      Problems Resolved During this Admission:       MANPREET on CKD3b  - baseline Cr 1.3-1.5, follows in my outpatient clinic  - Cr worsening, up to 3.2 today.  MANPREET in the setting of SBO and hypotension  - continue IVF and decompression of SBO  - maintain normotension  - check UA, ULytes, repeat labs this  PM  - no urgent indication for dialysis   - monitor daily weights, strict I&Os  - avoid nephrotoxic agents including NSAIDs, renally dose medications    Hypokalemia  - caution w/ replacement in the setting of worsening renal function  - repeat labs this PM    Azotemia  - no fulminant uremia, continue IVF as above    Hypomagnesemia  - IV replacement, repeat labs this PM    Hypotension  - BP coming up, continue IVF  - titrate down on BP meds, BP meds being held currently while NPO    SBO    h/o UTI  Diabetes ~10-15 years, A1C 5.5 on 11/2022  CAD s/p stent  DHF  HLD  PAD   OA  h/o tobacco abuse    Thank you for your consult. I will follow-up with patient. Please contact us if you have any additional questions.    Lynn Morales MD  Nephrology  [facility] - Med Surg

## 2023-07-29 NOTE — SUBJECTIVE & OBJECTIVE
Current Facility-Administered Medications on File Prior to Encounter   Medication    [COMPLETED] LIDOcaine (PF) 40 mg/mL (4 %) injection 5 mL    [COMPLETED] morphine injection 2 mg    [COMPLETED] ondansetron injection 4 mg    [COMPLETED] sodium chloride 0.9% bolus 500 mL 500 mL     Current Outpatient Medications on File Prior to Encounter   Medication Sig    acetaminophen (TYLENOL) 325 MG tablet Take 325 mg by mouth as needed for Pain.    aspirin (ECOTRIN) 81 MG EC tablet Take 81 mg by mouth once daily.    atorvastatin (LIPITOR) 80 MG tablet Take 1 tablet (80 mg total) by mouth every evening.    blood sugar diagnostic (TRUE METRIX GLUCOSE TEST STRIP) Strp 1 strip by Misc.(Non-Drug; Combo Route) route 2 (two) times daily.    blood-glucose meter (TRUE METRIX GLUCOSE METER) Misc Test blood sugars twice daily    carvediloL (COREG) 25 MG tablet TAKE 1 TABLET BY MOUTH TWICE A DAY WITH FOOD (Patient taking differently: Take 25 mg by mouth 2 (two) times daily with meals.)    clopidogreL (PLAVIX) 75 mg tablet Take 1 tablet (75 mg total) by mouth once daily.    ferrous sulfate 324 mg (65 mg iron) TbEC TAKE 1 TABLET (324 MG TOTAL) BY MOUTH ONCE DAILY. (Patient taking differently: Take 324 mg by mouth once daily.)    furosemide (LASIX) 20 MG tablet Take 1 tablet (20 mg total) by mouth once daily.    gabapentin (NEURONTIN) 300 MG capsule TAKE 1 CAPSULE BY MOUTH EVERY EVENING (Patient taking differently: Take 300 mg by mouth every evening.)    hydrALAZINE (APRESOLINE) 100 MG tablet Take 100 mg by mouth every 8 (eight) hours.    lancets (BD ULTRA FINE LANCETS) 33 gauge Misc 1 lancet by Misc.(Non-Drug; Combo Route) route 2 (two) times daily.    latanoprost 0.005 % ophthalmic solution Place 1 drop into both eyes every evening.    linaGLIPtin (TRADJENTA) 5 mg Tab tablet Take 1 tablet (5 mg total) by mouth once daily.    losartan (COZAAR) 100 MG tablet TAKE 1 TABLET BY MOUTH EVERY DAY (Patient taking differently: Take 100 mg by mouth  once daily.)    NIFEdipine (ADALAT CC) 90 MG TbSR Take 90 mg by mouth once daily.    omeprazole (PRILOSEC) 40 MG capsule Take 1 capsule (40 mg total) by mouth once daily.    [DISCONTINUED] cilostazol (PLETAL) 100 MG Tab Take 100 mg by mouth 2 (two) times daily.       Review of patient's allergies indicates:   Allergen Reactions    Codeine Other (See Comments)     Jittery, lightheadedness, nausea  Other reaction(s): NAUSEA       Past Medical History:   Diagnosis Date    Acute on chronic diastolic congestive heart failure 2023    Anemia     Arthritis     Coronary artery disease     Diabetes mellitus     Diabetes mellitus type II     Diabetic retinopathy 2019    Dyslipidemia     Hypertension     Insomnia     PAD (peripheral artery disease)      Past Surgical History:   Procedure Laterality Date    angiagram  2018    CORONARY ANGIOGRAPHY N/A 2022    Procedure: ANGIOGRAM, CORONARY ARTERY;  Surgeon: Kiet Vega MD;  Location: [facility] CATH LAB/EP;  Service: Cardiology;  Laterality: N/A;    CORONARY ANGIOPLASTY      RCA 2011 EJ    GALLBLADDER SURGERY      HYSTERECTOMY      LEFT HEART CATHETERIZATION Left 2022    Procedure: Left heart cath;  Surgeon: Kiet Vega MD;  Location: [facility] CATH LAB/EP;  Service: Cardiology;  Laterality: Left;    PERIPHERAL ARTERIAL STENT GRAFT      Left lower extremity RCA      Family History       Problem Relation (Age of Onset)    Heart attack Mother          Tobacco Use    Smoking status: Former     Current packs/day: 0.00     Types: Cigarettes     Quit date: 2005     Years since quittin.6    Smokeless tobacco: Never   Substance and Sexual Activity    Alcohol use: No    Drug use: No    Sexual activity: Not Currently     Review of Systems   Constitutional:  Positive for activity change and appetite change. Negative for chills, fatigue and fever.   HENT: Negative.     Eyes: Negative.    Respiratory:  Negative for cough, choking and shortness of breath.     Cardiovascular:  Negative for chest pain.   Gastrointestinal:  Positive for abdominal pain, nausea and vomiting. Negative for abdominal distention, constipation and diarrhea.   Endocrine: Negative.    Genitourinary: Negative.    Skin: Negative.      Objective:     Vital Signs (Most Recent):  Temp: 98.9 °F (37.2 °C) (07/29/23 0700)  Pulse: 69 (07/29/23 0700)  Resp: 19 (07/29/23 0700)  BP: (!) 110/53 (07/29/23 0700)  SpO2: 96 % (07/29/23 0700) Vital Signs (24h Range):  Temp:  [97.5 °F (36.4 °C)-99.5 °F (37.5 °C)] 98.9 °F (37.2 °C)  Pulse:  [63-80] 69  Resp:  [19-26] 19  SpO2:  [85 %-100 %] 96 %  BP: ()/(46-72) 110/53     Weight: 69.3 kg (152 lb 12.5 oz)  Body mass index is 27.94 kg/m².     Physical Exam  Vitals and nursing note reviewed.   Constitutional:       General: She is not in acute distress.     Appearance: Normal appearance.   HENT:      Nose: Nose normal.      Mouth/Throat:      Mouth: Mucous membranes are moist.   Cardiovascular:      Rate and Rhythm: Normal rate and regular rhythm.   Pulmonary:      Effort: Pulmonary effort is normal. No respiratory distress.   Abdominal:      General: Abdomen is flat. There is no distension.      Palpations: Abdomen is soft.      Tenderness: There is no abdominal tenderness.   Musculoskeletal:         General: Normal range of motion.      Left lower leg: Edema present.   Skin:     General: Skin is warm and dry.   Neurological:      General: No focal deficit present.      Mental Status: She is alert.            I have reviewed all pertinent lab results within the past 24 hours.  CBC:   Recent Labs   Lab 07/29/23  0507   WBC 4.36   RBC 3.35*   HGB 10.8*   HCT 34.6*   *   *   MCH 32.2*   MCHC 31.2*     CMP:   Recent Labs   Lab 07/28/23  1123 07/29/23  0507   * 123*   CALCIUM 10.2 8.3*   ALBUMIN 4.7  --    PROT 8.6*  --     141   K 3.5 3.4*   CO2 22* 21*    105   BUN 57* 70*   CREATININE 2.45* 3.2*   ALKPHOS 110  --    ALT 22  --     AST 27  --    BILITOT 0.7  --        Significant Diagnostics:  I have reviewed all pertinent imaging results/findings within the past 24 hours.

## 2023-07-29 NOTE — ASSESSMENT & PLAN NOTE
Petra Zazueta is an 88yoF who presents with a week-long history of abdominal discomfort, decrease PO intake, and emesis. Her work-up concerning for a small bowel obstruction. General surgery consulted for evaluation.     - patient seen and examined this morning  - labs and imaging reviewed   - On my review of the CT scan, there is gastric and proximal small bowel dilation with distal tapering of the small bowel to normal caliber at the pelvic inlet   - NG tube previously placed at OSH with reported 2L output  - would recommend replacement of NG tube for further decompression   - plan to decompress for 12-24 hours   - gastrograffin challenge either 7/30 or 7/31 pending NG tube output  - patient with ongoing bowel function  - NPO  - rest of care per primary; please call with questions

## 2023-07-29 NOTE — PLAN OF CARE
Plan of care is ongoing.      Problem: Adult Inpatient Plan of Care  Goal: Plan of Care Review  Outcome: Ongoing, Progressing  Goal: Patient-Specific Goal (Individualized)  Outcome: Ongoing, Progressing  Goal: Absence of Hospital-Acquired Illness or Injury  Outcome: Ongoing, Progressing  Goal: Optimal Comfort and Wellbeing  Outcome: Ongoing, Progressing  Goal: Readiness for Transition of Care  Outcome: Ongoing, Progressing

## 2023-07-29 NOTE — SUBJECTIVE & OBJECTIVE
Past Medical History:   Diagnosis Date    Acute on chronic diastolic congestive heart failure 1/16/2023    Anemia     Arthritis     Coronary artery disease     Diabetes mellitus     Diabetes mellitus type II     Diabetic retinopathy 08/22/2019    Dyslipidemia     Hypertension     Insomnia     PAD (peripheral artery disease)        Past Surgical History:   Procedure Laterality Date    angiagram  08/31/2018    CORONARY ANGIOGRAPHY N/A 6/7/2022    Procedure: ANGIOGRAM, CORONARY ARTERY;  Surgeon: Kiet Vega MD;  Location: [facility] CATH LAB/EP;  Service: Cardiology;  Laterality: N/A;    CORONARY ANGIOPLASTY      RCA 4/2011 EJ    GALLBLADDER SURGERY      HYSTERECTOMY      LEFT HEART CATHETERIZATION Left 6/7/2022    Procedure: Left heart cath;  Surgeon: Kiet Vega MD;  Location: [facility] CATH LAB/EP;  Service: Cardiology;  Laterality: Left;    PERIPHERAL ARTERIAL STENT GRAFT      Left lower extremity RCA        Review of patient's allergies indicates:   Allergen Reactions    Codeine Other (See Comments)     Jittery, lightheadedness, nausea  Other reaction(s): NAUSEA       Current Facility-Administered Medications on File Prior to Encounter   Medication    [COMPLETED] LIDOcaine (PF) 40 mg/mL (4 %) injection 5 mL    [COMPLETED] morphine injection 2 mg    [COMPLETED] ondansetron injection 4 mg    [COMPLETED] sodium chloride 0.9% bolus 500 mL 500 mL     Current Outpatient Medications on File Prior to Encounter   Medication Sig    acetaminophen (TYLENOL) 325 MG tablet Take 325 mg by mouth as needed for Pain.    aspirin (ECOTRIN) 81 MG EC tablet Take 81 mg by mouth once daily.    atorvastatin (LIPITOR) 80 MG tablet Take 1 tablet (80 mg total) by mouth every evening.    blood sugar diagnostic (TRUE METRIX GLUCOSE TEST STRIP) Strp 1 strip by Misc.(Non-Drug; Combo Route) route 2 (two) times daily.    blood-glucose meter (TRUE METRIX GLUCOSE METER) Misc Test blood sugars twice daily    carvediloL (COREG) 25 MG tablet TAKE 1  TABLET BY MOUTH TWICE A DAY WITH FOOD (Patient taking differently: Take 25 mg by mouth 2 (two) times daily with meals.)    clopidogreL (PLAVIX) 75 mg tablet Take 1 tablet (75 mg total) by mouth once daily.    ferrous sulfate 324 mg (65 mg iron) TbEC TAKE 1 TABLET (324 MG TOTAL) BY MOUTH ONCE DAILY. (Patient taking differently: Take 324 mg by mouth once daily.)    furosemide (LASIX) 20 MG tablet Take 1 tablet (20 mg total) by mouth once daily.    gabapentin (NEURONTIN) 300 MG capsule TAKE 1 CAPSULE BY MOUTH EVERY EVENING (Patient taking differently: Take 300 mg by mouth every evening.)    hydrALAZINE (APRESOLINE) 100 MG tablet Take 100 mg by mouth every 8 (eight) hours.    lancets (BD ULTRA FINE LANCETS) 33 gauge Misc 1 lancet by Misc.(Non-Drug; Combo Route) route 2 (two) times daily.    latanoprost 0.005 % ophthalmic solution Place 1 drop into both eyes every evening.    linaGLIPtin (TRADJENTA) 5 mg Tab tablet Take 1 tablet (5 mg total) by mouth once daily.    losartan (COZAAR) 100 MG tablet TAKE 1 TABLET BY MOUTH EVERY DAY (Patient taking differently: Take 100 mg by mouth once daily.)    NIFEdipine (ADALAT CC) 90 MG TbSR Take 90 mg by mouth once daily.    omeprazole (PRILOSEC) 40 MG capsule Take 1 capsule (40 mg total) by mouth once daily.    [DISCONTINUED] aspirin (ECOTRIN) 81 MG EC tablet Take 1 tablet (81 mg total) by mouth once daily.    [DISCONTINUED] cilostazol (PLETAL) 100 MG Tab Take 100 mg by mouth 2 (two) times daily.    [DISCONTINUED] hydrALAZINE (APRESOLINE) 10 MG tablet Take 10 mg by mouth every 8 (eight) hours. Taking 100mg    [DISCONTINUED] NIFEdipine (ADALAT CC) 60 MG TbSR Take 1 tablet (60 mg total) by mouth once daily. (Patient taking differently: Take 90 mg by mouth once daily.)    [DISCONTINUED] pantoprazole (PROTONIX) 40 MG tablet TAKE 1 TABLET BY MOUTH EVERY DAY     Family History       Problem Relation (Age of Onset)    Heart attack Mother          Tobacco Use    Smoking status: Former      Types: Cigarettes     Quit date: 2005     Years since quittin.5    Smokeless tobacco: Never   Substance and Sexual Activity    Alcohol use: No    Drug use: No    Sexual activity: Not Currently     Review of Systems   Constitutional:  Positive for appetite change.   HENT: Negative.     Eyes: Negative.    Respiratory: Negative.     Cardiovascular: Negative.    Gastrointestinal:  Positive for abdominal pain, nausea and vomiting.   Endocrine: Negative.    Genitourinary: Negative.    Musculoskeletal: Negative.    Skin: Negative.    Allergic/Immunologic: Negative.    Neurological: Negative.    Psychiatric/Behavioral: Negative.     Objective:     Vital Signs (Most Recent):  Temp: 98 °F (36.7 °C) (23)  Pulse: 80 (23)  Resp: (!) 22 (23)  BP: 129/62 (23)  SpO2: 96 % (23) Vital Signs (24h Range):  Temp:  [97.5 °F (36.4 °C)-98 °F (36.7 °C)] 98 °F (36.7 °C)  Pulse:  [63-80] 80  Resp:  [20-26] 22  SpO2:  [89 %-98 %] 96 %  BP: (121-148)/(57-72) 129/62     Weight: 69.3 kg (152 lb 12.5 oz)  Body mass index is 27.94 kg/m².     Physical Exam  Vitals and nursing note reviewed.   Constitutional:       General: She is not in acute distress.     Appearance: Normal appearance. She is not ill-appearing.   HENT:      Head: Normocephalic and atraumatic.      Nose: Nose normal.      Mouth/Throat:      Mouth: Mucous membranes are moist.   Eyes:      Extraocular Movements: Extraocular movements intact.   Cardiovascular:      Rate and Rhythm: Normal rate.      Pulses: Normal pulses.   Pulmonary:      Effort: Pulmonary effort is normal. No respiratory distress.   Abdominal:      General: Abdomen is flat.      Palpations: Abdomen is soft.      Tenderness: There is no abdominal tenderness.   Musculoskeletal:      Right lower leg: No edema.      Left lower leg: No edema.   Skin:     General: Skin is warm.      Capillary Refill: Capillary refill takes less than 2 seconds.    Neurological:      General: No focal deficit present.      Mental Status: She is alert.   Psychiatric:         Mood and Affect: Mood normal.              Significant Labs: All pertinent labs within the past 24 hours have been reviewed.    Significant Imaging: I have reviewed all pertinent imaging results/findings within the past 24 hours.

## 2023-07-29 NOTE — PLAN OF CARE
Brief Nephrology Note    Consult received.  Noted worsening MANPREET w/ hypotension, on IVF and decompression of SBO. Full consult note with history and physical exam to follow.  Thank you for allowing me to participate in the care of your patient.  Please call with any questions.    Date of service: 7/29/2023  Lynn Morales  Nephrology

## 2023-07-29 NOTE — ASSESSMENT & PLAN NOTE
Patient's FSGs are controlled on current medication regimen.  Last A1c reviewed-   Lab Results   Component Value Date    LABA1C 7.2 (H) 05/25/2018    HGBA1C 5.5 11/18/2022     Most recent fingerstick glucose reviewed- No results for input(s): POCTGLUCOSE in the last 24 hours.  Current correctional scale  Low  Maintain anti-hyperglycemic dose as follows-   Antihyperglycemics (From admission, onward)    Start     Stop Route Frequency Ordered    07/28/23 2202  insulin aspart U-100 pen 0-5 Units         -- SubQ Before meals & nightly PRN 07/28/23 2103        Hold Oral hypoglycemics while patient is in the hospital.

## 2023-07-29 NOTE — H&P
[facility] Medicine  History & Physical    Patient Name: Petra Zazueta  MRN: [id number]  Patient Class: IP- Inpatient  Admission Date: 7/28/2023  Attending Physician: Henry Armstrong MD   Primary Care Provider: Edel Shepard MD         Patient information was obtained from patient and ER records.     Subjective:     Principal Problem:SBO (small bowel obstruction)    Chief Complaint: No chief complaint on file.       HPI: This is an 88-year-old female with a past medical history of CAD, HFpEF (EF: 75%, GIIDD), hyperlipidemia, type 2 diabetes, CKD 4, peripheral artery disease, who presents with vomiting.     Patient presents with emesis, abdominal pain and decreased po intake that started about a week prior to presentation. Associated symptoms include abdominal distention and a productive cough. She is having bowel movements and is passing gas. She has chronic left lower extremity swelling that has not changed.     In the ED, the patient was tachypneic, hypertensive and was placed on 3 L of supplemental oxygen for comfort.  Labs were remarkable for an elevated creatinine (2.45 from a baseline of 1.5), hypomagnesemia (1.5).  CT abdomen and pelvis showed findings consistent with obstruction versus ileus, and bibasilar atelectasis versus consolidative changes.  She was given 500 cc of NS, Zofran 4 mg IV, morphine 2 mg IV and an NG-tube was placed with 2L immediate output.  She was admitted for further management. Upon arrival, NG tube had been already removed.       Past Medical History:   Diagnosis Date    Acute on chronic diastolic congestive heart failure 1/16/2023    Anemia     Arthritis     Coronary artery disease     Diabetes mellitus     Diabetes mellitus type II     Diabetic retinopathy 08/22/2019    Dyslipidemia     Hypertension     Insomnia     PAD (peripheral artery disease)        Past Surgical History:   Procedure Laterality Date    angiagram  08/31/2018    CORONARY  ANGIOGRAPHY N/A 6/7/2022    Procedure: ANGIOGRAM, CORONARY ARTERY;  Surgeon: Kiet Vega MD;  Location: [facility] CATH LAB/EP;  Service: Cardiology;  Laterality: N/A;    CORONARY ANGIOPLASTY      RCA 4/2011 EJ    GALLBLADDER SURGERY      HYSTERECTOMY      LEFT HEART CATHETERIZATION Left 6/7/2022    Procedure: Left heart cath;  Surgeon: Kiet Vega MD;  Location: [facility] CATH LAB/EP;  Service: Cardiology;  Laterality: Left;    PERIPHERAL ARTERIAL STENT GRAFT      Left lower extremity RCA        Review of patient's allergies indicates:   Allergen Reactions    Codeine Other (See Comments)     Jittery, lightheadedness, nausea  Other reaction(s): NAUSEA       Current Facility-Administered Medications on File Prior to Encounter   Medication    [COMPLETED] LIDOcaine (PF) 40 mg/mL (4 %) injection 5 mL    [COMPLETED] morphine injection 2 mg    [COMPLETED] ondansetron injection 4 mg    [COMPLETED] sodium chloride 0.9% bolus 500 mL 500 mL     Current Outpatient Medications on File Prior to Encounter   Medication Sig    acetaminophen (TYLENOL) 325 MG tablet Take 325 mg by mouth as needed for Pain.    aspirin (ECOTRIN) 81 MG EC tablet Take 81 mg by mouth once daily.    atorvastatin (LIPITOR) 80 MG tablet Take 1 tablet (80 mg total) by mouth every evening.    blood sugar diagnostic (TRUE METRIX GLUCOSE TEST STRIP) Strp 1 strip by Misc.(Non-Drug; Combo Route) route 2 (two) times daily.    blood-glucose meter (TRUE METRIX GLUCOSE METER) Misc Test blood sugars twice daily    carvediloL (COREG) 25 MG tablet TAKE 1 TABLET BY MOUTH TWICE A DAY WITH FOOD (Patient taking differently: Take 25 mg by mouth 2 (two) times daily with meals.)    clopidogreL (PLAVIX) 75 mg tablet Take 1 tablet (75 mg total) by mouth once daily.    ferrous sulfate 324 mg (65 mg iron) TbEC TAKE 1 TABLET (324 MG TOTAL) BY MOUTH ONCE DAILY. (Patient taking differently: Take 324 mg by mouth once daily.)    furosemide (LASIX) 20 MG tablet Take 1  tablet (20 mg total) by mouth once daily.    gabapentin (NEURONTIN) 300 MG capsule TAKE 1 CAPSULE BY MOUTH EVERY EVENING (Patient taking differently: Take 300 mg by mouth every evening.)    hydrALAZINE (APRESOLINE) 100 MG tablet Take 100 mg by mouth every 8 (eight) hours.    lancets (BD ULTRA FINE LANCETS) 33 gauge Misc 1 lancet by Misc.(Non-Drug; Combo Route) route 2 (two) times daily.    latanoprost 0.005 % ophthalmic solution Place 1 drop into both eyes every evening.    linaGLIPtin (TRADJENTA) 5 mg Tab tablet Take 1 tablet (5 mg total) by mouth once daily.    losartan (COZAAR) 100 MG tablet TAKE 1 TABLET BY MOUTH EVERY DAY (Patient taking differently: Take 100 mg by mouth once daily.)    NIFEdipine (ADALAT CC) 90 MG TbSR Take 90 mg by mouth once daily.    omeprazole (PRILOSEC) 40 MG capsule Take 1 capsule (40 mg total) by mouth once daily.    [DISCONTINUED] aspirin (ECOTRIN) 81 MG EC tablet Take 1 tablet (81 mg total) by mouth once daily.    [DISCONTINUED] cilostazol (PLETAL) 100 MG Tab Take 100 mg by mouth 2 (two) times daily.    [DISCONTINUED] hydrALAZINE (APRESOLINE) 10 MG tablet Take 10 mg by mouth every 8 (eight) hours. Taking 100mg    [DISCONTINUED] NIFEdipine (ADALAT CC) 60 MG TbSR Take 1 tablet (60 mg total) by mouth once daily. (Patient taking differently: Take 90 mg by mouth once daily.)    [DISCONTINUED] pantoprazole (PROTONIX) 40 MG tablet TAKE 1 TABLET BY MOUTH EVERY DAY     Family History       Problem Relation (Age of Onset)    Heart attack Mother          Tobacco Use    Smoking status: Former     Types: Cigarettes     Quit date: 2005     Years since quittin.5    Smokeless tobacco: Never   Substance and Sexual Activity    Alcohol use: No    Drug use: No    Sexual activity: Not Currently     Review of Systems   Constitutional:  Positive for appetite change.   HENT: Negative.     Eyes: Negative.    Respiratory: Negative.     Cardiovascular: Negative.    Gastrointestinal:   Positive for abdominal pain, nausea and vomiting.   Endocrine: Negative.    Genitourinary: Negative.    Musculoskeletal: Negative.    Skin: Negative.    Allergic/Immunologic: Negative.    Neurological: Negative.    Psychiatric/Behavioral: Negative.     Objective:     Vital Signs (Most Recent):  Temp: 98 °F (36.7 °C) (07/28/23 1950)  Pulse: 80 (07/28/23 1950)  Resp: (!) 22 (07/28/23 1950)  BP: 129/62 (07/28/23 1950)  SpO2: 96 % (07/28/23 1950) Vital Signs (24h Range):  Temp:  [97.5 °F (36.4 °C)-98 °F (36.7 °C)] 98 °F (36.7 °C)  Pulse:  [63-80] 80  Resp:  [20-26] 22  SpO2:  [89 %-98 %] 96 %  BP: (121-148)/(57-72) 129/62     Weight: 69.3 kg (152 lb 12.5 oz)  Body mass index is 27.94 kg/m².     Physical Exam  Vitals and nursing note reviewed.   Constitutional:       General: She is not in acute distress.     Appearance: Normal appearance. She is not ill-appearing.   HENT:      Head: Normocephalic and atraumatic.      Nose: Nose normal.      Mouth/Throat:      Mouth: Mucous membranes are moist.   Eyes:      Extraocular Movements: Extraocular movements intact.   Cardiovascular:      Rate and Rhythm: Normal rate.      Pulses: Normal pulses.   Pulmonary:      Effort: Pulmonary effort is normal. No respiratory distress.   Abdominal:      General: Abdomen is flat.      Palpations: Abdomen is soft.      Tenderness: There is no abdominal tenderness.   Musculoskeletal:      Right lower leg: No edema.      Left lower leg: No edema.   Skin:     General: Skin is warm.      Capillary Refill: Capillary refill takes less than 2 seconds.   Neurological:      General: No focal deficit present.      Mental Status: She is alert.   Psychiatric:         Mood and Affect: Mood normal.              Significant Labs: All pertinent labs within the past 24 hours have been reviewed.    Significant Imaging: I have reviewed all pertinent imaging results/findings within the past 24 hours.    Assessment/Plan:     * SBO (small bowel  obstruction)  Presented with abdominal pain, vomiting  CT abdomen and pelvis showed findings consistent with obstruction versus ileus, and bibasilar atelectasis versus consolidative changes  S/p NG tube with 2L removed in the ED. Subsequently, NG tube was removed- unclear when/why.     Plan:   Surgery consult  Keep NPO  IVF  Pain control   NG tube if vomiting or symptoms worsen     Acute kidney injury superimposed on chronic kidney disease  Patient with acute kidney injury/acute renal failure likely due to pre-renal azotemia due to IVVD MANPREET is currently stable. Baseline creatinine 1.5 - Labs reviewed- Renal function/electrolytes with Estimated Creatinine Clearance: 14.8 mL/min (A) (based on SCr of 2.45 mg/dL (H)). according to latest data. Monitor urine output and serial BMP and adjust therapy as needed. Avoid nephrotoxins and renally dose meds for GFR listed above.    IVF  Hold home lasix, losartan     Type 2 diabetes mellitus with stage 4 chronic kidney disease, without long-term current use of insulin  Patient's FSGs are controlled on current medication regimen.  Last A1c reviewed-   Lab Results   Component Value Date    LABA1C 7.2 (H) 05/25/2018    HGBA1C 5.5 11/18/2022     Most recent fingerstick glucose reviewed- No results for input(s): POCTGLUCOSE in the last 24 hours.  Current correctional scale  Low  Maintain anti-hyperglycemic dose as follows-   Antihyperglycemics (From admission, onward)    Start     Stop Route Frequency Ordered    07/28/23 2202  insulin aspart U-100 pen 0-5 Units         -- SubQ Before meals & nightly PRN 07/28/23 2103        Hold Oral hypoglycemics while patient is in the hospital.    Chronic diastolic heart failure  Results for orders placed during the hospital encounter of 01/16/23    Echo    Interpretation Summary  · The left ventricle is normal in size with concentric hypertrophy and hyperdynamic systolic function.  · The estimated ejection fraction is 75%.  · Grade II left  ventricular diastolic dysfunction.  · Normal right ventricular size with normal right ventricular systolic function.  · Mild to moderate left atrial enlargement.  · Mild mitral regurgitation.  · Mild tricuspid regurgitation.  · Mild pulmonic regurgitation.  · Mild right atrial enlargement.  · Small anterior pericardial effusion. Effusion is fluid.  · Normal central venous pressure (3 mmHg).  · The estimated PA systolic pressure is 21 mmHg.  No acute issues.     Coronary artery disease involving native coronary artery of native heart without angina pectoris  S/p stenting.   Resume home medications    PAD (peripheral artery disease)  Chronic left lower extremity swelling. S/p stenting Resume home medications       Hyperlipidemia associated with type 2 diabetes mellitus  Resume statin    Hypertension associated with type 2 diabetes mellitus  Resume non nephrotoxic medications     VTE Risk Mitigation (From admission, onward)         Ordered     IP VTE HIGH RISK PATIENT  Once         07/28/23 2103     Place sequential compression device  Until discontinued         07/28/23 2103                Abbe Lazo MD  Department of Hospital Medicine  [facility] [place]

## 2023-07-30 LAB
ALBUMIN SERPL BCP-MCNC: 2.9 G/DL (ref 3.5–5.2)
ANION GAP SERPL CALC-SCNC: 9 MMOL/L (ref 8–16)
BUN SERPL-MCNC: 61 MG/DL (ref 8–23)
CALCIUM SERPL-MCNC: 8.8 MG/DL (ref 8.7–10.5)
CHLORIDE SERPL-SCNC: 110 MMOL/L (ref 95–110)
CO2 SERPL-SCNC: 25 MMOL/L (ref 23–29)
CREAT SERPL-MCNC: 1.8 MG/DL (ref 0.5–1.4)
EST. GFR  (NO RACE VARIABLE): 27 ML/MIN/1.73 M^2
GLUCOSE SERPL-MCNC: 84 MG/DL (ref 70–110)
PHOSPHATE SERPL-MCNC: 2.9 MG/DL (ref 2.7–4.5)
POCT GLUCOSE: 115 MG/DL (ref 70–110)
POCT GLUCOSE: 63 MG/DL (ref 70–110)
POCT GLUCOSE: 66 MG/DL (ref 70–110)
POCT GLUCOSE: 67 MG/DL (ref 70–110)
POCT GLUCOSE: 77 MG/DL (ref 70–110)
POCT GLUCOSE: 80 MG/DL (ref 70–110)
POCT GLUCOSE: 93 MG/DL (ref 70–110)
POTASSIUM SERPL-SCNC: 3.2 MMOL/L (ref 3.5–5.1)
SODIUM SERPL-SCNC: 144 MMOL/L (ref 136–145)
UUN UR-MCNC: 599 MG/DL (ref 140–1050)

## 2023-07-30 PROCEDURE — 80069 RENAL FUNCTION PANEL: CPT | Performed by: INTERNAL MEDICINE

## 2023-07-30 PROCEDURE — 99222 1ST HOSP IP/OBS MODERATE 55: CPT | Mod: ,,, | Performed by: SURGERY

## 2023-07-30 PROCEDURE — 99900035 HC TECH TIME PER 15 MIN (STAT)

## 2023-07-30 PROCEDURE — 36415 COLL VENOUS BLD VENIPUNCTURE: CPT | Performed by: INTERNAL MEDICINE

## 2023-07-30 PROCEDURE — 94760 N-INVAS EAR/PLS OXIMETRY 1: CPT

## 2023-07-30 PROCEDURE — 99222 PR INITIAL HOSPITAL CARE,LEVL II: ICD-10-PCS | Mod: ,,, | Performed by: SURGERY

## 2023-07-30 PROCEDURE — 25000003 PHARM REV CODE 250: Performed by: STUDENT IN AN ORGANIZED HEALTH CARE EDUCATION/TRAINING PROGRAM

## 2023-07-30 PROCEDURE — 11000001 HC ACUTE MED/SURG PRIVATE ROOM

## 2023-07-30 RX ADMIN — MUPIROCIN: 20 OINTMENT TOPICAL at 08:07

## 2023-07-30 RX ADMIN — ATORVASTATIN CALCIUM 80 MG: 40 TABLET, FILM COATED ORAL at 08:07

## 2023-07-30 RX ADMIN — LATANOPROST 1 DROP: 50 SOLUTION OPHTHALMIC at 08:07

## 2023-07-30 RX ADMIN — DEXTROSE MONOHYDRATE 12.5 G: 25 INJECTION, SOLUTION INTRAVENOUS at 08:07

## 2023-07-30 RX ADMIN — CARVEDILOL 25 MG: 12.5 TABLET, FILM COATED ORAL at 08:07

## 2023-07-30 RX ADMIN — SODIUM CHLORIDE: 9 INJECTION, SOLUTION INTRAVENOUS at 12:07

## 2023-07-30 RX ADMIN — CARVEDILOL 25 MG: 12.5 TABLET, FILM COATED ORAL at 04:07

## 2023-07-30 RX ADMIN — CLOPIDOGREL BISULFATE 75 MG: 75 TABLET ORAL at 08:07

## 2023-07-30 RX ADMIN — ASPIRIN 81 MG: 81 TABLET, COATED ORAL at 08:07

## 2023-07-30 RX ADMIN — DEXTROSE MONOHYDRATE 12.5 G: 25 INJECTION, SOLUTION INTRAVENOUS at 04:07

## 2023-07-30 NOTE — ASSESSMENT & PLAN NOTE
Presented with abdominal pain, vomiting  CT abdomen and pelvis showed findings consistent with obstruction versus ileus, and bibasilar atelectasis versus consolidative changes  S/p NG tube with 2L removed in the ED. Subsequently, NG tube was removed- unclear when/why. Replaced today.    Plan:   Surgery consult  Keep NPO  IVF  Pain control   - slowly having some bowel function, gastrografin challenge today per Surgery

## 2023-07-30 NOTE — SUBJECTIVE & OBJECTIVE
Interval History: + flatus today, feeling better. Asking for ice    Review of Systems  Objective:     Vital Signs (Most Recent):  Temp: 97.4 °F (36.3 °C) (07/30/23 1125)  Pulse: 61 (07/30/23 1125)  Resp: 18 (07/30/23 1125)  BP: (!) 164/70 (07/30/23 1125)  SpO2: 95 % (07/30/23 1125) Vital Signs (24h Range):  Temp:  [97.4 °F (36.3 °C)-99 °F (37.2 °C)] 97.4 °F (36.3 °C)  Pulse:  [61-67] 61  Resp:  [16-18] 18  SpO2:  [90 %-100 %] 95 %  BP: (117-164)/(56-70) 164/70     Weight: 75.3 kg (166 lb 0.1 oz)  Body mass index is 30.36 kg/m².    Intake/Output Summary (Last 24 hours) at 7/30/2023 1328  Last data filed at 7/30/2023 1156  Gross per 24 hour   Intake 0 ml   Output 50 ml   Net -50 ml         Physical Exam  Vitals and nursing note reviewed.   Constitutional:       General: She is not in acute distress.     Appearance: Normal appearance.      Comments: NGT in place   HENT:      Nose: Nose normal.      Mouth/Throat:      Mouth: Mucous membranes are moist.   Cardiovascular:      Rate and Rhythm: Normal rate and regular rhythm.   Pulmonary:      Effort: Pulmonary effort is normal. No respiratory distress.   Abdominal:      General: Abdomen is flat. There is no distension.      Palpations: Abdomen is soft.      Tenderness: There is no abdominal tenderness.   Musculoskeletal:         General: Normal range of motion.      Left lower leg: Edema present.   Skin:     General: Skin is warm and dry.   Neurological:      General: No focal deficit present.      Mental Status: She is alert.             Significant Labs: All pertinent labs within the past 24 hours have been reviewed.    Significant Imaging: I have reviewed all pertinent imaging results/findings within the past 24 hours.

## 2023-07-30 NOTE — PROGRESS NOTES
[facility] Surg  General Surgery  Progress Note    Subjective:     History of Present Illness:  Petra Zazueta is an 88yoF with a past medical history significant for HTN, HLD, DM2, CKD IV, CAD and HFpEF who presented to an outside hospital with complaints of vomiting. She describes a week-long history of abdominal discomfort, decreased PO intake, and several bouts of emesis. This prompted her presentation. In the outside emergency room, her work up demonstrated a worsened MANPREET and a CT scan with distended small intestine with mild tapering of the small bowel distally. She had an NG tube placed at the outside ED and it put out 2L. It was removed for unknown reasons. She continues with bowel movements. She was transferred to [facility] for surgical evaluation.     On my exam, the patient is resting comfortably in bed. Her abdomen is soft, non-distended and largely non-tender. She reports ongoing bowel function. She has no NG tube. Her CT scan is concerning for a small bowel obstruction, which I explained to her. Plan of care discussed with the patient.       Post-Op Info:  * No surgery found *         Interval History: Patient seen and examined this morning. Feeling better subjectively. 500cc dark output from NG tube. Having bowel function. Plan for gastrograffin challenge today.     Medications:  Continuous Infusions:   sodium chloride 0.9% 125 mL/hr at 07/30/23 0034     Scheduled Meds:   aspirin  81 mg Oral Daily    atorvastatin  80 mg Oral QHS    carvediloL  25 mg Oral BID WM    clopidogreL  75 mg Oral Daily    latanoprost  1 drop Both Eyes QHS    mupirocin   Nasal BID     PRN Meds:acetaminophen, acetaminophen, albuterol-ipratropium, aluminum-magnesium hydroxide-simethicone, dextrose 50%, dextrose 50%, glucagon (human recombinant), glucose, glucose, insulin aspart U-100, magnesium oxide, magnesium oxide, melatonin, naloxone, ondansetron, potassium bicarbonate, potassium bicarbonate, potassium bicarbonate,  potassium, sodium phosphates, potassium, sodium phosphates, potassium, sodium phosphates, prochlorperazine, simethicone, sodium chloride 0.9%     Review of patient's allergies indicates:   Allergen Reactions    Codeine Other (See Comments)     Jittery, lightheadedness, nausea  Other reaction(s): NAUSEA     Objective:     Vital Signs (Most Recent):  Temp: 98.9 °F (37.2 °C) (07/30/23 0702)  Pulse: 63 (07/30/23 0702)  Resp: 18 (07/30/23 0702)  BP: 131/60 (07/30/23 0702)  SpO2: (!) 93 % (07/30/23 0800) Vital Signs (24h Range):  Temp:  [98.3 °F (36.8 °C)-99 °F (37.2 °C)] 98.9 °F (37.2 °C)  Pulse:  [62-67] 63  Resp:  [16-18] 18  SpO2:  [90 %-100 %] 93 %  BP: (104-135)/(54-63) 131/60     Weight: 75.3 kg (166 lb 0.1 oz)  Body mass index is 30.36 kg/m².    Intake/Output - Last 3 Shifts         07/28 0700 07/29 0659 07/29 0700 07/30 0659 07/30 0700 07/31 0659    Urine (mL/kg/hr)  400 (0.2)     Drains  50     Stool  0     Total Output  450     Net  -450            Urine Occurrence 4 x 3 x     Stool Occurrence 2 x 2 x 1 x             Physical Exam  Vitals and nursing note reviewed.   Constitutional:       General: She is not in acute distress.     Appearance: Normal appearance.   HENT:      Nose: Nose normal.      Comments: NG tube in place     Mouth/Throat:      Mouth: Mucous membranes are moist.   Cardiovascular:      Rate and Rhythm: Normal rate and regular rhythm.   Pulmonary:      Effort: Pulmonary effort is normal. No respiratory distress.   Abdominal:      General: Abdomen is flat. There is no distension.      Palpations: Abdomen is soft.      Tenderness: There is no abdominal tenderness.   Musculoskeletal:         General: Normal range of motion.      Left lower leg: Edema present.   Skin:     General: Skin is warm and dry.   Neurological:      General: No focal deficit present.      Mental Status: She is alert.          Significant Labs:  I have reviewed all pertinent lab results within the past 24 hours.  CBC:    Recent Labs   Lab 07/29/23  0507   WBC 4.36   RBC 3.35*   HGB 10.8*   HCT 34.6*   *   *   MCH 32.2*   MCHC 31.2*     CMP:   Recent Labs   Lab 07/28/23  1123 07/29/23  0507 07/29/23  1500   *   < > 105   CALCIUM 10.2   < > 8.4*   ALBUMIN 4.7  --  3.2*   PROT 8.6*  --   --       < > 142   K 3.5   < > 3.6   CO2 22*   < > 24      < > 104   BUN 57*   < > 72*   CREATININE 2.45*   < > 3.0*   ALKPHOS 110  --   --    ALT 22  --   --    AST 27  --   --    BILITOT 0.7  --   --     < > = values in this interval not displayed.       Significant Diagnostics:  I have reviewed all pertinent imaging results/findings within the past 24 hours.      Assessment/Plan:     * SBO (small bowel obstruction)  Petra Zazueta is an 88yoF who presents with a week-long history of abdominal discomfort, decrease PO intake, and emesis. Her work-up concerning for a small bowel obstruction. General surgery consulted for evaluation.     - patient seen and examined this morning  - NG tube with 500cc output   - plan for gastrograffin challenge today; orders placed  - patient with ongoing bowel function  - NPO  - rest of care per primary; please call with questions        Rasheed Rubalcava MD  General Surgery  [facility] - [place] - [facility] [place] Surg

## 2023-07-30 NOTE — NURSING
[facility] - [place]  Nurses Note -- 4 Eyes      7/30/2023       Skin assessed on: Q Shift      [x] No Pressure Injuries Present    [x]Prevention Measures Documented    [] Yes LDA  for Pressure Injury Previously documented     [] Yes New Pressure Injury Discovered   [] LDA for New Pressure Injury Added      Attending RN:  Marlin Combs, RN     Second RN:  Haylie Herrmann        Negative

## 2023-07-30 NOTE — SUBJECTIVE & OBJECTIVE
Interval History: Patient seen and examined this morning. Feeling better subjectively. 500cc dark output from NG tube. Having bowel function. Plan for gastrograffin challenge today.     Medications:  Continuous Infusions:   sodium chloride 0.9% 125 mL/hr at 07/30/23 0034     Scheduled Meds:   aspirin  81 mg Oral Daily    atorvastatin  80 mg Oral QHS    carvediloL  25 mg Oral BID WM    clopidogreL  75 mg Oral Daily    latanoprost  1 drop Both Eyes QHS    mupirocin   Nasal BID     PRN Meds:acetaminophen, acetaminophen, albuterol-ipratropium, aluminum-magnesium hydroxide-simethicone, dextrose 50%, dextrose 50%, glucagon (human recombinant), glucose, glucose, insulin aspart U-100, magnesium oxide, magnesium oxide, melatonin, naloxone, ondansetron, potassium bicarbonate, potassium bicarbonate, potassium bicarbonate, potassium, sodium phosphates, potassium, sodium phosphates, potassium, sodium phosphates, prochlorperazine, simethicone, sodium chloride 0.9%     Review of patient's allergies indicates:   Allergen Reactions    Codeine Other (See Comments)     Jittery, lightheadedness, nausea  Other reaction(s): NAUSEA     Objective:     Vital Signs (Most Recent):  Temp: 98.9 °F (37.2 °C) (07/30/23 0702)  Pulse: 63 (07/30/23 0702)  Resp: 18 (07/30/23 0702)  BP: 131/60 (07/30/23 0702)  SpO2: (!) 93 % (07/30/23 0800) Vital Signs (24h Range):  Temp:  [98.3 °F (36.8 °C)-99 °F (37.2 °C)] 98.9 °F (37.2 °C)  Pulse:  [62-67] 63  Resp:  [16-18] 18  SpO2:  [90 %-100 %] 93 %  BP: (104-135)/(54-63) 131/60     Weight: 75.3 kg (166 lb 0.1 oz)  Body mass index is 30.36 kg/m².    Intake/Output - Last 3 Shifts         07/28 0700 07/29 0659 07/29 0700 07/30 0659 07/30 0700 07/31 0659    Urine (mL/kg/hr)  400 (0.2)     Drains  50     Stool  0     Total Output  450     Net  -450            Urine Occurrence 4 x 3 x     Stool Occurrence 2 x 2 x 1 x             Physical Exam  Vitals and nursing note reviewed.   Constitutional:       General: She  is not in acute distress.     Appearance: Normal appearance.   HENT:      Nose: Nose normal.      Comments: NG tube in place     Mouth/Throat:      Mouth: Mucous membranes are moist.   Cardiovascular:      Rate and Rhythm: Normal rate and regular rhythm.   Pulmonary:      Effort: Pulmonary effort is normal. No respiratory distress.   Abdominal:      General: Abdomen is flat. There is no distension.      Palpations: Abdomen is soft.      Tenderness: There is no abdominal tenderness.   Musculoskeletal:         General: Normal range of motion.      Left lower leg: Edema present.   Skin:     General: Skin is warm and dry.   Neurological:      General: No focal deficit present.      Mental Status: She is alert.          Significant Labs:  I have reviewed all pertinent lab results within the past 24 hours.  CBC:   Recent Labs   Lab 07/29/23  0507   WBC 4.36   RBC 3.35*   HGB 10.8*   HCT 34.6*   *   *   MCH 32.2*   MCHC 31.2*     CMP:   Recent Labs   Lab 07/28/23  1123 07/29/23  0507 07/29/23  1500   *   < > 105   CALCIUM 10.2   < > 8.4*   ALBUMIN 4.7  --  3.2*   PROT 8.6*  --   --       < > 142   K 3.5   < > 3.6   CO2 22*   < > 24      < > 104   BUN 57*   < > 72*   CREATININE 2.45*   < > 3.0*   ALKPHOS 110  --   --    ALT 22  --   --    AST 27  --   --    BILITOT 0.7  --   --     < > = values in this interval not displayed.       Significant Diagnostics:  I have reviewed all pertinent imaging results/findings within the past 24 hours.

## 2023-07-30 NOTE — ASSESSMENT & PLAN NOTE
Petra Zazueta is an 88yoF who presents with a week-long history of abdominal discomfort, decrease PO intake, and emesis. Her work-up concerning for a small bowel obstruction. General surgery consulted for evaluation.     - patient seen and examined this morning  - NG tube with 500cc output   - plan for gastrograffin challenge today; orders placed  - patient with ongoing bowel function  - NPO  - rest of care per primary; please call with questions

## 2023-07-30 NOTE — PROGRESS NOTES
[facility]  Nephrology  Progress Note    Patient Name: Petra Zazueta  MRN: [id number]  Admission Date: 7/28/2023  Hospital Length of Stay: 2 days  Attending Provider: Kareem Francois MD   Primary Care Physician: Edel Shepard MD  Principal Problem:SBO (small bowel obstruction)  Date of Service: 07/30/2023    Subjective:     Interval History: She's feeling better today. Reports having a bowel movement and urinating well this morning. On NS @ 125 ml/hr will decrease IVF 2/2 bibasilar crackles. Kidney function is improving. She reports she's ready to eat. Diet advanced to ice chips. NGT in place connected to wall suction. Denies nausea and vomiting.     Review of patient's allergies indicates:   Allergen Reactions    Codeine Other (See Comments)     Jittery, lightheadedness, nausea  Other reaction(s): NAUSEA     Current Facility-Administered Medications   Medication Frequency    0.9%  NaCl infusion Continuous    acetaminophen tablet 650 mg Q8H PRN    acetaminophen tablet 650 mg Q4H PRN    albuterol-ipratropium 2.5 mg-0.5 mg/3 mL nebulizer solution 3 mL Q4H PRN    aluminum-magnesium hydroxide-simethicone 200-200-20 mg/5 mL suspension 30 mL QID PRN    aspirin EC tablet 81 mg Daily    atorvastatin tablet 80 mg QHS    carvediloL tablet 25 mg BID WM    clopidogreL tablet 75 mg Daily    dextrose 50% injection 12.5 g PRN    dextrose 50% injection 25 g PRN    glucagon (human recombinant) injection 1 mg PRN    glucose chewable tablet 16 g PRN    glucose chewable tablet 24 g PRN    insulin aspart U-100 pen 0-5 Units QID (AC + HS) PRN    latanoprost 0.005 % ophthalmic solution 1 drop QHS    magnesium oxide tablet 800 mg PRN    magnesium oxide tablet 800 mg PRN    melatonin tablet 6 mg Nightly PRN    mupirocin 2 % ointment BID    naloxone 0.4 mg/mL injection 0.02 mg PRN    ondansetron injection 4 mg Q8H PRN    potassium bicarbonate disintegrating tablet 35 mEq PRN    potassium bicarbonate disintegrating tablet 50  mEq PRN    potassium bicarbonate disintegrating tablet 60 mEq PRN    potassium, sodium phosphates 280-160-250 mg packet 2 packet PRN    potassium, sodium phosphates 280-160-250 mg packet 2 packet PRN    potassium, sodium phosphates 280-160-250 mg packet 2 packet PRN    prochlorperazine injection Soln 5 mg Q6H PRN    simethicone chewable tablet 80 mg QID PRN    sodium chloride 0.9% flush 10 mL Q12H PRN     Review of Systems   All other systems reviewed and are negative.    Objective:     Vital Signs (Most Recent):  Temp: 97.4 °F (36.3 °C) (07/30/23 1125)  Pulse: 61 (07/30/23 1125)  Resp: 18 (07/30/23 1125)  BP: (!) 164/70 (07/30/23 1125)  SpO2: 95 % (07/30/23 1125) Vital Signs (24h Range):  Temp:  [97.4 °F (36.3 °C)-99 °F (37.2 °C)] 97.4 °F (36.3 °C)  Pulse:  [61-67] 61  Resp:  [16-18] 18  SpO2:  [90 %-100 %] 95 %  BP: (121-164)/(58-70) 164/70     Weight: 75.3 kg (166 lb 0.1 oz) (07/30/23 0400)  Body mass index is 30.36 kg/m².  Body surface area is 1.81 meters squared.    I/O last 3 completed shifts:  In: -   Out: 450 [Urine:400; Drains:50]    Physical Exam  Vitals and nursing note reviewed.   Constitutional:       General: She is awake. She is not in acute distress.     Appearance: Normal appearance. She is normal weight. She is not ill-appearing.   HENT:      Head: Normocephalic and atraumatic.      Nose: Nose normal.   Eyes:      Extraocular Movements: Extraocular movements intact.   Cardiovascular:      Rate and Rhythm: Normal rate and regular rhythm.   Pulmonary:      Effort: No respiratory distress.      Breath sounds: Rales (bibasilar crackles R > L) present.   Abdominal:      General: There is no distension.      Tenderness: There is no guarding.      Comments: + NGT left nostril    Musculoskeletal:         General: Normal range of motion.      Cervical back: Normal range of motion and neck supple.      Right lower leg: No edema.      Left lower leg: Edema present.   Skin:     General: Skin is dry.    Neurological:      Mental Status: She is alert and oriented to person, place, and time.   Psychiatric:         Mood and Affect: Mood normal.         Behavior: Behavior normal. Behavior is cooperative.         Thought Content: Thought content normal.         Significant Labs:  Recent Labs   Lab 07/29/23  0507 07/29/23  1500 07/30/23  1014   *   < > 84      < > 144   K 3.4*   < > 3.2*      < > 110   CO2 21*   < > 25   BUN 70*   < > 61*   CREATININE 3.2*   < > 1.8*   CALCIUM 8.3*   < > 8.8   MG 1.4*  --   --     < > = values in this interval not displayed.     CBC:   Recent Labs   Lab 07/29/23  0507   WBC 4.36   RBC 3.35*   HGB 10.8*   HCT 34.6*   *   *   MCH 32.2*   MCHC 31.2*     CMP:   Recent Labs   Lab 07/28/23  1123 07/29/23  0507 07/30/23  1014   *   < > 84   CALCIUM 10.2   < > 8.8   ALBUMIN 4.7   < > 2.9*   PROT 8.6*  --   --       < > 144   K 3.5   < > 3.2*   CO2 22*   < > 25      < > 110   BUN 57*   < > 61*   CREATININE 2.45*   < > 1.8*   ALKPHOS 110  --   --    ALT 22  --   --    AST 27  --   --    BILITOT 0.7  --   --     < > = values in this interval not displayed.     All labs within the past 24 hours have been reviewed.    Significant Imaging:  Labs: Reviewed  X-Ray: Reviewed      Assessment/Plan:     Active Diagnoses:    Diagnosis Date Noted POA    PRINCIPAL PROBLEM:  SBO (small bowel obstruction) [K56.609] 07/28/2023 Yes    Acute kidney injury superimposed on chronic kidney disease [N17.9, N18.9] 07/28/2023 Yes    Type 2 diabetes mellitus with stage 4 chronic kidney disease, without long-term current use of insulin [E11.22, N18.4] 01/20/2020 Yes    Chronic diastolic heart failure [I50.32] 08/23/2018 Yes    Coronary artery disease involving native coronary artery of native heart without angina pectoris [I25.10]  Yes    PAD (peripheral artery disease) [I73.9] 12/26/2012 Yes    Hypertension associated with type 2 diabetes mellitus [E11.59, I15.2]  12/26/2012 Yes    Hyperlipidemia associated with type 2 diabetes mellitus [E11.69, E78.5] 12/26/2012 Yes      Problems Resolved During this Admission:       MANPREET on CKD3b  - baseline Cr 1.3-1.5, follows in my outpatient clinic  - Cr decreasing, down to 1.8 today.  MANPREET in the setting of SBO and hypotension.   - continue with decreased IVF and decompression of SBO  - maintain normotension  - no urgent indication for dialysis   - monitor daily weights, strict I&Os  - avoid nephrotoxic agents including NSAIDs, renally dose medications     Hypokalemia  - PO PRN K+ replacement order in place  - repeat labs AM     Azotemia  - no fulminant uremia, continue IVF as above     Hypomagnesemia  - s/p 2g IV replacement, repeat labs AM  - PO PRN mag replacement order in place     Hypotension  - now hypertensive   - decrease IVF to 50ml/hr  - continue BP meds    Hypoalbuminemia   - likely 2/2 NPO status  - add novasource if persistent after diet advanced     SBO     h/o UTI  Diabetes ~10-15 years, A1C 5.5 on 11/2022  CAD s/p stent  DHF  HLD  PAD   OA  h/o tobacco abuse    Thank you for your consult. I will follow-up with patient. Please contact us if you have any additional questions.    ISABELL TREJO-C  Nephrology  [facility]  Office [phone]    Date of Service: 07/30/2023  [facility] - Med Surg

## 2023-07-30 NOTE — ASSESSMENT & PLAN NOTE
Patient with acute kidney injury/acute renal failure likely due to pre-renal azotemia due to IVVD MANPREET is currently stable. Baseline creatinine 1.5 - Labs reviewed- Renal function/electrolytes with Estimated Creatinine Clearance: 20.9 mL/min (A) (based on SCr of 1.8 mg/dL (H)). according to latest data. Monitor urine output and serial BMP and adjust therapy as needed. Avoid nephrotoxins and renally dose meds for GFR listed above.    IVF  Hold home lasix, losartan   - Nephrology consulted, appreciate recommendations  - Improving closer to baseline

## 2023-07-30 NOTE — NURSING
Patient remained free of falls during shift. Fall/safety precautions reinforced. Patient verbalizes understand.   Patient awake in bed, RR even and unlabored on room ai. Bed in lowest position, bed alarm activated, call light and bedside table within reach.  NGT to LIWS. No acute distress noted. Will report to night shift.

## 2023-07-31 VITALS
TEMPERATURE: 98 F | HEART RATE: 64 BPM | OXYGEN SATURATION: 100 % | HEIGHT: 62 IN | SYSTOLIC BLOOD PRESSURE: 137 MMHG | DIASTOLIC BLOOD PRESSURE: 88 MMHG | WEIGHT: 166 LBS | RESPIRATION RATE: 20 BRPM | BODY MASS INDEX: 30.55 KG/M2

## 2023-07-31 LAB
ALBUMIN SERPL BCP-MCNC: 3.2 G/DL (ref 3.5–5.2)
ALP SERPL-CCNC: 72 U/L (ref 55–135)
ALT SERPL W/O P-5'-P-CCNC: 15 U/L (ref 10–44)
ANION GAP SERPL CALC-SCNC: 10 MMOL/L (ref 8–16)
AST SERPL-CCNC: 27 U/L (ref 10–40)
BASOPHILS # BLD AUTO: 0.01 K/UL (ref 0–0.2)
BASOPHILS NFR BLD: 0.2 % (ref 0–1.9)
BILIRUB SERPL-MCNC: 1.2 MG/DL (ref 0.1–1)
BUN SERPL-MCNC: 41 MG/DL (ref 8–23)
CALCIUM SERPL-MCNC: 8.9 MG/DL (ref 8.7–10.5)
CHLORIDE SERPL-SCNC: 114 MMOL/L (ref 95–110)
CO2 SERPL-SCNC: 23 MMOL/L (ref 23–29)
CREAT SERPL-MCNC: 1.2 MG/DL (ref 0.5–1.4)
DIFFERENTIAL METHOD: ABNORMAL
EOSINOPHIL # BLD AUTO: 0.1 K/UL (ref 0–0.5)
EOSINOPHIL NFR BLD: 1.3 % (ref 0–8)
ERYTHROCYTE [DISTWIDTH] IN BLOOD BY AUTOMATED COUNT: 13 % (ref 11.5–14.5)
EST. GFR  (NO RACE VARIABLE): 44 ML/MIN/1.73 M^2
GLUCOSE SERPL-MCNC: 61 MG/DL (ref 70–110)
HCT VFR BLD AUTO: 34.8 % (ref 37–48.5)
HGB BLD-MCNC: 10.6 G/DL (ref 12–16)
IMM GRANULOCYTES # BLD AUTO: 0.02 K/UL (ref 0–0.04)
IMM GRANULOCYTES NFR BLD AUTO: 0.4 % (ref 0–0.5)
LYMPHOCYTES # BLD AUTO: 0.9 K/UL (ref 1–4.8)
LYMPHOCYTES NFR BLD: 17.4 % (ref 18–48)
MAGNESIUM SERPL-MCNC: 2.2 MG/DL (ref 1.6–2.6)
MCH RBC QN AUTO: 31.6 PG (ref 27–31)
MCHC RBC AUTO-ENTMCNC: 30.5 G/DL (ref 32–36)
MCV RBC AUTO: 104 FL (ref 82–98)
MONOCYTES # BLD AUTO: 0.5 K/UL (ref 0.3–1)
MONOCYTES NFR BLD: 10 % (ref 4–15)
NEUTROPHILS # BLD AUTO: 3.7 K/UL (ref 1.8–7.7)
NEUTROPHILS NFR BLD: 70.7 % (ref 38–73)
NRBC BLD-RTO: 0 /100 WBC
PHOSPHATE SERPL-MCNC: 2 MG/DL (ref 2.7–4.5)
PLATELET # BLD AUTO: 140 K/UL (ref 150–450)
PMV BLD AUTO: 12 FL (ref 9.2–12.9)
POCT GLUCOSE: 151 MG/DL (ref 70–110)
POCT GLUCOSE: 62 MG/DL (ref 70–110)
POTASSIUM SERPL-SCNC: 3.2 MMOL/L (ref 3.5–5.1)
PROT SERPL-MCNC: 6.3 G/DL (ref 6–8.4)
RBC # BLD AUTO: 3.35 M/UL (ref 4–5.4)
SODIUM SERPL-SCNC: 147 MMOL/L (ref 136–145)
WBC # BLD AUTO: 5.28 K/UL (ref 3.9–12.7)

## 2023-07-31 PROCEDURE — 99233 PR SUBSEQUENT HOSPITAL CARE,LEVL III: ICD-10-PCS | Mod: GC,,, | Performed by: SURGERY

## 2023-07-31 PROCEDURE — 99233 SBSQ HOSP IP/OBS HIGH 50: CPT | Mod: GC,,, | Performed by: SURGERY

## 2023-07-31 PROCEDURE — 25000003 PHARM REV CODE 250: Performed by: STUDENT IN AN ORGANIZED HEALTH CARE EDUCATION/TRAINING PROGRAM

## 2023-07-31 PROCEDURE — 25000003 PHARM REV CODE 250

## 2023-07-31 PROCEDURE — 25000003 PHARM REV CODE 250: Performed by: PHYSICIAN ASSISTANT

## 2023-07-31 PROCEDURE — 36415 COLL VENOUS BLD VENIPUNCTURE: CPT | Performed by: INTERNAL MEDICINE

## 2023-07-31 PROCEDURE — 83735 ASSAY OF MAGNESIUM: CPT | Performed by: INTERNAL MEDICINE

## 2023-07-31 PROCEDURE — 97530 THERAPEUTIC ACTIVITIES: CPT

## 2023-07-31 PROCEDURE — 97161 PT EVAL LOW COMPLEX 20 MIN: CPT

## 2023-07-31 PROCEDURE — 80053 COMPREHEN METABOLIC PANEL: CPT | Performed by: INTERNAL MEDICINE

## 2023-07-31 PROCEDURE — 84100 ASSAY OF PHOSPHORUS: CPT | Performed by: INTERNAL MEDICINE

## 2023-07-31 PROCEDURE — 85025 COMPLETE CBC W/AUTO DIFF WBC: CPT | Performed by: INTERNAL MEDICINE

## 2023-07-31 RX ORDER — LOSARTAN POTASSIUM 25 MG/1
100 TABLET ORAL DAILY
Status: DISCONTINUED | OUTPATIENT
Start: 2023-07-31 | End: 2023-07-31 | Stop reason: HOSPADM

## 2023-07-31 RX ADMIN — CARVEDILOL 25 MG: 12.5 TABLET, FILM COATED ORAL at 04:07

## 2023-07-31 RX ADMIN — SODIUM CHLORIDE: 9 INJECTION, SOLUTION INTRAVENOUS at 06:07

## 2023-07-31 RX ADMIN — POTASSIUM PHOSPHATE, MONOBASIC AND POTASSIUM PHOSPHATE, DIBASIC 15 MMOL: 224; 236 INJECTION, SOLUTION, CONCENTRATE INTRAVENOUS at 11:07

## 2023-07-31 RX ADMIN — POTASSIUM BICARBONATE 25 MEQ: 977.5 TABLET, EFFERVESCENT ORAL at 10:07

## 2023-07-31 RX ADMIN — CLOPIDOGREL BISULFATE 75 MG: 75 TABLET ORAL at 07:07

## 2023-07-31 RX ADMIN — DEXTROSE MONOHYDRATE 12.5 G: 25 INJECTION, SOLUTION INTRAVENOUS at 04:07

## 2023-07-31 RX ADMIN — LOSARTAN POTASSIUM 100 MG: 25 TABLET, FILM COATED ORAL at 11:07

## 2023-07-31 RX ADMIN — CARVEDILOL 25 MG: 12.5 TABLET, FILM COATED ORAL at 07:07

## 2023-07-31 RX ADMIN — DEXTROSE MONOHYDRATE 500 ML: 50 INJECTION, SOLUTION INTRAVENOUS at 12:07

## 2023-07-31 RX ADMIN — ASPIRIN 81 MG: 81 TABLET, COATED ORAL at 07:07

## 2023-07-31 RX ADMIN — MUPIROCIN: 20 OINTMENT TOPICAL at 07:07

## 2023-07-31 NOTE — ASSESSMENT & PLAN NOTE
Petra Zazueta is an 88yoF who presents with a week-long history of abdominal discomfort, decrease PO intake, and emesis. Her work-up concerning for a small bowel obstruction. General surgery consulted for evaluation.     - patient seen and examined this morning  - patient with contrast in her colon after the 8 hour KUB for Gastrografin challenge   -NG tube removed  - patient with ongoing bowel function  - clear liquid diet  - rest of care per primary; please call with questions

## 2023-07-31 NOTE — NURSING
Reviewed discharge instructions with patient and son. Instructions includes follow up appointments,EMS and when to seeks additional medical help. No acute distress noted . Transferred via wheelchair for discharge

## 2023-07-31 NOTE — NURSING
Safety sitter placed because patient tried to get out of bed on several attempt. Unable to redirect. Patient had telesitter and all fall precaution in progress but was unable to get her to stay in bed.

## 2023-07-31 NOTE — PLAN OF CARE
Problem: Physical Therapy  Goal: Physical Therapy Goal  Description: Goals to be met by: 23     Patient will increase functional independence with mobility by performin. Supine to sit with Modified [place]  2. Rolling to Left and Right with Modified [place]  3. Sit to stand transfer with Modified [place]  4. Bed to chair transfer with Modified [place]   5. Gait x250 feet with Modified [place] using no AD  6. Upper/Lower extremity exercise program 2 sets x15 reps per handout, with independence    Outcome: Ongoing, Progressing     Pt will benefit from HHPT services.  Pt will need family supervision and assistance 2* confusion at this time.  No DME's needed.

## 2023-07-31 NOTE — PROGRESS NOTES
[facility]  General Surgery  Progress Note    Subjective:     History of Present Illness:  Petra Zazueta is an 88yoF with a past medical history significant for HTN, HLD, DM2, CKD IV, CAD and HFpEF who presented to an outside hospital with complaints of vomiting. She describes a week-long history of abdominal discomfort, decreased PO intake, and several bouts of emesis. This prompted her presentation. In the outside emergency room, her work up demonstrated a worsened MANPREET and a CT scan with distended small intestine with mild tapering of the small bowel distally. She had an NG tube placed at the outside ED and it put out 2L. It was removed for unknown reasons. She continues with bowel movements. She was transferred to [facility] for surgical evaluation.     On my exam, the patient is resting comfortably in bed. Her abdomen is soft, non-distended and largely non-tender. She reports ongoing bowel function. She has no NG tube. Her CT scan is concerning for a small bowel obstruction, which I explained to her. Plan of care discussed with the patient.       Post-Op Info:  * No surgery found *         Interval History:   No acute events overnight.  KUB with contrast throughout her colon, NG tube removed and started on clear liquids.  Still not having any bowel movements.    Medications:  Continuous Infusions:  Scheduled Meds:   aspirin  81 mg Oral Daily    atorvastatin  80 mg Oral QHS    carvediloL  25 mg Oral BID WM    clopidogreL  75 mg Oral Daily    latanoprost  1 drop Both Eyes QHS    mupirocin   Nasal BID     PRN Meds:acetaminophen, acetaminophen, albuterol-ipratropium, aluminum-magnesium hydroxide-simethicone, dextrose 50%, dextrose 50%, diatrizoate meglumineand-diatrizoate sodium, glucagon (human recombinant), glucose, glucose, insulin aspart U-100, magnesium oxide, magnesium oxide, melatonin, naloxone, ondansetron, potassium bicarbonate, potassium bicarbonate, potassium bicarbonate, potassium, sodium  phosphates, potassium, sodium phosphates, potassium, sodium phosphates, prochlorperazine, simethicone, sodium chloride 0.9%     Review of patient's allergies indicates:   Allergen Reactions    Codeine Other (See Comments)     Jittery, lightheadedness, nausea  Other reaction(s): NAUSEA     Objective:     Vital Signs (Most Recent):  Temp: 97.5 °F (36.4 °C) (07/31/23 0712)  Pulse: 69 (07/31/23 0712)  Resp: 20 (07/31/23 0712)  BP: (!) 199/84 (07/31/23 0712)  SpO2: (!) 92 % (07/31/23 0712) Vital Signs (24h Range):  Temp:  [97 °F (36.1 °C)-98.3 °F (36.8 °C)] 97.5 °F (36.4 °C)  Pulse:  [61-79] 69  Resp:  [18-20] 20  SpO2:  [92 %-95 %] 92 %  BP: (163-199)/(70-84) 199/84     Weight: 75.3 kg (166 lb 0.1 oz)  Body mass index is 30.36 kg/m².    Intake/Output - Last 3 Shifts         07/29 0700 07/30 0659 07/30 0700 07/31 0659 07/31 0700 08/01 0659    P.O.  0     Total Intake(mL/kg)  0 (0)     Urine (mL/kg/hr) 400 (0.2) 225 (0.1)     Drains 50 50     Stool 0 0     Total Output 450 275     Net -450 -275            Urine Occurrence 3 x 4 x     Stool Occurrence 2 x 5 x              Physical Exam  Vitals and nursing note reviewed.   Constitutional:       General: She is not in acute distress.     Appearance: Normal appearance.   HENT:      Nose: Nose normal.      Mouth/Throat:      Mouth: Mucous membranes are moist.   Cardiovascular:      Rate and Rhythm: Normal rate and regular rhythm.   Pulmonary:      Effort: Pulmonary effort is normal. No respiratory distress.   Abdominal:      General: Abdomen is flat. There is no distension.      Palpations: Abdomen is soft.      Tenderness: There is no abdominal tenderness.   Musculoskeletal:         General: Normal range of motion.      Left lower leg: Edema present.   Skin:     General: Skin is warm and dry.   Neurological:      General: No focal deficit present.      Mental Status: She is alert.          Significant Labs:  I have reviewed all pertinent lab results within the past 24  hours.  CBC:   Recent Labs   Lab 07/31/23  0402   WBC 5.28   RBC 3.35*   HGB 10.6*   HCT 34.8*   *   *   MCH 31.6*   MCHC 30.5*     CMP:   Recent Labs   Lab 07/31/23  0402   GLU 61*   CALCIUM 8.9   ALBUMIN 3.2*   PROT 6.3   *   K 3.2*   CO2 23   *   BUN 41*   CREATININE 1.2   ALKPHOS 72   ALT 15   AST 27   BILITOT 1.2*       Significant Diagnostics:  I have reviewed all pertinent imaging results/findings within the past 24 hours.    Assessment/Plan:     * SBO (small bowel obstruction)  Petra Zazueta is an 88yoF who presents with a week-long history of abdominal discomfort, decrease PO intake, and emesis. Her work-up concerning for a small bowel obstruction. General surgery consulted for evaluation.     - patient seen and examined this morning  - patient with contrast in her colon after the 8 hour KUB for Gastrografin challenge   -NG tube removed  - patient with ongoing bowel function  - clear liquid diet  - rest of care per primary; please call with questions        Al Monterroso MD  General Surgery  [facility]

## 2023-07-31 NOTE — PT/OT/SLP EVAL
Physical Therapy Evaluation    Patient Name:  Petra Zazueta   MRN:  [id number]    Recommendations:     Discharge Recommendations: home health PT (with family supervision/assistance)   Discharge Equipment Recommendations: none   Barriers to discharge: None    Assessment:     Petra Zazueta is a 88 y.o. female admitted with a medical diagnosis of SBO (small bowel obstruction).  She presents with the following impairments/functional limitations: weakness, impaired functional mobility, gait instability, impaired balance, impaired cognition, decreased upper extremity function, decreased lower extremity function.    Rehab Prognosis: Good; patient would benefit from acute skilled PT services to address these deficits and reach maximum level of function.    Recent Surgery: * No surgery found *      Plan:     During this hospitalization, patient to be seen 5 x/week to address the identified rehab impairments via gait training, therapeutic activities, therapeutic exercises and progress toward the following goals:    Plan of Care Expires:  08/14/23    Subjective     Chief Complaint: diarrhea  Patient/Family Comments/goals: Pt agreeable to PT eval.   Pain/Comfort:  Pain Rating 1: 0/10    Living Environment:  Pt lives alone in a [facility] with no concerns at entry.   Prior to admission, patients level of function was independent and driving.  Equipment used at home: none.  Upon discharge, patient will have assistance from son.  Pt reported having 2 sons.     Objective:     Communicated with Dr. Francois prior to session, pt received meds for high BP and MD would like to D/C pt home today.  Patient found HOB elevated with bed alarm, peripheral IV, telemetry, SCD ([employer] monitor and hospital safety sitter) upon PT entry to room.    General Precautions: Standard, fall, diabetic  Orthopedic Precautions:N/A   Braces: N/A  Respiratory Status: Room air    Exams:  Cognitive Exam:  Patient was able to follow simple commands.   Gross Motor  Coordination:  WFL  Postural Exam:  Patient presented with the following abnormalities:    -       No postural abnormalities identified  Sensation:    -       Intact  light/touch BUE/BLE  Skin Integrity/Edema:      -       Skin integrity: Visible skin intact  -       Edema: None noted BUE/BLE  BUE/BLE ROM: WFL  BUE/LE Strength: WFL    Functional Mobility: Pt pleasantly confused and cooperative with therapy, however activities limited by liquid stool.  Pt with no c/o dizziness or HA despite high BP.  Pt waiting on son to come take her home.   Bed Mobility:     Scooting: CGA-stand by assistance  Supine to Sit: contact guard assistance  Sit to Supine: contact guard assistance  Transfers:     Sit to Stand: stand by assistance and contact guard assistance with no AD x3 trials; Pt tolerated standing for perineal cleaning and diaper change with CGA-SBA.  Pt continued to have liquid stool during ambulation.      Gait: Pt ambulated ~70 ft with CGA using no AD.  Pt with decreased step length and rene, limited by liquid stool.    Balance: Pt with fair+ dynamic standing balance.       AM-PAC 6 CLICK MOBILITY  Total Score:20       Treatment & Education:  Pt educated on acute skilled PT services and goals.  Pt ok to be OOB>chair with nursing assistance.  Pt verbalized understanding.     Patient left HOB elevated with all lines intact, call button in reach, bed alarm on, and hospital safety sitter and Avas[employer] monitor present.  SCD and 1L O2 NC reapplied.     GOALS:   Multidisciplinary Problems       Physical Therapy Goals          Problem: Physical Therapy    Goal Priority Disciplines Outcome Goal Variances Interventions   Physical Therapy Goal     PT, PT/OT Ongoing, Progressing     Description: Goals to be met by: 23     Patient will increase functional independence with mobility by performin. Supine to sit with Modified [place]  2. Rolling to Left and Right with Modified [place]  3. Sit to stand transfer  with Modified [place]  4. Bed to chair transfer with Modified [place]   5. Gait x250 feet with Modified [place] using no AD  6. Upper/Lower extremity exercise program 2 sets x15 reps per handout, with independence                         History:     Past Medical History:   Diagnosis Date    Acute on chronic diastolic congestive heart failure 1/16/2023    Anemia     Arthritis     Coronary artery disease     Diabetes mellitus     Diabetes mellitus type II     Diabetic retinopathy 08/22/2019    Dyslipidemia     Hypertension     Insomnia     PAD (peripheral artery disease)        Past Surgical History:   Procedure Laterality Date    angiagram  08/31/2018    CORONARY ANGIOGRAPHY N/A 6/7/2022    Procedure: ANGIOGRAM, CORONARY ARTERY;  Surgeon: Kiet Vega MD;  Location: [facility] CATH LAB/EP;  Service: Cardiology;  Laterality: N/A;    CORONARY ANGIOPLASTY      RCA 4/2011 EJ    GALLBLADDER SURGERY      HYSTERECTOMY      LEFT HEART CATHETERIZATION Left 6/7/2022    Procedure: Left heart cath;  Surgeon: Kiet Vega MD;  Location: [facility] CATH LAB/EP;  Service: Cardiology;  Laterality: Left;    PERIPHERAL ARTERIAL STENT GRAFT      Left lower extremity RCA        Time Tracking:     PT Received On: 07/31/23  PT Start Time: 1122     PT Stop Time: 1149  PT Total Time (min): 27 min     Billable Minutes: Evaluation 17 min and Therapeutic Activity 10 min      07/31/2023

## 2023-07-31 NOTE — PLAN OF CARE
TN sent a secure chat to med surg nurse Federica that this patient is cleared for discharge form case management's viewpoint. Home health referral sent to [facility].. PCP appt on 8/14 at 10AM, requested a sooner appt for within a week.  TN talked with son says he will be before 8:00 PM to transport pt home..   07/31/23 6288   Final Note   Assessment Type Final Discharge Note   Anticipated Discharge Disposition Home-Health   What phone number can be called within the next 1-3 days to see how you are doing after discharge?   (see chart)   Hospital Resources/Appts/Education Provided Provided patient/caregiver with written discharge plan information;Appointments scheduled and added to AVS;Appointments scheduled by Navigator/Coordinator   Post-Acute Status   Post-Acute Authorization Home Health   Home Health Status Referrals Sent  ([facility] home health)   Coverage [facility]   Discharge Delays None known at this time

## 2023-07-31 NOTE — PHYSICIAN QUERY
PT Name: Petra Zazueta  MR #: [id number]     DOCUMENTATION CLARIFICATION     CDS/: Patsy Crespo               Contact information: [email or web address]  This form is a permanent document in the medical record.     Query Date: July 31, 2023    By submitting this query, we are merely seeking further clarification of documentation to reflect the severity of illness of your patient. Please utilize your independent clinical judgment when addressing the question(s) below.    The medical record reflects the following:     Indicators   Supporting Clinical Findings Location in Medical Record   x Bowel obstruction, intestinal obstruction, LBO or SBO documented SBO (small bowel obstruction)    SBO (small bowel obstruction)- work-up concerning for a small bowel obstruction   Hospital Medicine H&P 07/28    General Surgery Consult 07/29     x Radiology findings On my review of the CT scan, there is gastric and proximal small bowel dilation with distal tapering of the small bowel to normal caliber at the pelvic inlet      There is a large amount of scattered fluid-filled dilated loops of small bowel measuring up to 4.5 cm in diameter which contain multiple air-fluid levels consistent with obstruction versus ileus.       Persistent dilated small bowel loops, however contrast is seen extending throughout the colon on 8 hour delayed images with no evidence of high-grade small bowel obstruction.   General Surgery Consult 07/29        CT Abdomen Pelvis 07/28        XR Gastrograffin Challenge 07/30     x Treatment/Medication Surgery consult  Keep NPO  IVF  Pain control    NG tube for further decompression  Plan to decompress for 12-24 hours  Gastrograffin challenge   Patient with ongoing bowel function    KUB with contrast throughout her colon, NG tube removed and started on clear liquids.  Still not having any bowel movements   Hospital Medicine H&P 07/28          General Surgery Consult 07/29          General  Surgery PN 07/31      Procedure/Surgery      Other       Provider, please further specify the bowel obstruction diagnosis:  [   ] Partial or incomplete intestinal obstruction, due to other (please specify): ____________   [  x ] Partial or incomplete intestinal obstruction, unknown or unspecified etiology   [   ] Complete intestinal obstruction, due to other (please specify): ____________   [   ] Complete intestinal obstruction, unknown or unspecified etiology   [   ] Other intestinal condition (please specify): _____________________   [   ]  Clinically Undetermined           Please document in your progress notes daily for the duration of treatment until resolved, and include in your discharge summary.

## 2023-07-31 NOTE — PLAN OF CARE
[facility]      HOME HEALTH ORDERS  FACE TO FACE ENCOUNTER    Patient Name: Petra Zazueta  YOB: 1935    PCP: Edel Shepard MD   PCP Address: [address] [place] [address] [address] [place] [place] [id number]  PCP Phone Number: [phone]  PCP Fax: [phone]    Encounter Date: 7/28/23    Admit to Home Health    Diagnoses:  Active Hospital Problems    Diagnosis  POA    *SBO (small bowel obstruction) [K56.609]  Yes    Acute kidney injury superimposed on chronic kidney disease [N17.9, N18.9]  Yes    Type 2 diabetes mellitus with stage 4 chronic kidney disease, without long-term current use of insulin [E11.22, N18.4]  Yes     Lab Results   Component Value Date    LABA1C 7.2 (H) 05/25/2018    HGBA1C 5.5 11/18/2022     - well controlled  - continue current management plan   - patient encouraged to notify me with any changes      Chronic diastolic heart failure [I50.32]  Yes     - appears compensated but difficult to assess on virtual visit  - continue with current medical therapy  - ARB discontinued during hospitalization 06/2022 secondary to recurrent hyperkalemia      Coronary artery disease involving native coronary artery of native heart without angina pectoris [I25.10]  Yes     - 4/2011 RAUL RCA  - 06/08/2022 nonobstructive coronary artery disease continue medical therapy  - followed by Cardiology  - goal LDL <70  - BP goal < 130/80  - DAPT  - A1C goal <7%      PAD (peripheral artery disease) [I73.9]  Yes     - stent left leg with chronic nonpitting edema  - followed by Cardiology Dr. Clif COBB  - goal LDL <70  - BP goal < 130/80  - DAPT  - A1C goal <7%      Hypertension associated with type 2 diabetes mellitus [E11.59, I15.2]  Yes     - well controlled  - continue current medications      Hyperlipidemia associated with type 2 diabetes mellitus [E11.69, E78.5]  Yes     Lab Results   Component Value Date    LDLCALC 55 01/21/2022   - well controlled  - continue current medication        Resolved  Hospital Problems   No resolved problems to display.       Follow Up Appointments:  Future Appointments   Date Time Provider Department Center   8/17/2023  2:15 PM Velvet Morales MD [facility]   10/24/2023  9:00 AM Lenin Akhtar NP [facility] CARDIO [place]       Allergies:  Review of patient's allergies indicates:   Allergen Reactions    Codeine Other (See Comments)     Jittery, lightheadedness, nausea  Other reaction(s): NAUSEA       Medications: Review discharge medications with patient and family and provide education.      I have seen and examined this patient within the last 30 days. My clinical findings that support the need for the home health skilled services and home bound status are the following:no   Weakness/numbness causing balance and gait disturbance due to Weakness/Debility making it taxing to leave home.  Requiring assistive device to leave home due to unsteady gait caused by  Weakness/Debility.     Diet:   regular diet    Labs:  N/a    Referrals/ Consults  Physical Therapy to evaluate and treat. Evaluate for home safety and equipment needs; Establish/upgrade home exercise program. Perform / instruct on therapeutic exercises, gait training, transfer training, and Range of Motion.  Occupational Therapy to evaluate and treat. Evaluate home environment for safety and equipment needs. Perform/Instruct on transfers, ADL training, ROM, and therapeutic exercises.   to evaluate for community resources/long-range planning.  Aide to provide assistance with personal care, ADLs, and vital signs.    Activities:   activity as tolerated    Nursing:   Agency to admit patient within 24 hours of hospital discharge unless specified on physician order or at patient request    SN to complete comprehensive assessment including routine vital signs. Instruct on disease process and s/s of complications to report to MD. Review/verify medication list sent home with the patient at time of discharge  and instruct  patient/caregiver as needed. Frequency may be adjusted depending on start of care date.     Skilled nurse to perform up to 3 visits PRN for symptoms related to diagnosis    Notify MD if SBP > 160 or < 90; DBP > 90 or < 50; HR > 120 or < 50; Temp > 101; O2 < 88%; Other:       Ok to schedule additional visits based on staff availability and patient request on consecutive days within the home health episode.    Miscellaneous       Home Health Aide:  Nursing , Physical Therapy , and Occupational Therapy     Wound Care Orders  no    I certify that this patient is confined to her home and needs intermittent skilled nursing care, physical therapy, and occupational therapy.

## 2023-07-31 NOTE — PLAN OF CARE
07/31/23 1550   Medicare Message   Important Message from Medicare regarding Discharge Appeal Rights Given to patient/caregiver;Explained to patient/caregiver;Signed/date by patient/caregiver;Other (comments)   Date IMM was signed 07/31/23   Time IMM was signed [id number]

## 2023-07-31 NOTE — NURSING
SCD's in place, NG tube to left nare to LIWS, no distress noted, brief in place.      [facility]  Nurses Note -- 4 Eyes      7/30/2023       Skin assessed on: Q Shift      [x] No Pressure Injuries Present    [x]Prevention Measures Documented    [] Yes LDA  for Pressure Injury Previously documented     [] Yes New Pressure Injury Discovered   [] LDA for New Pressure Injury Added      Attending RN:  Arielle Acosta RN     Second RN:  ED Gonzalez

## 2023-07-31 NOTE — SUBJECTIVE & OBJECTIVE
Interval History:   No acute events overnight.  KUB with contrast throughout her colon, NG tube removed and started on clear liquids.  Still not having any bowel movements.    Medications:  Continuous Infusions:  Scheduled Meds:   aspirin  81 mg Oral Daily    atorvastatin  80 mg Oral QHS    carvediloL  25 mg Oral BID WM    clopidogreL  75 mg Oral Daily    latanoprost  1 drop Both Eyes QHS    mupirocin   Nasal BID     PRN Meds:acetaminophen, acetaminophen, albuterol-ipratropium, aluminum-magnesium hydroxide-simethicone, dextrose 50%, dextrose 50%, diatrizoate meglumineand-diatrizoate sodium, glucagon (human recombinant), glucose, glucose, insulin aspart U-100, magnesium oxide, magnesium oxide, melatonin, naloxone, ondansetron, potassium bicarbonate, potassium bicarbonate, potassium bicarbonate, potassium, sodium phosphates, potassium, sodium phosphates, potassium, sodium phosphates, prochlorperazine, simethicone, sodium chloride 0.9%     Review of patient's allergies indicates:   Allergen Reactions    Codeine Other (See Comments)     Jittery, lightheadedness, nausea  Other reaction(s): NAUSEA     Objective:     Vital Signs (Most Recent):  Temp: 97.5 °F (36.4 °C) (07/31/23 0712)  Pulse: 69 (07/31/23 0712)  Resp: 20 (07/31/23 0712)  BP: (!) 199/84 (07/31/23 0712)  SpO2: (!) 92 % (07/31/23 0712) Vital Signs (24h Range):  Temp:  [97 °F (36.1 °C)-98.3 °F (36.8 °C)] 97.5 °F (36.4 °C)  Pulse:  [61-79] 69  Resp:  [18-20] 20  SpO2:  [92 %-95 %] 92 %  BP: (163-199)/(70-84) 199/84     Weight: 75.3 kg (166 lb 0.1 oz)  Body mass index is 30.36 kg/m².    Intake/Output - Last 3 Shifts         07/29 0700 07/30 0659 07/30 0700 07/31 0659 07/31 0700 08/01 0659    P.O.  0     Total Intake(mL/kg)  0 (0)     Urine (mL/kg/hr) 400 (0.2) 225 (0.1)     Drains 50 50     Stool 0 0     Total Output 450 275     Net -450 -275            Urine Occurrence 3 x 4 x     Stool Occurrence 2 x 5 x              Physical Exam  Vitals and nursing note  reviewed.   Constitutional:       General: She is not in acute distress.     Appearance: Normal appearance.   HENT:      Nose: Nose normal.      Mouth/Throat:      Mouth: Mucous membranes are moist.   Cardiovascular:      Rate and Rhythm: Normal rate and regular rhythm.   Pulmonary:      Effort: Pulmonary effort is normal. No respiratory distress.   Abdominal:      General: Abdomen is flat. There is no distension.      Palpations: Abdomen is soft.      Tenderness: There is no abdominal tenderness.   Musculoskeletal:         General: Normal range of motion.      Left lower leg: Edema present.   Skin:     General: Skin is warm and dry.   Neurological:      General: No focal deficit present.      Mental Status: She is alert.          Significant Labs:  I have reviewed all pertinent lab results within the past 24 hours.  CBC:   Recent Labs   Lab 07/31/23  0402   WBC 5.28   RBC 3.35*   HGB 10.6*   HCT 34.8*   *   *   MCH 31.6*   MCHC 30.5*     CMP:   Recent Labs   Lab 07/31/23  0402   GLU 61*   CALCIUM 8.9   ALBUMIN 3.2*   PROT 6.3   *   K 3.2*   CO2 23   *   BUN 41*   CREATININE 1.2   ALKPHOS 72   ALT 15   AST 27   BILITOT 1.2*       Significant Diagnostics:  I have reviewed all pertinent imaging results/findings within the past 24 hours.

## 2023-07-31 NOTE — PROGRESS NOTES
"[facility]  Nephrology  Progress Note    Patient Name: Petra Zazueta  MRN: [id number]  Admission Date: 7/28/2023  Hospital Length of Stay: 3 days  Attending Provider: Kareem Francois MD   Primary Care Physician: Edel Shepard MD  Principal Problem:SBO (small bowel obstruction)  Date of Service: 07/31/2023      Subjective:     Interval History: Feeling " like a spring chicken," denies SON , n/v, abdominal pain. Thinks she had BM    Review of patient's allergies indicates:   Allergen Reactions    Codeine Other (See Comments)     Jittery, lightheadedness, nausea  Other reaction(s): NAUSEA     Current Facility-Administered Medications   Medication Frequency    acetaminophen tablet 650 mg Q8H PRN    acetaminophen tablet 650 mg Q4H PRN    albuterol-ipratropium 2.5 mg-0.5 mg/3 mL nebulizer solution 3 mL Q4H PRN    aluminum-magnesium hydroxide-simethicone 200-200-20 mg/5 mL suspension 30 mL QID PRN    aspirin EC tablet 81 mg Daily    atorvastatin tablet 80 mg QHS    carvediloL tablet 25 mg BID WM    clopidogreL tablet 75 mg Daily    dextrose 50% injection 12.5 g PRN    dextrose 50% injection 25 g PRN    diatrizoate meglumineand-diatrizoate sodium (GASTROVIEW) solution 25 mL ONCE PRN    glucagon (human recombinant) injection 1 mg PRN    glucose chewable tablet 16 g PRN    glucose chewable tablet 24 g PRN    insulin aspart U-100 pen 0-5 Units QID (AC + HS) PRN    latanoprost 0.005 % ophthalmic solution 1 drop QHS    magnesium oxide tablet 800 mg PRN    magnesium oxide tablet 800 mg PRN    melatonin tablet 6 mg Nightly PRN    mupirocin 2 % ointment BID    naloxone 0.4 mg/mL injection 0.02 mg PRN    ondansetron injection 4 mg Q8H PRN    potassium bicarbonate disintegrating tablet 35 mEq PRN    potassium bicarbonate disintegrating tablet 50 mEq PRN    potassium bicarbonate disintegrating tablet 50 mEq Once    potassium bicarbonate disintegrating tablet 60 mEq PRN    potassium phosphate 15 mmol in dextrose 5 % " (D5W) 250 mL infusion Once    potassium, sodium phosphates 280-160-250 mg packet 2 packet PRN    potassium, sodium phosphates 280-160-250 mg packet 2 packet PRN    potassium, sodium phosphates 280-160-250 mg packet 2 packet PRN    prochlorperazine injection Soln 5 mg Q6H PRN    simethicone chewable tablet 80 mg QID PRN    sodium chloride 0.9% flush 10 mL Q12H PRN     Review of Systems   All other systems reviewed and are negative.    Objective:     Vital Signs (Most Recent):  Temp: 97.5 °F (36.4 °C) (07/31/23 0712)  Pulse: 69 (07/31/23 0712)  Resp: 20 (07/31/23 0712)  BP: (!) 199/84 (07/31/23 0712)  SpO2: (!) 92 % (07/31/23 0712) Vital Signs (24h Range):  Temp:  [97 °F (36.1 °C)-98.3 °F (36.8 °C)] 97.5 °F (36.4 °C)  Pulse:  [61-79] 69  Resp:  [18-20] 20  SpO2:  [92 %-95 %] 92 %  BP: (163-199)/(70-84) 199/84     Weight: 75.3 kg (166 lb 0.1 oz) (07/30/23 0400)  Body mass index is 30.36 kg/m².  Body surface area is 1.81 meters squared.    I/O last 3 completed shifts:  In: 0   Out: 275 [Urine:225; Drains:50]    Physical Exam  Vitals and nursing note reviewed.   Constitutional:       General: She is awake. She is not in acute distress.     Appearance: Normal appearance. She is normal weight. She is not ill-appearing.   HENT:      Head: Normocephalic and atraumatic.      Nose: Nose normal.   Eyes:      Extraocular Movements: Extraocular movements intact.   Cardiovascular:      Rate and Rhythm: Normal rate and regular rhythm.   Pulmonary:      Effort: No respiratory distress.      Breath sounds: Rales (bibasilar crackles R > L) present.   Abdominal:      General: There is no distension.      Tenderness: There is no guarding.      Comments: + NGT left nostril    Musculoskeletal:         General: Normal range of motion.      Cervical back: Normal range of motion and neck supple.      Right lower leg: No edema.      Left lower leg: Edema present.   Skin:     General: Skin is dry.   Neurological:      Mental Status: She is  alert and oriented to person, place, and time.   Psychiatric:         Mood and Affect: Mood normal.         Behavior: Behavior normal. Behavior is cooperative.         Thought Content: Thought content normal.         Significant Labs:  Recent Labs   Lab 07/31/23 0402   GLU 61*   *   K 3.2*   *   CO2 23   BUN 41*   CREATININE 1.2   CALCIUM 8.9   MG 2.2       CBC:   Recent Labs   Lab 07/31/23 0402   WBC 5.28   RBC 3.35*   HGB 10.6*   HCT 34.8*   *   *   MCH 31.6*   MCHC 30.5*       CMP:   Recent Labs   Lab 07/31/23 0402   GLU 61*   CALCIUM 8.9   ALBUMIN 3.2*   PROT 6.3   *   K 3.2*   CO2 23   *   BUN 41*   CREATININE 1.2   ALKPHOS 72   ALT 15   AST 27   BILITOT 1.2*       All labs within the past 24 hours have been reviewed.    Significant Imaging:  Labs: Reviewed  X-Ray: Reviewed      Assessment/Plan:     Active Diagnoses:    Diagnosis Date Noted POA    PRINCIPAL PROBLEM:  SBO (small bowel obstruction) [K56.609] 07/28/2023 Yes    Acute kidney injury superimposed on chronic kidney disease [N17.9, N18.9] 07/28/2023 Yes    Type 2 diabetes mellitus with stage 4 chronic kidney disease, without long-term current use of insulin [E11.22, N18.4] 01/20/2020 Yes    Chronic diastolic heart failure [I50.32] 08/23/2018 Yes    Coronary artery disease involving native coronary artery of native heart without angina pectoris [I25.10]  Yes    PAD (peripheral artery disease) [I73.9] 12/26/2012 Yes    Hypertension associated with type 2 diabetes mellitus [E11.59, I15.2] 12/26/2012 Yes    Hyperlipidemia associated with type 2 diabetes mellitus [E11.69, E78.5] 12/26/2012 Yes      Problems Resolved During this Admission:       MANPRETE on CKD3b  - baseline Cr 1.3-1.5, follows in my outpatient clinic  - Cr decreasing, down to 1.2 today.  MANPREET in the setting of SBO and hypotension.   - holding IVF and decompression of SBO. Encourage fluids  - maintain normotension  - no urgent indication for dialysis   -  monitor daily weights, strict I&Os  - avoid nephrotoxic agents including NSAIDs, renally dose medications     Hypokalemia  - gentle PO replacement today  - repeat labs AM     Hyponatremia   - D5W 500 ml x1 slowly  - encourage PO fluids  - repeat level tomorrow AM     Hypophosphatemia  - IV replacement with Kphos   - repeat level in AM   - encourage PO intake/ advance diet as tolerated    Hypomagnesemia  - level up, replace PRN    Hypoalbuminemia   - add novasource if persistent after diet advanced     SBO     h/o UTI  Diabetes ~10-15 years, A1C 5.5 on 11/2022  CAD s/p stent  DHF  HLD  PAD   OA  h/o tobacco abuse    Thank you for your consult. I will follow-up with patient. Please contact us if you have any additional questions.    Karly Gallardo PA-C  Nephrology  [facility]  Office [phone]    Date of Service: 07/31/2023  [facility] - Med Surg

## 2023-07-31 NOTE — PLAN OF CARE
Problem: Adult Inpatient Plan of Care  Goal: Plan of Care Review  Outcome: Ongoing, Progressing     Problem: Adult Inpatient Plan of Care  Goal: Patient-Specific Goal (Individualized)  Outcome: Ongoing, Progressing     Problem: Adult Inpatient Plan of Care  Goal: Absence of Hospital-Acquired Illness or Injury  Outcome: Ongoing, Progressing     Problem: Adult Inpatient Plan of Care  Goal: Optimal Comfort and Wellbeing  Outcome: Ongoing, Progressing     Problem: Diabetes Comorbidity  Goal: Blood Glucose Level Within Targeted Range  Outcome: Ongoing, Progressing     Problem: Fluid and Electrolyte Imbalance (Acute Kidney Injury/Impairment)  Goal: Fluid and Electrolyte Balance  Outcome: Ongoing, Progressing     Problem: Renal Function Impairment (Acute Kidney Injury/Impairment)  Goal: Effective Renal Function  Outcome: Ongoing, Progressing     Problem: Fall Injury Risk  Goal: Absence of Fall and Fall-Related Injury  Outcome: Ongoing, Progressing

## 2023-08-01 ENCOUNTER — PATIENT OUTREACH (OUTPATIENT)
Dept: ADMINISTRATIVE | Facility: CLINIC | Age: 88
End: 2023-08-01
Payer: MEDICAID

## 2023-08-01 NOTE — PROGRESS NOTES
C3 nurse attempted to contact Petra Zazueta for a TCC post hospital discharge follow up call. No answer. No voicemail available.  The patient has a scheduled HOSFU appointment with Edel Shepard MD (Internal Medicine) on 8/14/2023;   AT 10:00 am.

## 2023-08-02 NOTE — PROGRESS NOTES
C3 nurse spoke with Petra Zazueta for a TCC post hospital discharge follow up call. The patient has a scheduled [facility] appointment with Edel Shepard MD (Internal Medicine) on 8/14/2023;  AT 10:00 am.

## 2023-08-02 NOTE — DISCHARGE SUMMARY
[facility]  Discharge Summary      Patient Name: Petra Zazueta  MRN: [id number]  [facility]: [id number]  Patient Class: IP- Inpatient  Admission Date: 7/28/2023  Hospital Length of Stay: 3 days  Discharge Date and Time: 7/31/2023  6:17 PM  Attending Physician: No att. providers found   Discharging Provider: Kareem Francois MD  Primary Care Provider: Edel Shepard MD    Primary Care Team: Networked reference to record PCT     HPI:   This is an 88-year-old female with a past medical history of CAD, HFpEF (EF: 75%, GIIDD), hyperlipidemia, type 2 diabetes, CKD 4, peripheral artery disease, who presents with vomiting.     Patient presents with emesis, abdominal pain and decreased po intake that started about a week prior to presentation. Associated symptoms include abdominal distention and a productive cough. She is having bowel movements and is passing gas. She has chronic left lower extremity swelling that has not changed.     In the ED, the patient was tachypneic, hypertensive and was placed on 3 L of supplemental oxygen for comfort.  Labs were remarkable for an elevated creatinine (2.45 from a baseline of 1.5), hypomagnesemia (1.5).  CT abdomen and pelvis showed findings consistent with obstruction versus ileus, and bibasilar atelectasis versus consolidative changes.  She was given 500 cc of NS, Zofran 4 mg IV, morphine 2 mg IV and an NG-tube was placed with 2L immediate output.  She was admitted for further management. Upon arrival, NG tube had been already removed.       * No surgery found *      Hospital Course:   Mrs. Zazueta is an 87 yo F who was admitted with concerns for SBO and acute on chronic renal failure. NGT was placed and stomach decompressed. She was started on IVF. Nephrology and General Surgery consulted. Renal function slowly improved and patient started having flatus. Gastrograffin challenge and successfully had Bms. She was tolerating diet. PT consulted and recommending  home health. Patient overall stable and requesting discharge home. She will need to follow up with her Nephrologist and PCP. All questions answered, patient discharged home with her son.       Goals of Care Treatment Preferences:  Code Status: Full Code      Consults:   Consults (From admission, onward)        Status Ordering Provider     Inpatient consult to Nephrology  Once        Provider:  Velvet Morales MD    Completed PRINCESS RAMÍREZ     Inpatient consult to General Surgery  Once        Provider:  Al Monterroso MD    Completed DAVID JENSEN          No new Assessment & Plan notes have been filed under this hospital service since the last note was generated.  Service: Hospital Medicine    Final Active Diagnoses:    Diagnosis Date Noted POA    PRINCIPAL PROBLEM:  SBO (small bowel obstruction) [K56.609] 07/28/2023 Yes    Acute kidney injury superimposed on chronic kidney disease [N17.9, N18.9] 07/28/2023 Yes    Type 2 diabetes mellitus with stage 4 chronic kidney disease, without long-term current use of insulin [E11.22, N18.4] 01/20/2020 Yes    Chronic diastolic heart failure [I50.32] 08/23/2018 Yes    Coronary artery disease involving native coronary artery of native heart without angina pectoris [I25.10]  Yes    PAD (peripheral artery disease) [I73.9] 12/26/2012 Yes    Hypertension associated with type 2 diabetes mellitus [E11.59, I15.2] 12/26/2012 Yes    Hyperlipidemia associated with type 2 diabetes mellitus [E11.69, E78.5] 12/26/2012 Yes      Problems Resolved During this Admission:       Discharged Condition: stable    Disposition: Home or Self Care    Follow Up:   Follow-up Information     Edel Shepard MD Follow up on 8/14/2023.    Specialty: Internal Medicine  Why: [address]  [address]   [place]  APPOINTMENT AT 10:00 am. requesting a sooner appt.  Contact information:  [address]  [address]  [place] [address] [phone]             [facility]  Follow up.    Why: Home Health  Contact information:  [phone]    WILL CALL WITH A HOME HEALTH  PROVIDER                     Patient Instructions:      Diet renal     Notify your health care provider if you experience any of the following:  temperature >100.4     Notify your health care provider if you experience any of the following:  persistent nausea and vomiting or diarrhea     Notify your health care provider if you experience any of the following:  severe uncontrolled pain     Notify your health care provider if you experience any of the following:  difficulty breathing or increased cough     Notify your health care provider if you experience any of the following:  severe persistent headache     Notify your health care provider if you experience any of the following:  worsening rash     Notify your health care provider if you experience any of the following:  persistent dizziness, light-headedness, or visual disturbances     Notify your health care provider if you experience any of the following:  increased confusion or weakness     Activity as tolerated       Significant Diagnostic Studies: Labs: All labs within the past 24 hours have been reviewed    Pending Diagnostic Studies:     None         Medications:  Reconciled Home Medications:      Medication List      CHANGE how you take these medications    ferrous sulfate 324 mg (65 mg iron) Tbec  TAKE 1 TABLET (324 MG TOTAL) BY MOUTH ONCE DAILY.     losartan 100 MG tablet  Commonly known as: COZAAR  TAKE 1 TABLET BY MOUTH EVERY DAY  What changed: when to take this        CONTINUE taking these medications    acetaminophen 325 MG tablet  Commonly known as: TYLENOL  Take 325 mg by mouth as needed for Pain.     aspirin 81 MG EC tablet  Commonly known as: ECOTRIN  Take 81 mg by mouth once daily.     atorvastatin 80 MG tablet  Commonly known as: LIPITOR  Take 1 tablet (80 mg total) by mouth every evening.     blood sugar diagnostic Strp  Commonly known as: TRUE  METRIX GLUCOSE TEST STRIP  1 strip by Misc.(Non-Drug; Combo Route) route 2 (two) times daily.     blood-glucose meter Misc  Commonly known as: TRUE METRIX GLUCOSE METER  Test blood sugars twice daily     carvediloL 25 MG tablet  Commonly known as: COREG  TAKE 1 TABLET BY MOUTH TWICE A DAY WITH FOOD     clopidogreL 75 mg tablet  Commonly known as: PLAVIX  Take 1 tablet (75 mg total) by mouth once daily.     lancets 33 gauge Misc  Commonly known as: BD ULTRA FINE LANCETS  1 lancet by Misc.(Non-Drug; Combo Route) route 2 (two) times daily.     latanoprost 0.005 % ophthalmic solution  Place 1 drop into both eyes every evening.     omeprazole 40 MG capsule  Commonly known as: PRILOSEC  Take 1 capsule (40 mg total) by mouth once daily.     TRADJENTA 5 mg Tab tablet  Generic drug: linaGLIPtin  Take 1 tablet (5 mg total) by mouth once daily.        STOP taking these medications    furosemide 20 MG tablet  Commonly known as: LASIX     gabapentin 300 MG capsule  Commonly known as: NEURONTIN     hydrALAZINE 100 MG tablet  Commonly known as: APRESOLINE     NIFEdipine 90 MG Tbsr  Commonly known as: ADALAT CC            Indwelling Lines/Drains at time of discharge:   Lines/Drains/Airways     None                 Time spent on the discharge of patient: >35 minutes         Kareem Francois MD  Department of Hospital Medicine  [facility]

## 2023-08-02 NOTE — PROGRESS NOTES
C3 nurse attempted to contact Petra Zazueta for a TCC post hospital discharge follow up call. Patient answered but stated she was currently with the  nurse and requested a call back later today. The patient has a scheduled \[facility] [place] [facility]\"" appointment with Edel Shepard MD (Internal Medicine) on 8/14/2023;   AT 10:00 am.

## 2023-08-02 NOTE — HOSPITAL COURSE
Mrs. Zazueta is an 87 yo F who was admitted with concerns for SBO and acute on chronic renal failure. NGT was placed and stomach decompressed. She was started on IVF. Nephrology and General Surgery consulted. Renal function slowly improved and patient started having flatus. Gastrograffin challenge and successfully had Bms. She was tolerating diet. PT consulted and recommending home health. Patient overall stable and requesting discharge home. She will need to follow up with her Nephrologist and PCP. All questions answered, patient discharged home with her son.

## 2023-08-14 ENCOUNTER — OFFICE VISIT (OUTPATIENT)
Dept: FAMILY MEDICINE | Facility: CLINIC | Age: 88
End: 2023-08-14
Payer: MEDICARE

## 2023-08-14 VITALS
HEIGHT: 62 IN | SYSTOLIC BLOOD PRESSURE: 170 MMHG | OXYGEN SATURATION: 98 % | DIASTOLIC BLOOD PRESSURE: 78 MMHG | HEART RATE: 51 BPM | WEIGHT: 152.69 LBS | BODY MASS INDEX: 28.1 KG/M2

## 2023-08-14 DIAGNOSIS — D53.9 MACROCYTIC ANEMIA: ICD-10-CM

## 2023-08-14 DIAGNOSIS — N18.31 STAGE 3A CHRONIC KIDNEY DISEASE: ICD-10-CM

## 2023-08-14 DIAGNOSIS — E66.3 OVERWEIGHT WITH BODY MASS INDEX (BMI) OF 27 TO 27.9 IN ADULT: ICD-10-CM

## 2023-08-14 DIAGNOSIS — E87.6 HYPOKALEMIA: ICD-10-CM

## 2023-08-14 DIAGNOSIS — J43.2 CENTRILOBULAR EMPHYSEMA: Primary | ICD-10-CM

## 2023-08-14 DIAGNOSIS — K56.609 SMALL BOWEL OBSTRUCTION: ICD-10-CM

## 2023-08-14 DIAGNOSIS — Z09 HOSPITAL DISCHARGE FOLLOW-UP: ICD-10-CM

## 2023-08-14 DIAGNOSIS — I10 ESSENTIAL HYPERTENSION: ICD-10-CM

## 2023-08-14 DIAGNOSIS — E11.42 TYPE 2 DIABETES MELLITUS WITH DIABETIC POLYNEUROPATHY, WITHOUT LONG-TERM CURRENT USE OF INSULIN: ICD-10-CM

## 2023-08-14 PROCEDURE — 99495 TRANSJ CARE MGMT MOD F2F 14D: CPT | Mod: S$GLB,,, | Performed by: STUDENT IN AN ORGANIZED HEALTH CARE EDUCATION/TRAINING PROGRAM

## 2023-08-14 PROCEDURE — 99999 PR PBB SHADOW E&M-EST. PATIENT-LVL IV: ICD-10-PCS | Mod: PBBFAC,,, | Performed by: STUDENT IN AN ORGANIZED HEALTH CARE EDUCATION/TRAINING PROGRAM

## 2023-08-14 PROCEDURE — 1101F PR PT FALLS ASSESS DOC 0-1 FALLS W/OUT INJ PAST YR: ICD-10-PCS | Mod: CPTII,S$GLB,, | Performed by: STUDENT IN AN ORGANIZED HEALTH CARE EDUCATION/TRAINING PROGRAM

## 2023-08-14 PROCEDURE — 1159F PR MEDICATION LIST DOCUMENTED IN MEDICAL RECORD: ICD-10-PCS | Mod: CPTII,S$GLB,, | Performed by: STUDENT IN AN ORGANIZED HEALTH CARE EDUCATION/TRAINING PROGRAM

## 2023-08-14 PROCEDURE — 1160F PR REVIEW ALL MEDS BY PRESCRIBER/CLIN PHARMACIST DOCUMENTED: ICD-10-PCS | Mod: CPTII,S$GLB,, | Performed by: STUDENT IN AN ORGANIZED HEALTH CARE EDUCATION/TRAINING PROGRAM

## 2023-08-14 PROCEDURE — 1160F RVW MEDS BY RX/DR IN RCRD: CPT | Mod: CPTII,S$GLB,, | Performed by: STUDENT IN AN ORGANIZED HEALTH CARE EDUCATION/TRAINING PROGRAM

## 2023-08-14 PROCEDURE — 1126F PR PAIN SEVERITY QUANTIFIED, NO PAIN PRESENT: ICD-10-PCS | Mod: CPTII,S$GLB,, | Performed by: STUDENT IN AN ORGANIZED HEALTH CARE EDUCATION/TRAINING PROGRAM

## 2023-08-14 PROCEDURE — 1111F DSCHRG MED/CURRENT MED MERGE: CPT | Mod: CPTII,S$GLB,, | Performed by: STUDENT IN AN ORGANIZED HEALTH CARE EDUCATION/TRAINING PROGRAM

## 2023-08-14 PROCEDURE — 1111F PR DISCHARGE MEDS RECONCILED W/ CURRENT OUTPATIENT MED LIST: ICD-10-PCS | Mod: CPTII,S$GLB,, | Performed by: STUDENT IN AN ORGANIZED HEALTH CARE EDUCATION/TRAINING PROGRAM

## 2023-08-14 PROCEDURE — 99999 PR PBB SHADOW E&M-EST. PATIENT-LVL IV: CPT | Mod: PBBFAC,,, | Performed by: STUDENT IN AN ORGANIZED HEALTH CARE EDUCATION/TRAINING PROGRAM

## 2023-08-14 PROCEDURE — 1159F MED LIST DOCD IN RCRD: CPT | Mod: CPTII,S$GLB,, | Performed by: STUDENT IN AN ORGANIZED HEALTH CARE EDUCATION/TRAINING PROGRAM

## 2023-08-14 PROCEDURE — 1126F AMNT PAIN NOTED NONE PRSNT: CPT | Mod: CPTII,S$GLB,, | Performed by: STUDENT IN AN ORGANIZED HEALTH CARE EDUCATION/TRAINING PROGRAM

## 2023-08-14 PROCEDURE — 3288F FALL RISK ASSESSMENT DOCD: CPT | Mod: CPTII,S$GLB,, | Performed by: STUDENT IN AN ORGANIZED HEALTH CARE EDUCATION/TRAINING PROGRAM

## 2023-08-14 PROCEDURE — 3288F PR FALLS RISK ASSESSMENT DOCUMENTED: ICD-10-PCS | Mod: CPTII,S$GLB,, | Performed by: STUDENT IN AN ORGANIZED HEALTH CARE EDUCATION/TRAINING PROGRAM

## 2023-08-14 PROCEDURE — 1101F PT FALLS ASSESS-DOCD LE1/YR: CPT | Mod: CPTII,S$GLB,, | Performed by: STUDENT IN AN ORGANIZED HEALTH CARE EDUCATION/TRAINING PROGRAM

## 2023-08-14 PROCEDURE — 99495 TCM SERVICES (MODERATE COMPLEXITY): ICD-10-PCS | Mod: S$GLB,,, | Performed by: STUDENT IN AN ORGANIZED HEALTH CARE EDUCATION/TRAINING PROGRAM

## 2023-08-14 RX ORDER — HYDRALAZINE HYDROCHLORIDE 50 MG/1
50 TABLET, FILM COATED ORAL EVERY 12 HOURS
Qty: 60 TABLET | Refills: 3 | Status: SHIPPED | OUTPATIENT
Start: 2023-08-14 | End: 2023-09-28 | Stop reason: SDUPTHER

## 2023-08-14 NOTE — PROGRESS NOTES
[facility] Note    Chief Complaint      Chief Complaint   Patient presents with    Hospital Follow Up     History of Present Illness      HPI    Petra Zazueta is a 88 y.o. female with T2DM complicated by polyneuropathy, sHTN, HLD, macrocytic anemia, CKD stage 3a, chronic diastolic HFpEF and PVD independent of ADLs and iADLs accompanied in office today by son (Mr Concepcion) who presents for hospital follow up visit and establishment of care. She was recently managed for SBO (07/28/2023-07/31/2023) at [facility] following presentation with constipation, diffuse abdominal pain for couple of days, found to be hypertensive and MANPREET on CKD subsequently managed with IVF, NGT and gastrograffin challenge with bowel movement establised prior to discharge. Since discharge she has been having acute watery diarrhea which just resolved yesterday, no new complaints, compliant with all her discharge medications and described her mood to be great today.     Problem List Addressed This Visit:    1. Hospital discharge follow-up    2. Small bowel obstruction    3. Essential hypertension  -     TSH; Standing  -     hydrALAZINE (APRESOLINE) 50 MG tablet; Take 1 tablet (50 mg total) by mouth every 12 (twelve) hours.  Dispense: 60 tablet; Refill: 3    4. Type 2 diabetes mellitus with diabetic polyneuropathy, without long-term current use of insulin  Overview:  Lab Results   Component Value Date    LABA1C 7.2 (H) 05/25/2018    HGBA1C 5.5 11/18/2022         Orders:  -     HEMOGLOBIN A1C; Standing  -     Microalbumin/Creatinine Ratio, Urine; Standing  -     LIPID PANEL; Standing    5. Stage 3a chronic kidney disease  -     Comprehensive Metabolic Panel; Standing    6. Hypokalemia  -     Comprehensive Metabolic Panel; Standing    7. Macrocytic anemia  -     CBC auto differential; Standing  -     Vitamin B12; Future; Expected date: 08/14/2023  -     Folate; Future; Expected date: 08/14/2023    8. Overweight with body mass index  (BMI) of 27 to 27.9 in adult         Health Maintenance   Topic Date Due    Lipid Panel  2023    Hemoglobin A1c  2023    Eye Exam  2024    TETANUS VACCINE  2031    Shingles Vaccine  Completed       Past Medical History:   Diagnosis Date    Acute on chronic diastolic congestive heart failure 2023    Anemia     Arthritis     Coronary artery disease     Diabetes mellitus     Diabetes mellitus type II     Diabetic retinopathy 2019    Dyslipidemia     Hypertension     Insomnia     PAD (peripheral artery disease)        Past Surgical History:   Procedure Laterality Date    angiagram  2018    CORONARY ANGIOGRAPHY N/A 2022    Procedure: ANGIOGRAM, CORONARY ARTERY;  Surgeon: Kiet Vega MD;  Location: [facility] CATH LAB/EP;  Service: Cardiology;  Laterality: N/A;    CORONARY ANGIOPLASTY      RCA 2011 EJ    GALLBLADDER SURGERY      HYSTERECTOMY      LEFT HEART CATHETERIZATION Left 2022    Procedure: Left heart cath;  Surgeon: Kiet Vega MD;  Location: [facility] CATH LAB/EP;  Service: Cardiology;  Laterality: Left;    PERIPHERAL ARTERIAL STENT GRAFT      Left lower extremity RCA        family history includes Heart attack in her mother.    Social History     Tobacco Use    Smoking status: Former     Current packs/day: 0.00     Types: Cigarettes     Quit date: 2005     Years since quittin.6    Smokeless tobacco: Never   Substance Use Topics    Alcohol use: No    Drug use: No       Review of Systems   Constitutional:  Negative for fatigue and fever.   Respiratory:  Negative for cough and chest tightness.    Gastrointestinal:  Negative for abdominal pain, diarrhea and vomiting.   Endocrine: Negative for polydipsia and polyphagia.   Genitourinary:  Negative for difficulty urinating, dysuria and frequency.   Musculoskeletal:  Negative for arthralgias, back pain, gait problem and joint swelling.   Skin:  Negative for rash.   Neurological:  Negative for seizures,  weakness, numbness and headaches.   Psychiatric/Behavioral:  Negative for sleep disturbance.        Outpatient Encounter Medications as of 8/14/2023   Medication Sig Note Dispense Refill    acetaminophen (TYLENOL) 325 MG tablet Take 325 mg by mouth as needed for Pain.       aspirin (ECOTRIN) 81 MG EC tablet Take 81 mg by mouth once daily. 8/2/2023: Twice daily      atorvastatin (LIPITOR) 80 MG tablet Take 1 tablet (80 mg total) by mouth every evening.  90 tablet 3    blood sugar diagnostic (TRUE METRIX GLUCOSE TEST STRIP) Strp 1 strip by Misc.(Non-Drug; Combo Route) route 2 (two) times daily.  200 each 1    blood-glucose meter (TRUE METRIX GLUCOSE METER) Misc Test blood sugars twice daily  1 each 0    carvediloL (COREG) 25 MG tablet TAKE 1 TABLET BY MOUTH TWICE A DAY WITH FOOD (Patient taking differently: Take 25 mg by mouth 2 (two) times daily with meals.)  180 tablet 3    clopidogreL (PLAVIX) 75 mg tablet Take 1 tablet (75 mg total) by mouth once daily.  30 tablet 11    ferrous sulfate 324 mg (65 mg iron) TbEC TAKE 1 TABLET (324 MG TOTAL) BY MOUTH ONCE DAILY.  90 tablet 3    lancets (BD ULTRA FINE LANCETS) 33 gauge Misc 1 lancet by Misc.(Non-Drug; Combo Route) route 2 (two) times daily.  200 each 1    latanoprost 0.005 % ophthalmic solution Place 1 drop into both eyes every evening.       linaGLIPtin (TRADJENTA) 5 mg Tab tablet Take 1 tablet (5 mg total) by mouth once daily.  90 tablet 3    losartan (COZAAR) 100 MG tablet TAKE 1 TABLET BY MOUTH EVERY DAY (Patient taking differently: Take 100 mg by mouth once daily.)  90 tablet 3    omeprazole (PRILOSEC) 40 MG capsule Take 1 capsule (40 mg total) by mouth once daily.  90 capsule 3    hydrALAZINE (APRESOLINE) 50 MG tablet Take 1 tablet (50 mg total) by mouth every 12 (twelve) hours.  60 tablet 3    [DISCONTINUED] cilostazol (PLETAL) 100 MG Tab Take 100 mg by mouth 2 (two) times daily.   3     No facility-administered encounter medications on file as of 8/14/2023.     "    Review of patient's allergies indicates:   Allergen Reactions    Codeine Other (See Comments)     Jittery, lightheadedness, nausea  Other reaction(s): NAUSEA       Physical Exam      Vital Signs  Pulse: (!) 51  SpO2: 98 %  BP: (!) 170/78  Pain Score: 0-No pain  Height and Weight  Height: 5' 2" (157.5 cm)  Weight: 69.3 kg (152 lb 11.2 oz)  BSA (Calculated - sq m): 1.74 sq meters  BMI (Calculated): 27.9  Weight in (lb) to have BMI = 25: 136.4]    Physical Exam  Vitals reviewed.   Constitutional:       Appearance: Normal appearance.   HENT:      Head: Normocephalic and atraumatic.      Right Ear: Tympanic membrane normal.      Left Ear: Tympanic membrane normal.      Mouth/Throat:      Mouth: Mucous membranes are moist.      Pharynx: Oropharynx is clear.   Eyes:      Extraocular Movements: Extraocular movements intact.      Conjunctiva/sclera: Conjunctivae normal.      Pupils: Pupils are equal, round, and reactive to light.   Cardiovascular:      Rate and Rhythm: Normal rate and regular rhythm.      Pulses: Normal pulses.      Heart sounds: Normal heart sounds.   Pulmonary:      Effort: Pulmonary effort is normal.      Breath sounds: Normal breath sounds.   Abdominal:      General: Abdomen is flat. Bowel sounds are normal.      Palpations: Abdomen is soft.   Musculoskeletal:      Cervical back: Normal range of motion.      Right lower leg: No edema.      Left lower leg: No edema.   Skin:     General: Skin is warm and dry.   Neurological:      General: No focal deficit present.      Mental Status: She is alert and oriented to person, place, and time.      Sensory: No sensory deficit.   Psychiatric:         Mood and Affect: Mood normal.         Behavior: Behavior normal.          Laboratory:  CBC:  Recent Labs   Lab Result Units 07/28/23  1123 07/29/23  0507 07/31/23  0402   WBC K/uL 10.07 4.36 5.28   RBC M/uL 4.27 3.35* 3.35*   Hemoglobin g/dL 13.7 10.8* 10.6*   Hematocrit % 42.1 34.6* 34.8*   Platelets K/uL 169 " "130* 140*   MCV fL 99* 103* 104*   MCH pg 32.1* 32.2* 31.6*   MCHC g/dL 32.5 31.2* 30.5*     CMP:  Recent Labs   Lab Result Units 06/05/23  0935 07/28/23  1123 07/29/23  0507 07/31/23  0402   Glucose mg/dL 127* 178*   < > 61*   Calcium mg/dL 9.5 10.2   < > 8.9   Albumin g/dL 4.2 4.7   < > 3.2*   Total Protein g/dL 7.3 8.6*  --  6.3   Sodium mmol/L 141 140   < > 147*   Potassium mmol/L 3.8 3.5   < > 3.2*   CO2 mmol/L 27 22*   < > 23   Chloride mmol/L 106 100   < > 114*   BUN mg/dL 27* 57*   < > 41*   Alkaline Phosphatase U/L 85 110  --  72   ALT U/L 21 22  --  15   AST U/L 27 27  --  27   Total Bilirubin mg/dL 0.6 0.7  --  1.2*    < > = values in this interval not displayed.     URINALYSIS:  Recent Labs   Lab Result Units 06/05/23  0932 07/29/23  1630   Color, UA  Yellow Yellow   Specific Gravity, UA  1.020 1.020   pH, UA  6.0 5.0   Protein, UA  1+* Trace*   Bacteria /hpf Few*  --    Nitrite, UA  Negative Negative   Leukocytes, UA  Negative Negative   Urobilinogen, UA EU/dL Negative Negative   Hyaline Casts, UA /lpf 0  --       LIPIDS:  No results for input(s): "TSH", "HDL", "CHOL", "TRIG", "LDLCALC", "CHOLHDL", "NONHDLCHOL", "TOTALCHOLEST" in the last 2160 hours.  TSH:  No results for input(s): "TSH" in the last 2160 hours.  A1C:  No results for input(s): "HGBA1C" in the last 2160 hours.    Radiology:      Assessment/Plan     Petra Zazueta is a 88 y.o.female with:    1. Hospital discharge follow-up  -for Small bowel obstruction  -s/p IVF, NGT and gastrograffinn challenge while on admission  -now back to baseline     3. Essential hypertension  -uncontrolled today despite compliance with current regimen  -recently taken off Hydralazine and nifedipine  -restart hydralazine 50mg BID, c/w coreg 25mg BID and losartan 100mg daily  -patient advised to monitor and keep BP log  - TSH; Standing  - hydrALAZINE (APRESOLINE) 50 MG tablet; Take 1 tablet (50 mg total) by mouth every 12 (twelve) hours.  Dispense: 60 tablet; " Refill: 3    4. Type 2 diabetes mellitus with diabetic polyneuropathy, without long-term current use of insulin  -controlled per last A1C ( 11/18/2022)- 5.5  -c/w current regimen  -UTD on eye and podiatry  -due for repeat A1C and annual urine  - HEMOGLOBIN A1C; Standing  - Microalbumin/Creatinine Ratio, Urine; Standing  - LIPID PANEL; Standing    5. Stage 3a chronic kidney disease  -improving renal function  -patient counseled on nephrotoxins  -renal evaluation already scheduled (08/17/2023) with Dr Morales  - Comprehensive Metabolic Panel; Standing    6. Hypokalemia  -likely due to excessive GI loss with overdiuresis  -recent serum K level @ 3.2  -repeat level today again  - Comprehensive Metabolic Panel; Standing    7. Macrocytic anemia  -recent H&H @ 10.6, MCV @ 104  - CBC auto differential; Standing  - Vitamin B12; Future  - Folate; Future    8. Overweight with body mass index (BMI) of 27 to 27.9 in adult  -c/w life style modifications      -Continue current medications and maintain follow up with specialists.      Patient verbalizes understanding and agrees with current treatment plan.    Transitional Care Note    Family and/or Caretaker present at visit?  Yes. (Son , Mr Concepcion)  Diagnostic tests reviewed/disposition: I have reviewed all completed as well as pending diagnostic tests at the time of discharge.  Disease/illness education: yes  Home health/community services discussion/referrals: Patient does not have home health established from hospital visit.  They do not need home health.  If needed, we will set up home health for the patient.   Establishment or re-establishment of referral orders for community resources: No other necessary community resources.   Discussion with other health care providers: No discussion with other health care providers necessary.          Dr Edel Shepard MD  [facility]  [place]

## 2023-08-16 ENCOUNTER — TELEPHONE (OUTPATIENT)
Dept: FAMILY MEDICINE | Facility: CLINIC | Age: 88
End: 2023-08-16
Payer: MEDICAID

## 2023-08-16 NOTE — TELEPHONE ENCOUNTER
----- Message from Edel Shepard MD sent at 8/15/2023  5:10 PM CDT -----  Please let patient know all her recent blood work is within normal except her vitamin B12 level that is quite elevated, hence, she should stop using any form of over the counter multivitamin for now except calcium/Vitamin D supplement. Thanks

## 2023-08-16 NOTE — TELEPHONE ENCOUNTER
Called patient per MD Linton. Patient son answered stated his mother is at the lab and he will give her the message.

## 2023-08-18 ENCOUNTER — DOCUMENT SCAN (OUTPATIENT)
Dept: HOME HEALTH SERVICES | Facility: HOSPITAL | Age: 88
End: 2023-08-18
Payer: MEDICAID

## 2023-08-28 ENCOUNTER — OFFICE VISIT (OUTPATIENT)
Dept: FAMILY MEDICINE | Facility: CLINIC | Age: 88
End: 2023-08-28
Payer: MEDICARE

## 2023-08-28 DIAGNOSIS — E11.59 HYPERTENSION ASSOCIATED WITH TYPE 2 DIABETES MELLITUS: Primary | ICD-10-CM

## 2023-08-28 DIAGNOSIS — I15.2 HYPERTENSION ASSOCIATED WITH TYPE 2 DIABETES MELLITUS: Primary | ICD-10-CM

## 2023-08-28 PROCEDURE — 99212 OFFICE O/P EST SF 10 MIN: CPT | Mod: S$GLB,,, | Performed by: STUDENT IN AN ORGANIZED HEALTH CARE EDUCATION/TRAINING PROGRAM

## 2023-08-28 PROCEDURE — 99212 PR OFFICE/OUTPT VISIT, EST, LEVL II, 10-19 MIN: ICD-10-PCS | Mod: S$GLB,,, | Performed by: STUDENT IN AN ORGANIZED HEALTH CARE EDUCATION/TRAINING PROGRAM

## 2023-08-28 PROCEDURE — 1159F MED LIST DOCD IN RCRD: CPT | Mod: CPTII,S$GLB,, | Performed by: STUDENT IN AN ORGANIZED HEALTH CARE EDUCATION/TRAINING PROGRAM

## 2023-08-28 PROCEDURE — 99999 PR PBB SHADOW E&M-EST. PATIENT-LVL I: ICD-10-PCS | Mod: PBBFAC,,, | Performed by: STUDENT IN AN ORGANIZED HEALTH CARE EDUCATION/TRAINING PROGRAM

## 2023-08-28 PROCEDURE — 99999 PR PBB SHADOW E&M-EST. PATIENT-LVL I: CPT | Mod: PBBFAC,,, | Performed by: STUDENT IN AN ORGANIZED HEALTH CARE EDUCATION/TRAINING PROGRAM

## 2023-08-28 PROCEDURE — 1160F RVW MEDS BY RX/DR IN RCRD: CPT | Mod: CPTII,S$GLB,, | Performed by: STUDENT IN AN ORGANIZED HEALTH CARE EDUCATION/TRAINING PROGRAM

## 2023-08-28 PROCEDURE — 1111F PR DISCHARGE MEDS RECONCILED W/ CURRENT OUTPATIENT MED LIST: ICD-10-PCS | Mod: CPTII,S$GLB,, | Performed by: STUDENT IN AN ORGANIZED HEALTH CARE EDUCATION/TRAINING PROGRAM

## 2023-08-28 PROCEDURE — 1111F DSCHRG MED/CURRENT MED MERGE: CPT | Mod: CPTII,S$GLB,, | Performed by: STUDENT IN AN ORGANIZED HEALTH CARE EDUCATION/TRAINING PROGRAM

## 2023-08-28 PROCEDURE — 1159F PR MEDICATION LIST DOCUMENTED IN MEDICAL RECORD: ICD-10-PCS | Mod: CPTII,S$GLB,, | Performed by: STUDENT IN AN ORGANIZED HEALTH CARE EDUCATION/TRAINING PROGRAM

## 2023-08-28 PROCEDURE — 1160F PR REVIEW ALL MEDS BY PRESCRIBER/CLIN PHARMACIST DOCUMENTED: ICD-10-PCS | Mod: CPTII,S$GLB,, | Performed by: STUDENT IN AN ORGANIZED HEALTH CARE EDUCATION/TRAINING PROGRAM

## 2023-08-28 NOTE — PROGRESS NOTES
Petra Zazueta 88 y.o. female is here today for Blood Pressure check.   History of HTN yes.    Review of patient's allergies indicates:   Allergen Reactions    Codeine Other (See Comments)     Jittery, lightheadedness, nausea  Other reaction(s): NAUSEA     Creatinine   Date Value Ref Range Status   08/16/2023 1.37 0.50 - 1.40 mg/dL Final     Sodium   Date Value Ref Range Status   08/16/2023 142 136 - 145 mmol/L Final     Potassium   Date Value Ref Range Status   08/16/2023 3.9 3.5 - 5.1 mmol/L Final   ]  Patient verifies taking blood pressure medications on a regular basis at the same time of the day.     Current Outpatient Medications:     acetaminophen (TYLENOL) 325 MG tablet, Take 325 mg by mouth as needed for Pain., Disp: , Rfl:     aspirin (ECOTRIN) 81 MG EC tablet, Take 81 mg by mouth once daily., Disp: , Rfl:     atorvastatin (LIPITOR) 80 MG tablet, Take 1 tablet (80 mg total) by mouth every evening., Disp: 90 tablet, Rfl: 3    blood sugar diagnostic (TRUE METRIX GLUCOSE TEST STRIP) Strp, 1 strip by Misc.(Non-Drug; Combo Route) route 2 (two) times daily., Disp: 200 each, Rfl: 1    blood-glucose meter (TRUE METRIX GLUCOSE METER) Misc, Test blood sugars twice daily, Disp: 1 each, Rfl: 0    carvediloL (COREG) 25 MG tablet, TAKE 1 TABLET BY MOUTH TWICE A DAY WITH FOOD (Patient taking differently: Take 25 mg by mouth 2 (two) times daily with meals.), Disp: 180 tablet, Rfl: 3    clopidogreL (PLAVIX) 75 mg tablet, Take 1 tablet (75 mg total) by mouth once daily., Disp: 30 tablet, Rfl: 11    ferrous sulfate 324 mg (65 mg iron) TbEC, TAKE 1 TABLET (324 MG TOTAL) BY MOUTH ONCE DAILY., Disp: 90 tablet, Rfl: 3    hydrALAZINE (APRESOLINE) 50 MG tablet, Take 1 tablet (50 mg total) by mouth every 12 (twelve) hours., Disp: 60 tablet, Rfl: 3    lancets (BD ULTRA FINE LANCETS) 33 gauge Misc, 1 lancet by Misc.(Non-Drug; Combo Route) route 2 (two) times daily., Disp: 200 each, Rfl: 1    latanoprost 0.005 % ophthalmic solution,  Place 1 drop into both eyes every evening., Disp: , Rfl:     linaGLIPtin (TRADJENTA) 5 mg Tab tablet, Take 1 tablet (5 mg total) by mouth once daily., Disp: 90 tablet, Rfl: 3    losartan (COZAAR) 100 MG tablet, TAKE 1 TABLET BY MOUTH EVERY DAY (Patient taking differently: Take 100 mg by mouth once daily.), Disp: 90 tablet, Rfl: 3    omeprazole (PRILOSEC) 40 MG capsule, Take 1 capsule (40 mg total) by mouth once daily., Disp: 90 capsule, Rfl: 3  Does patient have record of home blood pressure readings yes. Readings have been averaging 130/80.   Last dose of blood pressure medication was taken at 8:00am.  Patient is asymptomatic.   Complains of nothing.       .    Blood pressure reading after 15 minutes was 168/66, Pulse 50.  Dr. Linton notified.

## 2023-08-31 NOTE — PROGRESS NOTES
Petra Zazueta 88 y.o. female is here today for Blood Pressure check.   History of HTN yes.    Review of patient's allergies indicates:   Allergen Reactions    Codeine Other (See Comments)     Jittery, lightheadedness, nausea  Other reaction(s): NAUSEA     Creatinine   Date Value Ref Range Status   08/16/2023 1.37 0.50 - 1.40 mg/dL Final     Sodium   Date Value Ref Range Status   08/16/2023 142 136 - 145 mmol/L Final     Potassium   Date Value Ref Range Status   08/16/2023 3.9 3.5 - 5.1 mmol/L Final   ]  Patient verifies taking blood pressure medications on a regular basis at the same time of the day.     Current Outpatient Medications:     acetaminophen (TYLENOL) 325 MG tablet, Take 325 mg by mouth as needed for Pain., Disp: , Rfl:     aspirin (ECOTRIN) 81 MG EC tablet, Take 81 mg by mouth once daily., Disp: , Rfl:     atorvastatin (LIPITOR) 80 MG tablet, Take 1 tablet (80 mg total) by mouth every evening., Disp: 90 tablet, Rfl: 3    blood sugar diagnostic (TRUE METRIX GLUCOSE TEST STRIP) Strp, 1 strip by Misc.(Non-Drug; Combo Route) route 2 (two) times daily., Disp: 200 each, Rfl: 1    blood-glucose meter (TRUE METRIX GLUCOSE METER) Misc, Test blood sugars twice daily, Disp: 1 each, Rfl: 0    carvediloL (COREG) 25 MG tablet, TAKE 1 TABLET BY MOUTH TWICE A DAY WITH FOOD (Patient taking differently: Take 25 mg by mouth 2 (two) times daily with meals.), Disp: 180 tablet, Rfl: 3    clopidogreL (PLAVIX) 75 mg tablet, Take 1 tablet (75 mg total) by mouth once daily., Disp: 30 tablet, Rfl: 11    ferrous sulfate 324 mg (65 mg iron) TbEC, TAKE 1 TABLET (324 MG TOTAL) BY MOUTH ONCE DAILY., Disp: 90 tablet, Rfl: 3    hydrALAZINE (APRESOLINE) 50 MG tablet, Take 1 tablet (50 mg total) by mouth every 12 (twelve) hours., Disp: 60 tablet, Rfl: 3    lancets (BD ULTRA FINE LANCETS) 33 gauge Misc, 1 lancet by Misc.(Non-Drug; Combo Route) route 2 (two) times daily., Disp: 200 each, Rfl: 1    latanoprost 0.005 % ophthalmic solution,  Place 1 drop into both eyes every evening., Disp: , Rfl:     linaGLIPtin (TRADJENTA) 5 mg Tab tablet, Take 1 tablet (5 mg total) by mouth once daily., Disp: 90 tablet, Rfl: 3    losartan (COZAAR) 100 MG tablet, TAKE 1 TABLET BY MOUTH EVERY DAY (Patient taking differently: Take 100 mg by mouth once daily.), Disp: 90 tablet, Rfl: 3    omeprazole (PRILOSEC) 40 MG capsule, Take 1 capsule (40 mg total) by mouth once daily., Disp: 90 capsule, Rfl: 3  Does patient have record of home blood pressure readings yes. Readings have been averaging 130/80.   Last dose of blood pressure medication was taken at 8:00am.  Patient is asymptomatic.   Complains of nothing.      ,   .    Blood pressure reading after 15 minutes was 168/6, Pulse 50.  Dr. Linton notified.

## 2023-09-21 NOTE — PROGRESS NOTES
Emergency Department Provider Note       PCP: Delma Villanueva MD   Age: 4 y.o. Sex: male     DISPOSITION Decision To Discharge 09/21/2023 09:51:50 AM       ICD-10-CM    1. Acute upper respiratory infection  J06.9       2. Fever, unspecified fever cause  R50.9       3. Cough, unspecified type  R05.9           Medical Decision Making     Complexity of Problems Addressed:  1 stable acute uncomplicated illness    Data Reviewed and Analyzed:   I independently ordered and reviewed each unique test.  I reviewed external records: provider visit note from PCP. The patients assessment required an independent historian: Mother provides historical formation regards to patient's symptoms, past medical history. States that she gave Tylenol around midnight. .  The reason they were needed is developmental age. Pediatrician well-child visit note reviewed from 2/28/2023. Immunizations up-to-date. Patient with palpable undescended testicle. Referral for outpatient urology visit ordered. I interpreted the X-rays chest x-ray with no infiltrate or consolidation. Agree with radiologist interpretation. Discussion of management or test interpretation. 4year-old male presents with mother with complaint of nonproductive cough, congestion, rhinorrhea since Monday. States that cough has been worsening. States that she noticed patient felt warm last night so she gave pediatric Tylenol around midnight. Patient noted to be tachycardic, febrile. Orders placed for pediatric Motrin. Will swab for COVID, flu, strep.  ==========================================================  Flu, COVID, strep swab negative. Chest x-ray with no infiltrate or consolidation. Patient tolerating p.o. without difficulty. Patient well-appearing. Repeat vital signs improved. Patient no longer tachycardic. Discussed symptomatic care with mother. Started on need for close follow-up with pediatrician and given strict return precautions.   Given Patient, Petra Zazueta (MRN #[id number]), presented with a recorded BMI of 35.1 kg/m^2 and a documented comorbidity(s):  - Diabetes Mellitus Type 2  - Hypertension  - Hyperlipidemia  to which the severe obesity is a contributing factor. This is consistent with the definition of severe obesity (BMI 35.0-35.9) with comorbidity (ICD-10 E66.01, Z68.35). The patient's severe obesity was monitored, evaluated, addressed and/or treated. This addendum to the medical record is made on 10/16/2018.

## 2023-09-28 ENCOUNTER — OFFICE VISIT (OUTPATIENT)
Dept: FAMILY MEDICINE | Facility: CLINIC | Age: 88
End: 2023-09-28
Payer: MEDICARE

## 2023-09-28 VITALS
DIASTOLIC BLOOD PRESSURE: 70 MMHG | HEIGHT: 62 IN | BODY MASS INDEX: 27.92 KG/M2 | SYSTOLIC BLOOD PRESSURE: 172 MMHG | OXYGEN SATURATION: 98 % | HEART RATE: 51 BPM | WEIGHT: 151.69 LBS

## 2023-09-28 DIAGNOSIS — I10 ESSENTIAL HYPERTENSION: Primary | ICD-10-CM

## 2023-09-28 DIAGNOSIS — N25.81 HYPERPARATHYROIDISM, SECONDARY RENAL: ICD-10-CM

## 2023-09-28 DIAGNOSIS — N18.32 TYPE 2 DIABETES MELLITUS WITH STAGE 3B CHRONIC KIDNEY DISEASE, WITHOUT LONG-TERM CURRENT USE OF INSULIN: ICD-10-CM

## 2023-09-28 DIAGNOSIS — N18.32 STAGE 3B CHRONIC KIDNEY DISEASE: ICD-10-CM

## 2023-09-28 DIAGNOSIS — D63.8 ANEMIA OF CHRONIC DISEASE: ICD-10-CM

## 2023-09-28 DIAGNOSIS — E11.22 TYPE 2 DIABETES MELLITUS WITH STAGE 3B CHRONIC KIDNEY DISEASE, WITHOUT LONG-TERM CURRENT USE OF INSULIN: ICD-10-CM

## 2023-09-28 PROCEDURE — 99999 PR PBB SHADOW E&M-EST. PATIENT-LVL V: ICD-10-PCS | Mod: PBBFAC,,, | Performed by: STUDENT IN AN ORGANIZED HEALTH CARE EDUCATION/TRAINING PROGRAM

## 2023-09-28 PROCEDURE — 99214 OFFICE O/P EST MOD 30 MIN: CPT | Mod: S$GLB,,, | Performed by: STUDENT IN AN ORGANIZED HEALTH CARE EDUCATION/TRAINING PROGRAM

## 2023-09-28 PROCEDURE — 1160F PR REVIEW ALL MEDS BY PRESCRIBER/CLIN PHARMACIST DOCUMENTED: ICD-10-PCS | Mod: CPTII,S$GLB,, | Performed by: STUDENT IN AN ORGANIZED HEALTH CARE EDUCATION/TRAINING PROGRAM

## 2023-09-28 PROCEDURE — 3288F FALL RISK ASSESSMENT DOCD: CPT | Mod: CPTII,S$GLB,, | Performed by: STUDENT IN AN ORGANIZED HEALTH CARE EDUCATION/TRAINING PROGRAM

## 2023-09-28 PROCEDURE — 1160F RVW MEDS BY RX/DR IN RCRD: CPT | Mod: CPTII,S$GLB,, | Performed by: STUDENT IN AN ORGANIZED HEALTH CARE EDUCATION/TRAINING PROGRAM

## 2023-09-28 PROCEDURE — 1159F MED LIST DOCD IN RCRD: CPT | Mod: CPTII,S$GLB,, | Performed by: STUDENT IN AN ORGANIZED HEALTH CARE EDUCATION/TRAINING PROGRAM

## 2023-09-28 PROCEDURE — 1101F PR PT FALLS ASSESS DOC 0-1 FALLS W/OUT INJ PAST YR: ICD-10-PCS | Mod: CPTII,S$GLB,, | Performed by: STUDENT IN AN ORGANIZED HEALTH CARE EDUCATION/TRAINING PROGRAM

## 2023-09-28 PROCEDURE — 1126F PR PAIN SEVERITY QUANTIFIED, NO PAIN PRESENT: ICD-10-PCS | Mod: CPTII,S$GLB,, | Performed by: STUDENT IN AN ORGANIZED HEALTH CARE EDUCATION/TRAINING PROGRAM

## 2023-09-28 PROCEDURE — 1101F PT FALLS ASSESS-DOCD LE1/YR: CPT | Mod: CPTII,S$GLB,, | Performed by: STUDENT IN AN ORGANIZED HEALTH CARE EDUCATION/TRAINING PROGRAM

## 2023-09-28 PROCEDURE — 99214 PR OFFICE/OUTPT VISIT, EST, LEVL IV, 30-39 MIN: ICD-10-PCS | Mod: S$GLB,,, | Performed by: STUDENT IN AN ORGANIZED HEALTH CARE EDUCATION/TRAINING PROGRAM

## 2023-09-28 PROCEDURE — 99999 PR PBB SHADOW E&M-EST. PATIENT-LVL V: CPT | Mod: PBBFAC,,, | Performed by: STUDENT IN AN ORGANIZED HEALTH CARE EDUCATION/TRAINING PROGRAM

## 2023-09-28 PROCEDURE — 1159F PR MEDICATION LIST DOCUMENTED IN MEDICAL RECORD: ICD-10-PCS | Mod: CPTII,S$GLB,, | Performed by: STUDENT IN AN ORGANIZED HEALTH CARE EDUCATION/TRAINING PROGRAM

## 2023-09-28 PROCEDURE — 1126F AMNT PAIN NOTED NONE PRSNT: CPT | Mod: CPTII,S$GLB,, | Performed by: STUDENT IN AN ORGANIZED HEALTH CARE EDUCATION/TRAINING PROGRAM

## 2023-09-28 PROCEDURE — 3288F PR FALLS RISK ASSESSMENT DOCUMENTED: ICD-10-PCS | Mod: CPTII,S$GLB,, | Performed by: STUDENT IN AN ORGANIZED HEALTH CARE EDUCATION/TRAINING PROGRAM

## 2023-09-28 RX ORDER — HYDRALAZINE HYDROCHLORIDE 50 MG/1
50 TABLET, FILM COATED ORAL EVERY 8 HOURS
Qty: 270 TABLET | Refills: 3 | Status: SHIPPED | OUTPATIENT
Start: 2023-09-28 | End: 2024-09-27

## 2023-09-28 RX ORDER — NIFEDIPINE 90 MG/1
90 TABLET, FILM COATED, EXTENDED RELEASE ORAL
COMMUNITY
Start: 2023-09-02 | End: 2024-01-04

## 2023-09-28 RX ORDER — PREDNISOLONE ACETATE 10 MG/ML
SUSPENSION/ DROPS OPHTHALMIC
COMMUNITY
Start: 2023-08-31 | End: 2024-01-04

## 2023-09-28 NOTE — PROGRESS NOTES
[facility] Note    Chief Complaint      Chief Complaint   Patient presents with    Follow-up    Hypertension     History of Present Illness     Petra Zazueta is a 88 y.o. 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 Last seen in the clinic 6 weeks ago for establishment of care and uncontrolled BP, home BP readings predominantly uncontrolled except in the last 3 days despite compliance with all her medications, she has no new complaints and described her mood to be great today.     Problem List Addressed This Visit:    1. Essential hypertension  -     Hypertension Digital Medicine (HDMP) Enrollment Order  -     Hypertension Digital Medicine ([facility]): Assign Onboarding Questionnaires  -     hydrALAZINE (APRESOLINE) 50 MG tablet; Take 1 tablet (50 mg total) by mouth every 8 (eight) hours.  Dispense: 270 tablet; Refill: 3    2. Stage 3b chronic kidney disease  -     Ambulatory referral/consult to Nephrology; Future; Expected date: 10/05/2023    3. Anemia of chronic disease    4. Type 2 diabetes mellitus with stage 3b chronic kidney disease, without long-term current use of insulin    5. Hyperparathyroidism, secondary renal         Health Maintenance   Topic Date Due    Hemoglobin A1c  02/16/2024    Eye Exam  05/09/2024    Lipid Panel  08/16/2024    TETANUS VACCINE  07/26/2031    Shingles Vaccine  Completed       Past Medical History:   Diagnosis Date    Acute on chronic diastolic congestive heart failure 1/16/2023    Anemia     Arthritis     Centrilobular emphysema 8/14/2023    Coronary artery disease     Diabetes mellitus     Diabetes mellitus type II     Diabetic retinopathy 08/22/2019    Dyslipidemia     Hypertension     Insomnia     PAD (peripheral artery disease)        Past Surgical History:   Procedure Laterality Date    angiagram  08/31/2018    CORONARY  ANGIOGRAPHY N/A 2022    Procedure: ANGIOGRAM, CORONARY ARTERY;  Surgeon: Kiet Vega MD;  Location: [facility] CATH LAB/EP;  Service: Cardiology;  Laterality: N/A;    CORONARY ANGIOPLASTY      RCA 2011 EJ    GALLBLADDER SURGERY      HYSTERECTOMY      LEFT HEART CATHETERIZATION Left 2022    Procedure: Left heart cath;  Surgeon: Kiet Vega MD;  Location: [facility] CATH LAB/EP;  Service: Cardiology;  Laterality: Left;    PERIPHERAL ARTERIAL STENT GRAFT      Left lower extremity RCA        family history includes Heart attack in her mother.    Social History     Tobacco Use    Smoking status: Former     Current packs/day: 0.00     Types: Cigarettes     Quit date: 2005     Years since quittin.7    Smokeless tobacco: Never   Substance Use Topics    Alcohol use: No    Drug use: No       Review of Systems   Constitutional:  Negative for fatigue and fever.   Respiratory:  Negative for cough and chest tightness.    Gastrointestinal:  Negative for abdominal pain, diarrhea and vomiting.   Endocrine: Negative for polydipsia and polyphagia.   Genitourinary:  Negative for difficulty urinating, dysuria and frequency.   Musculoskeletal:  Negative for arthralgias, back pain, gait problem and joint swelling.   Skin:  Negative for rash.   Neurological:  Negative for seizures, weakness, numbness and headaches.   Psychiatric/Behavioral:  Negative for sleep disturbance.        Outpatient Encounter Medications as of 2023   Medication Sig Note Dispense Refill    acetaminophen (TYLENOL) 325 MG tablet Take 325 mg by mouth as needed for Pain.       aspirin (ECOTRIN) 81 MG EC tablet Take 81 mg by mouth once daily. 2023: Twice daily      atorvastatin (LIPITOR) 80 MG tablet Take 1 tablet (80 mg total) by mouth every evening.  90 tablet 3    blood sugar diagnostic (TRUE METRIX GLUCOSE TEST STRIP) Strp 1 strip by Misc.(Non-Drug; Combo Route) route 2 (two) times daily.  200 each 1    blood-glucose meter (TRUE METRIX  "GLUCOSE METER) Misc Test blood sugars twice daily  1 each 0    carvediloL (COREG) 25 MG tablet TAKE 1 TABLET BY MOUTH TWICE A DAY WITH FOOD (Patient taking differently: Take 25 mg by mouth 2 (two) times daily with meals.)  180 tablet 3    clopidogreL (PLAVIX) 75 mg tablet Take 1 tablet (75 mg total) by mouth once daily.  30 tablet 11    ferrous sulfate 324 mg (65 mg iron) TbEC TAKE 1 TABLET (324 MG TOTAL) BY MOUTH ONCE DAILY.  90 tablet 3    lancets (BD ULTRA FINE LANCETS) 33 gauge Misc 1 lancet by Misc.(Non-Drug; Combo Route) route 2 (two) times daily.  200 each 1    latanoprost 0.005 % ophthalmic solution Place 1 drop into both eyes every evening.       linaGLIPtin (TRADJENTA) 5 mg Tab tablet Take 1 tablet (5 mg total) by mouth once daily.  90 tablet 3    losartan (COZAAR) 100 MG tablet TAKE 1 TABLET BY MOUTH EVERY DAY (Patient taking differently: Take 100 mg by mouth once daily.)  90 tablet 3    NIFEdipine (ADALAT CC) 90 MG TbSR Take 90 mg by mouth.       omeprazole (PRILOSEC) 40 MG capsule Take 1 capsule (40 mg total) by mouth once daily.  90 capsule 3    prednisoLONE acetate (PRED FORTE) 1 % DrpS Place into the right eye.       [DISCONTINUED] hydrALAZINE (APRESOLINE) 50 MG tablet Take 1 tablet (50 mg total) by mouth every 12 (twelve) hours.  60 tablet 3    hydrALAZINE (APRESOLINE) 50 MG tablet Take 1 tablet (50 mg total) by mouth every 8 (eight) hours.  270 tablet 3    [DISCONTINUED] cilostazol (PLETAL) 100 MG Tab Take 100 mg by mouth 2 (two) times daily.   3     No facility-administered encounter medications on file as of 9/28/2023.        Review of patient's allergies indicates:   Allergen Reactions    Codeine Other (See Comments)     Jittery, lightheadedness, nausea  Other reaction(s): NAUSEA       Physical Exam      Vital Signs  Pulse: (!) 51  SpO2: 98 %  BP: (!) 172/70  Pain Score: 0-No pain  Height and Weight  Height: 5' 2" (157.5 cm)  Weight: 68.8 kg (151 lb 10.8 oz)  BSA (Calculated - sq m): 1.73 sq " meters  BMI (Calculated): 27.7  Weight in (lb) to have BMI = 25: 136.4]    Physical Exam  Vitals reviewed.   Constitutional:       Appearance: Normal appearance.   HENT:      Head: Normocephalic and atraumatic.      Right Ear: Tympanic membrane normal.      Left Ear: Tympanic membrane normal.      Mouth/Throat:      Mouth: Mucous membranes are moist.      Pharynx: Oropharynx is clear.   Eyes:      Extraocular Movements: Extraocular movements intact.      Conjunctiva/sclera: Conjunctivae normal.      Pupils: Pupils are equal, round, and reactive to light.   Cardiovascular:      Rate and Rhythm: Normal rate and regular rhythm.      Pulses: Normal pulses.      Heart sounds: Normal heart sounds.   Pulmonary:      Effort: Pulmonary effort is normal.      Breath sounds: Normal breath sounds.   Abdominal:      General: Abdomen is flat. Bowel sounds are normal.      Palpations: Abdomen is soft.   Musculoskeletal:      Cervical back: Normal range of motion.      Right lower leg: No edema.      Left lower leg: No edema.   Skin:     General: Skin is warm and dry.   Neurological:      General: No focal deficit present.      Mental Status: She is alert and oriented to person, place, and time.      Sensory: No sensory deficit.   Psychiatric:         Mood and Affect: Mood normal.         Behavior: Behavior normal.          Laboratory:  CBC:  Recent Labs   Lab Result Units 07/31/23  0402 08/14/23  1035 08/16/23  0920   WBC K/uL 5.28 4.60 4.67   RBC M/uL 3.35* 3.20* 3.28*   Hemoglobin g/dL 10.6* 10.5* 10.8*   Hematocrit % 34.8* 33.3* 33.8*   Platelets K/uL 140* 175 165   MCV fL 104* 104* 103*   MCH pg 31.6* 32.8* 32.9*   MCHC g/dL 30.5* 31.5* 32.0     CMP:  Recent Labs   Lab Result Units 07/31/23  0402 08/14/23  1035 08/16/23  0920   Glucose mg/dL 61* 97 101   Calcium mg/dL 8.9 9.0 8.9   Albumin g/dL 3.2* 3.7 3.6   Total Protein g/dL 6.3 6.7 6.7   Sodium mmol/L 147* 142 142   Potassium mmol/L 3.2* 3.9 3.9   CO2 mmol/L 23 22* 23    Chloride mmol/L 114* 107 110   BUN mg/dL 41* 16 21*   Alkaline Phosphatase U/L 72 81 74   ALT U/L 15 19 17   AST U/L 27 30 29   Total Bilirubin mg/dL 1.2* 0.8 0.7     URINALYSIS:  Recent Labs   Lab Result Units 08/16/23  0938   Color, UA  Yellow   Specific Gravity, UA  1.025   pH, UA  6.0   Protein, UA  1+*   Bacteria /hpf Occasional   Nitrite, UA  Negative   Leukocytes, UA  Negative   Urobilinogen, UA EU/dL Negative   Hyaline Casts, UA /lpf 0      LIPIDS:  Recent Labs   Lab Result Units 08/14/23  1035 08/16/23  0920   TSH uIU/mL 1.670 1.240   HDL mg/dL 31* 33*   Cholesterol mg/dL 75* 74*   Triglycerides mg/dL 65 65   LDL Cholesterol mg/dL 31.0* 28.0*   HDL/Cholesterol Ratio % 41.3 44.6   Non-HDL Cholesterol mg/dL 44 41   Total Cholesterol/HDL Ratio  2.4 2.2     TSH:  Recent Labs   Lab Result Units 08/14/23  1035 08/16/23  0920   TSH uIU/mL 1.670 1.240     A1C:  Recent Labs   Lab Result Units 08/14/23  1035 08/16/23  0920   Hemoglobin A1C % 5.4 5.3       Radiology:      Assessment/Plan     Petra Zazueta is a 88 y.o.female with:    1. Essential hypertension  -uncontrolled today despite compliance with current regimen  -take hydralazine 50mg TID, c/w coreg 25mg BID and losartan 100mg daily  -patient advised to monitor and keep BP log  -Amenable to sign up for digital medicine    2. CKD, stage 3b  -likely multifactorial : T2DM, sHTN  -patient advised to avoid nephrotoxins  -referral to renal    3. Anemia of chronic disease  -stable H&H, @ 10  -iron panel with high ferritin and low TIBC  -could not tolerate iron pills  -monitor for now    4. Type 2 diabetes mellitus with diabetic polyneuropathy, without long-term current use of insulin  -controlled per last A1C ( 11/18/2022)- 5.5  -c/w current regimen  -UTD on eye and podiatry and urine  -c/w current regimen    5. Hyperparathyroidism  -secondary , renal   -vit D WNL  -monitor for now    6. Macrocytic anemia  -recent H&H @ 10.6, MCV @ 104  - CBC auto differential;  Standing  - Vitamin B12, folate WNL    7. Overweight with body mass index (BMI) of 27 to 27.9 in adult  -c/w life style modifications      -Continue current medications and maintain follow up with specialists.      Patient verbalizes understanding and agrees with current treatment plan.      MD Valentin LeeBanner MD [facility] - [place]

## 2023-10-12 ENCOUNTER — EXTERNAL HOME HEALTH (OUTPATIENT)
Dept: HOME HEALTH SERVICES | Facility: HOSPITAL | Age: 88
End: 2023-10-12
Payer: MEDICAID

## 2023-10-12 ENCOUNTER — TELEPHONE (OUTPATIENT)
Dept: FAMILY MEDICINE | Facility: CLINIC | Age: 88
End: 2023-10-12
Payer: MEDICAID

## 2023-10-12 NOTE — TELEPHONE ENCOUNTER
"[facility], System Message  P Edel Shepard Staff  Petra Zazueta was enrolled in the Hypertension Digital Medicine program by Edel Shepard on 9/28/2023 but but has not completed the program consent questionnaire or setup of the required Digital Medicine devices.     Please reach out to the patient and inform them that Dr. Edel Shepard is expecting their at home readings. The patient should log into their [employer] account to complete the "Hypertension Digital Medicine Patient Consent" questionnaire.     Instructions will automatically be sent via [employer] message after consent is obtained.       If the patient has technical issues, please have her follow up with the Digital Medicine Support Line at [phone].   "

## 2023-10-13 ENCOUNTER — TELEPHONE (OUTPATIENT)
Dept: FAMILY MEDICINE | Facility: CLINIC | Age: 88
End: 2023-10-13
Payer: MEDICAID

## 2023-10-24 ENCOUNTER — OFFICE VISIT (OUTPATIENT)
Dept: CARDIOLOGY | Facility: CLINIC | Age: 88
End: 2023-10-24
Payer: MEDICARE

## 2023-10-24 VITALS
HEART RATE: 60 BPM | HEIGHT: 62 IN | SYSTOLIC BLOOD PRESSURE: 158 MMHG | OXYGEN SATURATION: 98 % | WEIGHT: 147 LBS | DIASTOLIC BLOOD PRESSURE: 76 MMHG | BODY MASS INDEX: 27.05 KG/M2

## 2023-10-24 DIAGNOSIS — I73.9 PAD (PERIPHERAL ARTERY DISEASE): ICD-10-CM

## 2023-10-24 DIAGNOSIS — K56.609 SBO (SMALL BOWEL OBSTRUCTION): ICD-10-CM

## 2023-10-24 DIAGNOSIS — I50.32 CHRONIC DIASTOLIC HEART FAILURE: Primary | ICD-10-CM

## 2023-10-24 DIAGNOSIS — E11.59 HYPERTENSION ASSOCIATED WITH TYPE 2 DIABETES MELLITUS: ICD-10-CM

## 2023-10-24 DIAGNOSIS — K21.9 GASTROESOPHAGEAL REFLUX DISEASE, UNSPECIFIED WHETHER ESOPHAGITIS PRESENT: ICD-10-CM

## 2023-10-24 DIAGNOSIS — N25.81 SECONDARY HYPERPARATHYROIDISM OF RENAL ORIGIN: ICD-10-CM

## 2023-10-24 DIAGNOSIS — E11.69 HYPERLIPIDEMIA ASSOCIATED WITH TYPE 2 DIABETES MELLITUS: ICD-10-CM

## 2023-10-24 DIAGNOSIS — I25.10 CORONARY ARTERY DISEASE INVOLVING NATIVE CORONARY ARTERY OF NATIVE HEART WITHOUT ANGINA PECTORIS: ICD-10-CM

## 2023-10-24 DIAGNOSIS — E66.09 CLASS 1 OBESITY DUE TO EXCESS CALORIES WITH SERIOUS COMORBIDITY AND BODY MASS INDEX (BMI) OF 31.0 TO 31.9 IN ADULT: ICD-10-CM

## 2023-10-24 DIAGNOSIS — E11.22 TYPE 2 DIABETES MELLITUS WITH STAGE 4 CHRONIC KIDNEY DISEASE, WITHOUT LONG-TERM CURRENT USE OF INSULIN: ICD-10-CM

## 2023-10-24 DIAGNOSIS — N18.4 TYPE 2 DIABETES MELLITUS WITH STAGE 4 CHRONIC KIDNEY DISEASE, WITHOUT LONG-TERM CURRENT USE OF INSULIN: ICD-10-CM

## 2023-10-24 DIAGNOSIS — R60.0 EDEMA OF LEFT LOWER EXTREMITY: ICD-10-CM

## 2023-10-24 DIAGNOSIS — N18.4 STAGE 4 CHRONIC KIDNEY DISEASE: ICD-10-CM

## 2023-10-24 DIAGNOSIS — E78.5 HYPERLIPIDEMIA ASSOCIATED WITH TYPE 2 DIABETES MELLITUS: ICD-10-CM

## 2023-10-24 DIAGNOSIS — I15.2 HYPERTENSION ASSOCIATED WITH TYPE 2 DIABETES MELLITUS: ICD-10-CM

## 2023-10-24 PROCEDURE — 3288F FALL RISK ASSESSMENT DOCD: CPT | Mod: CPTII,S$GLB,,

## 2023-10-24 PROCEDURE — 99214 PR OFFICE/OUTPT VISIT, EST, LEVL IV, 30-39 MIN: ICD-10-PCS | Mod: S$GLB,,,

## 2023-10-24 PROCEDURE — 99214 OFFICE O/P EST MOD 30 MIN: CPT | Mod: S$GLB,,,

## 2023-10-24 PROCEDURE — 1160F RVW MEDS BY RX/DR IN RCRD: CPT | Mod: CPTII,S$GLB,,

## 2023-10-24 PROCEDURE — 1101F PR PT FALLS ASSESS DOC 0-1 FALLS W/OUT INJ PAST YR: ICD-10-PCS | Mod: CPTII,S$GLB,,

## 2023-10-24 PROCEDURE — 1159F PR MEDICATION LIST DOCUMENTED IN MEDICAL RECORD: ICD-10-PCS | Mod: CPTII,S$GLB,,

## 2023-10-24 PROCEDURE — 1160F PR REVIEW ALL MEDS BY PRESCRIBER/CLIN PHARMACIST DOCUMENTED: ICD-10-PCS | Mod: CPTII,S$GLB,,

## 2023-10-24 PROCEDURE — 99999 PR PBB SHADOW E&M-EST. PATIENT-LVL III: ICD-10-PCS | Mod: PBBFAC,,,

## 2023-10-24 PROCEDURE — 1126F AMNT PAIN NOTED NONE PRSNT: CPT | Mod: CPTII,S$GLB,,

## 2023-10-24 PROCEDURE — 1101F PT FALLS ASSESS-DOCD LE1/YR: CPT | Mod: CPTII,S$GLB,,

## 2023-10-24 PROCEDURE — 99999 PR PBB SHADOW E&M-EST. PATIENT-LVL III: CPT | Mod: PBBFAC,,,

## 2023-10-24 PROCEDURE — 1159F MED LIST DOCD IN RCRD: CPT | Mod: CPTII,S$GLB,,

## 2023-10-24 PROCEDURE — 3288F PR FALLS RISK ASSESSMENT DOCUMENTED: ICD-10-PCS | Mod: CPTII,S$GLB,,

## 2023-10-24 PROCEDURE — 99499 UNLISTED E&M SERVICE: CPT | Mod: S$GLB,,,

## 2023-10-24 PROCEDURE — 1126F PR PAIN SEVERITY QUANTIFIED, NO PAIN PRESENT: ICD-10-PCS | Mod: CPTII,S$GLB,,

## 2023-10-24 RX ORDER — FUROSEMIDE 20 MG/1
20 TABLET ORAL DAILY
Qty: 90 TABLET | Refills: 3 | Status: SHIPPED | OUTPATIENT
Start: 2023-10-24 | End: 2024-10-23

## 2023-10-24 NOTE — PROGRESS NOTES
Subjective:    Patient ID:  Petra Zazueta is a 88 y.o. female who presents for follow-up of Tachycardia (Started last week & comes every 2 days but goes away after chewing 2 or 3 aspirin)      PCP/Referring: Polly Greer DO     HPI: Pt is a 88 yo F w/ PMH of CAD s/p PCI to mRCA w/ RAUL 2011, DM2 w/ hgba1c 5.8%, HTN, HLD, and PAD s/p stenting of LLE 2016 who presents for f/u appt and HF management. She was last seen on 7/24/23 for f/u  and HF management and was continued on medical therapy. She presents with son. She reports doing well since last visit. She notes she notes palpitations not associated lightheadedness, dizziness, or pre-syncope. She denies CP, orthopnea, PND, LOC, and claudication. She notes compliance w/ meds and denies side effects. She reports dietary intermittent compliance with low salt diet. She does not exercise regularly however is active w/ cleaning up her house and yard and denies limitations. She is back driving. She monitors her BP at home and has been running 120s/60-70s.       Past Medical History:   Diagnosis Date    Acute on chronic diastolic congestive heart failure 1/16/2023    Anemia     Arthritis     Centrilobular emphysema 8/14/2023    Coronary artery disease     Diabetes mellitus     Diabetes mellitus type II     Diabetic retinopathy 08/22/2019    Dyslipidemia     Hypertension     Insomnia     PAD (peripheral artery disease)      Past Surgical History:   Procedure Laterality Date    angiagram  08/31/2018    CORONARY ANGIOGRAPHY N/A 6/7/2022    Procedure: ANGIOGRAM, CORONARY ARTERY;  Surgeon: Kiet Vega MD;  Location: [facility] CATH LAB/EP;  Service: Cardiology;  Laterality: N/A;    CORONARY ANGIOPLASTY      RCA 4/2011 EJ    GALLBLADDER SURGERY      HYSTERECTOMY      LEFT HEART CATHETERIZATION Left 6/7/2022    Procedure: Left heart cath;  Surgeon: Kiet Vega MD;  Location: [facility] CATH LAB/EP;  Service: Cardiology;  Laterality: Left;    PERIPHERAL ARTERIAL STENT GRAFT       Left lower extremity RCA      Social History     Socioeconomic History    Marital status:     Number of children: 2   Tobacco Use    Smoking status: Former     Current packs/day: 0.00     Types: Cigarettes     Quit date: 2005     Years since quittin.8    Smokeless tobacco: Never   Substance and Sexual Activity    Alcohol use: No    Drug use: No    Sexual activity: Not Currently   Social History Narrative    Lives alone in [place]. Has 2 sons. Darell Concepcion lives in [place] and cares for her. He is mPOA. Other son lives in [place]. Quit drinking years ago. [place].      Family History   Problem Relation Age of Onset    Heart attack Mother        Review of patient's allergies indicates:   Allergen Reactions    Codeine Other (See Comments)     Jittery, lightheadedness, nausea  Other reaction(s): NAUSEA       Medication List with Changes/Refills   Current Medications    ACETAMINOPHEN (TYLENOL) 325 MG TABLET    Take 325 mg by mouth as needed for Pain.    ASPIRIN (ECOTRIN) 81 MG EC TABLET    Take 81 mg by mouth once daily.    ATORVASTATIN (LIPITOR) 80 MG TABLET    Take 1 tablet (80 mg total) by mouth every evening.    BLOOD SUGAR DIAGNOSTIC (TRUE METRIX GLUCOSE TEST STRIP) STRP    1 strip by Misc.(Non-Drug; Combo Route) route 2 (two) times daily.    BLOOD-GLUCOSE METER (TRUE METRIX GLUCOSE METER) MISC    Test blood sugars twice daily    CARVEDILOL (COREG) 25 MG TABLET    TAKE 1 TABLET BY MOUTH TWICE A DAY WITH FOOD    CLOPIDOGREL (PLAVIX) 75 MG TABLET    Take 1 tablet (75 mg total) by mouth once daily.    FERROUS SULFATE 324 MG (65 MG IRON) TBEC    TAKE 1 TABLET (324 MG TOTAL) BY MOUTH ONCE DAILY.    HYDRALAZINE (APRESOLINE) 50 MG TABLET    Take 1 tablet (50 mg total) by mouth every 8 (eight) hours.    LANCETS (BD ULTRA FINE LANCETS) 33 GAUGE MISC    1 lancet by Misc.(Non-Drug; Combo Route) route 2 (two) times daily.    LATANOPROST 0.005 % OPHTHALMIC SOLUTION    Place 1 drop into both eyes every evening.     "LINAGLIPTIN (TRADJENTA) 5 MG TAB TABLET    Take 1 tablet (5 mg total) by mouth once daily.    LOSARTAN (COZAAR) 100 MG TABLET    TAKE 1 TABLET BY MOUTH EVERY DAY    NIFEDIPINE (ADALAT CC) 90 MG TBSR    Take 90 mg by mouth.    OMEPRAZOLE (PRILOSEC) 40 MG CAPSULE    Take 1 capsule (40 mg total) by mouth once daily.    PREDNISOLONE ACETATE (PRED FORTE) 1 % DRPS    Place into the right eye.       Review of Systems   Constitutional: Negative for diaphoresis and fever.   HENT:  Negative for congestion and hearing loss.    Eyes:  Negative for blurred vision and pain.   Cardiovascular:  Positive for leg swelling. Negative for chest pain, claudication, dyspnea on exertion, near-syncope, palpitations and syncope.   Respiratory:  Negative for shortness of breath and sleep disturbances due to breathing.    Hematologic/Lymphatic: Negative for bleeding problem. Does not bruise/bleed easily.   Skin:  Negative for color change and poor wound healing.   Gastrointestinal:  Negative for abdominal pain and nausea.   Genitourinary:  Negative for bladder incontinence and flank pain.   Neurological:  Negative for focal weakness and light-headedness.        Objective:   BP (!) 158/76 (BP Location: Left arm, Patient Position: Sitting, BP Method: Large (Manual))   Pulse 60   Ht 5' 2" (1.575 m)   Wt 66.7 kg (147 lb)   LMP  (LMP Unknown)   SpO2 98%   BMI 26.89 kg/m²    Physical Exam  Constitutional:       Appearance: She is well-developed. She is not diaphoretic.   HENT:      Head: Normocephalic and atraumatic.   Eyes:      General: No scleral icterus.     Pupils: Pupils are equal, round, and reactive to light.   Neck:      Vascular: No JVD.   Cardiovascular:      Rate and Rhythm: Normal rate and regular rhythm.      Pulses: Intact distal pulses.           Radial pulses are 2+ on the right side and 2+ on the left side.        Dorsalis pedis pulses are 2+ on the right side and 2+ on the left side.        Posterior tibial pulses are 2+ on " the right side and 2+ on the left side.      Heart sounds: S1 normal and S2 normal. Murmur heard.      No friction rub. No gallop.      Comments: Systolic murmur  Chronic LLE edema   Pulmonary:      Effort: Pulmonary effort is normal. No respiratory distress.      Breath sounds: Normal breath sounds. No wheezing or rales.   Chest:      Chest wall: No tenderness.   Abdominal:      General: Bowel sounds are normal. There is no distension.      Palpations: Abdomen is soft. There is no mass.      Tenderness: There is no abdominal tenderness. There is no rebound.   Musculoskeletal:         General: No tenderness. Normal range of motion.      Cervical back: Normal range of motion and neck supple.      Right lower le+ Edema present.      Left lower le+ Edema present.      Comments: LLE chronic nonpitting edema   Skin:     General: Skin is warm and dry.      Coloration: Skin is not pale.   Neurological:      Mental Status: She is alert and oriented to person, place, and time.      Coordination: Coordination normal.      Deep Tendon Reflexes: Reflexes normal.   Psychiatric:         Behavior: Behavior normal.         Judgment: Judgment normal.        TTE 23:   Summary    The left ventricle is normal in size with concentric hypertrophy and hyperdynamic systolic function.  The estimated ejection fraction is 75%.  Grade II left ventricular diastolic dysfunction.  Normal right ventricular size with normal right ventricular systolic function.  Mild to moderate left atrial enlargement.  Mild mitral regurgitation.  Mild tricuspid regurgitation.  Mild pulmonic regurgitation.  Mild right atrial enlargement.  Small anterior pericardial effusion. Effusion is fluid.  Normal central venous pressure (3 mmHg).  The estimated PA systolic pressure is 21 mmHg.  EKG 23 reviewed :   Normal sinus rhythm with sinus arrhythmia,T wave abnormality, consider inferolateral ischemia      EC2020- reviewed.  NSR, T wave  abnormality w/ possible inferolateral ischemia.      ETT: 12/15/2020- reviewed.    Conclusion         The EKG portion of this study is negative for ischemia.    The patient reported no chest pain during the stress test.    There were no arrhythmias during stress.    The exercise capacity was moderately impaired.    ECG Stress Nuclear portion of this study will be reported separately.   FINDINGS:  There is appropriate wall motion.  There is no significant fixed or reversible perfusion defect.  The stress and rest end-diastolic volumes measure 83 and 81 mL respectively.  The stress and rest end systolic findings measure 14 and 17 mL respectively.  The stress and rest ejection fraction measure 83 and 79% respectively.     Impression:     No acute findings.      C: 6/7/2022- reviewed.   Summary       The pre-procedure left ventricular end diastolic pressure was 16.  The estimated blood loss was none.  There was non-obstructive coronary artery disease..       Assessment:       1. Coronary artery disease involving native coronary artery of native heart without angina pectoris    2. Chronic diastolic heart failure    3. Hyperlipidemia associated with type 2 diabetes mellitus    4. Hypertension associated with type 2 diabetes mellitus    5. PAD (peripheral artery disease)    6. Stage 4 chronic kidney disease    7. Class 1 obesity due to excess calories with serious comorbidity and body mass index (BMI) of 31.0 to 31.9 in adult    8. Secondary hyperparathyroidism of renal origin    9. Type 2 diabetes mellitus with stage 4 chronic kidney disease, without long-term current use of insulin    10. Gastroesophageal reflux disease, unspecified whether esophagitis present    11. SBO (small bowel obstruction)    12. Edema of left lower extremity                 Plan:         Coronary artery disease involving native coronary artery of native heart without angina pectoris  CCS 0.  Pt compliant w/ meds.  S/p RAUL to RCA. 2011  -  negative ETT MPI for ischemia and unchanged coronary angiogram  - continue medical therapy  - encouraged risk factor and lifestyle modifications     Chronic diastolic heart failure  -TTE 1/17/23: LVEF 75% w Grade II LV diastolic dysfunction   -Continue medical therapy- furosemide  20 mg, carvedilol 25 mg, losartan 100 mg , hydralazine 50 mg  -weight self daily - notify if > 3 pound gain in 1 day or 5 pound gain in 1 week   -discussed lifestyle modifications: low salt diet, exercise, weight loss     Hyperlipidemia associated with type 2 diabetes mellitus  Controlled.  Goal LDL < 70, last 55 1/2022.  Compliant w/ meds.    - continue medical therapy  - encouraged risk factor and lifestyle modifications     Hypertension associated with type 2 diabetes mellitus  Controlled.  Goal BP < 140/90.  Pt compliant w/ meds.    - continue medical therapy  - pt to monitor BP at home and record  - encouraged risk factor and lifestyle modifications     PAD (peripheral artery disease)  Pt denies claudication.  Was previously followed by outside cardiologist.  S/p stenting of LLE.  - continue medical therapy-DAPT, statin   - encouraged risk factor and lifestyle modifications     Stage 4 chronic kidney disease  Followed by renal.        Class 1 obesity due to excess calories with serious comorbidity and body mass index (BMI) of 31.0 to 31.9 in adult  -Pt aware of health complications a/w obesity and desires to lose weight.    - encouraged lifestyle modifications (diet, exercise, and weight loss)     Secondary hyperparathyroidism of renal origin  -followed by Nephrology     Type 2 diabetes mellitus with stage 4 chronic kidney disease, without long-term current use of insulin  -Followed by PCP and Nephrology   -continue medical therapy     Gastroesophageal reflux disease  -Followed by PCP  -continue medical therapy     SBO (small bowel obstruction)  -Post admission followed by GI and PCP    Edema of left lower extremity  Pt notes chronic  following PTA of her LLE.  Pt was unable make her last appt for compression stockings and will reschedule soon.    - thigh high compression stockings            Total duration of face to face visit time 30 minutes.  Total time spent counseling greater than fifty percent of total visit time.  Counseling included discussion regarding imaging findings, diagnosis, possibilities, treatment options, risks and benefits.  The patient had many questions regarding the options and long-term effects      Lenin Akhtar,DEE  Cardiology                 HPI

## 2023-10-24 NOTE — ASSESSMENT & PLAN NOTE
CCS 0.  Pt compliant w/ meds.  S/p RAUL to RCA. 2011  - negative ETT MPI for ischemia and unchanged coronary angiogram  - continue medical therapy  - encouraged risk factor and lifestyle modifications

## 2023-10-24 NOTE — ASSESSMENT & PLAN NOTE
-TTE 1/17/23: LVEF 75% w Grade II LV diastolic dysfunction   -Continue medical therapy- furosemide  20 mg, carvedilol 25 mg, losartan 100 mg , hydralazine 50 mg  -weight self daily - notify if > 3 pound gain in 1 day or 5 pound gain in 1 week   -discussed lifestyle modifications: low salt diet, exercise, weight loss

## 2023-10-30 PROBLEM — N17.9 ACUTE KIDNEY INJURY SUPERIMPOSED ON CHRONIC KIDNEY DISEASE: Status: RESOLVED | Noted: 2023-07-28 | Resolved: 2023-10-30

## 2023-10-30 PROBLEM — N18.9 ACUTE KIDNEY INJURY SUPERIMPOSED ON CHRONIC KIDNEY DISEASE: Status: RESOLVED | Noted: 2023-07-28 | Resolved: 2023-10-30

## 2023-11-09 ENCOUNTER — OFFICE VISIT (OUTPATIENT)
Dept: FAMILY MEDICINE | Facility: CLINIC | Age: 88
End: 2023-11-09
Payer: MEDICARE

## 2023-11-09 VITALS
HEIGHT: 62 IN | WEIGHT: 147.69 LBS | DIASTOLIC BLOOD PRESSURE: 60 MMHG | OXYGEN SATURATION: 98 % | HEART RATE: 58 BPM | BODY MASS INDEX: 27.18 KG/M2 | SYSTOLIC BLOOD PRESSURE: 130 MMHG

## 2023-11-09 DIAGNOSIS — D63.8 ANEMIA OF CHRONIC DISEASE: ICD-10-CM

## 2023-11-09 DIAGNOSIS — N18.32 STAGE 3B CHRONIC KIDNEY DISEASE: ICD-10-CM

## 2023-11-09 DIAGNOSIS — N18.32 TYPE 2 DIABETES MELLITUS WITH STAGE 3B CHRONIC KIDNEY DISEASE, WITHOUT LONG-TERM CURRENT USE OF INSULIN: ICD-10-CM

## 2023-11-09 DIAGNOSIS — E11.22 TYPE 2 DIABETES MELLITUS WITH STAGE 3B CHRONIC KIDNEY DISEASE, WITHOUT LONG-TERM CURRENT USE OF INSULIN: ICD-10-CM

## 2023-11-09 DIAGNOSIS — I10 ESSENTIAL HYPERTENSION: Primary | ICD-10-CM

## 2023-11-09 PROCEDURE — 3288F PR FALLS RISK ASSESSMENT DOCUMENTED: ICD-10-PCS | Mod: CPTII,S$GLB,, | Performed by: STUDENT IN AN ORGANIZED HEALTH CARE EDUCATION/TRAINING PROGRAM

## 2023-11-09 PROCEDURE — 3288F FALL RISK ASSESSMENT DOCD: CPT | Mod: CPTII,S$GLB,, | Performed by: STUDENT IN AN ORGANIZED HEALTH CARE EDUCATION/TRAINING PROGRAM

## 2023-11-09 PROCEDURE — 1159F PR MEDICATION LIST DOCUMENTED IN MEDICAL RECORD: ICD-10-PCS | Mod: CPTII,S$GLB,, | Performed by: STUDENT IN AN ORGANIZED HEALTH CARE EDUCATION/TRAINING PROGRAM

## 2023-11-09 PROCEDURE — 1160F RVW MEDS BY RX/DR IN RCRD: CPT | Mod: CPTII,S$GLB,, | Performed by: STUDENT IN AN ORGANIZED HEALTH CARE EDUCATION/TRAINING PROGRAM

## 2023-11-09 PROCEDURE — 99214 PR OFFICE/OUTPT VISIT, EST, LEVL IV, 30-39 MIN: ICD-10-PCS | Mod: S$GLB,,, | Performed by: STUDENT IN AN ORGANIZED HEALTH CARE EDUCATION/TRAINING PROGRAM

## 2023-11-09 PROCEDURE — 1160F PR REVIEW ALL MEDS BY PRESCRIBER/CLIN PHARMACIST DOCUMENTED: ICD-10-PCS | Mod: CPTII,S$GLB,, | Performed by: STUDENT IN AN ORGANIZED HEALTH CARE EDUCATION/TRAINING PROGRAM

## 2023-11-09 PROCEDURE — 1101F PR PT FALLS ASSESS DOC 0-1 FALLS W/OUT INJ PAST YR: ICD-10-PCS | Mod: CPTII,S$GLB,, | Performed by: STUDENT IN AN ORGANIZED HEALTH CARE EDUCATION/TRAINING PROGRAM

## 2023-11-09 PROCEDURE — 1126F AMNT PAIN NOTED NONE PRSNT: CPT | Mod: CPTII,S$GLB,, | Performed by: STUDENT IN AN ORGANIZED HEALTH CARE EDUCATION/TRAINING PROGRAM

## 2023-11-09 PROCEDURE — 99999 PR PBB SHADOW E&M-EST. PATIENT-LVL IV: ICD-10-PCS | Mod: PBBFAC,,, | Performed by: STUDENT IN AN ORGANIZED HEALTH CARE EDUCATION/TRAINING PROGRAM

## 2023-11-09 PROCEDURE — 99999 PR PBB SHADOW E&M-EST. PATIENT-LVL IV: CPT | Mod: PBBFAC,,, | Performed by: STUDENT IN AN ORGANIZED HEALTH CARE EDUCATION/TRAINING PROGRAM

## 2023-11-09 PROCEDURE — 1159F MED LIST DOCD IN RCRD: CPT | Mod: CPTII,S$GLB,, | Performed by: STUDENT IN AN ORGANIZED HEALTH CARE EDUCATION/TRAINING PROGRAM

## 2023-11-09 PROCEDURE — 1126F PR PAIN SEVERITY QUANTIFIED, NO PAIN PRESENT: ICD-10-PCS | Mod: CPTII,S$GLB,, | Performed by: STUDENT IN AN ORGANIZED HEALTH CARE EDUCATION/TRAINING PROGRAM

## 2023-11-09 PROCEDURE — 1101F PT FALLS ASSESS-DOCD LE1/YR: CPT | Mod: CPTII,S$GLB,, | Performed by: STUDENT IN AN ORGANIZED HEALTH CARE EDUCATION/TRAINING PROGRAM

## 2023-11-09 PROCEDURE — 99214 OFFICE O/P EST MOD 30 MIN: CPT | Mod: S$GLB,,, | Performed by: STUDENT IN AN ORGANIZED HEALTH CARE EDUCATION/TRAINING PROGRAM

## 2023-11-09 NOTE — PROGRESS NOTES
[facility] Note    Chief Complaint      Chief Complaint   Patient presents with    Hypertension    Follow-up     History of Present Illness     Petra Zazueta is a 88 y.o. 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 Last seen in the clinic 4 weeks ago for establishment of care and uncontrolled BP, home BP readings predominantly uncontrolled except in the last 3 days despite compliance with all her medications, she has no new complaints and described her mood to be great today.     Problem List Addressed This Visit:    1. Essential hypertension    2. Type 2 diabetes mellitus with stage 3b chronic kidney disease, without long-term current use of insulin    3. Stage 3b chronic kidney disease    4. Anemia of chronic disease           Health Maintenance   Topic Date Due    Hemoglobin A1c  02/16/2024    Eye Exam  05/09/2024    Lipid Panel  08/16/2024    TETANUS VACCINE  07/26/2031    Shingles Vaccine  Completed       Past Medical History:   Diagnosis Date    Acute on chronic diastolic congestive heart failure 1/16/2023    Anemia     Arthritis     Centrilobular emphysema 8/14/2023    Coronary artery disease     Diabetes mellitus     Diabetes mellitus type II     Diabetic retinopathy 08/22/2019    Dyslipidemia     Hypertension     Insomnia     PAD (peripheral artery disease)        Past Surgical History:   Procedure Laterality Date    angiagram  08/31/2018    CORONARY ANGIOGRAPHY N/A 6/7/2022    Procedure: ANGIOGRAM, CORONARY ARTERY;  Surgeon: Kiet Vega MD;  Location: [facility] CATH LAB/EP;  Service: Cardiology;  Laterality: N/A;    CORONARY ANGIOPLASTY      RCA 4/2011 EJ    GALLBLADDER SURGERY      HYSTERECTOMY      LEFT HEART CATHETERIZATION Left 6/7/2022    Procedure: Left heart cath;  Surgeon: Kiet Vega MD;  Location: [facility] CATH LAB/EP;  Service:  Cardiology;  Laterality: Left;    PERIPHERAL ARTERIAL STENT GRAFT      Left lower extremity RCA        family history includes Heart attack in her mother.    Social History     Tobacco Use    Smoking status: Former     Current packs/day: 0.00     Types: Cigarettes     Quit date: 2005     Years since quittin.8    Smokeless tobacco: Never   Substance Use Topics    Alcohol use: No    Drug use: No       Review of Systems   Constitutional:  Negative for fatigue and fever.   Respiratory:  Negative for cough and chest tightness.    Gastrointestinal:  Negative for abdominal pain, diarrhea and vomiting.   Endocrine: Negative for polydipsia and polyphagia.   Genitourinary:  Negative for difficulty urinating, dysuria and frequency.   Musculoskeletal:  Negative for arthralgias, back pain, gait problem and joint swelling.   Skin:  Negative for rash.   Neurological:  Negative for seizures, weakness, numbness and headaches.   Psychiatric/Behavioral:  Negative for sleep disturbance.        Outpatient Encounter Medications as of 2023   Medication Sig Note Dispense Refill    acetaminophen (TYLENOL) 325 MG tablet Take 325 mg by mouth as needed for Pain.       aspirin (ECOTRIN) 81 MG EC tablet Take 81 mg by mouth once daily. 2023: Twice daily      atorvastatin (LIPITOR) 80 MG tablet Take 1 tablet (80 mg total) by mouth every evening.  90 tablet 3    blood sugar diagnostic (TRUE METRIX GLUCOSE TEST STRIP) Strp 1 strip by Misc.(Non-Drug; Combo Route) route 2 (two) times daily.  200 each 1    blood-glucose meter (TRUE METRIX GLUCOSE METER) Misc Test blood sugars twice daily  1 each 0    carvediloL (COREG) 25 MG tablet TAKE 1 TABLET BY MOUTH TWICE A DAY WITH FOOD (Patient taking differently: Take 25 mg by mouth 2 (two) times daily with meals.)  180 tablet 3    clopidogreL (PLAVIX) 75 mg tablet Take 1 tablet (75 mg total) by mouth once daily.  30 tablet 11    ferrous sulfate 324 mg (65 mg iron) TbEC TAKE 1 TABLET (324 MG  "TOTAL) BY MOUTH ONCE DAILY.  90 tablet 3    furosemide (LASIX) 20 MG tablet Take 1 tablet (20 mg total) by mouth once daily.  90 tablet 3    hydrALAZINE (APRESOLINE) 50 MG tablet Take 1 tablet (50 mg total) by mouth every 8 (eight) hours.  270 tablet 3    lancets (BD ULTRA FINE LANCETS) 33 gauge Misc 1 lancet by Misc.(Non-Drug; Combo Route) route 2 (two) times daily.  200 each 1    latanoprost 0.005 % ophthalmic solution Place 1 drop into both eyes every evening.       linaGLIPtin (TRADJENTA) 5 mg Tab tablet Take 1 tablet (5 mg total) by mouth once daily.  90 tablet 3    losartan (COZAAR) 100 MG tablet TAKE 1 TABLET BY MOUTH EVERY DAY (Patient taking differently: Take 100 mg by mouth once daily.)  90 tablet 3    NIFEdipine (ADALAT CC) 90 MG TbSR Take 90 mg by mouth.       omeprazole (PRILOSEC) 40 MG capsule Take 1 capsule (40 mg total) by mouth once daily.  90 capsule 3    prednisoLONE acetate (PRED FORTE) 1 % DrpS Place into the right eye.       [DISCONTINUED] cilostazol (PLETAL) 100 MG Tab Take 100 mg by mouth 2 (two) times daily.   3     No facility-administered encounter medications on file as of 11/9/2023.        Review of patient's allergies indicates:   Allergen Reactions    Codeine Other (See Comments)     Jittery, lightheadedness, nausea  Other reaction(s): NAUSEA       Physical Exam      Vital Signs  Pulse: (!) 58  SpO2: 98 %  BP: 130/60  BP Location: Left arm  Patient Position: Sitting  Pain Score: 0-No pain  Height and Weight  Height: 5' 2.4" (158.5 cm)  Weight: 67 kg (147 lb 11.2 oz)  BSA (Calculated - sq m): 1.72 sq meters  BMI (Calculated): 26.7  Weight in (lb) to have BMI = 25: 138.2]    Physical Exam  Vitals reviewed.   Constitutional:       Appearance: Normal appearance.   HENT:      Mouth/Throat:      Mouth: Mucous membranes are moist.      Pharynx: Oropharynx is clear.   Cardiovascular:      Rate and Rhythm: Normal rate and regular rhythm.      Pulses: Normal pulses.      Heart sounds: Normal " heart sounds.   Pulmonary:      Effort: Pulmonary effort is normal.      Breath sounds: Normal breath sounds.   Abdominal:      General: Abdomen is flat. Bowel sounds are normal.      Palpations: Abdomen is soft.   Musculoskeletal:      Cervical back: Normal range of motion.      Right lower leg: Edema ((1+)) present.      Left lower leg: Edema ((2+)) present.   Skin:     General: Skin is warm and dry.   Neurological:      General: No focal deficit present.      Mental Status: She is alert and oriented to person, place, and time.      Sensory: No sensory deficit.   Psychiatric:         Mood and Affect: Mood normal.         Behavior: Behavior normal.          Laboratory:  CBC:  Recent Labs   Lab Result Units 08/14/23  1035 08/16/23  0920 10/24/23  0955   WBC K/uL 4.60 4.67 5.69  5.69   RBC M/uL 3.20* 3.28* 3.54*  3.54*   Hemoglobin g/dL 10.5* 10.8* 11.6*  11.6*   Hematocrit % 33.3* 33.8* 36.2*  36.2*   Platelets K/uL 175 165 139*  139*   MCV fL 104* 103* 102*  102*   MCH pg 32.8* 32.9* 32.8*  32.8*   MCHC g/dL 31.5* 32.0 32.0  32.0     CMP:  Recent Labs   Lab Result Units 08/14/23  1035 08/16/23  0920 10/24/23  0955   Glucose mg/dL 97 101 118*  118*   Calcium mg/dL 9.0 8.9 9.1  9.1   Albumin g/dL 3.7 3.6 3.9  3.9   Total Protein g/dL 6.7 6.7 7.3  7.3   Sodium mmol/L 142 142 141  141   Potassium mmol/L 3.9 3.9 3.9  3.9   CO2 mmol/L 22* 23 24  24   Chloride mmol/L 107 110 107  107   BUN mg/dL 16 21* 25*  25*   Alkaline Phosphatase U/L 81 74 89  89   ALT U/L 19 17 16  16   AST U/L 30 29 25  25   Total Bilirubin mg/dL 0.8 0.7 0.7  0.7     URINALYSIS:  Recent Labs   Lab Result Units 08/16/23  0938 10/24/23  1017   Color, UA  Yellow Yellow   Specific Gravity, UA  1.025 1.020   pH, UA  6.0 6.0   Protein, UA  1+* 1+*   Bacteria /hpf Occasional None   Nitrite, UA  Negative Negative   Leukocytes, UA  Negative Negative   Urobilinogen, UA EU/dL Negative Negative   Hyaline Casts, UA /lpf 0 0       LIPIDS:  Recent Labs   Lab Result Units 08/14/23  1035 08/16/23  0920   TSH uIU/mL 1.670 1.240   HDL mg/dL 31* 33*   Cholesterol mg/dL 75* 74*   Triglycerides mg/dL 65 65   LDL Cholesterol mg/dL 31.0* 28.0*   HDL/Cholesterol Ratio % 41.3 44.6   Non-HDL Cholesterol mg/dL 44 41   Total Cholesterol/HDL Ratio  2.4 2.2     TSH:  Recent Labs   Lab Result Units 08/14/23  1035 08/16/23  0920   TSH uIU/mL 1.670 1.240     A1C:  Recent Labs   Lab Result Units 08/14/23  1035 08/16/23  0920   Hemoglobin A1C % 5.4 5.3       Radiology:      Assessment/Plan     Petra Zazueta is a 88 y.o.female with:    1. Essential hypertension  -now @ goal  -c/w  hydralazine 50mg TID, c/w coreg 25mg BID and losartan 100mg daily  -patient advised to monitor and keep BP log  -Amenable to sign up for digital medicine    2. CKD, stage 3b  -likely multifactorial : T2DM, sHTN  -patient advised to avoid nephrotoxins  -f/u with renal     3. Anemia of chronic disease  -stable H&H, @ 11  -iron panel with high ferritin and low TIBC  -could not tolerate iron pills  -monitor for now    4. Type 2 diabetes mellitus with diabetic polyneuropathy, without long-term current use of insulin  -controlled per last A1C ( 11/18/2022)- 5.5  -c/w current regimen  -UTD on eye and podiatry and urine  -c/w current regimen    5. Hyperparathyroidism  -secondary , renal   -vit D WNL  -monitor for now    6. Macrocytic anemia  -recent H&H @ 10.6, MCV @ 104  - CBC auto differential; Standing  - Vitamin B12, folate WNL    7. Overweight with body mass index (BMI) of 27 to 27.9 in adult  -c/w life style modifications      -Continue current medications and maintain follow up with specialists.      Patient verbalizes understanding and agrees with current treatment plan.      Dr Edel Shepard MD  [facility]

## 2024-01-25 DIAGNOSIS — R13.19 ESOPHAGEAL DYSPHAGIA: ICD-10-CM

## 2024-01-25 RX ORDER — OMEPRAZOLE 40 MG/1
40 CAPSULE, DELAYED RELEASE ORAL
Qty: 90 CAPSULE | Refills: 3 | Status: SHIPPED | OUTPATIENT
Start: 2024-01-25

## 2024-02-08 ENCOUNTER — OFFICE VISIT (OUTPATIENT)
Dept: CARDIOLOGY | Facility: CLINIC | Age: 89
End: 2024-02-08
Payer: MEDICARE

## 2024-02-08 VITALS
SYSTOLIC BLOOD PRESSURE: 130 MMHG | HEART RATE: 47 BPM | DIASTOLIC BLOOD PRESSURE: 74 MMHG | BODY MASS INDEX: 26.57 KG/M2 | WEIGHT: 144.38 LBS | OXYGEN SATURATION: 98 % | HEIGHT: 62 IN

## 2024-02-08 DIAGNOSIS — K21.9 GASTROESOPHAGEAL REFLUX DISEASE WITHOUT ESOPHAGITIS: ICD-10-CM

## 2024-02-08 DIAGNOSIS — E11.69 HYPERLIPIDEMIA ASSOCIATED WITH TYPE 2 DIABETES MELLITUS: ICD-10-CM

## 2024-02-08 DIAGNOSIS — I50.32 CHRONIC DIASTOLIC HEART FAILURE: ICD-10-CM

## 2024-02-08 DIAGNOSIS — E66.09 CLASS 1 OBESITY DUE TO EXCESS CALORIES WITH SERIOUS COMORBIDITY AND BODY MASS INDEX (BMI) OF 31.0 TO 31.9 IN ADULT: ICD-10-CM

## 2024-02-08 DIAGNOSIS — R60.0 EDEMA OF LEFT LOWER EXTREMITY: ICD-10-CM

## 2024-02-08 DIAGNOSIS — N18.4 TYPE 2 DIABETES MELLITUS WITH STAGE 4 CHRONIC KIDNEY DISEASE, WITHOUT LONG-TERM CURRENT USE OF INSULIN: ICD-10-CM

## 2024-02-08 DIAGNOSIS — E11.59 HYPERTENSION ASSOCIATED WITH TYPE 2 DIABETES MELLITUS: ICD-10-CM

## 2024-02-08 DIAGNOSIS — I73.9 PAD (PERIPHERAL ARTERY DISEASE): ICD-10-CM

## 2024-02-08 DIAGNOSIS — K56.609 SBO (SMALL BOWEL OBSTRUCTION): ICD-10-CM

## 2024-02-08 DIAGNOSIS — N18.4 STAGE 4 CHRONIC KIDNEY DISEASE: ICD-10-CM

## 2024-02-08 DIAGNOSIS — E11.22 TYPE 2 DIABETES MELLITUS WITH STAGE 4 CHRONIC KIDNEY DISEASE, WITHOUT LONG-TERM CURRENT USE OF INSULIN: ICD-10-CM

## 2024-02-08 DIAGNOSIS — I25.10 CORONARY ARTERY DISEASE INVOLVING NATIVE CORONARY ARTERY OF NATIVE HEART WITHOUT ANGINA PECTORIS: Primary | ICD-10-CM

## 2024-02-08 DIAGNOSIS — E78.5 HYPERLIPIDEMIA ASSOCIATED WITH TYPE 2 DIABETES MELLITUS: ICD-10-CM

## 2024-02-08 DIAGNOSIS — I15.2 HYPERTENSION ASSOCIATED WITH TYPE 2 DIABETES MELLITUS: ICD-10-CM

## 2024-02-08 PROCEDURE — 99214 OFFICE O/P EST MOD 30 MIN: CPT | Mod: S$GLB,,,

## 2024-02-08 PROCEDURE — 99999 PR PBB SHADOW E&M-EST. PATIENT-LVL III: CPT | Mod: PBBFAC,,,

## 2024-02-08 NOTE — ASSESSMENT & PLAN NOTE
Controlled.  Goal BP < 140/90.  Pt compliant w/ meds.    -controlled- 130/84  - continue medical therapy  - pt to monitor BP at home and record  - encouraged risk factor and lifestyle modifications

## 2024-02-08 NOTE — PROGRESS NOTES
Subjective:    Patient ID:  Petra Zazueta is a 88 y.o. female who presents for follow-up of No chief complaint on file.      PCP: Edel Shepard MD     Referring Provider:     HPI:  Pt is a 89 yo F w/ PMH of CAD s/p PCI to mRCA w/ RAUL 2011, DM2 w/ hgba1c 5.8%, HTN, HLD, and PAD s/p stenting of LLE 2016 who presents for f/u appt and HF management. She was last seen on 10/24/23 for f/u and HF management and was continued on medical therapy. She presents with son. She reports doing well since last visit. She reports it has been a while since she experienced palpitations. She denies CP, orthopnea, PND, LOC, and claudication. She notes compliance w/ meds and denies side effects. She reports dietary intermittent compliance with low salt diet. She does not exercise regularly however is active w/ cleaning up her house and yard and denies limitations. She is back driving. She attends the [employer] 5 days a week and enjoys the activities. She monitors her BP at home and has been running 120s/60-70s. Her birthday is [birth year] and she plans to go to the [employer].     Past Medical History:   Diagnosis Date    Acute on chronic diastolic congestive heart failure 1/16/2023    Anemia     Arthritis     Centrilobular emphysema 8/14/2023    Coronary artery disease     Diabetes mellitus     Diabetes mellitus type II     Diabetic retinopathy 08/22/2019    Dyslipidemia     Hypertension     Insomnia     PAD (peripheral artery disease)      Past Surgical History:   Procedure Laterality Date    angiagram  08/31/2018    CORONARY ANGIOGRAPHY N/A 6/7/2022    Procedure: ANGIOGRAM, CORONARY ARTERY;  Surgeon: Kiet Vega MD;  Location: [facility] CATH LAB/EP;  Service: Cardiology;  Laterality: N/A;    CORONARY ANGIOPLASTY      RCA 4/2011 EJ    GALLBLADDER SURGERY      HYSTERECTOMY      LEFT HEART CATHETERIZATION Left 6/7/2022    Procedure: Left heart cath;  Surgeon: Kiet Vega MD;  Location: [facility] CATH LAB/EP;  Service:  Cardiology;  Laterality: Left;    PERIPHERAL ARTERIAL STENT GRAFT      Left lower extremity RCA      Social History     Socioeconomic History    Marital status:     Number of children: 2   Tobacco Use    Smoking status: Former     Current packs/day: 0.00     Types: Cigarettes     Quit date: 2005     Years since quittin.1    Smokeless tobacco: Never   Substance and Sexual Activity    Alcohol use: No    Drug use: No    Sexual activity: Not Currently   Social History Narrative    Lives alone in [place]. Has 2 sons. Darell Concepcion lives in [place] and cares for her. He is mPOA. Other son lives in [place]. Quit drinking years ago. [place].      Family History   Problem Relation Age of Onset    Heart attack Mother        Review of patient's allergies indicates:   Allergen Reactions    Codeine Other (See Comments)     Jittery, lightheadedness, nausea  Other reaction(s): NAUSEA       Medication List with Changes/Refills   Current Medications    ACETAMINOPHEN (TYLENOL) 325 MG TABLET    Take 325 mg by mouth as needed for Pain.    ASPIRIN (ECOTRIN) 81 MG EC TABLET    Take 81 mg by mouth once daily.    ATORVASTATIN (LIPITOR) 80 MG TABLET    Take 1 tablet (80 mg total) by mouth every evening.    BLOOD SUGAR DIAGNOSTIC (TRUE METRIX GLUCOSE TEST STRIP) STRP    1 strip by Misc.(Non-Drug; Combo Route) route 2 (two) times daily.    BLOOD-GLUCOSE METER (TRUE METRIX GLUCOSE METER) MISC    Test blood sugars twice daily    CARVEDILOL (COREG) 25 MG TABLET    TAKE 1 TABLET BY MOUTH TWICE A DAY WITH FOOD    CLOPIDOGREL (PLAVIX) 75 MG TABLET    Take 1 tablet (75 mg total) by mouth once daily.    FERROUS SULFATE 324 MG (65 MG IRON) TBEC    TAKE 1 TABLET (324 MG TOTAL) BY MOUTH ONCE DAILY.    FUROSEMIDE (LASIX) 20 MG TABLET    Take 1 tablet (20 mg total) by mouth once daily.    HYDRALAZINE (APRESOLINE) 50 MG TABLET    Take 1 tablet (50 mg total) by mouth every 8 (eight) hours.    LANCETS (BD ULTRA FINE LANCETS) 33 GAUGE MISC    1  lancet by Misc.(Non-Drug; Combo Route) route 2 (two) times daily.    LATANOPROST 0.005 % OPHTHALMIC SOLUTION    Place 1 drop into both eyes every evening.    LINAGLIPTIN (TRADJENTA) 5 MG TAB TABLET    Take 1 tablet (5 mg total) by mouth once daily.    LOSARTAN (COZAAR) 100 MG TABLET    TAKE 1 TABLET BY MOUTH EVERY DAY    OMEPRAZOLE (PRILOSEC) 40 MG CAPSULE    TAKE 1 CAPSULE BY MOUTH EVERY DAY       Review of Systems   Constitutional: Negative for diaphoresis and fever.   HENT:  Negative for congestion and hearing loss.    Eyes:  Negative for blurred vision and pain.   Cardiovascular:  Positive for leg swelling. Negative for chest pain, claudication, dyspnea on exertion, near-syncope, palpitations and syncope.   Respiratory:  Negative for shortness of breath and sleep disturbances due to breathing.    Hematologic/Lymphatic: Negative for bleeding problem. Does not bruise/bleed easily.   Skin:  Negative for color change and poor wound healing.   Gastrointestinal:  Negative for abdominal pain and nausea.   Genitourinary:  Negative for bladder incontinence and flank pain.   Neurological:  Negative for focal weakness and light-headedness.        Objective:   LMP  (LMP Unknown)    Physical Exam  Constitutional:       Appearance: She is well-developed. She is not diaphoretic.   HENT:      Head: Normocephalic and atraumatic.   Eyes:      General: No scleral icterus.     Pupils: Pupils are equal, round, and reactive to light.   Neck:      Vascular: No JVD.   Cardiovascular:      Rate and Rhythm: Normal rate and regular rhythm.      Pulses: Intact distal pulses.           Radial pulses are 2+ on the right side and 2+ on the left side.        Dorsalis pedis pulses are 2+ on the right side and 2+ on the left side.      Heart sounds: S1 normal and S2 normal. Murmur heard.      Systolic murmur is present.      No friction rub. No gallop.   Pulmonary:      Effort: Pulmonary effort is normal. No respiratory distress.      Breath  sounds: Normal breath sounds. No wheezing or rales.   Chest:      Chest wall: No tenderness.   Abdominal:      General: Bowel sounds are normal. There is no distension.      Palpations: Abdomen is soft. There is no mass.      Tenderness: There is no abdominal tenderness. There is no rebound.   Musculoskeletal:         General: No tenderness. Normal range of motion.      Cervical back: Normal range of motion and neck supple.      Right lower le+ Edema present.      Left lower le+ Edema present.      Comments: LLE chronic nonpitting edema   Skin:     General: Skin is warm and dry.      Coloration: Skin is not pale.   Neurological:      Mental Status: She is alert and oriented to person, place, and time.      Coordination: Coordination normal.      Deep Tendon Reflexes: Reflexes normal.   Psychiatric:         Behavior: Behavior normal.         Judgment: Judgment normal.             TTE 23:   Summary     The left ventricle is normal in size with concentric hypertrophy and hyperdynamic systolic function.  The estimated ejection fraction is 75%.  Grade II left ventricular diastolic dysfunction.  Normal right ventricular size with normal right ventricular systolic function.  Mild to moderate left atrial enlargement.  Mild mitral regurgitation.  Mild tricuspid regurgitation.  Mild pulmonic regurgitation.  Mild right atrial enlargement.  Small anterior pericardial effusion. Effusion is fluid.  Normal central venous pressure (3 mmHg).  The estimated PA systolic pressure is 21 mmHg.  EKG 23 reviewed :   Normal sinus rhythm with sinus arrhythmia,T wave abnormality, consider inferolateral ischemia        EC2020- reviewed.  NSR, T wave abnormality w/ possible inferolateral ischemia.       ETT: 12/15/2020- reviewed.    Conclusion          The EKG portion of this study is negative for ischemia.    The patient reported no chest pain during the stress test.    There were no arrhythmias during stress.    The  exercise capacity was moderately impaired.    ECG Stress Nuclear portion of this study will be reported separately.   FINDINGS:  There is appropriate wall motion.  There is no significant fixed or reversible perfusion defect.  The stress and rest end-diastolic volumes measure 83 and 81 mL respectively.  The stress and rest end systolic findings measure 14 and 17 mL respectively.  The stress and rest ejection fraction measure 83 and 79% respectively.     Impression:     No acute findings.        [facility]: 6/7/2022- reviewed.   Summary        The pre-procedure left ventricular end diastolic pressure was 16.  The estimated blood loss was none.  There was non-obstructive coronary artery disease..       Assessment:       1. Coronary artery disease involving native coronary artery of native heart without angina pectoris    2. Chronic diastolic heart failure    3. Hyperlipidemia associated with type 2 diabetes mellitus    4. Hypertension associated with type 2 diabetes mellitus    5. PAD (peripheral artery disease)    6. Stage 4 chronic kidney disease    7. Class 1 obesity due to excess calories with serious comorbidity and body mass index (BMI) of 31.0 to 31.9 in adult    8. Type 2 diabetes mellitus with stage 4 chronic kidney disease, without long-term current use of insulin    9. Gastroesophageal reflux disease without esophagitis    10. SBO (small bowel obstruction)    11. Edema of left lower extremity         Plan:         Coronary artery disease involving native coronary artery of native heart without angina pectoris  CCS 0.  Pt compliant w/ meds.  S/p RAUL to RCA. 2011  - negative ETT MPI for ischemia and unchanged coronary angiogram  - continue medical therapy  - encouraged risk factor and lifestyle modifications     Chronic diastolic heart failure  -TTE 1/17/23: LVEF 75% w Grade II LV diastolic dysfunction   -Continue medical therapy- furosemide  20 mg, carvedilol 25 mg, losartan 100 mg , hydralazine 50 mg  -weight self  daily - notify if > 3 pound gain in 1 day or 5 pound gain in 1 week   -discussed lifestyle modifications: low salt diet, exercise, weight loss     Hyperlipidemia associated with type 2 diabetes mellitus  Controlled.  Goal LDL < 70, last 55 1/2022.  Compliant w/ meds.    - continue medical therapy  - encouraged risk factor and lifestyle modifications     Hypertension associated with type 2 diabetes mellitus  Controlled.  Goal BP < 140/90.  Pt compliant w/ meds.    - continue medical therapy  - pt to monitor BP at home and record  - encouraged risk factor and lifestyle modifications     PAD (peripheral artery disease)  Pt denies claudication.  Was previously followed by outside cardiologist.  S/p stenting of LLE.  - continue medical therapy-DAPT, statin   - encouraged risk factor and lifestyle modifications     Stage 4 chronic kidney disease  Followed by renal.        Class 1 obesity due to excess calories with serious comorbidity and body mass index (BMI) of 31.0 to 31.9 in adult  -Pt aware of health complications a/w obesity and desires to lose weight.    - encouraged lifestyle modifications (diet, exercise, and weight loss)     Type 2 diabetes mellitus with stage 4 chronic kidney disease, without long-term current use of insulin  -Followed by PCP and Nephrology   -continue medical therapy     Gastroesophageal reflux disease  -Followed by PCP  -continue medical therapy     SBO (small bowel obstruction)  -Post admission followed by GI and PCP    Edema of left lower extremity  Pt notes chronic following PTA of her LLE.  Pt was unable make her last appt for compression stockings and will reschedule soon.    - thigh high compression stockings      Total duration of face to face visit time 30 minutes.  Total time spent counseling greater than fifty percent of total visit time.  Counseling included discussion regarding imaging findings, diagnosis, possibilities, treatment options, risks and benefits.  The patient had many  questions regarding the options and long-term effects      Lenin Akhtar,DEE  Cardiology

## 2024-02-16 DIAGNOSIS — E11.42 TYPE 2 DIABETES MELLITUS WITH DIABETIC POLYNEUROPATHY, WITHOUT LONG-TERM CURRENT USE OF INSULIN: ICD-10-CM

## 2024-02-16 DIAGNOSIS — E11.22 TYPE 2 DIABETES MELLITUS WITH STAGE 3B CHRONIC KIDNEY DISEASE, WITHOUT LONG-TERM CURRENT USE OF INSULIN: ICD-10-CM

## 2024-02-16 DIAGNOSIS — E11.51 TYPE 2 DIABETES MELLITUS WITH DIABETIC PERIPHERAL ANGIOPATHY WITHOUT GANGRENE, WITHOUT LONG-TERM CURRENT USE OF INSULIN: ICD-10-CM

## 2024-02-16 DIAGNOSIS — N18.32 TYPE 2 DIABETES MELLITUS WITH STAGE 3B CHRONIC KIDNEY DISEASE, WITHOUT LONG-TERM CURRENT USE OF INSULIN: ICD-10-CM

## 2024-02-16 RX ORDER — LINAGLIPTIN 5 MG/1
5 TABLET, FILM COATED ORAL
Qty: 90 TABLET | Refills: 3 | Status: SHIPPED | OUTPATIENT
Start: 2024-02-16

## 2024-02-26 ENCOUNTER — OFFICE VISIT (OUTPATIENT)
Dept: FAMILY MEDICINE | Facility: CLINIC | Age: 89
End: 2024-02-26
Payer: MEDICARE

## 2024-02-26 VITALS
WEIGHT: 143.19 LBS | HEART RATE: 58 BPM | HEIGHT: 62 IN | OXYGEN SATURATION: 98 % | TEMPERATURE: 98 F | DIASTOLIC BLOOD PRESSURE: 66 MMHG | BODY MASS INDEX: 26.35 KG/M2 | SYSTOLIC BLOOD PRESSURE: 136 MMHG

## 2024-02-26 DIAGNOSIS — N18.32 STAGE 3B CHRONIC KIDNEY DISEASE: ICD-10-CM

## 2024-02-26 DIAGNOSIS — J43.2 CENTRILOBULAR EMPHYSEMA: ICD-10-CM

## 2024-02-26 DIAGNOSIS — D53.9 MACROCYTIC ANEMIA: ICD-10-CM

## 2024-02-26 DIAGNOSIS — E78.5 HYPERLIPIDEMIA ASSOCIATED WITH TYPE 2 DIABETES MELLITUS: ICD-10-CM

## 2024-02-26 DIAGNOSIS — R00.1 BRADYCARDIA: ICD-10-CM

## 2024-02-26 DIAGNOSIS — I25.118 ATHEROSCLEROTIC HEART DISEASE OF NATIVE CORONARY ARTERY WITH OTHER FORMS OF ANGINA PECTORIS: ICD-10-CM

## 2024-02-26 DIAGNOSIS — I10 ESSENTIAL HYPERTENSION: ICD-10-CM

## 2024-02-26 DIAGNOSIS — N18.32 TYPE 2 DIABETES MELLITUS WITH STAGE 3B CHRONIC KIDNEY DISEASE, WITHOUT LONG-TERM CURRENT USE OF INSULIN: ICD-10-CM

## 2024-02-26 DIAGNOSIS — E11.22 TYPE 2 DIABETES MELLITUS WITH STAGE 3B CHRONIC KIDNEY DISEASE, WITHOUT LONG-TERM CURRENT USE OF INSULIN: ICD-10-CM

## 2024-02-26 DIAGNOSIS — E11.69 HYPERLIPIDEMIA ASSOCIATED WITH TYPE 2 DIABETES MELLITUS: ICD-10-CM

## 2024-02-26 PROBLEM — D69.6 THROMBOCYTOPENIA: Status: RESOLVED | Noted: 2022-06-20 | Resolved: 2024-02-26

## 2024-02-26 PROCEDURE — 99215 OFFICE O/P EST HI 40 MIN: CPT | Mod: S$GLB,,, | Performed by: STUDENT IN AN ORGANIZED HEALTH CARE EDUCATION/TRAINING PROGRAM

## 2024-02-26 PROCEDURE — 99999 PR PBB SHADOW E&M-EST. PATIENT-LVL IV: CPT | Mod: PBBFAC,,, | Performed by: STUDENT IN AN ORGANIZED HEALTH CARE EDUCATION/TRAINING PROGRAM

## 2024-02-26 NOTE — PROGRESS NOTES
[facility] Note    Chief Complaint      Chief Complaint   Patient presents with    Follow-up     History of Present Illness     Petra Zazueta is a 89 y.o. 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 She has no new complaints today, compliant with all her current regimen and described her mood to be great today.     Problem List Addressed This Visit:    1. Essential hypertension    2. Bradycardia    3. Type 2 diabetes mellitus with stage 3b chronic kidney disease, without long-term current use of insulin  -     HEMOGLOBIN A1C; Future; Expected date: 02/26/2024  -     Comprehensive Metabolic Panel; Future; Expected date: 02/26/2024    4. Stage 3b chronic kidney disease  -     Comprehensive Metabolic Panel; Future; Expected date: 02/26/2024    5. Centrilobular emphysema    6. Macrocytic anemia  -     CBC auto differential; Future; Expected date: 02/26/2024    7. Hyperlipidemia associated with type 2 diabetes mellitus  Overview:  Lab Results   Component Value Date    LDLCALC 55 01/21/2022   - well controlled  - continue current medication    Orders:  -     LIPID PANEL; Future; Expected date: 02/26/2024    8. Atherosclerotic heart disease of native coronary artery with other forms of angina pectoris  Overview:  - 4/2011 RAUL RCA  - 06/08/2022 nonobstructive coronary artery disease continue medical therapy  - followed by Cardiology  - goal LDL <70  - BP goal < 130/80  - DAPT  - A1C goal <7%             Health Maintenance   Topic Date Due    Hemoglobin A1c  02/16/2024    Eye Exam  05/09/2024    Lipid Panel  08/16/2024    Aspirin/Antiplatelet Therapy  02/26/2025    TETANUS VACCINE  07/26/2031    Shingles Vaccine  Completed       Past Medical History:   Diagnosis Date    Acute on chronic diastolic congestive heart failure 1/16/2023    Anemia     Arthritis      Centrilobular emphysema 2023    Coronary artery disease     Diabetes mellitus     Diabetes mellitus type II     Diabetic retinopathy 2019    Dyslipidemia     Hypertension     Insomnia     PAD (peripheral artery disease)        Past Surgical History:   Procedure Laterality Date    angiagram  2018    CORONARY ANGIOGRAPHY N/A 2022    Procedure: ANGIOGRAM, CORONARY ARTERY;  Surgeon: Kiet Vega MD;  Location: [facility] CATH LAB/EP;  Service: Cardiology;  Laterality: N/A;    CORONARY ANGIOPLASTY      RCA 2011 EJ    GALLBLADDER SURGERY      HYSTERECTOMY      LEFT HEART CATHETERIZATION Left 2022    Procedure: Left heart cath;  Surgeon: Kiet Vega MD;  Location: [facility] CATH LAB/EP;  Service: Cardiology;  Laterality: Left;    PERIPHERAL ARTERIAL STENT GRAFT      Left lower extremity RCA        family history includes Heart attack in her mother.    Social History     Tobacco Use    Smoking status: Former     Current packs/day: 0.00     Types: Cigarettes     Quit date: 2005     Years since quittin.1    Smokeless tobacco: Never   Substance Use Topics    Alcohol use: No    Drug use: No       Review of Systems   Constitutional:  Negative for fatigue and fever.   Respiratory:  Negative for cough and chest tightness.    Gastrointestinal:  Negative for abdominal pain, diarrhea and vomiting.   Endocrine: Negative for polydipsia and polyphagia.   Genitourinary:  Negative for difficulty urinating, dysuria and frequency.   Musculoskeletal:  Negative for arthralgias, back pain, gait problem and joint swelling.   Skin:  Negative for rash.   Neurological:  Negative for seizures, weakness, numbness and headaches.   Psychiatric/Behavioral:  Negative for sleep disturbance.        Outpatient Encounter Medications as of 2024   Medication Sig Note Dispense Refill    acetaminophen (TYLENOL) 325 MG tablet Take 325 mg by mouth as needed for Pain.       aspirin (ECOTRIN) 81 MG EC tablet Take 81 mg by  mouth once daily. 8/2/2023: Twice daily      atorvastatin (LIPITOR) 80 MG tablet Take 1 tablet (80 mg total) by mouth every evening.  90 tablet 3    blood sugar diagnostic (TRUE METRIX GLUCOSE TEST STRIP) Strp 1 strip by Misc.(Non-Drug; Combo Route) route 2 (two) times daily.  200 each 1    blood-glucose meter (TRUE METRIX GLUCOSE METER) Misc Test blood sugars twice daily  1 each 0    carvediloL (COREG) 25 MG tablet TAKE 1 TABLET BY MOUTH TWICE A DAY WITH FOOD (Patient taking differently: Take 25 mg by mouth 2 (two) times daily with meals.)  180 tablet 3    clopidogreL (PLAVIX) 75 mg tablet Take 1 tablet (75 mg total) by mouth once daily.  30 tablet 11    ferrous sulfate 324 mg (65 mg iron) TbEC TAKE 1 TABLET (324 MG TOTAL) BY MOUTH ONCE DAILY.  90 tablet 3    furosemide (LASIX) 20 MG tablet Take 1 tablet (20 mg total) by mouth once daily.  90 tablet 3    hydrALAZINE (APRESOLINE) 50 MG tablet Take 1 tablet (50 mg total) by mouth every 8 (eight) hours.  270 tablet 3    lancets (BD ULTRA FINE LANCETS) 33 gauge Misc 1 lancet by Misc.(Non-Drug; Combo Route) route 2 (two) times daily.  200 each 1    latanoprost 0.005 % ophthalmic solution Place 1 drop into both eyes every evening.       losartan (COZAAR) 100 MG tablet TAKE 1 TABLET BY MOUTH EVERY DAY (Patient taking differently: Take 100 mg by mouth once daily.)  90 tablet 3    omeprazole (PRILOSEC) 40 MG capsule TAKE 1 CAPSULE BY MOUTH EVERY DAY  90 capsule 3    TRADJENTA 5 mg Tab tablet TAKE 1 TABLET BY MOUTH EVERY DAY  90 tablet 3    [DISCONTINUED] cilostazol (PLETAL) 100 MG Tab Take 100 mg by mouth 2 (two) times daily.   3    [DISCONTINUED] linaGLIPtin (TRADJENTA) 5 mg Tab tablet Take 1 tablet (5 mg total) by mouth once daily.  90 tablet 3     No facility-administered encounter medications on file as of 2/26/2024.        Review of patient's allergies indicates:   Allergen Reactions    Codeine Other (See Comments)     Jittery, lightheadedness, nausea  Other  "reaction(s): NAUSEA       Physical Exam      Vital Signs  Temp: 97.7 °F (36.5 °C)  Temp Source: Temporal  Pulse: (!) 58  SpO2: 98 %  BP: 136/66  BP Location: Right arm  Patient Position: Sitting  Pain Score: 0-No pain  Height and Weight  Height: 5' 2" (157.5 cm)  Weight: 64.9 kg (143 lb 3 oz)  BSA (Calculated - sq m): 1.69 sq meters  BMI (Calculated): 26.2  Weight in (lb) to have BMI = 25: 136.4]    Physical Exam  Vitals reviewed.   Constitutional:       Appearance: Normal appearance.   HENT:      Mouth/Throat:      Mouth: Mucous membranes are moist.      Pharynx: Oropharynx is clear.   Cardiovascular:      Rate and Rhythm: Normal rate and regular rhythm.      Pulses: Normal pulses.      Heart sounds: Normal heart sounds.   Pulmonary:      Effort: Pulmonary effort is normal.      Breath sounds: Normal breath sounds.   Abdominal:      General: Abdomen is flat. Bowel sounds are normal.      Palpations: Abdomen is soft.   Musculoskeletal:      Cervical back: Normal range of motion.      Right lower leg: Edema (trace) present.      Left lower leg: Edema ((2+)) present.   Skin:     General: Skin is warm and dry.   Neurological:      General: No focal deficit present.      Mental Status: She is alert and oriented to person, place, and time.      Sensory: No sensory deficit.   Psychiatric:         Mood and Affect: Mood normal.         Behavior: Behavior normal.          Laboratory:  CBC:  Recent Labs   Lab Result Units 12/29/23  1408   WBC K/uL 5.33   RBC M/uL 3.26*   Hemoglobin g/dL 10.8*   Hematocrit % 33.6*   Platelets K/uL 150   MCV fL 103*   MCH pg 33.1*   MCHC g/dL 32.1     CMP:  Recent Labs   Lab Result Units 12/29/23  1408   Glucose mg/dL 163*   Calcium mg/dL 9.6   Albumin g/dL 4.2   Total Protein g/dL 7.1   Sodium mmol/L 142   Potassium mmol/L 3.7   CO2 mmol/L 24   Chloride mmol/L 105   BUN mg/dL 27*   Alkaline Phosphatase U/L 72   ALT U/L 15   AST U/L 25   Total Bilirubin mg/dL 0.6     URINALYSIS:  Recent Labs " "  Lab Result Units 12/29/23  1457   Bacteria /hpf Moderate*   Hyaline Casts, UA /lpf 1      LIPIDS:  No results for input(s): "TSH", "HDL", "CHOL", "TRIG", "LDLCALC", "CHOLHDL", "NONHDLCHOL", "TOTALCHOLEST" in the last 2160 hours.    TSH:  No results for input(s): "TSH" in the last 2160 hours.    A1C:  No results for input(s): "HGBA1C" in the last 2160 hours.      Radiology:      Assessment/Plan     Petra Zazueta is a 89 y.o.female with:    1. Essential hypertension  -now @ goal  -c/w  hydralazine 50mg TID, c/w coreg 25mg BID and losartan 100mg daily  -patient advised to monitor and keep BP log    2. CKD, stage 3b  -likely multifactorial : T2DM, sHTN  -patient advised to avoid nephrotoxins  -f/u with renal     3. Anemia of chronic disease  -1g drop in H&H, @ 10  -iron panel with high ferritin and low TIBC  -could not tolerate iron pills  -monitor for now    4. Type 2 diabetes mellitus with diabetic polyneuropathy, without long-term current use of insulin  -controlled @ 5.3  -c/w current regimen(linagliptin)  -UTD on eye and podiatry and urine  -c/w current regimen    5. Hyperparathyroidism  -secondary , renal   -vit D WNL  -monitor for now    6. Macrocytic anemia  -recent H&H @ 10.6, MCV @ 104  -B12, folate WNL    7. Bradycardia  -likely iatrogenic  -asymptomatic  -monitor for now    8. Centrilobular emphysema  -asymptomatic for now      -Continue current medications and maintain follow up with specialists.      Patient verbalizes understanding and agrees with current treatment plan.      Dr Edel Shepard MD  [facility]"

## 2024-04-01 ENCOUNTER — TELEPHONE (OUTPATIENT)
Dept: FAMILY MEDICINE | Facility: CLINIC | Age: 89
End: 2024-04-01
Payer: MEDICARE

## 2024-04-01 NOTE — TELEPHONE ENCOUNTER
----- Message from Edel Shepard MD sent at 4/1/2024 11:47 AM CDT -----  Please tell her to take the water pill ( lasix 20mg , also called furosemide) only three times a week instead of daily. Her kidney function went down from prior values. Thanks

## 2024-04-06 PROBLEM — R80.1 PERSISTENT PROTEINURIA: Status: ACTIVE | Noted: 2024-04-06

## 2024-04-06 PROBLEM — M10.9 GOUT: Status: ACTIVE | Noted: 2024-04-06

## 2024-04-06 PROBLEM — D50.9 IRON DEFICIENCY ANEMIA: Status: ACTIVE | Noted: 2024-04-06

## 2024-04-06 PROBLEM — N28.1 RENAL CYST: Status: ACTIVE | Noted: 2024-04-06

## 2024-04-19 DIAGNOSIS — I25.10 CORONARY ARTERY DISEASE INVOLVING NATIVE CORONARY ARTERY OF NATIVE HEART WITHOUT ANGINA PECTORIS: ICD-10-CM

## 2024-04-19 RX ORDER — ATORVASTATIN CALCIUM 80 MG/1
80 TABLET, FILM COATED ORAL NIGHTLY
Qty: 90 TABLET | Refills: 3 | Status: SHIPPED | OUTPATIENT
Start: 2024-04-19

## 2024-04-19 NOTE — TELEPHONE ENCOUNTER
Refill Decision Note   Petra Carlita  is requesting a refill authorization.  Brief Assessment and Rationale for Refill:  Approve     Medication Therapy Plan:        Comments:     Note composed:3:55 PM 04/19/2024

## 2024-04-19 NOTE — TELEPHONE ENCOUNTER
Care Due:                  Date            Visit Type   Department     Provider  --------------------------------------------------------------------------------    Last Visit: None Found      None         None Found  Next Visit: None Scheduled  None         None Found                                                            Last  Test          Frequency    Reason                     Performed    Due Date  --------------------------------------------------------------------------------    Office Visit  15 months..  TRADJENTA, atorvastatin,   Not Found    Overdue                             hydrALAZINE..............    [employer] Embedded Care Due Messages. Reference number: [id number].   4/19/2024 12:15:07 AM CDT

## 2024-05-14 ENCOUNTER — OFFICE VISIT (OUTPATIENT)
Dept: CARDIOLOGY | Facility: CLINIC | Age: 89
End: 2024-05-14
Payer: MEDICARE

## 2024-05-14 VITALS
HEIGHT: 62 IN | DIASTOLIC BLOOD PRESSURE: 72 MMHG | WEIGHT: 142.75 LBS | SYSTOLIC BLOOD PRESSURE: 148 MMHG | OXYGEN SATURATION: 98 % | BODY MASS INDEX: 26.27 KG/M2 | HEART RATE: 48 BPM

## 2024-05-14 DIAGNOSIS — I73.9 PAD (PERIPHERAL ARTERY DISEASE): ICD-10-CM

## 2024-05-14 DIAGNOSIS — D63.1 ANEMIA OF CHRONIC RENAL FAILURE, STAGE 4 (SEVERE): ICD-10-CM

## 2024-05-14 DIAGNOSIS — E11.22 TYPE 2 DIABETES MELLITUS WITH STAGE 4 CHRONIC KIDNEY DISEASE, WITHOUT LONG-TERM CURRENT USE OF INSULIN: ICD-10-CM

## 2024-05-14 DIAGNOSIS — R60.0 LOCALIZED EDEMA: ICD-10-CM

## 2024-05-14 DIAGNOSIS — N18.4 TYPE 2 DIABETES MELLITUS WITH STAGE 4 CHRONIC KIDNEY DISEASE, WITHOUT LONG-TERM CURRENT USE OF INSULIN: ICD-10-CM

## 2024-05-14 DIAGNOSIS — K21.9 GASTROESOPHAGEAL REFLUX DISEASE WITHOUT ESOPHAGITIS: ICD-10-CM

## 2024-05-14 DIAGNOSIS — I10 HYPERTENSION, ESSENTIAL: ICD-10-CM

## 2024-05-14 DIAGNOSIS — E78.5 HYPERLIPIDEMIA ASSOCIATED WITH TYPE 2 DIABETES MELLITUS: ICD-10-CM

## 2024-05-14 DIAGNOSIS — N18.4 ANEMIA OF CHRONIC RENAL FAILURE, STAGE 4 (SEVERE): ICD-10-CM

## 2024-05-14 DIAGNOSIS — I25.118 ATHEROSCLEROTIC HEART DISEASE OF NATIVE CORONARY ARTERY WITH OTHER FORMS OF ANGINA PECTORIS: Primary | ICD-10-CM

## 2024-05-14 DIAGNOSIS — E11.69 HYPERLIPIDEMIA ASSOCIATED WITH TYPE 2 DIABETES MELLITUS: ICD-10-CM

## 2024-05-14 DIAGNOSIS — I25.10 CORONARY ARTERY DISEASE INVOLVING NATIVE CORONARY ARTERY OF NATIVE HEART WITHOUT ANGINA PECTORIS: ICD-10-CM

## 2024-05-14 DIAGNOSIS — I50.32 CHRONIC DIASTOLIC HEART FAILURE: ICD-10-CM

## 2024-05-14 DIAGNOSIS — N25.81 HYPERPARATHYROIDISM, SECONDARY: ICD-10-CM

## 2024-05-14 DIAGNOSIS — E66.09 CLASS 1 OBESITY DUE TO EXCESS CALORIES WITH SERIOUS COMORBIDITY AND BODY MASS INDEX (BMI) OF 31.0 TO 31.9 IN ADULT: ICD-10-CM

## 2024-05-14 PROCEDURE — 1101F PT FALLS ASSESS-DOCD LE1/YR: CPT | Mod: CPTII,S$GLB,,

## 2024-05-14 PROCEDURE — 99999 PR PBB SHADOW E&M-EST. PATIENT-LVL III: CPT | Mod: PBBFAC,,,

## 2024-05-14 PROCEDURE — 1160F RVW MEDS BY RX/DR IN RCRD: CPT | Mod: CPTII,S$GLB,,

## 2024-05-14 PROCEDURE — 1159F MED LIST DOCD IN RCRD: CPT | Mod: CPTII,S$GLB,,

## 2024-05-14 PROCEDURE — 99214 OFFICE O/P EST MOD 30 MIN: CPT | Mod: S$GLB,,,

## 2024-05-14 PROCEDURE — 3288F FALL RISK ASSESSMENT DOCD: CPT | Mod: CPTII,S$GLB,,

## 2024-05-14 PROCEDURE — 1126F AMNT PAIN NOTED NONE PRSNT: CPT | Mod: CPTII,S$GLB,,

## 2024-05-14 RX ORDER — CLOPIDOGREL BISULFATE 75 MG/1
75 TABLET ORAL DAILY
Qty: 30 TABLET | Refills: 11 | Status: SHIPPED | OUTPATIENT
Start: 2024-05-14 | End: 2025-05-14

## 2024-05-14 NOTE — ASSESSMENT & PLAN NOTE
Controlled.  Goal LDL < 70, last LDL 44.0 4/1/24.  Compliant w/ meds.    - continue medical therapy- atorvastatin 80 mg   - encouraged risk factor and lifestyle modifications

## 2024-05-14 NOTE — ASSESSMENT & PLAN NOTE
CCS 0.  Pt compliant w/ meds.  S/p RAUL to RCA. 2011  - negative ETT MPI for ischemia and unchanged coronary angiogram  - continue medical therapy  - encouraged risk factor and lifestyle modifications    Pt states she lives at home but had 2 aides; one from 10 AM to 4 PM then another from 4PM to 8 PM

## 2024-05-14 NOTE — PROGRESS NOTES
Subjective:    Patient ID:  Petra Zazueta is a 89 y.o. female who presents for follow-up of No chief complaint on file.      PCP: Edel Shepard MD     Referring Provider:     HPI:  Pt is a 87 yo F w/ PMH of CAD s/p PCI to mRCA w/ RAUL 2011, DM2 w/ hgba1c 5.8%, HTN, HLD, and PAD s/p stenting of LLE 2016 who presents for f/u appt and HF management. She was last seen on 10/24/23 for f/u and HF management and was continued on medical therapy. She presents with son. She reports doing well since last visit. She reports it has been a while since she experienced palpitations. She denies CP, orthopnea, PND, LOC, and claudication. She notes compliance w/ meds and denies side effects. She reports dietary intermittent compliance with low salt diet. She does not exercise regularly however is active w/ cleaning up her house and yard and denies limitations. She is back driving. She attends the [employer] 5 days a week and enjoys the activities. She monitors her BP at home and has been running 120s/60-70s. Her birthday is Saturday and she plans to go to the [employer].     5/14/2024: Patient presents today for f/u appt. She was last seen on 2/8/24 for f/u and was continued on medical therapy and HF management. She presents with son. She reports doing well since last visit. She reports it has been a while since she experienced palpitations. She denies CP, orthopnea, PND, LOC, and claudication. She notes compliance w/ meds and denies side effects. She reports dietary intermittent compliance with low salt diet. She does not exercise regularly however is active w/ cleaning up her house and yard and denies limitations. She attends the [employer] 5 days a week and enjoys the activities. She monitors her BP at home and has been running 120s/60-70s.      Past Medical History:   Diagnosis Date    Acute on chronic diastolic congestive heart failure 1/16/2023    Anemia     Arthritis     Centrilobular emphysema 8/14/2023     Coronary artery disease     Diabetes mellitus     Diabetes mellitus type II     Diabetic retinopathy 2019    Dyslipidemia     Hypertension     Insomnia     PAD (peripheral artery disease)      Past Surgical History:   Procedure Laterality Date    angiagram  2018    CORONARY ANGIOGRAPHY N/A 2022    Procedure: ANGIOGRAM, CORONARY ARTERY;  Surgeon: Kiet Vega MD;  Location: [facility] CATH LAB/EP;  Service: Cardiology;  Laterality: N/A;    CORONARY ANGIOPLASTY      RCA 2011 EJ    GALLBLADDER SURGERY      HYSTERECTOMY      LEFT HEART CATHETERIZATION Left 2022    Procedure: Left heart cath;  Surgeon: Kiet Vega MD;  Location: [facility] CATH LAB/EP;  Service: Cardiology;  Laterality: Left;    PERIPHERAL ARTERIAL STENT GRAFT      Left lower extremity RCA      Social History     Socioeconomic History    Marital status:     Number of children: 2   Tobacco Use    Smoking status: Former     Current packs/day: 0.00     Types: Cigarettes     Quit date: 2005     Years since quittin.3    Smokeless tobacco: Never   Substance and Sexual Activity    Alcohol use: No    Drug use: No    Sexual activity: Not Currently   Social History Issac    Lives alone in [place]. Has 2 sons. Darell Concepcion lives in [place] and cares for her. He is mPOA. Other son lives in [place]. Quit drinking years ago. [employer].      Family History   Problem Relation Name Age of Onset    Heart attack Mother         Review of patient's allergies indicates:   Allergen Reactions    Codeine Other (See Comments)     Jittery, lightheadedness, nausea  Other reaction(s): NAUSEA       Medication List with Changes/Refills   Current Medications    ACETAMINOPHEN (TYLENOL) 325 MG TABLET    Take 325 mg by mouth as needed for Pain.    ASPIRIN (ECOTRIN) 81 MG EC TABLET    Take 81 mg by mouth once daily.    ATORVASTATIN (LIPITOR) 80 MG TABLET    TAKE 1 TABLET BY MOUTH EVERY EVENING    BLOOD SUGAR DIAGNOSTIC (TRUE METRIX GLUCOSE TEST STRIP)  STRP    1 strip by Misc.(Non-Drug; Combo Route) route 2 (two) times daily.    BLOOD-GLUCOSE METER (TRUE METRIX GLUCOSE METER) MISC    Test blood sugars twice daily    CARVEDILOL (COREG) 25 MG TABLET    TAKE 1 TABLET BY MOUTH TWICE A DAY WITH FOOD    FERROUS SULFATE 324 MG (65 MG IRON) TBEC    TAKE 1 TABLET (324 MG TOTAL) BY MOUTH ONCE DAILY.    FUROSEMIDE (LASIX) 20 MG TABLET    Take 1 tablet (20 mg total) by mouth once daily.    HYDRALAZINE (APRESOLINE) 50 MG TABLET    Take 1 tablet (50 mg total) by mouth every 8 (eight) hours.    LANCETS (BD ULTRA FINE LANCETS) 33 GAUGE MISC    1 lancet by Misc.(Non-Drug; Combo Route) route 2 (two) times daily.    LATANOPROST 0.005 % OPHTHALMIC SOLUTION    Place 1 drop into both eyes every evening.    LOSARTAN (COZAAR) 100 MG TABLET    TAKE 1 TABLET BY MOUTH EVERY DAY    OMEPRAZOLE (PRILOSEC) 40 MG CAPSULE    TAKE 1 CAPSULE BY MOUTH EVERY DAY    TRADJENTA 5 MG TAB TABLET    TAKE 1 TABLET BY MOUTH EVERY DAY   Changed and/or Refilled Medications    Modified Medication Previous Medication    CLOPIDOGREL (PLAVIX) 75 MG TABLET clopidogreL (PLAVIX) 75 mg tablet       Take 1 tablet (75 mg total) by mouth once daily.    Take 1 tablet (75 mg total) by mouth once daily.       Review of Systems   Constitutional: Negative for diaphoresis and fever.   HENT:  Negative for congestion and hearing loss.    Eyes:  Negative for blurred vision and pain.   Cardiovascular:  Positive for leg swelling. Negative for chest pain, claudication, dyspnea on exertion, near-syncope, palpitations and syncope.   Respiratory:  Negative for shortness of breath and sleep disturbances due to breathing.    Hematologic/Lymphatic: Negative for bleeding problem. Does not bruise/bleed easily.   Skin:  Negative for color change and poor wound healing.   Gastrointestinal:  Negative for abdominal pain and nausea.   Genitourinary:  Negative for bladder incontinence and flank pain.   Neurological:  Negative for focal weakness  "and light-headedness.        Objective:   BP (!) 148/72 (BP Location: Left arm, Patient Position: Sitting, BP Method: Large (Manual))   Pulse (!) 48   Ht 5' 2" (1.575 m)   Wt 64.8 kg (142 lb 12 oz)   LMP  (LMP Unknown)   SpO2 98%   BMI 26.11 kg/m²    Physical Exam  Constitutional:       Appearance: She is well-developed. She is not diaphoretic.   HENT:      Head: Normocephalic and atraumatic.   Eyes:      General: No scleral icterus.     Pupils: Pupils are equal, round, and reactive to light.   Neck:      Vascular: No JVD.   Cardiovascular:      Rate and Rhythm: Normal rate and regular rhythm.      Pulses: Intact distal pulses.           Radial pulses are 2+ on the right side and 2+ on the left side.        Dorsalis pedis pulses are 2+ on the right side and 2+ on the left side.      Heart sounds: S1 normal and S2 normal. Murmur heard.      Systolic murmur is present.      No friction rub. No gallop.   Pulmonary:      Effort: Pulmonary effort is normal. No respiratory distress.      Breath sounds: Normal breath sounds. No wheezing or rales.   Chest:      Chest wall: No tenderness.   Abdominal:      General: Bowel sounds are normal. There is no distension.      Palpations: Abdomen is soft. There is no mass.      Tenderness: There is no abdominal tenderness. There is no rebound.   Musculoskeletal:         General: No tenderness. Normal range of motion.      Cervical back: Normal range of motion and neck supple.      Right lower le+ Edema present.      Left lower le+ Edema present.      Comments: LLE chronic nonpitting edema   Skin:     General: Skin is warm and dry.      Coloration: Skin is not pale.   Neurological:      Mental Status: She is alert and oriented to person, place, and time.      Coordination: Coordination normal.      Deep Tendon Reflexes: Reflexes normal.   Psychiatric:         Behavior: Behavior normal.         Judgment: Judgment normal.             TTE 23:   Summary     The left " ventricle is normal in size with concentric hypertrophy and hyperdynamic systolic function.  The estimated ejection fraction is 75%.  Grade II left ventricular diastolic dysfunction.  Normal right ventricular size with normal right ventricular systolic function.  Mild to moderate left atrial enlargement.  Mild mitral regurgitation.  Mild tricuspid regurgitation.  Mild pulmonic regurgitation.  Mild right atrial enlargement.  Small anterior pericardial effusion. Effusion is fluid.  Normal central venous pressure (3 mmHg).  The estimated PA systolic pressure is 21 mmHg.  EKG 23 reviewed :   Normal sinus rhythm with sinus arrhythmia,T wave abnormality, consider inferolateral ischemia        EC2020- reviewed.  NSR, T wave abnormality w/ possible inferolateral ischemia.       ETT: 12/15/2020- reviewed.    Conclusion          The EKG portion of this study is negative for ischemia.    The patient reported no chest pain during the stress test.    There were no arrhythmias during stress.    The exercise capacity was moderately impaired.    ECG Stress Nuclear portion of this study will be reported separately.   FINDINGS:  There is appropriate wall motion.  There is no significant fixed or reversible perfusion defect.  The stress and rest end-diastolic volumes measure 83 and 81 mL respectively.  The stress and rest end systolic findings measure 14 and 17 mL respectively.  The stress and rest ejection fraction measure 83 and 79% respectively.     Impression:     No acute findings.        [facility]: 2022- reviewed.   Summary        The pre-procedure left ventricular end diastolic pressure was 16.  The estimated blood loss was none.  There was non-obstructive coronary artery disease..       Assessment:       1. Atherosclerotic heart disease of native coronary artery with other forms of angina pectoris    2. Chronic diastolic heart failure    3. Hyperlipidemia associated with type 2 diabetes mellitus    4. Hypertension,  essential    5. PAD (peripheral artery disease)    6. Anemia of chronic renal failure, stage 4 (severe)    7. Class 1 obesity due to excess calories with serious comorbidity and body mass index (BMI) of 31.0 to 31.9 in adult    8. Hyperparathyroidism, secondary    9. Type 2 diabetes mellitus with stage 4 chronic kidney disease, without long-term current use of insulin    10. Gastroesophageal reflux disease without esophagitis    11. Localized edema    12. Coronary artery disease involving native coronary artery of native heart without angina pectoris           Plan:         Atherosclerotic heart disease of native coronary artery with other forms of angina pectoris  CCS 0.  Pt compliant w/ meds.  S/p RAUL to RCA. 2011  - negative ETT MPI for ischemia and unchanged coronary angiogram  - continue medical therapy  - encouraged risk factor and lifestyle modifications     Chronic diastolic heart failure  -TTE 1/17/23: LVEF 75% w Grade II LV diastolic dysfunction   -Continue medical therapy- furosemide  20 mg, carvedilol 25 mg, losartan 100 mg , hydralazine 50 mg  -weight self daily - notify if > 3 pound gain in 1 day or 5 pound gain in 1 week   -discussed lifestyle modifications: low salt diet, exercise, weight loss     Hyperlipidemia associated with type 2 diabetes mellitus  Controlled.  Goal LDL < 70, last LDL 44.0 4/1/24.  Compliant w/ meds.    - continue medical therapy- atorvastatin 80 mg   - encouraged risk factor and lifestyle modifications     Hypertension, essential  Controlled.  Goal BP < 140/90.  Pt compliant w/ meds.    -controlled- 130/84  - continue medical therapy  - pt to monitor BP at home and record  - encouraged risk factor and lifestyle modifications     PAD (peripheral artery disease)  Pt denies claudication.  Was previously followed by outside cardiologist.  S/p stenting of LLE.  - continue medical therapy-DAPT, statin   - encouraged risk factor and lifestyle modifications     Anemia of chronic renal  failure, stage 4 (severe)  Followed by renal.        Class 1 obesity due to excess calories with serious comorbidity and body mass index (BMI) of 31.0 to 31.9 in adult  -Pt aware of health complications a/w obesity and desires to lose weight.    - encouraged lifestyle modifications (diet, exercise, and weight loss)     Hyperparathyroidism, secondary  -followed by Nephrology     Type 2 diabetes mellitus with stage 4 chronic kidney disease, without long-term current use of insulin  -Followed by PCP and Nephrology   -A1c 5.8 4/1/24  -continue medical therapy     Gastroesophageal reflux disease  -Followed by PCP  -continue medical therapy - omeprazole 40 mg     Localized edema  Pt notes chronic following PTA of her LLE.  Pt was unable make her last appt for compression stockings and will reschedule soon.    - thigh high compression stockings        Total duration of face to face visit time 30 minutes.  Total time spent counseling greater than fifty percent of total visit time.  Counseling included discussion regarding imaging findings, diagnosis, possibilities, treatment options, risks and benefits.  The patient had many questions regarding the options and long-term effects      Lenin Akhtar NP  Cardiology

## 2024-06-12 NOTE — PROGRESS NOTES
Problem: Device-Related Complication Risk (Hemodialysis)  Goal: Safe, Effective Therapy Delivery  Outcome: Ongoing, Progressing  Intervention: Optimize Device Care and Function  Recent Flowsheet Documentation  Taken 6/12/2024 [id number] by Delmer Sánchez RN  Medication Review/Management:   medications reviewed   high-risk medications identified     Problem: Hemodynamic Instability (Hemodialysis)  Goal: Effective Tissue Perfusion  Outcome: Ongoing, Progressing     Problem: Infection (Hemodialysis)  Goal: Absence of Infection Signs and Symptoms  Outcome: Ongoing, Progressing   Goal Outcome Evaluation:            HD completed. UF goal of 1340 reached. Tolerated well. VS stable and wdl during tx. Blood returned to pt. Report to JESUS Huggins   Subjective:       Patient ID: Petra Zazueta is a 83 y.o. Black or  female who presents for follow-up evaluation of CKD.       HPI   80 y/o F with longstanding DM2, HTN, HLD, CAD (CABG 3 yrs ago), PAD and CKD3 with a baseline creatinine of 1.3-1.6 mg/dl and episodes of increased creatinine. The most recent one being 2.07 mg/dl. She reports she was taking Advil PRN almost daily for several years, but stopped last year. Takes tylenol only for pain. She was supposed to see me back in 4-5 month ago.   The patient still complains about cramping pain in her calves on both sides now after 2 blocks (used to walk 3 miles twice a day a couple of yrs ago). Complains about increased leg swelling.    No new labs today.    Review of Systems   Constitutional: Negative for activity change, appetite change, chills, diaphoresis, fatigue, fever and unexpected weight change.   HENT: Negative for congestion, facial swelling and trouble swallowing.    Eyes: Negative for pain, discharge, redness and visual disturbance.   Respiratory: Positive for shortness of breath (half a block). Negative for cough and wheezing.    Cardiovascular: Positive for leg swelling. Negative for chest pain and palpitations.   Gastrointestinal: Negative for abdominal distention, abdominal pain, constipation and diarrhea.   Endocrine: Negative for cold intolerance and heat intolerance.   Genitourinary: Negative for decreased urine volume, difficulty urinating, dysuria, flank pain, frequency and urgency.   Musculoskeletal: Positive for arthralgias. Negative for joint swelling and myalgias.   Skin: Negative for color change.   Allergic/Immunologic: Negative for immunocompromised state.   Neurological: Negative for dizziness, tremors, syncope, speech difficulty, weakness, light-headedness and numbness.   Hematological: Negative for adenopathy.   Psychiatric/Behavioral: Negative for agitation, confusion, decreased concentration and dysphoric mood.        Objective:      Physical Exam   Constitutional: She is oriented to person, place, and time. She appears well-developed and well-nourished.   HENT:   Head: Normocephalic and atraumatic.   Eyes: Conjunctivae and EOM are normal. Pupils are equal, round, and reactive to light.   Neck: Neck supple.   Cardiovascular: Normal rate, regular rhythm and intact distal pulses.    Murmur (systolic) heard.  Pulmonary/Chest: Effort normal and breath sounds normal.   Abdominal: Soft. Bowel sounds are normal.   Musculoskeletal: Normal range of motion. She exhibits edema (left lower extremity worse than right).   Neurological: She is alert and oriented to person, place, and time. She has normal reflexes.   Skin: Skin is warm and dry.   Psychiatric: She has a normal mood and affect. Her behavior is normal.   Nursing note and vitals reviewed.      Assessment:       1. Type 2 diabetes mellitus with diabetic nephropathy, without long-term current use of insulin    2. CKD (chronic kidney disease), stage IV    3. Essential hypertension    4. PAD (peripheral artery disease)        Plan:       1. CKD 3: Likely secondary to vascular disease, long standing HTN and diabetic nephropathy (no significant proteinuria).     Recently elevated creatinine - will repeat labs today (were not done)  - UA and urine culture today (asymptomatic!)  Avoids NSAIDs.    - continue statin  - ARB for renal protection - will increase her Losartan if she has proteinuria.  - will change her bumex to torsemide next visit if the edema does not improve.    Lab Results   Component Value Date    CREATININE 2.0 (H) 12/26/2017     Protein Creatinine Ratios:     Prot/Creat Ratio, Ur   Date Value Ref Range Status   12/22/2017 0.08 0.00 - 0.20 Final   06/17/2016 0.10 0.00 - 0.20 Final     ·   ·   Acid-Base:   Lab Results   Component Value Date     12/26/2017    K 4.7 12/26/2017    CO2 24 12/26/2017     2. HTN: Low salt diet. Continue medications as directed.   The  patient was educated in practicing a low salt diet.  Avoid high salt foods (olives, pickles, smoked meats, salted potato chips, etc.).   Do not add salt to your food at the table.   Use only small amounts of salt when cooking.      3. Renal osteodystrophy: Monitor. Repeat.  Lab Results   Component Value Date    PTH 59.0 12/22/2017    CALCIUM 10.1 12/26/2017    PHOS 3.2 12/26/2017       4. Anemia: At goal for CKD.   Lab Results   Component Value Date    HGB 11.6 (L) 12/22/2017        5. DM: Well controlled - Follow up with primary care.   Lab Results   Component Value Date    HGBA1C 6.9 (H) 12/22/2017       6. Lipid management: On Statin.   Has problems sleeping - advised in sleep hygiene and will prescribe temazepam low dose.      Has appointment with cardiology in January    Follow up in 4 months with labs.   Labs today

## 2024-06-14 ENCOUNTER — TELEPHONE (OUTPATIENT)
Dept: CARDIOLOGY | Facility: CLINIC | Age: 89
End: 2024-06-14
Payer: MEDICARE

## 2024-06-14 NOTE — TELEPHONE ENCOUNTER
Spoke to Ms. Stevenson and she will try to get the thigh high approved thru the insurance    ----- Message from Lenin Akhtar NP sent at 6/14/2024  8:15 AM CDT -----  Regarding: RE: Compression stockings [id number] Petra Zazueta  Please inform Ms. Zazueta because of previous stent she need thigh high stockings.    Thank you,  Lenin Akhtar DNP  ----- Message -----  From: Rosy Zapata MA  Sent: 6/13/2024   3:57 PM CDT  To: Lenin Akhtar NP  Subject: Compression stockings [id number] Petra Martines    Insurance covers 1 pair of knee high a year, with the diagnoses she has, they want to know if you can change script to knee high?  ----- Message -----  From: Vale Chang  Sent: 6/13/2024   8:43 AM CDT  To: Hermilo Phelps Staff    Type: Call    Who Called:[facility]   Does the patient know what this is regarding?:orders for compression stockings (concerns)  Would the patient rather a call back or a response via [facility]? Call   Best Call Back Number:  Additional Information:

## 2024-08-12 ENCOUNTER — PATIENT MESSAGE (OUTPATIENT)
Dept: CARDIOLOGY | Facility: CLINIC | Age: 89
End: 2024-08-12

## 2024-08-12 ENCOUNTER — OFFICE VISIT (OUTPATIENT)
Dept: CARDIOLOGY | Facility: CLINIC | Age: 89
End: 2024-08-12
Payer: MEDICARE

## 2024-08-12 VITALS
DIASTOLIC BLOOD PRESSURE: 78 MMHG | OXYGEN SATURATION: 98 % | WEIGHT: 141.56 LBS | SYSTOLIC BLOOD PRESSURE: 120 MMHG | HEIGHT: 62 IN | HEART RATE: 49 BPM | BODY MASS INDEX: 26.05 KG/M2

## 2024-08-12 DIAGNOSIS — E11.69 HYPERLIPIDEMIA ASSOCIATED WITH TYPE 2 DIABETES MELLITUS: ICD-10-CM

## 2024-08-12 DIAGNOSIS — E66.09 CLASS 1 OBESITY DUE TO EXCESS CALORIES WITH SERIOUS COMORBIDITY AND BODY MASS INDEX (BMI) OF 31.0 TO 31.9 IN ADULT: ICD-10-CM

## 2024-08-12 DIAGNOSIS — D63.1 ANEMIA OF CHRONIC RENAL FAILURE, STAGE 4 (SEVERE): ICD-10-CM

## 2024-08-12 DIAGNOSIS — E78.5 HYPERLIPIDEMIA ASSOCIATED WITH TYPE 2 DIABETES MELLITUS: ICD-10-CM

## 2024-08-12 DIAGNOSIS — I73.9 PAD (PERIPHERAL ARTERY DISEASE): ICD-10-CM

## 2024-08-12 DIAGNOSIS — I25.118 ATHEROSCLEROTIC HEART DISEASE OF NATIVE CORONARY ARTERY WITH OTHER FORMS OF ANGINA PECTORIS: Primary | ICD-10-CM

## 2024-08-12 DIAGNOSIS — R60.0 LOCALIZED EDEMA: ICD-10-CM

## 2024-08-12 DIAGNOSIS — N18.4 ANEMIA OF CHRONIC RENAL FAILURE, STAGE 4 (SEVERE): ICD-10-CM

## 2024-08-12 DIAGNOSIS — I50.32 CHRONIC DIASTOLIC HEART FAILURE: ICD-10-CM

## 2024-08-12 DIAGNOSIS — E11.42 TYPE 2 DIABETES MELLITUS WITH DIABETIC POLYNEUROPATHY, WITHOUT LONG-TERM CURRENT USE OF INSULIN: ICD-10-CM

## 2024-08-12 DIAGNOSIS — K21.9 GASTROESOPHAGEAL REFLUX DISEASE WITHOUT ESOPHAGITIS: ICD-10-CM

## 2024-08-12 DIAGNOSIS — N25.81 HYPERPARATHYROIDISM, SECONDARY: ICD-10-CM

## 2024-08-12 DIAGNOSIS — I10 HYPERTENSION, ESSENTIAL: ICD-10-CM

## 2024-08-12 PROCEDURE — 3288F FALL RISK ASSESSMENT DOCD: CPT | Mod: CPTII,S$GLB,,

## 2024-08-12 PROCEDURE — 1159F MED LIST DOCD IN RCRD: CPT | Mod: CPTII,S$GLB,,

## 2024-08-12 PROCEDURE — 99999 PR PBB SHADOW E&M-EST. PATIENT-LVL III: CPT | Mod: PBBFAC,,,

## 2024-08-12 PROCEDURE — 1160F RVW MEDS BY RX/DR IN RCRD: CPT | Mod: CPTII,S$GLB,,

## 2024-08-12 PROCEDURE — 1101F PT FALLS ASSESS-DOCD LE1/YR: CPT | Mod: CPTII,S$GLB,,

## 2024-08-12 PROCEDURE — 1126F AMNT PAIN NOTED NONE PRSNT: CPT | Mod: CPTII,S$GLB,,

## 2024-08-12 PROCEDURE — 99214 OFFICE O/P EST MOD 30 MIN: CPT | Mod: S$GLB,,,

## 2024-08-12 NOTE — PROGRESS NOTES
Subjective:    Patient ID:  Petra Zazueta is a 89 y.o. female who presents for follow-up of No chief complaint on file.      PCP: Edel Shepard MD     Referring Provider:     HPI:  Pt is a 89 yo F w/ PMH of CAD s/p PCI to mRCA w/ RAUL 2011, DM2 w/ hgba1c 5.8%, HTN, HLD, and PAD s/p stenting of LLE 2016 who presents for f/u appt and HF management.  She was last seen on 5/14/24 for f/u and was continued on medical therapy and HF management. She presents with son. She reports doing well since last visit. She is also followed by Nephrology and was last seen by  on 7/11/2024. She reports it has been a while since she experienced palpitations. She denies CP, orthopnea, PND, LOC, and claudication. She reports some left chest discomfort, but symptoms resolved when she removed her bra. She notes compliance w/ meds and denies side effects. She reports dietary intermittent compliance with low salt diet. She does not exercise regularly however is active w/ cleaning up her house and yard and denies limitations. She attends the [employer] 5 days a week and enjoys the activities. She monitors her BP at home and has been running 120s/60-70s.    Past Medical History:   Diagnosis Date    Acute on chronic diastolic congestive heart failure 1/16/2023    Anemia     Arthritis     Centrilobular emphysema 8/14/2023    Coronary artery disease     Diabetes mellitus     Diabetes mellitus type II     Diabetic retinopathy 08/22/2019    Dyslipidemia     Hypertension     Insomnia     PAD (peripheral artery disease)      Past Surgical History:   Procedure Laterality Date    angiagram  08/31/2018    CORONARY ANGIOGRAPHY N/A 6/7/2022    Procedure: ANGIOGRAM, CORONARY ARTERY;  Surgeon: Kiet Vega MD;  Location: [facility] CATH LAB/EP;  Service: Cardiology;  Laterality: N/A;    CORONARY ANGIOPLASTY      RCA 4/2011 EJ    GALLBLADDER SURGERY      HYSTERECTOMY      LEFT HEART CATHETERIZATION Left 6/7/2022    Procedure: Left heart  cath;  Surgeon: Kiet Vega MD;  Location: [facility] CATH LAB/EP;  Service: Cardiology;  Laterality: Left;    PERIPHERAL ARTERIAL STENT GRAFT      Left lower extremity RCA      Social History     Socioeconomic History    Marital status:     Number of children: 2   Tobacco Use    Smoking status: Former     Current packs/day: 0.00     Types: Cigarettes     Quit date: 2005     Years since quittin.6    Smokeless tobacco: Never   Substance and Sexual Activity    Alcohol use: No    Drug use: No    Sexual activity: Not Currently   Social History Narrative    Lives alone in [place]. Has 2 sons. Darell Concepcion lives in [place] and cares for her. He is mPOA. Other son lives in [place]. Quit drinking years ago. [place].      Family History   Problem Relation Name Age of Onset    Heart attack Mother         Review of patient's allergies indicates:   Allergen Reactions    Codeine Other (See Comments)     Jittery, lightheadedness, nausea  Other reaction(s): NAUSEA       Medication List with Changes/Refills   Current Medications    ACETAMINOPHEN (TYLENOL) 325 MG TABLET    Take 325 mg by mouth as needed for Pain.    ASPIRIN (ECOTRIN) 81 MG EC TABLET    Take 81 mg by mouth once daily.    ATORVASTATIN (LIPITOR) 80 MG TABLET    TAKE 1 TABLET BY MOUTH EVERY EVENING    BLOOD SUGAR DIAGNOSTIC (TRUE METRIX GLUCOSE TEST STRIP) STRP    1 strip by Misc.(Non-Drug; Combo Route) route 2 (two) times daily.    BLOOD-GLUCOSE METER (TRUE METRIX GLUCOSE METER) MISC    Test blood sugars twice daily    CARVEDILOL (COREG) 25 MG TABLET    TAKE 1 TABLET BY MOUTH TWICE A DAY WITH FOOD    CLOPIDOGREL (PLAVIX) 75 MG TABLET    Take 1 tablet (75 mg total) by mouth once daily.    FERROUS SULFATE 324 MG (65 MG IRON) TBEC    TAKE 1 TABLET (324 MG TOTAL) BY MOUTH ONCE DAILY.    FUROSEMIDE (LASIX) 20 MG TABLET    Take 1 tablet (20 mg total) by mouth once daily.    GABAPENTIN (NEURONTIN) 300 MG CAPSULE    Take 300 mg by mouth every evening.     "HYDRALAZINE (APRESOLINE) 50 MG TABLET    Take 1 tablet (50 mg total) by mouth every 8 (eight) hours.    LANCETS (BD ULTRA FINE LANCETS) 33 GAUGE MISC    1 lancet by Misc.(Non-Drug; Combo Route) route 2 (two) times daily.    LATANOPROST 0.005 % OPHTHALMIC SOLUTION    Place 1 drop into both eyes every evening.    LOSARTAN (COZAAR) 100 MG TABLET    TAKE 1 TABLET BY MOUTH EVERY DAY    NIFEDIPINE (ADALAT CC) 60 MG TBSR    Take 30 mg by mouth once daily.    OMEPRAZOLE (PRILOSEC) 40 MG CAPSULE    TAKE 1 CAPSULE BY MOUTH EVERY DAY    PANTOPRAZOLE (PROTONIX) 40 MG TABLET    Take 40 mg by mouth once daily.    TRADJENTA 5 MG TAB TABLET    TAKE 1 TABLET BY MOUTH EVERY DAY       Review of Systems   Constitutional: Negative for diaphoresis and fever.   HENT:  Negative for congestion and hearing loss.    Eyes:  Negative for blurred vision and pain.   Cardiovascular:  Positive for leg swelling. Negative for chest pain, claudication, dyspnea on exertion, near-syncope, palpitations and syncope.   Respiratory:  Negative for shortness of breath and sleep disturbances due to breathing.    Hematologic/Lymphatic: Negative for bleeding problem. Does not bruise/bleed easily.   Skin:  Negative for color change and poor wound healing.   Gastrointestinal:  Negative for abdominal pain and nausea.   Genitourinary:  Negative for bladder incontinence and flank pain.   Neurological:  Negative for focal weakness and light-headedness.        Objective:   /78 (BP Location: Left arm, Patient Position: Sitting, BP Method: Large (Manual))   Pulse (!) 49   Ht 5' 2" (1.575 m)   Wt 64.2 kg (141 lb 8.6 oz)   LMP  (LMP Unknown)   SpO2 98%   BMI 25.89 kg/m²    Physical Exam  Constitutional:       Appearance: She is well-developed. She is not diaphoretic.   HENT:      Head: Normocephalic and atraumatic.   Eyes:      General: No scleral icterus.     Pupils: Pupils are equal, round, and reactive to light.   Neck:      Vascular: No JVD. "   Cardiovascular:      Rate and Rhythm: Normal rate and regular rhythm.      Pulses: Intact distal pulses.           Radial pulses are 2+ on the right side and 2+ on the left side.        Dorsalis pedis pulses are 2+ on the right side and 2+ on the left side.      Heart sounds: S1 normal and S2 normal. Murmur heard.      Systolic murmur is present.      No friction rub. No gallop.   Pulmonary:      Effort: Pulmonary effort is normal. No respiratory distress.      Breath sounds: Normal breath sounds. No wheezing or rales.   Chest:      Chest wall: No tenderness.   Abdominal:      General: Bowel sounds are normal. There is no distension.      Palpations: Abdomen is soft. There is no mass.      Tenderness: There is no abdominal tenderness. There is no rebound.   Musculoskeletal:         General: No tenderness. Normal range of motion.      Cervical back: Normal range of motion and neck supple.      Right lower le+ Edema present.      Left lower le+ Edema present.      Comments: LLE chronic nonpitting edema   Skin:     General: Skin is warm and dry.      Coloration: Skin is not pale.   Neurological:      Mental Status: She is alert and oriented to person, place, and time.      Coordination: Coordination normal.      Deep Tendon Reflexes: Reflexes normal.   Psychiatric:         Behavior: Behavior normal.         Judgment: Judgment normal.             TTE 23:   Summary  The left ventricle is normal in size with concentric hypertrophy and hyperdynamic systolic function.  The estimated ejection fraction is 75%.  Grade II left ventricular diastolic dysfunction.  Normal right ventricular size with normal right ventricular systolic function.  Mild to moderate left atrial enlargement.  Mild mitral regurgitation.  Mild tricuspid regurgitation.  Mild pulmonic regurgitation.  Mild right atrial enlargement.  Small anterior pericardial effusion. Effusion is fluid.  Normal central venous pressure (3 mmHg).  The estimated  PA systolic pressure is 21 mmHg.  EKG 23 reviewed :   Normal sinus rhythm with sinus arrhythmia,T wave abnormality, consider inferolateral ischemia        EC2020- reviewed.  NSR, T wave abnormality w/ possible inferolateral ischemia.       ETT: 12/15/2020- reviewed.    Conclusion          The EKG portion of this study is negative for ischemia.    The patient reported no chest pain during the stress test.    There were no arrhythmias during stress.    The exercise capacity was moderately impaired.    ECG Stress Nuclear portion of this study will be reported separately.   FINDINGS:  There is appropriate wall motion.  There is no significant fixed or reversible perfusion defect.  The stress and rest end-diastolic volumes measure 83 and 81 mL respectively.  The stress and rest end systolic findings measure 14 and 17 mL respectively.  The stress and rest ejection fraction measure 83 and 79% respectively.     Impression:     No acute findings.        [facility]: 2022  Summary  The pre-procedure left ventricular end diastolic pressure was 16.  The estimated blood loss was none.  There was non-obstructive coronary artery disease..       Assessment:       1. Atherosclerotic heart disease of native coronary artery with other forms of angina pectoris    2. Chronic diastolic heart failure    3. Hyperlipidemia associated with type 2 diabetes mellitus    4. Hypertension, essential    5. PAD (peripheral artery disease)    6. Anemia of chronic renal failure, stage 4 (severe)    7. Class 1 obesity due to excess calories with serious comorbidity and body mass index (BMI) of 31.0 to 31.9 in adult    8. Hyperparathyroidism, secondary    9. Type 2 diabetes mellitus with diabetic polyneuropathy, without long-term current use of insulin    10. Gastroesophageal reflux disease without esophagitis    11. Localized edema         Plan:         Atherosclerotic heart disease of native coronary artery with other forms of angina  pectoris  CCS 0.  Pt compliant w/ meds.  S/p RAUL to RCA. 2011  - negative ETT MPI for ischemia and unchanged coronary angiogram  - continue medical therapy  - encouraged risk factor and lifestyle modifications     Chronic diastolic heart failure  -TTE 1/17/23: LVEF 75% w Grade II LV diastolic dysfunction   -Continue medical therapy- furosemide  20 mg, carvedilol 25 mg, losartan 100 mg , hydralazine 50 mg  -weight self daily - notify if > 3 pound gain in 1 day or 5 pound gain in 1 week   -discussed lifestyle modifications: low salt diet, exercise, weight loss     Hyperlipidemia associated with type 2 diabetes mellitus  Controlled.  Goal LDL < 70, last LDL 44.0 4/1/24.  Compliant w/ meds.    - continue medical therapy- atorvastatin 80 mg   - encouraged risk factor and lifestyle modifications     Hypertension, essential  Controlled.  Goal BP < 140/90.  Pt compliant w/ meds.    -controlled 120/78  -continue medical therapy- nifedipine 60 mg   -see HF management   - continue medical therapy  - pt to monitor BP at home and record  - encouraged risk factor and lifestyle modifications     PAD (peripheral artery disease)  Pt denies claudication.  Was previously followed by outside cardiologist.  S/p stenting of LLE.  - continue medical therapy-DAPT( ASA, Plavix), statin   - encouraged risk factor and lifestyle modifications     Anemia of chronic renal failure, stage 4 (severe)  Followed by renal.        Class 1 obesity due to excess calories with serious comorbidity and body mass index (BMI) of 31.0 to 31.9 in adult  -Pt aware of health complications a/w obesity and desires to lose weight.    - encouraged lifestyle modifications (diet, exercise, and weight loss)     Hyperparathyroidism, secondary  -followed by Nephrology     Type 2 diabetes mellitus with diabetic polyneuropathy, without long-term current use of insulin  Followed by PCP.    -continue medical therapy    Gastroesophageal reflux disease  -Followed by  PCP  -continue medical therapy - omeprazole 40 mg     Localized edema  Pt notes chronic following PTA of her LLE.  Pt was unable make her last appt for compression stockings and will reschedule soon.    - thigh high compression stockings      Total duration of face to face visit time 30 minutes.  Total time spent counseling greater than fifty percent of total visit time.  Counseling included discussion regarding imaging findings, diagnosis, possibilities, treatment options, risks and benefits.  The patient had many questions regarding the options and long-term effects      Lenin Akhtar, GRADY  Cardiology

## 2024-08-12 NOTE — ASSESSMENT & PLAN NOTE
Controlled.  Goal BP < 140/90.  Pt compliant w/ meds.    -controlled 120/78  -continue medical therapy- nifedipine 60 mg   -see HF management   - continue medical therapy  - pt to monitor BP at home and record  - encouraged risk factor and lifestyle modifications

## 2024-08-12 NOTE — ASSESSMENT & PLAN NOTE
Pt denies claudication.  Was previously followed by outside cardiologist.  S/p stenting of LLE.  - continue medical therapy-DAPT( ASA, Plavix), statin   - encouraged risk factor and lifestyle modifications

## 2024-08-26 ENCOUNTER — TELEPHONE (OUTPATIENT)
Dept: FAMILY MEDICINE | Facility: CLINIC | Age: 89
End: 2024-08-26

## 2024-08-26 ENCOUNTER — OFFICE VISIT (OUTPATIENT)
Dept: FAMILY MEDICINE | Facility: CLINIC | Age: 89
End: 2024-08-26
Payer: MEDICARE

## 2024-08-26 VITALS
OXYGEN SATURATION: 98 % | DIASTOLIC BLOOD PRESSURE: 76 MMHG | TEMPERATURE: 97 F | HEART RATE: 50 BPM | HEIGHT: 62 IN | WEIGHT: 142.31 LBS | BODY MASS INDEX: 26.19 KG/M2 | SYSTOLIC BLOOD PRESSURE: 136 MMHG

## 2024-08-26 DIAGNOSIS — N18.32 TYPE 2 DIABETES MELLITUS WITH STAGE 3B CHRONIC KIDNEY DISEASE, WITHOUT LONG-TERM CURRENT USE OF INSULIN: Primary | ICD-10-CM

## 2024-08-26 DIAGNOSIS — E11.22 TYPE 2 DIABETES MELLITUS WITH STAGE 3B CHRONIC KIDNEY DISEASE, WITHOUT LONG-TERM CURRENT USE OF INSULIN: Primary | ICD-10-CM

## 2024-08-26 DIAGNOSIS — I10 ESSENTIAL HYPERTENSION: ICD-10-CM

## 2024-08-26 DIAGNOSIS — N25.81 HYPERPARATHYROIDISM, SECONDARY RENAL: ICD-10-CM

## 2024-08-26 DIAGNOSIS — D53.9 MACROCYTIC ANEMIA: ICD-10-CM

## 2024-08-26 DIAGNOSIS — R00.1 BRADYCARDIA: ICD-10-CM

## 2024-08-26 DIAGNOSIS — N18.32 STAGE 3B CHRONIC KIDNEY DISEASE: ICD-10-CM

## 2024-08-26 PROCEDURE — 1126F AMNT PAIN NOTED NONE PRSNT: CPT | Mod: CPTII,S$GLB,, | Performed by: STUDENT IN AN ORGANIZED HEALTH CARE EDUCATION/TRAINING PROGRAM

## 2024-08-26 PROCEDURE — 1101F PT FALLS ASSESS-DOCD LE1/YR: CPT | Mod: CPTII,S$GLB,, | Performed by: STUDENT IN AN ORGANIZED HEALTH CARE EDUCATION/TRAINING PROGRAM

## 2024-08-26 PROCEDURE — 3288F FALL RISK ASSESSMENT DOCD: CPT | Mod: CPTII,S$GLB,, | Performed by: STUDENT IN AN ORGANIZED HEALTH CARE EDUCATION/TRAINING PROGRAM

## 2024-08-26 PROCEDURE — 1159F MED LIST DOCD IN RCRD: CPT | Mod: CPTII,S$GLB,, | Performed by: STUDENT IN AN ORGANIZED HEALTH CARE EDUCATION/TRAINING PROGRAM

## 2024-08-26 PROCEDURE — 99214 OFFICE O/P EST MOD 30 MIN: CPT | Mod: S$GLB,,, | Performed by: STUDENT IN AN ORGANIZED HEALTH CARE EDUCATION/TRAINING PROGRAM

## 2024-08-26 PROCEDURE — 1160F RVW MEDS BY RX/DR IN RCRD: CPT | Mod: CPTII,S$GLB,, | Performed by: STUDENT IN AN ORGANIZED HEALTH CARE EDUCATION/TRAINING PROGRAM

## 2024-08-26 PROCEDURE — 99999 PR PBB SHADOW E&M-EST. PATIENT-LVL IV: CPT | Mod: PBBFAC,,, | Performed by: STUDENT IN AN ORGANIZED HEALTH CARE EDUCATION/TRAINING PROGRAM

## 2024-08-26 NOTE — PROGRESS NOTES
[facility] Note    Chief Complaint      Chief Complaint   Patient presents with    Follow-up on chronic conditions     History of Present Illness     Petra Zazueta is a 89 y.o. female with T2DM complicated by polyneuropathy, sHTN, HLD, macrocytic anemia, CKD stage 3a, chronic diastolic HFpEF and PVD independent of ADLs and iADLs presents for f/u on chronic conditions. She has no new complaints today, compliant with all her current regimen and described her mood to be great today.     Problem List Addressed This Visit:    As listed below       Health Maintenance   Topic Date Due    Hemoglobin A1c  10/01/2024    Lipid Panel  04/01/2025    Eye Exam  07/10/2025    Aspirin/Antiplatelet Therapy  08/26/2025    Foot Exam  08/26/2025    TETANUS VACCINE  07/26/2031    Shingles Vaccine  Completed       Past Medical History:   Diagnosis Date    Acute on chronic diastolic congestive heart failure 1/16/2023    Anemia     Arthritis     Centrilobular emphysema 8/14/2023    Coronary artery disease     Diabetes mellitus     Diabetes mellitus type II     Diabetic retinopathy 08/22/2019    Dyslipidemia     Hypertension     Insomnia     PAD (peripheral artery disease)        Past Surgical History:   Procedure Laterality Date    angiagram  08/31/2018    CORONARY ANGIOGRAPHY N/A 6/7/2022    Procedure: ANGIOGRAM, CORONARY ARTERY;  Surgeon: Kiet Vega MD;  Location: [facility] CATH LAB/EP;  Service: Cardiology;  Laterality: N/A;    CORONARY ANGIOPLASTY      RCA 4/2011 EJ    GALLBLADDER SURGERY      HYSTERECTOMY      LEFT HEART CATHETERIZATION Left 6/7/2022    Procedure: Left heart cath;  Surgeon: Kiet Vega MD;  Location: [facility] CATH LAB/EP;  Service: Cardiology;  Laterality: Left;    PERIPHERAL ARTERIAL STENT GRAFT      Left lower extremity RCA        family history includes Heart attack in her mother.    Social History     Tobacco Use    Smoking status: Former     Current packs/day: 0.00     Types:  Cigarettes     Quit date: 2005     Years since quittin.6    Smokeless tobacco: Never   Substance Use Topics    Alcohol use: No    Drug use: No       Review of Systems   Constitutional:  Negative for fatigue and fever.   Respiratory:  Negative for cough and chest tightness.    Gastrointestinal:  Negative for abdominal pain, diarrhea and vomiting.   Endocrine: Negative for polydipsia and polyphagia.   Genitourinary:  Negative for difficulty urinating, dysuria and frequency.   Musculoskeletal:  Negative for arthralgias, back pain, gait problem and joint swelling.   Skin:  Negative for rash.   Neurological:  Negative for seizures, weakness, numbness and headaches.   Psychiatric/Behavioral:  Negative for sleep disturbance.        Outpatient Encounter Medications as of 2024   Medication Sig Note Dispense Refill    acetaminophen (TYLENOL) 325 MG tablet Take 325 mg by mouth as needed for Pain.       aspirin (ECOTRIN) 81 MG EC tablet Take 81 mg by mouth once daily. 2023: Twice daily      atorvastatin (LIPITOR) 80 MG tablet TAKE 1 TABLET BY MOUTH EVERY EVENING  90 tablet 3    blood sugar diagnostic (TRUE METRIX GLUCOSE TEST STRIP) Strp 1 strip by Misc.(Non-Drug; Combo Route) route 2 (two) times daily.  200 each 1    blood-glucose meter (TRUE METRIX GLUCOSE METER) Misc Test blood sugars twice daily  1 each 0    carvediloL (COREG) 25 MG tablet TAKE 1 TABLET BY MOUTH TWICE A DAY WITH FOOD  180 tablet 3    clopidogreL (PLAVIX) 75 mg tablet Take 1 tablet (75 mg total) by mouth once daily.  30 tablet 11    ferrous sulfate 324 mg (65 mg iron) TbEC TAKE 1 TABLET (324 MG TOTAL) BY MOUTH ONCE DAILY.  90 tablet 3    furosemide (LASIX) 20 MG tablet Take 1 tablet (20 mg total) by mouth once daily.  90 tablet 3    hydrALAZINE (APRESOLINE) 50 MG tablet Take 1 tablet (50 mg total) by mouth every 8 (eight) hours.  270 tablet 3    lancets (BD ULTRA FINE LANCETS) 33 gauge Misc 1 lancet by Misc.(Non-Drug; Combo Route) route 2  "(two) times daily.  200 each 1    losartan (COZAAR) 100 MG tablet TAKE 1 TABLET BY MOUTH EVERY DAY  90 tablet 3    omeprazole (PRILOSEC) 40 MG capsule TAKE 1 CAPSULE BY MOUTH EVERY DAY  90 capsule 3    pantoprazole (PROTONIX) 40 MG tablet Take 40 mg by mouth once daily.       TRADJENTA 5 mg Tab tablet TAKE 1 TABLET BY MOUTH EVERY DAY  90 tablet 3    gabapentin (NEURONTIN) 300 MG capsule Take 300 mg by mouth every evening.       latanoprost 0.005 % ophthalmic solution Place 1 drop into both eyes every evening. 8/26/2024: Changed eye doctors and son doesn't think she is on this one anymore      [DISCONTINUED] cilostazol (PLETAL) 100 MG Tab Take 100 mg by mouth 2 (two) times daily.   3    [DISCONTINUED] NIFEdipine (ADALAT CC) 60 MG TbSR Take 30 mg by mouth once daily. (Patient not taking: Reported on 8/26/2024)        No facility-administered encounter medications on file as of 8/26/2024.        Review of patient's allergies indicates:   Allergen Reactions    Codeine Other (See Comments)     Jittery, lightheadedness, nausea  Other reaction(s): NAUSEA       Physical Exam      Vital Signs  Temp: 97.2 °F (36.2 °C)  Pulse: (!) 50  SpO2: 98 %  BP: 136/76  BP Location: Right arm  Patient Position: Sitting  Pain Score: 0-No pain  Height and Weight  Height: 5' 2" (157.5 cm)  Weight: 64.5 kg (142 lb 4.9 oz)  BSA (Calculated - sq m): 1.68 sq meters  BMI (Calculated): 26  Weight in (lb) to have BMI = 25: 136.4]    Physical Exam  Vitals reviewed.   Constitutional:       Appearance: Normal appearance.   HENT:      Mouth/Throat:      Mouth: Mucous membranes are moist.      Pharynx: Oropharynx is clear.   Cardiovascular:      Rate and Rhythm: Normal rate and regular rhythm.      Pulses: Normal pulses.      Heart sounds: Normal heart sounds.   Pulmonary:      Effort: Pulmonary effort is normal.      Breath sounds: Normal breath sounds. No rales.   Abdominal:      General: Abdomen is flat. Bowel sounds are normal.      Palpations: " "Abdomen is soft.   Musculoskeletal:      Cervical back: Normal range of motion.      Right lower leg: No edema (tracefoot exam sensate to monofilament bilaterally).      Left lower leg: Edema ((1+ , pitting) up to the knee) present.   Skin:     General: Skin is warm and dry.   Neurological:      General: No focal deficit present.      Mental Status: She is alert and oriented to person, place, and time.      Sensory: No sensory deficit.   Psychiatric:         Mood and Affect: Mood normal.         Behavior: Behavior normal.          Laboratory:  CBC:  Recent Labs   Lab Result Units 07/09/24  1253   WBC K/uL 5.32   RBC M/uL 3.47*   Hemoglobin g/dL 11.6*   Hematocrit % 35.6*   Platelets K/uL 124*   MCV fL 103*   MCH pg 33.4*   MCHC g/dL 32.6     CMP:  Recent Labs   Lab Result Units 07/09/24  1253   Glucose mg/dL 198*   Calcium mg/dL 9.8   Albumin g/dL 3.8   Total Protein g/dL 7.1   Sodium mmol/L 143   Potassium mmol/L 3.7   CO2 mmol/L 24   Chloride mmol/L 109   BUN mg/dL 27*   Alkaline Phosphatase U/L 72   ALT U/L 9*   AST U/L 18   Total Bilirubin mg/dL 0.6     URINALYSIS:  Recent Labs   Lab Result Units 07/09/24  1357   Color, UA  Yellow   Specific Gravity, UA  1.025   pH, UA  6.0   Protein, UA  2+*   Bacteria /hpf Rare   Nitrite, UA  Negative   Leukocytes, UA  Negative   Urobilinogen, UA EU/dL Negative   Hyaline Casts, UA /lpf 1      LIPIDS:  No results for input(s): "TSH", "HDL", "CHOL", "TRIG", "LDLCALC", "CHOLHDL", "NONHDLCHOL", "TOTALCHOLEST" in the last 2160 hours.    TSH:  No results for input(s): "TSH" in the last 2160 hours.    A1C:  No results for input(s): "HGBA1C" in the last 2160 hours.      Radiology:      Assessment/Plan     Petra Zazueta is a 89 y.o.female with:    1. Essential hypertension  -now @ goal  -c/w  hydralazine 50mg TID, c/w coreg 25mg BID and losartan 100mg daily  -patient advised to monitor and keep BP log    2. CKD, stage 3b  -likely multifactorial : T2DM, sHTN  -patient advised to avoid " nephrotoxins  -f/u with renal     3. Anemia of chronic disease  -stable H&H, >10  -iron panel with high ferritin and low TIBC  -could not tolerate iron pills  -monitor for now    4. Type 2 diabetes mellitus with diabetic polyneuropathy, without long-term current use of insulin  -controlled @ 5.8  -c/w current regimen(linagliptin)  -UTD on eye and podiatry and urine  -c/w current regimen    5. Hyperparathyroidism  -secondary , renal   -vit D WNL  -monitor for now    6. Macrocytic anemia  -recent H&H @ 10.6, MCV @ 104  -B12, folate WNL    7. Bradycardia  -likely iatrogenic  -asymptomatic  -monitor for now    8. Centrilobular emphysema  -asymptomatic for now      -Continue current medications and maintain follow up with specialists.      Patient verbalizes understanding and agrees with current treatment plan.    32 minutes of total time spent on the encounter, which includes face to face time and non-face to face time preparing to see the patient (eg, review of tests), Obtaining and/or reviewing separately obtained history, Documenting clinical information in the electronic or other health record, Independently interpreting results (not separately reported) and communicating results to the patient/family/caregiver, or Care coordination (not separately reported).      Dr Edel Shepard MD  [facility]

## 2024-08-26 NOTE — TELEPHONE ENCOUNTER
Spoke with pts son Jamey, informed him that Mrs. Zazueta's urine protein is quite high which is sign of worsening kidney state, she should stop taking the tradjenta 5mg, and start taking farxiga that Dr. Linton switched her to, per Dr. Linton. Pts son verbally understood.

## 2024-08-26 NOTE — TELEPHONE ENCOUNTER
----- Message from Edel Shepard MD sent at 8/26/2024  4:19 PM CDT -----  Please call her son and let him know:  her urine protein is quite high which is sign of worsening kidney state, she should stop taking the tradjenta 5mg , I switched her to farxiga. Thanks

## 2024-08-27 ENCOUNTER — TELEPHONE (OUTPATIENT)
Dept: FAMILY MEDICINE | Facility: CLINIC | Age: 89
End: 2024-08-27
Payer: MEDICARE

## 2024-08-27 NOTE — TELEPHONE ENCOUNTER
Spoke to Dr. Linton to clarify medication per pt son request. Pt was originally prescribed Farxiga but insurance doesn't cover. Approved/covered medication to  from pharmacy is Jardiance.Pts son Jamey verbally understood and is going to  today.

## 2024-08-27 NOTE — TELEPHONE ENCOUNTER
----- Message from Genevieve Hodges sent at 8/27/2024 11:38 AM CDT -----  Regarding: call  Type:  Patient Returning Call    Who Called:pt  Who Left Message for Patient:office  Does the patient know what this is regarding?:return call   Would the patient rather a call back or a response via [facility]? Call  Best Call Back Number:[phone]  Additional Information:

## 2024-08-30 DIAGNOSIS — I10 ESSENTIAL HYPERTENSION: ICD-10-CM

## 2024-08-30 RX ORDER — HYDRALAZINE HYDROCHLORIDE 50 MG/1
50 TABLET, FILM COATED ORAL EVERY 8 HOURS
Qty: 270 TABLET | Refills: 3 | Status: SHIPPED | OUTPATIENT
Start: 2024-08-30

## 2024-08-30 NOTE — TELEPHONE ENCOUNTER
Care Due:                  Date            Visit Type   Department     Provider  --------------------------------------------------------------------------------                                EP -                              PRIMARY      DESC FAMILY  Last Visit: 08-      [facility] ([facility])   [facility]                              EP -                              PRIMARY      DESC FAMILY  Next Visit: 02-      [facility] ([facility]                                                            Last  Test          Frequency    Reason                     Performed    Due Date  --------------------------------------------------------------------------------    HBA1C.......  6 months...  empagliflozin............  04- 09-    [facility] Embedded Care Due Messages. Reference number: [id number].   8/30/2024 12:08:00 AM CDT

## 2024-10-17 DIAGNOSIS — I50.32 CHRONIC DIASTOLIC HEART FAILURE: ICD-10-CM

## 2024-10-18 RX ORDER — FUROSEMIDE 20 MG/1
20 TABLET ORAL
Qty: 90 TABLET | Refills: 3 | Status: SHIPPED | OUTPATIENT
Start: 2024-10-18

## 2024-11-12 ENCOUNTER — OFFICE VISIT (OUTPATIENT)
Dept: CARDIOLOGY | Facility: CLINIC | Age: 89
End: 2024-11-12
Payer: MEDICARE

## 2024-11-12 VITALS
HEART RATE: 43 BPM | OXYGEN SATURATION: 98 % | HEIGHT: 62 IN | DIASTOLIC BLOOD PRESSURE: 80 MMHG | BODY MASS INDEX: 25.15 KG/M2 | SYSTOLIC BLOOD PRESSURE: 142 MMHG | WEIGHT: 136.69 LBS

## 2024-11-12 DIAGNOSIS — I50.32 CHRONIC DIASTOLIC HEART FAILURE: ICD-10-CM

## 2024-11-12 DIAGNOSIS — I25.118 ATHEROSCLEROTIC HEART DISEASE OF NATIVE CORONARY ARTERY WITH OTHER FORMS OF ANGINA PECTORIS: Primary | ICD-10-CM

## 2024-11-12 DIAGNOSIS — E78.5 HYPERLIPIDEMIA ASSOCIATED WITH TYPE 2 DIABETES MELLITUS: ICD-10-CM

## 2024-11-12 DIAGNOSIS — I73.9 PAD (PERIPHERAL ARTERY DISEASE): ICD-10-CM

## 2024-11-12 DIAGNOSIS — R60.0 LOCALIZED EDEMA: ICD-10-CM

## 2024-11-12 DIAGNOSIS — D63.1 ANEMIA OF CHRONIC RENAL FAILURE, STAGE 4 (SEVERE): ICD-10-CM

## 2024-11-12 DIAGNOSIS — E11.42 TYPE 2 DIABETES MELLITUS WITH DIABETIC POLYNEUROPATHY, WITHOUT LONG-TERM CURRENT USE OF INSULIN: ICD-10-CM

## 2024-11-12 DIAGNOSIS — N18.4 ANEMIA OF CHRONIC RENAL FAILURE, STAGE 4 (SEVERE): ICD-10-CM

## 2024-11-12 DIAGNOSIS — E11.69 HYPERLIPIDEMIA ASSOCIATED WITH TYPE 2 DIABETES MELLITUS: ICD-10-CM

## 2024-11-12 DIAGNOSIS — I10 HYPERTENSION, ESSENTIAL: ICD-10-CM

## 2024-11-12 DIAGNOSIS — K21.9 GASTROESOPHAGEAL REFLUX DISEASE WITHOUT ESOPHAGITIS: ICD-10-CM

## 2024-11-12 DIAGNOSIS — N25.81 HYPERPARATHYROIDISM, SECONDARY: ICD-10-CM

## 2024-11-12 PROCEDURE — 99999 PR PBB SHADOW E&M-EST. PATIENT-LVL III: CPT | Mod: PBBFAC,,,

## 2024-11-12 PROCEDURE — 99214 OFFICE O/P EST MOD 30 MIN: CPT | Mod: S$GLB,,,

## 2024-11-12 PROCEDURE — 1126F AMNT PAIN NOTED NONE PRSNT: CPT | Mod: CPTII,S$GLB,,

## 2024-11-12 PROCEDURE — 1160F RVW MEDS BY RX/DR IN RCRD: CPT | Mod: CPTII,S$GLB,,

## 2024-11-12 PROCEDURE — 1101F PT FALLS ASSESS-DOCD LE1/YR: CPT | Mod: CPTII,S$GLB,,

## 2024-11-12 PROCEDURE — 1159F MED LIST DOCD IN RCRD: CPT | Mod: CPTII,S$GLB,,

## 2024-11-12 PROCEDURE — 3288F FALL RISK ASSESSMENT DOCD: CPT | Mod: CPTII,S$GLB,,

## 2024-11-12 NOTE — ASSESSMENT & PLAN NOTE
Controlled.  Goal BP < 140/90.  Pt compliant w/ meds.    -controlled  -continue medical therapy- nifedipine 60 mg   -see HF management   - continue medical therapy  - pt to monitor BP at home and record  - encouraged risk factor and lifestyle modifications

## 2024-11-12 NOTE — PROGRESS NOTES
Subjective:    Patient ID:  Petra Zazueta is a 89 y.o. female who presents for follow-up of No chief complaint on file.      PCP: Edel Shepard MD     Referring Provider:     HPI:  Pt is a 89 yo F w/ PMH of CAD s/p PCI to mRCA w/ RAUL 2011, DM2 w/ hgba1c 5.8%, HTN, HLD, and PAD s/p stenting of LLE 2016 who presents for f/u appt and HF management.  She was last seen on 8/12/24 for f/u and was continued on medical therapy and HF management. She presents with son. She reports doing well since last visit. She is also followed by Nephrology and was last seen by  on 10/24/24. She reports it has been a while since she experienced palpitations. She denies CP, orthopnea, PND, LOC, and claudication. She notes compliance w/ meds and denies side effects. She reports dietary intermittent compliance with low salt diet. She does not exercise regularly however is active w/ cleaning up her house and yard and denies limitations. She attends the [employer] 5 days a week and enjoys the activities. She monitors her BP at home and has been running 120s/60-70s.    Past Medical History:   Diagnosis Date    Acute on chronic diastolic congestive heart failure 1/16/2023    Anemia     Arthritis     Centrilobular emphysema 8/14/2023    Coronary artery disease     Diabetes mellitus     Diabetes mellitus type II     Diabetic retinopathy 08/22/2019    Dyslipidemia     Hypertension     Insomnia     PAD (peripheral artery disease)      Past Surgical History:   Procedure Laterality Date    angiagram  08/31/2018    CORONARY ANGIOGRAPHY N/A 6/7/2022    Procedure: ANGIOGRAM, CORONARY ARTERY;  Surgeon: Kiet Vega MD;  Location: [facility] CATH LAB/EP;  Service: Cardiology;  Laterality: N/A;    CORONARY ANGIOPLASTY      RCA 4/2011 EJ    GALLBLADDER SURGERY      HYSTERECTOMY      LEFT HEART CATHETERIZATION Left 6/7/2022    Procedure: Left heart cath;  Surgeon: Kiet Vega MD;  Location: [facility] CATH LAB/EP;  Service: Cardiology;   Laterality: Left;    PERIPHERAL ARTERIAL STENT GRAFT      Left lower extremity RCA      Social History     Socioeconomic History    Marital status:     Number of children: 2   Tobacco Use    Smoking status: Former     Current packs/day: 0.00     Types: Cigarettes     Quit date: 2005     Years since quittin.8     Passive exposure: Never    Smokeless tobacco: Never   Substance and Sexual Activity    Alcohol use: No    Drug use: No    Sexual activity: Not Currently   Social History Narrative    Lives alone in [place]. Has 2 sons. Darell Concepcion lives in [place] and cares for her. He is mPOA. Other son lives in [place]. Quit drinking years ago. [employer].      Family History   Problem Relation Name Age of Onset    Heart attack Mother         Review of patient's allergies indicates:   Allergen Reactions    Codeine Other (See Comments)     Jittery, lightheadedness, nausea  Other reaction(s): NAUSEA       Medication List with Changes/Refills   Current Medications    ACETAMINOPHEN (TYLENOL) 325 MG TABLET    Take 325 mg by mouth as needed for Pain.    ASPIRIN (ECOTRIN) 81 MG EC TABLET    Take 81 mg by mouth once daily.    ATORVASTATIN (LIPITOR) 80 MG TABLET    TAKE 1 TABLET BY MOUTH EVERY EVENING    BLOOD SUGAR DIAGNOSTIC (TRUE METRIX GLUCOSE TEST STRIP) STRP    1 strip by Misc.(Non-Drug; Combo Route) route 2 (two) times daily.    BLOOD-GLUCOSE METER (TRUE METRIX GLUCOSE METER) MISC    Test blood sugars twice daily    CARVEDILOL (COREG) 25 MG TABLET    TAKE 1 TABLET BY MOUTH TWICE A DAY WITH FOOD    CLOPIDOGREL (PLAVIX) 75 MG TABLET    Take 1 tablet (75 mg total) by mouth once daily.    EMPAGLIFLOZIN (JARDIANCE) 25 MG TABLET    Take 1 tablet (25 mg total) by mouth once daily.    ERGOCALCIFEROL (ERGOCALCIFEROL) 50,000 UNIT CAP    Take 1 capsule (50,000 Units total) by mouth every 7 days.    FERROUS SULFATE 324 MG (65 MG IRON) TBEC    TAKE 1 TABLET (324 MG TOTAL) BY MOUTH ONCE DAILY.    FUROSEMIDE (LASIX) 20 MG  "TABLET    TAKE 1 TABLET BY MOUTH EVERY DAY    GABAPENTIN (NEURONTIN) 300 MG CAPSULE    Take 300 mg by mouth every evening.    HYDRALAZINE (APRESOLINE) 50 MG TABLET    TAKE 1 TABLET BY MOUTH EVERY 8 HOURS.    LANCETS (BD ULTRA FINE LANCETS) 33 GAUGE MISC    1 lancet by Misc.(Non-Drug; Combo Route) route 2 (two) times daily.    LATANOPROST 0.005 % OPHTHALMIC SOLUTION    Place 1 drop into both eyes every evening.    LOSARTAN (COZAAR) 100 MG TABLET    TAKE 1 TABLET BY MOUTH EVERY DAY    OMEPRAZOLE (PRILOSEC) 40 MG CAPSULE    TAKE 1 CAPSULE BY MOUTH EVERY DAY    PANTOPRAZOLE (PROTONIX) 40 MG TABLET    Take 40 mg by mouth once daily.       Review of Systems   Constitutional: Negative for diaphoresis and fever.   HENT:  Negative for congestion and hearing loss.    Eyes:  Negative for blurred vision and pain.   Cardiovascular:  Positive for leg swelling. Negative for chest pain, claudication, dyspnea on exertion, near-syncope, palpitations and syncope.   Respiratory:  Negative for shortness of breath and sleep disturbances due to breathing.    Hematologic/Lymphatic: Negative for bleeding problem. Does not bruise/bleed easily.   Skin:  Negative for color change and poor wound healing.   Gastrointestinal:  Negative for abdominal pain and nausea.   Genitourinary:  Negative for bladder incontinence and flank pain.   Neurological:  Negative for focal weakness and light-headedness.        Objective:   BP (!) 142/80 (BP Location: Left arm, Patient Position: Sitting)   Pulse (!) 43   Ht 5' 2" (1.575 m)   Wt 62 kg (136 lb 11 oz)   LMP  (LMP Unknown)   SpO2 98%   BMI 25.00 kg/m²    Physical Exam  Constitutional:       Appearance: She is well-developed. She is not diaphoretic.   HENT:      Head: Normocephalic and atraumatic.   Eyes:      General: No scleral icterus.     Pupils: Pupils are equal, round, and reactive to light.   Neck:      Vascular: No JVD.   Cardiovascular:      Rate and Rhythm: Normal rate and regular rhythm.    "   Pulses: Intact distal pulses.           Radial pulses are 2+ on the right side and 2+ on the left side.        Dorsalis pedis pulses are 2+ on the right side and 2+ on the left side.      Heart sounds: S1 normal and S2 normal. Murmur heard.      Systolic murmur is present.      No friction rub. No gallop.   Pulmonary:      Effort: Pulmonary effort is normal. No respiratory distress.      Breath sounds: Normal breath sounds. No wheezing or rales.   Chest:      Chest wall: No tenderness.   Abdominal:      General: Bowel sounds are normal. There is no distension.      Palpations: Abdomen is soft. There is no mass.      Tenderness: There is no abdominal tenderness. There is no rebound.   Musculoskeletal:         General: No tenderness. Normal range of motion.      Cervical back: Normal range of motion and neck supple.      Right lower le+ Edema present.      Left lower le+ Edema present.      Comments: LLE chronic nonpitting edema   Skin:     General: Skin is warm and dry.      Coloration: Skin is not pale.   Neurological:      Mental Status: She is alert and oriented to person, place, and time.      Coordination: Coordination normal.      Deep Tendon Reflexes: Reflexes normal.   Psychiatric:         Behavior: Behavior normal.         Judgment: Judgment normal.             TTE 23:   Summary  The left ventricle is normal in size with concentric hypertrophy and hyperdynamic systolic function.  The estimated ejection fraction is 75%.  Grade II left ventricular diastolic dysfunction.  Normal right ventricular size with normal right ventricular systolic function.  Mild to moderate left atrial enlargement.  Mild mitral regurgitation.  Mild tricuspid regurgitation.  Mild pulmonic regurgitation.  Mild right atrial enlargement.  Small anterior pericardial effusion. Effusion is fluid.  Normal central venous pressure (3 mmHg).  The estimated PA systolic pressure is 21 mmHg.  EKG 23 reviewed :   Normal sinus  rhythm with sinus arrhythmia,T wave abnormality, consider inferolateral ischemia        EC2020- reviewed.  NSR, T wave abnormality w/ possible inferolateral ischemia.       ETT: 12/15/2020- reviewed.    Conclusion          The EKG portion of this study is negative for ischemia.    The patient reported no chest pain during the stress test.    There were no arrhythmias during stress.    The exercise capacity was moderately impaired.    ECG Stress Nuclear portion of this study will be reported separately.   FINDINGS:  There is appropriate wall motion.  There is no significant fixed or reversible perfusion defect.  The stress and rest end-diastolic volumes measure 83 and 81 mL respectively.  The stress and rest end systolic findings measure 14 and 17 mL respectively.  The stress and rest ejection fraction measure 83 and 79% respectively.     Impression:     No acute findings.        [facility]: 2022  Summary  The pre-procedure left ventricular end diastolic pressure was 16.  The estimated blood loss was none.  There was non-obstructive coronary artery disease..       Assessment:       1. Atherosclerotic heart disease of native coronary artery with other forms of angina pectoris    2. Chronic diastolic heart failure    3. Hypertension, essential    4. Hyperlipidemia associated with type 2 diabetes mellitus    5. PAD (peripheral artery disease)    6. Anemia of chronic renal failure, stage 4 (severe)    7. Hyperparathyroidism, secondary    8. Type 2 diabetes mellitus with diabetic polyneuropathy, without long-term current use of insulin    9. Gastroesophageal reflux disease without esophagitis    10. Localized edema         Plan:         Atherosclerotic heart disease of native coronary artery with other forms of angina pectoris  CCS 0.  Pt compliant w/ meds.  S/p RAUL to RCA.   - negative ETT MPI for ischemia and unchanged coronary angiogram  - continue medical therapy  - encouraged risk factor and lifestyle  modifications     Chronic diastolic heart failure  -TTE 1/17/23: LVEF 75% w Grade II LV diastolic dysfunction   -Continue medical therapy- furosemide  20 mg, carvedilol 25 mg, losartan 100 mg , hydralazine 50 mg  -weight self daily - notify if > 3 pound gain in 1 day or 5 pound gain in 1 week   -discussed lifestyle modifications: low salt diet, exercise, weight loss     Hyperlipidemia associated with type 2 diabetes mellitus  Controlled.  Goal LDL < 70, last LDL 44.0 4/1/24.  Compliant w/ meds.    - continue medical therapy- atorvastatin 80 mg   - encouraged risk factor and lifestyle modifications     PAD (peripheral artery disease)  Pt denies claudication.  Was previously followed by outside cardiologist.  S/p stenting of LLE.  - continue medical therapy-DAPT( ASA, Plavix), statin   - encouraged risk factor and lifestyle modifications     Anemia of chronic renal failure, stage 4 (severe)  Followed by renal.        Hyperparathyroidism, secondary  -followed by Nephrology     Type 2 diabetes mellitus with diabetic polyneuropathy, without long-term current use of insulin  Followed by PCP.    -controlled   -A1c 5.8   -continue medical therapy    Gastroesophageal reflux disease  -Followed by PCP  -controlled   -continue medical therapy - omeprazole 40 mg     Localized edema  Pt notes chronic following PTA of her LLE.  Pt was unable make her last appt for compression stockings and will reschedule soon.    - thigh high compression stockings    Hypertension, essential  Controlled.  Goal BP < 140/90.  Pt compliant w/ meds.    -controlled  -continue medical therapy- nifedipine 60 mg   -see HF management   - continue medical therapy  - pt to monitor BP at home and record  - encouraged risk factor and lifestyle modifications       Total duration of face to face visit time 30 minutes.  Total time spent counseling greater than fifty percent of total visit time.  Counseling included discussion regarding imaging findings,  diagnosis, possibilities, treatment options, risks and benefits.  The patient had many questions regarding the options and long-term effects      Lenin Akhtar, GRADY  Cardiology

## 2024-11-20 ENCOUNTER — PATIENT MESSAGE (OUTPATIENT)
Dept: ADMINISTRATIVE | Facility: HOSPITAL | Age: 89
End: 2024-11-20
Payer: MEDICARE

## 2024-11-21 ENCOUNTER — PATIENT OUTREACH (OUTPATIENT)
Dept: ADMINISTRATIVE | Facility: HOSPITAL | Age: 89
End: 2024-11-21
Payer: MEDICARE

## 2024-11-21 DIAGNOSIS — E11.42 TYPE 2 DIABETES MELLITUS WITH DIABETIC POLYNEUROPATHY, WITHOUT LONG-TERM CURRENT USE OF INSULIN: Primary | ICD-10-CM

## 2024-11-21 NOTE — PROGRESS NOTES
Health Maintenance Topic(s) Outreach Outcomes & Actions Taken:    Lab(s) - Outreach Outcomes & Actions Taken  : Overdue Lab(s) Ordered and Overdue Lab(s) Scheduled

## 2025-02-11 ENCOUNTER — PATIENT MESSAGE (OUTPATIENT)
Dept: CARDIOLOGY | Facility: CLINIC | Age: 89
End: 2025-02-11
Payer: MEDICARE

## 2025-02-12 ENCOUNTER — OFFICE VISIT (OUTPATIENT)
Dept: CARDIOLOGY | Facility: CLINIC | Age: 89
End: 2025-02-12
Payer: MEDICARE

## 2025-02-12 VITALS
HEART RATE: 50 BPM | WEIGHT: 140.19 LBS | OXYGEN SATURATION: 95 % | HEIGHT: 62 IN | SYSTOLIC BLOOD PRESSURE: 180 MMHG | BODY MASS INDEX: 25.8 KG/M2 | DIASTOLIC BLOOD PRESSURE: 82 MMHG

## 2025-02-12 DIAGNOSIS — K21.9 GASTROESOPHAGEAL REFLUX DISEASE WITHOUT ESOPHAGITIS: ICD-10-CM

## 2025-02-12 DIAGNOSIS — E11.69 HYPERLIPIDEMIA ASSOCIATED WITH TYPE 2 DIABETES MELLITUS: ICD-10-CM

## 2025-02-12 DIAGNOSIS — E78.5 HYPERLIPIDEMIA ASSOCIATED WITH TYPE 2 DIABETES MELLITUS: ICD-10-CM

## 2025-02-12 DIAGNOSIS — E66.811 CLASS 1 OBESITY DUE TO EXCESS CALORIES WITH SERIOUS COMORBIDITY AND BODY MASS INDEX (BMI) OF 31.0 TO 31.9 IN ADULT: ICD-10-CM

## 2025-02-12 DIAGNOSIS — I10 HYPERTENSION, ESSENTIAL: Primary | ICD-10-CM

## 2025-02-12 DIAGNOSIS — N18.4 TYPE 2 DIABETES MELLITUS WITH STAGE 4 CHRONIC KIDNEY DISEASE, WITHOUT LONG-TERM CURRENT USE OF INSULIN: ICD-10-CM

## 2025-02-12 DIAGNOSIS — I25.118 ATHEROSCLEROTIC HEART DISEASE OF NATIVE CORONARY ARTERY WITH OTHER FORMS OF ANGINA PECTORIS: ICD-10-CM

## 2025-02-12 DIAGNOSIS — I50.32 CHRONIC DIASTOLIC HEART FAILURE: ICD-10-CM

## 2025-02-12 DIAGNOSIS — I73.9 PAD (PERIPHERAL ARTERY DISEASE): ICD-10-CM

## 2025-02-12 DIAGNOSIS — R60.0 LOCALIZED EDEMA: ICD-10-CM

## 2025-02-12 DIAGNOSIS — N18.4 ANEMIA OF CHRONIC RENAL FAILURE, STAGE 4 (SEVERE): ICD-10-CM

## 2025-02-12 DIAGNOSIS — D63.1 ANEMIA OF CHRONIC RENAL FAILURE, STAGE 4 (SEVERE): ICD-10-CM

## 2025-02-12 DIAGNOSIS — N25.81 HYPERPARATHYROIDISM, SECONDARY: ICD-10-CM

## 2025-02-12 DIAGNOSIS — E11.22 TYPE 2 DIABETES MELLITUS WITH STAGE 4 CHRONIC KIDNEY DISEASE, WITHOUT LONG-TERM CURRENT USE OF INSULIN: ICD-10-CM

## 2025-02-12 DIAGNOSIS — E11.42 TYPE 2 DIABETES MELLITUS WITH DIABETIC POLYNEUROPATHY, WITHOUT LONG-TERM CURRENT USE OF INSULIN: ICD-10-CM

## 2025-02-12 DIAGNOSIS — E66.09 CLASS 1 OBESITY DUE TO EXCESS CALORIES WITH SERIOUS COMORBIDITY AND BODY MASS INDEX (BMI) OF 31.0 TO 31.9 IN ADULT: ICD-10-CM

## 2025-02-12 PROCEDURE — 99214 OFFICE O/P EST MOD 30 MIN: CPT | Mod: S$GLB,,,

## 2025-02-12 PROCEDURE — 3288F FALL RISK ASSESSMENT DOCD: CPT | Mod: CPTII,S$GLB,,

## 2025-02-12 PROCEDURE — G2211 COMPLEX E/M VISIT ADD ON: HCPCS | Mod: S$GLB,,,

## 2025-02-12 PROCEDURE — 1160F RVW MEDS BY RX/DR IN RCRD: CPT | Mod: CPTII,S$GLB,,

## 2025-02-12 PROCEDURE — 1101F PT FALLS ASSESS-DOCD LE1/YR: CPT | Mod: CPTII,S$GLB,,

## 2025-02-12 PROCEDURE — 99999 PR PBB SHADOW E&M-EST. PATIENT-LVL III: CPT | Mod: PBBFAC,,,

## 2025-02-12 PROCEDURE — 1159F MED LIST DOCD IN RCRD: CPT | Mod: CPTII,S$GLB,,

## 2025-02-12 PROCEDURE — 1126F AMNT PAIN NOTED NONE PRSNT: CPT | Mod: CPTII,S$GLB,,

## 2025-02-12 NOTE — ASSESSMENT & PLAN NOTE
Controlled.  Goal LDL < 70, last LDL 44.0 4/1/24.  Compliant w/ meds.    -controlled   - continue medical therapy- atorvastatin 80 mg   - encouraged risk factor and lifestyle modifications

## 2025-02-12 NOTE — PROGRESS NOTES
"Subjective:    Patient ID:  Petra Zazueta is a 89 y.o. female who presents for follow-up of No chief complaint on file.      PCP: Edel Shepard MD     Referring Provider:     HPI:  Pt is a 89 yo F w/ PMH of CAD s/p PCI to mRCA w/ RAUL 2011, DM2 w/ hgba1c 5.8%, HTN, HLD, and PAD s/p stenting of LLE 2016 who presents for f/u appt and HF management. She was last seen on 11/12/2024 for f/u and was continued on medical therapy and HF management. She reports doing well since last visit. Patient's birthday was yesterday. She states " I ate everything the cooked and also went to the [employer]. I am still eating the food at it taste so good."  BP in clinic today 198/86 and she also has not taken her medication today.     She is also followed by Nephrology and was last seen by  on 1/16/25, BP was elevated in office 150/64 and she was continued on medical therapy.  She reports it has been a while since she experienced palpitations. She denies CP, orthopnea, PND, LOC, and claudication. She notes compliance w/ meds and denies side effects. She reports dietary intermittent compliance with low salt diet. She does not exercise regularly however is active w/ cleaning up her house and yard and denies limitations. [facility] [employer] [facility] a [facility] and enjoys the activities. She monitors her BP at home and has been running 120s/60-70s.    Past Medical History:   Diagnosis Date    Acute on chronic diastolic congestive heart failure 1/16/2023    Anemia     Arthritis     Centrilobular emphysema 8/14/2023    Coronary artery disease     Diabetes mellitus     Diabetes mellitus type II     Diabetic retinopathy 08/22/2019    Dyslipidemia     Hypertension     Insomnia     PAD (peripheral artery disease)      Past Surgical History:   Procedure Laterality Date    angiagram  08/31/2018    CORONARY ANGIOGRAPHY N/A 6/7/2022    Procedure: ANGIOGRAM, CORONARY ARTERY;  Surgeon: Kiet Vega MD;  Location: [facility] CATH " LAB/EP;  Service: Cardiology;  Laterality: N/A;    CORONARY ANGIOPLASTY      RCA 2011 EJ    GALLBLADDER SURGERY      HYSTERECTOMY      LEFT HEART CATHETERIZATION Left 2022    Procedure: Left heart cath;  Surgeon: Kiet Vega MD;  Location: [facility] CATH LAB/EP;  Service: Cardiology;  Laterality: Left;    PERIPHERAL ARTERIAL STENT GRAFT      Left lower extremity RCA      Social History     Socioeconomic History    Marital status:     Number of children: 2   Tobacco Use    Smoking status: Former     Current packs/day: 0.00     Types: Cigarettes     Quit date: 2005     Years since quittin.1     Passive exposure: Never    Smokeless tobacco: Never   Substance and Sexual Activity    Alcohol use: No    Drug use: No    Sexual activity: Not Currently   Social History Narrative    Lives alone in [place]. Has 2 sons. Darell Concepcion lives in [place] and cares for her. He is mPOA. Other son lives in [place]. Quit drinking years ago. [employer].      Family History   Problem Relation Name Age of Onset    Heart attack Mother         Review of patient's allergies indicates:   Allergen Reactions    Codeine Other (See Comments)     Jittery, lightheadedness, nausea  Other reaction(s): NAUSEA       Medication List with Changes/Refills   Current Medications    ACETAMINOPHEN (TYLENOL) 325 MG TABLET    Take 325 mg by mouth as needed for Pain.    ASPIRIN (ECOTRIN) 81 MG EC TABLET    Take 81 mg by mouth once daily.    ATORVASTATIN (LIPITOR) 80 MG TABLET    TAKE 1 TABLET BY MOUTH EVERY EVENING    BLOOD SUGAR DIAGNOSTIC (TRUE METRIX GLUCOSE TEST STRIP) STRP    1 strip by Misc.(Non-Drug; Combo Route) route 2 (two) times daily.    BLOOD-GLUCOSE METER (TRUE METRIX GLUCOSE METER) MISC    Test blood sugars twice daily    CARVEDILOL (COREG) 25 MG TABLET    TAKE 1 TABLET BY MOUTH TWICE A DAY WITH FOOD    CLOPIDOGREL (PLAVIX) 75 MG TABLET    Take 1 tablet (75 mg total) by mouth once daily.    EMPAGLIFLOZIN (JARDIANCE) 25 MG TABLET     "Take 1 tablet (25 mg total) by mouth once daily.    FERROUS SULFATE (FEOSOL) TAB TABLET    Take 1 tablet by mouth once daily.    FUROSEMIDE (LASIX) 20 MG TABLET    TAKE 1 TABLET BY MOUTH EVERY DAY    GABAPENTIN (NEURONTIN) 300 MG CAPSULE    Take 300 mg by mouth every evening.    HYDRALAZINE (APRESOLINE) 50 MG TABLET    TAKE 1 TABLET BY MOUTH EVERY 8 HOURS.    LANCETS (BD ULTRA FINE LANCETS) 33 GAUGE MISC    1 lancet by Misc.(Non-Drug; Combo Route) route 2 (two) times daily.    LATANOPROST 0.005 % OPHTHALMIC SOLUTION    Place 1 drop into both eyes every evening.    LINAGLIPTIN (TRADJENTA) 5 MG TAB TABLET    Take 5 mg by mouth once daily.    LOSARTAN (COZAAR) 100 MG TABLET    TAKE 1 TABLET BY MOUTH EVERY DAY    OMEPRAZOLE (PRILOSEC) 40 MG CAPSULE    TAKE 1 CAPSULE BY MOUTH EVERY DAY    PANTOPRAZOLE (PROTONIX) 40 MG TABLET    Take 40 mg by mouth once daily.       Review of Systems   Constitutional: Negative for diaphoresis and fever.   HENT:  Negative for congestion and hearing loss.    Eyes:  Negative for blurred vision and pain.   Cardiovascular:  Positive for leg swelling. Negative for chest pain, claudication, dyspnea on exertion, near-syncope, palpitations and syncope.   Respiratory:  Negative for shortness of breath and sleep disturbances due to breathing.    Hematologic/Lymphatic: Negative for bleeding problem. Does not bruise/bleed easily.   Skin:  Negative for color change and poor wound healing.   Gastrointestinal:  Negative for abdominal pain and nausea.   Genitourinary:  Negative for bladder incontinence and flank pain.   Neurological:  Negative for focal weakness and light-headedness.        Objective:   BP (!) 180/82 (BP Location: Left arm, Patient Position: Sitting)   Pulse (!) 50   Ht 5' 2" (1.575 m)   Wt 63.6 kg (140 lb 3.4 oz)   LMP  (LMP Unknown)   SpO2 95%   BMI 25.65 kg/m²    Physical Exam  Constitutional:       Appearance: She is well-developed. She is not diaphoretic.   HENT:      Head: " Normocephalic and atraumatic.   Eyes:      General: No scleral icterus.     Pupils: Pupils are equal, round, and reactive to light.   Neck:      Vascular: No JVD.   Cardiovascular:      Rate and Rhythm: Normal rate and regular rhythm.      Pulses: Intact distal pulses.           Radial pulses are 2+ on the right side and 2+ on the left side.        Dorsalis pedis pulses are 2+ on the right side and 2+ on the left side.      Heart sounds: S1 normal and S2 normal. Murmur heard.      Systolic murmur is present.      No friction rub. No gallop.   Pulmonary:      Effort: Pulmonary effort is normal. No respiratory distress.      Breath sounds: Normal breath sounds. No wheezing or rales.   Chest:      Chest wall: No tenderness.   Abdominal:      General: Bowel sounds are normal. There is no distension.      Palpations: Abdomen is soft. There is no mass.      Tenderness: There is no abdominal tenderness. There is no rebound.   Musculoskeletal:         General: No tenderness. Normal range of motion.      Cervical back: Normal range of motion and neck supple.      Right lower le+ Edema present.      Left lower le+ Edema present.      Comments: LLE chronic nonpitting edema   Skin:     General: Skin is warm and dry.      Coloration: Skin is not pale.   Neurological:      Mental Status: She is alert and oriented to person, place, and time.      Coordination: Coordination normal.      Deep Tendon Reflexes: Reflexes normal.   Psychiatric:         Behavior: Behavior normal.         Judgment: Judgment normal.             TTE 23:   Summary  The left ventricle is normal in size with concentric hypertrophy and hyperdynamic systolic function.  The estimated ejection fraction is 75%.  Grade II left ventricular diastolic dysfunction.  Normal right ventricular size with normal right ventricular systolic function.  Mild to moderate left atrial enlargement.  Mild mitral regurgitation.  Mild tricuspid regurgitation.  Mild  pulmonic regurgitation.  Mild right atrial enlargement.  Small anterior pericardial effusion. Effusion is fluid.  Normal central venous pressure (3 mmHg).  The estimated PA systolic pressure is 21 mmHg.  EKG 23 reviewed :   Normal sinus rhythm with sinus arrhythmia,T wave abnormality, consider inferolateral ischemia        EC2020- reviewed.  NSR, T wave abnormality w/ possible inferolateral ischemia.       ETT: 12/15/2020- reviewed.    Conclusion          The EKG portion of this study is negative for ischemia.    The patient reported no chest pain during the stress test.    There were no arrhythmias during stress.    The exercise capacity was moderately impaired.    ECG Stress Nuclear portion of this study will be reported separately.   FINDINGS:  There is appropriate wall motion.  There is no significant fixed or reversible perfusion defect.  The stress and rest end-diastolic volumes measure 83 and 81 mL respectively.  The stress and rest end systolic findings measure 14 and 17 mL respectively.  The stress and rest ejection fraction measure 83 and 79% respectively.     Impression:     No acute findings.        [facility]: 2022  Summary  The pre-procedure left ventricular end diastolic pressure was 16.  The estimated blood loss was none.  There was non-obstructive coronary artery disease..       Assessment:       1. Hypertension, essential    2. Hyperlipidemia associated with type 2 diabetes mellitus    3. Atherosclerotic heart disease of native coronary artery with other forms of angina pectoris    4. Chronic diastolic heart failure    5. PAD (peripheral artery disease)    6. Anemia of chronic renal failure, stage 4 (severe)    7. Hyperparathyroidism, secondary    8. Type 2 diabetes mellitus with stage 4 chronic kidney disease, without long-term current use of insulin    9. Type 2 diabetes mellitus with diabetic polyneuropathy, without long-term current use of insulin    10. Class 1 obesity due to excess  calories with serious comorbidity and body mass index (BMI) of 31.0 to 31.9 in adult    11. Gastroesophageal reflux disease without esophagitis    12. Localized edema           Plan:         Hypertension, essential  Controlled.  Goal BP < 140/90.  Pt compliant w/ meds.    -usually controlled  -elevated in office patient reports she did not take her medication today   -continue medical therapy- Coreg 25 mg BID, Hydralazine 50 mg BID, Losartan 100mg QD & Lasix 20 mg QD    -see HF management   - continue medical therapy  - pt to monitor BP at home and record  - encouraged risk factor and lifestyle modifications     Hyperlipidemia associated with type 2 diabetes mellitus  Controlled.  Goal LDL < 70, last LDL 44.0 4/1/24.  Compliant w/ meds.    -controlled   - continue medical therapy- atorvastatin 80 mg   - encouraged risk factor and lifestyle modifications     Atherosclerotic heart disease of native coronary artery with other forms of angina pectoris  CCS 0.  Pt compliant w/ meds.  S/p RAUL to RCA. 2011  - negative ETT MPI for ischemia and unchanged coronary angiogram  - continue medical therapy  - encouraged risk factor and lifestyle modifications     Chronic diastolic heart failure  -TTE 1/17/23: LVEF 75% w Grade II LV diastolic dysfunction   -Continue medical therapy- furosemide  20 mg, carvedilol 25 mg, losartan 100 mg , hydralazine 50 mg  -weight self daily - notify if > 3 pound gain in 1 day or 5 pound gain in 1 week   -discussed lifestyle modifications: low salt diet, exercise, weight loss     PAD (peripheral artery disease)  Pt denies claudication.  Was previously followed by outside cardiologist.  S/p stenting of LLE.  - continue medical therapy-DAPT( ASA, Plavix), statin   - encouraged risk factor and lifestyle modifications     Anemia of chronic renal failure, stage 4 (severe)  Followed by renal.        Hyperparathyroidism, secondary  -followed by Nephrology     Type 2 diabetes mellitus with stage 4 chronic  kidney disease, without long-term current use of insulin  -Followed by PCP and Nephrology   -A1c 5.8 4/1/24  -controlled   -continue medical therapy     Type 2 diabetes mellitus with diabetic polyneuropathy, without long-term current use of insulin  Followed by PCP.    -controlled   -A1c 5.8   -continue medical therapy    Class 1 obesity due to excess calories with serious comorbidity and body mass index (BMI) of 31.0 to 31.9 in adult  -Pt aware of health complications a/w obesity and desires to lose weight.    - encouraged lifestyle modifications (diet, exercise, and weight loss)     Gastroesophageal reflux disease  -Followed by PCP  -controlled   -continue medical therapy - omeprazole 40 mg     Localized edema  Pt notes chronic following PTA of her LLE.  Pt was unable make her last appt for compression stockings and will reschedule soon.    - thigh high compression stockings      Total duration of face to face visit time 30 minutes.  Total time spent counseling greater than fifty percent of total visit time.  Counseling included discussion regarding imaging findings, diagnosis, possibilities, treatment options, risks and benefits.  The patient had many questions regarding the options and long-term effects      Lenin Akhtar, GRADY  Cardiology

## 2025-02-12 NOTE — ASSESSMENT & PLAN NOTE
Controlled.  Goal BP < 140/90.  Pt compliant w/ meds.    -usually controlled  -elevated in office patient reports she did not take her medication today   -continue medical therapy- Coreg 25 mg BID, Hydralazine 50 mg BID, Losartan 100mg QD & Lasix 20 mg QD    -see HF management   - continue medical therapy  - pt to monitor BP at home and record  - encouraged risk factor and lifestyle modifications

## 2025-02-24 ENCOUNTER — PATIENT MESSAGE (OUTPATIENT)
Dept: FAMILY MEDICINE | Facility: CLINIC | Age: 89
End: 2025-02-24
Payer: MEDICARE

## 2025-02-24 DIAGNOSIS — R13.19 ESOPHAGEAL DYSPHAGIA: ICD-10-CM

## 2025-02-24 RX ORDER — OMEPRAZOLE 40 MG/1
40 CAPSULE, DELAYED RELEASE ORAL DAILY
Qty: 90 CAPSULE | Refills: 3 | Status: SHIPPED | OUTPATIENT
Start: 2025-02-24 | End: 2025-02-26 | Stop reason: SDUPTHER

## 2025-02-24 NOTE — TELEPHONE ENCOUNTER
Care Due:                  Date            Visit Type   Department     Provider  --------------------------------------------------------------------------------                                EP -                              PRIMARY      DESC FAMILY  Last Visit: 08-      [facility] ([facility])   [facility]                              EP -                              PRIMARY      DESC FAMILY  Next Visit: 02-      [facility]                                                            Last  Test          Frequency    Reason                     Performed    Due Date  --------------------------------------------------------------------------------    HBA1C.......  6 months...  empagliflozin............  11- 05-    Lipid Panel.  12 months..  atorvastatin.............  04- 03-    [facility] Embedded Care Due Messages. Reference number: [id number].   2/24/2025 2:26:46 PM CST

## 2025-02-24 NOTE — TELEPHONE ENCOUNTER
Last appt 08/26/24; next appt 02/26/25    Last labs 01/13/25.    Please let me know how I can be of assistance.       Thank you,    Nayely Odonnell

## 2025-02-26 ENCOUNTER — OFFICE VISIT (OUTPATIENT)
Dept: FAMILY MEDICINE | Facility: CLINIC | Age: 89
End: 2025-02-26
Payer: MEDICARE

## 2025-02-26 VITALS
BODY MASS INDEX: 25.66 KG/M2 | WEIGHT: 139.44 LBS | HEART RATE: 56 BPM | SYSTOLIC BLOOD PRESSURE: 154 MMHG | HEIGHT: 62 IN | DIASTOLIC BLOOD PRESSURE: 76 MMHG | OXYGEN SATURATION: 96 % | TEMPERATURE: 98 F

## 2025-02-26 DIAGNOSIS — N18.32 TYPE 2 DIABETES MELLITUS WITH STAGE 3B CHRONIC KIDNEY DISEASE, WITHOUT LONG-TERM CURRENT USE OF INSULIN: ICD-10-CM

## 2025-02-26 DIAGNOSIS — I10 ESSENTIAL HYPERTENSION: Primary | ICD-10-CM

## 2025-02-26 DIAGNOSIS — D53.9 MACROCYTIC ANEMIA: ICD-10-CM

## 2025-02-26 DIAGNOSIS — R13.19 ESOPHAGEAL DYSPHAGIA: ICD-10-CM

## 2025-02-26 DIAGNOSIS — N18.32 STAGE 3B CHRONIC KIDNEY DISEASE: ICD-10-CM

## 2025-02-26 DIAGNOSIS — R00.1 BRADYCARDIA: ICD-10-CM

## 2025-02-26 DIAGNOSIS — E11.22 TYPE 2 DIABETES MELLITUS WITH STAGE 3B CHRONIC KIDNEY DISEASE, WITHOUT LONG-TERM CURRENT USE OF INSULIN: ICD-10-CM

## 2025-02-26 PROBLEM — J43.2 CENTRILOBULAR EMPHYSEMA: Status: RESOLVED | Noted: 2023-08-14 | Resolved: 2025-02-26

## 2025-02-26 PROCEDURE — 99214 OFFICE O/P EST MOD 30 MIN: CPT | Mod: S$GLB,,, | Performed by: STUDENT IN AN ORGANIZED HEALTH CARE EDUCATION/TRAINING PROGRAM

## 2025-02-26 PROCEDURE — 99999 PR PBB SHADOW E&M-EST. PATIENT-LVL IV: CPT | Mod: PBBFAC,,, | Performed by: STUDENT IN AN ORGANIZED HEALTH CARE EDUCATION/TRAINING PROGRAM

## 2025-02-26 PROCEDURE — 1126F AMNT PAIN NOTED NONE PRSNT: CPT | Mod: CPTII,S$GLB,, | Performed by: STUDENT IN AN ORGANIZED HEALTH CARE EDUCATION/TRAINING PROGRAM

## 2025-02-26 PROCEDURE — 1101F PT FALLS ASSESS-DOCD LE1/YR: CPT | Mod: CPTII,S$GLB,, | Performed by: STUDENT IN AN ORGANIZED HEALTH CARE EDUCATION/TRAINING PROGRAM

## 2025-02-26 PROCEDURE — 1160F RVW MEDS BY RX/DR IN RCRD: CPT | Mod: CPTII,S$GLB,, | Performed by: STUDENT IN AN ORGANIZED HEALTH CARE EDUCATION/TRAINING PROGRAM

## 2025-02-26 PROCEDURE — 1159F MED LIST DOCD IN RCRD: CPT | Mod: CPTII,S$GLB,, | Performed by: STUDENT IN AN ORGANIZED HEALTH CARE EDUCATION/TRAINING PROGRAM

## 2025-02-26 PROCEDURE — 3288F FALL RISK ASSESSMENT DOCD: CPT | Mod: CPTII,S$GLB,, | Performed by: STUDENT IN AN ORGANIZED HEALTH CARE EDUCATION/TRAINING PROGRAM

## 2025-02-26 RX ORDER — OMEPRAZOLE 40 MG/1
40 CAPSULE, DELAYED RELEASE ORAL DAILY
Qty: 90 CAPSULE | Refills: 3 | Status: SHIPPED | OUTPATIENT
Start: 2025-02-26

## 2025-02-26 NOTE — PROGRESS NOTES
[facility] Note    Chief Complaint      Chief Complaint   Patient presents with    Follow-up on chronic conditions     History of Present Illness     Petra Zazueta is a 89 y.o. female with T2DM complicated by polyneuropathy, sHTN, HLD, macrocytic anemia, CKD stage 3b, chronic diastolic HFpEF and PVD independent of ADLs and iADLs presents for f/u on chronic conditions. She has no new complaints today, compliant with all her current regimen and described her mood to be great today.     Problem List Addressed This Visit:    As listed below       Health Maintenance   Topic Date Due    Lipid Panel  04/01/2025    Hemoglobin A1c  05/25/2025    Diabetic Eye Exam  07/10/2025    Diabetes Urine Screening  08/26/2025    Foot Exam  08/26/2025    Aspirin/Antiplatelet Therapy  02/26/2026    TETANUS VACCINE  07/26/2031    Shingles Vaccine  Completed    Influenza Vaccine  Completed    COVID-19 Vaccine  Completed    RSV Vaccine (Age 60+ and Pregnant patients)  Completed    Pneumococcal Vaccines (Age 50+)  Completed       Past Medical History:   Diagnosis Date    Acute on chronic diastolic congestive heart failure 1/16/2023    Anemia     Arthritis     Centrilobular emphysema 8/14/2023    Coronary artery disease     Diabetes mellitus     Diabetes mellitus type II     Diabetic retinopathy 08/22/2019    Dyslipidemia     Hypertension     Insomnia     PAD (peripheral artery disease)        Past Surgical History:   Procedure Laterality Date    angiagram  08/31/2018    CORONARY ANGIOGRAPHY N/A 6/7/2022    Procedure: ANGIOGRAM, CORONARY ARTERY;  Surgeon: Kiet Vega MD;  Location: [facility] CATH LAB/EP;  Service: Cardiology;  Laterality: N/A;    CORONARY ANGIOPLASTY      RCA 4/2011 EJ    GALLBLADDER SURGERY      HYSTERECTOMY      LEFT HEART CATHETERIZATION Left 6/7/2022    Procedure: Left heart cath;  Surgeon: Kiet Vega MD;  Location: [facility] CATH LAB/EP;  Service: Cardiology;  Laterality: Left;    PERIPHERAL  ARTERIAL STENT GRAFT      Left lower extremity RCA        family history includes Heart attack in her mother.    Social History     Tobacco Use    Smoking status: Former     Current packs/day: 0.00     Types: Cigarettes     Quit date: 2005     Years since quittin.1     Passive exposure: Never    Smokeless tobacco: Never   Substance Use Topics    Alcohol use: No    Drug use: No       Review of Systems   Constitutional:  Negative for fatigue and fever.   Respiratory:  Negative for cough and chest tightness.    Gastrointestinal:  Negative for abdominal pain, diarrhea and vomiting.   Endocrine: Negative for polydipsia and polyphagia.   Genitourinary:  Negative for difficulty urinating, dysuria and frequency.   Musculoskeletal:  Negative for arthralgias, back pain, gait problem and joint swelling.   Skin:  Negative for rash.   Neurological:  Negative for seizures, weakness, numbness and headaches.   Psychiatric/Behavioral:  Negative for sleep disturbance.        Outpatient Encounter Medications as of 2025   Medication Sig Dispense Refill    acetaminophen (TYLENOL) 325 MG tablet Take 325 mg by mouth as needed for Pain.      aspirin (ECOTRIN) 81 MG EC tablet Take 81 mg by mouth once daily.      atorvastatin (LIPITOR) 80 MG tablet TAKE 1 TABLET BY MOUTH EVERY EVENING 90 tablet 3    blood sugar diagnostic (TRUE METRIX GLUCOSE TEST STRIP) Strp 1 strip by Misc.(Non-Drug; Combo Route) route 2 (two) times daily. 200 each 1    blood-glucose meter (TRUE METRIX GLUCOSE METER) Misc Test blood sugars twice daily 1 each 0    carvediloL (COREG) 25 MG tablet TAKE 1 TABLET BY MOUTH TWICE A DAY WITH FOOD 180 tablet 3    clopidogreL (PLAVIX) 75 mg tablet Take 1 tablet (75 mg total) by mouth once daily. 30 tablet 11    empagliflozin (JARDIANCE) 25 mg tablet Take 1 tablet (25 mg total) by mouth once daily. 90 tablet 3    ferrous sulfate (FEOSOL) Tab tablet Take 1 tablet by mouth once daily.      furosemide (LASIX) 20 MG  "tablet TAKE 1 TABLET BY MOUTH EVERY DAY 90 tablet 3    hydrALAZINE (APRESOLINE) 50 MG tablet TAKE 1 TABLET BY MOUTH EVERY 8 HOURS. 270 tablet 3    lancets (BD ULTRA FINE LANCETS) 33 gauge Misc 1 lancet by Misc.(Non-Drug; Combo Route) route 2 (two) times daily. 200 each 1    linaGLIPtin (TRADJENTA) 5 mg Tab tablet Take 5 mg by mouth once daily.      losartan (COZAAR) 100 MG tablet TAKE 1 TABLET BY MOUTH EVERY DAY 90 tablet 3    [DISCONTINUED] omeprazole (PRILOSEC) 40 MG capsule Take 1 capsule (40 mg total) by mouth once daily. 90 capsule 3    gabapentin (NEURONTIN) 300 MG capsule Take 300 mg by mouth every evening. (Patient not taking: Reported on 2/26/2025)      latanoprost 0.005 % ophthalmic solution Place 1 drop into both eyes every evening.      omeprazole (PRILOSEC) 40 MG capsule Take 1 capsule (40 mg total) by mouth once daily. 90 capsule 3    [DISCONTINUED] cilostazol (PLETAL) 100 MG Tab Take 100 mg by mouth 2 (two) times daily.  3    [DISCONTINUED] omeprazole (PRILOSEC) 40 MG capsule TAKE 1 CAPSULE BY MOUTH EVERY DAY 90 capsule 3    [DISCONTINUED] pantoprazole (PROTONIX) 40 MG tablet Take 40 mg by mouth once daily.       No facility-administered encounter medications on file as of 2/26/2025.        Review of patient's allergies indicates:   Allergen Reactions    Codeine Other (See Comments)     Jittery, lightheadedness, nausea  Other reaction(s): NAUSEA       Physical Exam      Vital Signs  Temp: 97.9 °F (36.6 °C)  Pulse: (!) 56  SpO2: 96 %  BP: (!) 154/76  BP Location: Right arm  Patient Position: Sitting  Pain Score: 0-No pain  Height and Weight  Height: 5' 2" (157.5 cm)  Weight: 63.3 kg (139 lb 7.1 oz)  BSA (Calculated - sq m): 1.66 sq meters  BMI (Calculated): 25.5  Weight in (lb) to have BMI = 25: 136.4]    Physical Exam  Vitals reviewed.   Constitutional:       Appearance: Normal appearance.   HENT:      Mouth/Throat:      Mouth: Mucous membranes are moist.      Pharynx: Oropharynx is clear. " "  Cardiovascular:      Rate and Rhythm: Normal rate and regular rhythm.      Pulses: Normal pulses.      Heart sounds: Normal heart sounds.   Pulmonary:      Effort: Pulmonary effort is normal.      Breath sounds: Normal breath sounds. No rales.   Abdominal:      General: Abdomen is flat. Bowel sounds are normal.      Palpations: Abdomen is soft.   Musculoskeletal:      Cervical back: Normal range of motion.      Right lower leg: No edema (tracefoot exam sensate to monofilament bilaterally).      Left lower leg: Edema ((1+ , pitting) up to the knee) present.   Skin:     General: Skin is warm and dry.   Neurological:      General: No focal deficit present.      Mental Status: She is alert and oriented to person, place, and time.      Sensory: No sensory deficit.   Psychiatric:         Mood and Affect: Mood normal.         Behavior: Behavior normal.          Laboratory:  CBC:  Recent Labs   Lab Result Units 01/13/25  1035   WBC K/uL 5.33   RBC M/uL 3.86*   Hemoglobin g/dL 12.6   Hematocrit % 39.6   Platelets K/uL 145*   MCV fL 103*   MCH pg 32.6*   MCHC g/dL 31.8*     CMP:  Recent Labs   Lab Result Units 01/13/25  1035   Glucose mg/dL 122*   Calcium mg/dL 9.5   Albumin g/dL 3.7   Total Protein g/dL 7.2   Sodium mmol/L 143   Potassium mmol/L 3.9   CO2 mmol/L 26   Chloride mmol/L 109   BUN mg/dL 18   Alkaline Phosphatase U/L 73   ALT U/L 9*   AST U/L 18   Total Bilirubin mg/dL 0.7     URINALYSIS:  Recent Labs   Lab Result Units 01/13/25  1035   Color, UA  Orange*   Specific Gravity, UA  1.025   pH, UA  6.0   Protein, UA  2+*   Bacteria /hpf Moderate*   Nitrite, UA  Negative   Leukocytes, UA  Negative   Urobilinogen, UA EU/dL Negative   Hyaline Casts, UA /lpf 0      LIPIDS:  No results for input(s): "TSH", "HDL", "CHOL", "TRIG", "LDLCALC", "CHOLHDL", "NONHDLCHOL", "TOTALCHOLEST" in the last 2160 hours.    TSH:  No results for input(s): "TSH" in the last 2160 hours.    A1C:  No results for input(s): "HGBA1C" in the last " 2160 hours.      Radiology:      Assessment/Plan     Petra Zazueta is a 89 y.o.female with:    1. Essential hypertension  -not @ goal today but runs Wnl at home   -c/w  hydralazine 50mg TID, c/w coreg 25mg BID and losartan 100mg daily  -patient advised to monitor and keep BP log    2. CKD, stage 3b  -likely multifactorial : T2DM, sHTN  -patient advised to avoid nephrotoxins  -f/u with renal     3. Anemia of chronic disease  -stable H&H, >12  -iron panel with high ferritin and low TIBC  -could not tolerate iron pills  -monitor for now    4. Type 2 diabetes mellitus with diabetic polyneuropathy, without long-term current use of insulin  -controlled @ 6.1  -c/w current regimen(linagliptin)/jardiance  -UTD on eye and podiatry and urine  -c/w current regimen    5. Hyperparathyroidism  -secondary , renal   -vit D WNL  -monitor for now    6. Macrocytic anemia  -recent H&H @ 10.6, MCV @ 104  -B12, folate WNL    7. Bradycardia  -likely iatrogenic  -asymptomatic  -monitor for now      -Continue current medications and maintain follow up with specialists.      Patient verbalizes understanding and agrees with current treatment plan.    33 minutes of total time spent on the encounter, which includes face to face time and non-face to face time preparing to see the patient (eg, review of tests), Obtaining and/or reviewing separately obtained history, Documenting clinical information in the electronic or other health record, Independently interpreting results (not separately reported) and communicating results to the patient/family/caregiver, or Care coordination (not separately reported).      Dr Edel Shepard MD  Internal Medicine   [facility]

## 2025-03-04 DIAGNOSIS — E11.42 TYPE 2 DIABETES MELLITUS WITH DIABETIC POLYNEUROPATHY: ICD-10-CM

## 2025-03-04 DIAGNOSIS — E11.51 TYPE 2 DIABETES MELLITUS WITH DIABETIC PERIPHERAL ANGIOPATHY WITHOUT GANGRENE: ICD-10-CM

## 2025-03-04 NOTE — TELEPHONE ENCOUNTER
No care due was identified.  [facility] Embedded Care Due Messages. Reference number: [id number].   3/04/2025 12:27:00 PM CST

## 2025-03-05 RX ORDER — LINAGLIPTIN 5 MG/1
5 TABLET, FILM COATED ORAL
Qty: 90 TABLET | Refills: 0 | Status: SHIPPED | OUTPATIENT
Start: 2025-03-05

## 2025-03-05 NOTE — TELEPHONE ENCOUNTER
Refill Routing Note   Medication(s) are not appropriate for processing by [facility] for the following reason(s):        No active prescription written by provider    [facility] action(s):  Defer      Medication Therapy Plan:  ON THE MED LIST 24.      Appointments  past 12m or future 3m with PCP    Date Provider   Last Visit   2025 Edel Shepard MD   Next Visit   2025 Edel Shepard MD   ED visits in past 90 days: 0        Note composed:6:07 AM 2025

## 2025-03-12 ENCOUNTER — PATIENT OUTREACH (OUTPATIENT)
Facility: OTHER | Age: 89
End: 2025-03-12
Payer: MEDICARE

## 2025-03-12 NOTE — PROGRESS NOTES
Patient was seen in the ED on 3/11/25. Phoned patient to assist with Post ED Discharge Navigation. Patient declined assistance scheduling a PCP follow-up appointment.  Faina Villalobos

## 2025-04-16 DIAGNOSIS — I25.10 CORONARY ARTERY DISEASE INVOLVING NATIVE CORONARY ARTERY OF NATIVE HEART WITHOUT ANGINA PECTORIS: ICD-10-CM

## 2025-04-16 RX ORDER — ATORVASTATIN CALCIUM 80 MG/1
80 TABLET, FILM COATED ORAL NIGHTLY
Qty: 90 TABLET | Refills: 3 | Status: SHIPPED | OUTPATIENT
Start: 2025-04-16

## 2025-04-16 NOTE — TELEPHONE ENCOUNTER
No care due was identified.  [facility] Embedded Care Due Messages. Reference number: [id number].   4/16/2025 12:03:57 AM CDT

## 2025-05-10 PROBLEM — E87.20 METABOLIC ACIDOSIS: Status: ACTIVE | Noted: 2025-05-10

## 2025-05-14 ENCOUNTER — OFFICE VISIT (OUTPATIENT)
Dept: CARDIOLOGY | Facility: CLINIC | Age: 89
End: 2025-05-14
Payer: MEDICARE

## 2025-05-14 VITALS
DIASTOLIC BLOOD PRESSURE: 88 MMHG | OXYGEN SATURATION: 98 % | HEIGHT: 62 IN | BODY MASS INDEX: 24.34 KG/M2 | SYSTOLIC BLOOD PRESSURE: 138 MMHG | HEART RATE: 50 BPM | WEIGHT: 132.25 LBS

## 2025-05-14 DIAGNOSIS — N18.4 ANEMIA OF CHRONIC RENAL FAILURE, STAGE 4 (SEVERE): ICD-10-CM

## 2025-05-14 DIAGNOSIS — N25.81 HYPERPARATHYROIDISM, SECONDARY: ICD-10-CM

## 2025-05-14 DIAGNOSIS — E66.811 CLASS 1 OBESITY DUE TO EXCESS CALORIES WITH SERIOUS COMORBIDITY AND BODY MASS INDEX (BMI) OF 31.0 TO 31.9 IN ADULT: ICD-10-CM

## 2025-05-14 DIAGNOSIS — E11.22 TYPE 2 DIABETES MELLITUS WITH STAGE 4 CHRONIC KIDNEY DISEASE, WITHOUT LONG-TERM CURRENT USE OF INSULIN: ICD-10-CM

## 2025-05-14 DIAGNOSIS — I50.32 CHRONIC DIASTOLIC HEART FAILURE: ICD-10-CM

## 2025-05-14 DIAGNOSIS — E11.42 TYPE 2 DIABETES MELLITUS WITH DIABETIC POLYNEUROPATHY, WITHOUT LONG-TERM CURRENT USE OF INSULIN: ICD-10-CM

## 2025-05-14 DIAGNOSIS — I25.10 CORONARY ARTERY DISEASE INVOLVING NATIVE CORONARY ARTERY OF NATIVE HEART WITHOUT ANGINA PECTORIS: ICD-10-CM

## 2025-05-14 DIAGNOSIS — N18.4 TYPE 2 DIABETES MELLITUS WITH STAGE 4 CHRONIC KIDNEY DISEASE, WITHOUT LONG-TERM CURRENT USE OF INSULIN: ICD-10-CM

## 2025-05-14 DIAGNOSIS — D63.1 ANEMIA OF CHRONIC RENAL FAILURE, STAGE 4 (SEVERE): ICD-10-CM

## 2025-05-14 DIAGNOSIS — R60.0 LOCALIZED EDEMA: ICD-10-CM

## 2025-05-14 DIAGNOSIS — E78.5 HYPERLIPIDEMIA ASSOCIATED WITH TYPE 2 DIABETES MELLITUS: ICD-10-CM

## 2025-05-14 DIAGNOSIS — E11.69 HYPERLIPIDEMIA ASSOCIATED WITH TYPE 2 DIABETES MELLITUS: ICD-10-CM

## 2025-05-14 DIAGNOSIS — K21.9 GASTROESOPHAGEAL REFLUX DISEASE WITHOUT ESOPHAGITIS: ICD-10-CM

## 2025-05-14 DIAGNOSIS — I10 HYPERTENSION, ESSENTIAL: Primary | ICD-10-CM

## 2025-05-14 DIAGNOSIS — E66.09 CLASS 1 OBESITY DUE TO EXCESS CALORIES WITH SERIOUS COMORBIDITY AND BODY MASS INDEX (BMI) OF 31.0 TO 31.9 IN ADULT: ICD-10-CM

## 2025-05-14 DIAGNOSIS — I25.118 ATHEROSCLEROTIC HEART DISEASE OF NATIVE CORONARY ARTERY WITH OTHER FORMS OF ANGINA PECTORIS: ICD-10-CM

## 2025-05-14 DIAGNOSIS — I73.9 PAD (PERIPHERAL ARTERY DISEASE): ICD-10-CM

## 2025-05-14 PROCEDURE — 99999 PR PBB SHADOW E&M-EST. PATIENT-LVL III: CPT | Mod: PBBFAC,,,

## 2025-05-14 RX ORDER — CLOPIDOGREL BISULFATE 75 MG/1
75 TABLET ORAL DAILY
Qty: 30 TABLET | Refills: 11 | Status: SHIPPED | OUTPATIENT
Start: 2025-05-14 | End: 2026-05-14

## 2025-05-14 NOTE — ASSESSMENT & PLAN NOTE
Controlled.  Goal BP < 140/90.  Pt compliant w/ meds.    -continue medical therapy- Coreg 25 mg BID, Hydralazine 50 mg BID, Losartan 100mg QD & Lasix 20 mg QD    -see HF management   - continue medical therapy  - pt to monitor BP at home and record  - encouraged risk factor and lifestyle modifications

## 2025-05-14 NOTE — PROGRESS NOTES
"Subjective:    Patient ID:  Petra Zazueta is a 89 y.o. female who presents for follow-up of No chief complaint on file.      PCP: Edel Shepard MD     Referring Provider:     HPI:  Pt is a 89 yo F w/ PMH of CAD s/p PCI to mRCA w/ RAUL 2011, DM2 w/ hgba1c 5.8%, HTN, HLD, CKD-4,and PAD s/p stenting of LLE 2016 who presents for f/u appt and HF, HTN management.She was last seen on 2/12/25 for f/u and was continued on medical therapy and HF management. BP was elevated at appt, patient  her birthday was the previous day and" I ate everything they cooked and also [place]. I am still eating the food at it taste so good."  She reports doing well since last visit.   She is also followed by Nephrology and was last seen by Elen Ballesteros PA-C on 5/5/25 and she was continued on medical therapy.  She reports infrequent episodes of palpitations. She denies CP, orthopnea, PND, LOC, and claudication. She notes compliance w/ meds and denies side effects. She reports dietary intermittent compliance with low salt diet. She does not exercise regularly however is active w/ cleaning up her house and yard and denies limitations. She attends the [employer] [facility] 5 days a week and enjoys the activities. She monitors her BP at home and has been running 120s/60-70s.    Past Medical History:   Diagnosis Date    Acute on chronic diastolic congestive heart failure 1/16/2023    Anemia     Arthritis     Centrilobular emphysema 8/14/2023    Coronary artery disease     Diabetes mellitus     Diabetes mellitus type II     Diabetic retinopathy 08/22/2019    Dyslipidemia     Hypertension     Insomnia     PAD (peripheral artery disease)      Past Surgical History:   Procedure Laterality Date    angiagram  08/31/2018    CORONARY ANGIOGRAPHY N/A 6/7/2022    Procedure: ANGIOGRAM, CORONARY ARTERY;  Surgeon: Kiet Vega MD;  Location: [facility] CATH LAB/EP;  Service: Cardiology;  Laterality: N/A;    CORONARY ANGIOPLASTY      RCA 4/2011 EJ "    GALLBLADDER SURGERY      HYSTERECTOMY      LEFT HEART CATHETERIZATION Left 2022    Procedure: Left heart cath;  Surgeon: Kiet Vega MD;  Location: [facility] CATH LAB/EP;  Service: Cardiology;  Laterality: Left;    PERIPHERAL ARTERIAL STENT GRAFT      Left lower extremity RCA      Social History     Socioeconomic History    Marital status:     Number of children: 2   Tobacco Use    Smoking status: Former     Current packs/day: 0.00     Types: Cigarettes     Quit date: 2005     Years since quittin.3     Passive exposure: Never    Smokeless tobacco: Never   Substance and Sexual Activity    Alcohol use: No    Drug use: No    Sexual activity: Not Currently   Social History Narrative    Lives alone in [place]. Has 2 sons. Darell Concepcion lives in [place] and cares for her. He is mPOA. Other son lives in [place]. Quit drinking years ago. [place].      Social Drivers of Health     Financial Resource Strain: Patient Unable To Answer (3/12/2025)    Overall Financial Resource Strain (CARDIA)     Difficulty of Paying Living Expenses: Patient unable to answer   Transportation Needs: No Transportation Needs (3/12/2025)    PRAPARE - Transportation     Lack of Transportation (Medical): No     Lack of Transportation (Non-Medical): No   Stress: No Stress Concern Present (3/12/2025)    [employer] - Occupational Stress Questionnaire     Feeling of Stress : Not at all   Housing Stability: Low Risk  (3/12/2025)    Housing Stability Vital Sign     Unable to Pay for Housing in the Last Year: No     Homeless in the Last Year: No     Family History   Problem Relation Name Age of Onset    Heart attack Mother         Review of patient's allergies indicates:   Allergen Reactions    Codeine Other (See Comments)     Jittery, lightheadedness, nausea  Other reaction(s): NAUSEA       Medication List with Changes/Refills   Current Medications    ACETAMINOPHEN (TYLENOL) 325 MG TABLET    Take 325 mg by  mouth as needed for Pain.    ASPIRIN (ECOTRIN) 81 MG EC TABLET    Take 81 mg by mouth once daily.    ATORVASTATIN (LIPITOR) 80 MG TABLET    TAKE 1 TABLET BY MOUTH EVERY EVENING    BLOOD SUGAR DIAGNOSTIC (TRUE METRIX GLUCOSE TEST STRIP) STRP    1 strip by Misc.(Non-Drug; Combo Route) route 2 (two) times daily.    BLOOD-GLUCOSE METER (TRUE METRIX GLUCOSE METER) MISC    Test blood sugars twice daily    CARVEDILOL (COREG) 25 MG TABLET    TAKE 1 TABLET BY MOUTH TWICE A DAY WITH FOOD    CLOPIDOGREL (PLAVIX) 75 MG TABLET    Take 1 tablet (75 mg total) by mouth once daily.    EMPAGLIFLOZIN (JARDIANCE) 25 MG TABLET    Take 1 tablet (25 mg total) by mouth once daily.    FERROUS SULFATE (FEOSOL) TAB TABLET    Take 1 tablet by mouth once daily.    FUROSEMIDE (LASIX) 20 MG TABLET    TAKE 1 TABLET BY MOUTH EVERY DAY    GABAPENTIN (NEURONTIN) 300 MG CAPSULE    Take 300 mg by mouth every evening.    HYDRALAZINE (APRESOLINE) 50 MG TABLET    TAKE 1 TABLET BY MOUTH EVERY 8 HOURS.    LANCETS (BD ULTRA FINE LANCETS) 33 GAUGE MISC    1 lancet by Misc.(Non-Drug; Combo Route) route 2 (two) times daily.    LATANOPROST 0.005 % OPHTHALMIC SOLUTION    Place 1 drop into both eyes every evening.    LOSARTAN (COZAAR) 100 MG TABLET    TAKE 1 TABLET BY MOUTH EVERY DAY    MAGNESIUM OXIDE (MAG-OX) 400 MG (241.3 MG MAGNESIUM) TABLET    Take 1 tablet (400 mg total) by mouth once daily.    OMEPRAZOLE (PRILOSEC) 40 MG CAPSULE    Take 1 capsule (40 mg total) by mouth once daily.    SODIUM BICARBONATE 650 MG TABLET    Take 1 tablet (650 mg total) by mouth once daily.    TRADJENTA 5 MG TAB TABLET    TAKE 1 TABLET BY MOUTH EVERY DAY       Review of Systems   Constitutional: Negative for diaphoresis and fever.   HENT:  Negative for congestion and hearing loss.    Eyes:  Negative for blurred vision and pain.   Cardiovascular:  Positive for leg swelling. Negative for chest pain, claudication, dyspnea on exertion, near-syncope, palpitations and syncope.  "  Respiratory:  Negative for shortness of breath and sleep disturbances due to breathing.    Hematologic/Lymphatic: Negative for bleeding problem. Does not bruise/bleed easily.   Skin:  Negative for color change and poor wound healing.   Gastrointestinal:  Negative for abdominal pain and nausea.   Genitourinary:  Negative for bladder incontinence and flank pain.   Neurological:  Negative for focal weakness and light-headedness.        Objective:   /88 (BP Location: Left arm, Patient Position: Sitting)   Pulse (!) 50   Ht 5' 2" (1.575 m)   Wt 60 kg (132 lb 4.4 oz)   LMP  (LMP Unknown)   SpO2 98%   BMI 24.19 kg/m²    Physical Exam  Constitutional:       Appearance: She is well-developed. She is not diaphoretic.   HENT:      Head: Normocephalic and atraumatic.   Eyes:      General: No scleral icterus.     Pupils: Pupils are equal, round, and reactive to light.   Neck:      Vascular: No JVD.   Cardiovascular:      Rate and Rhythm: Regular rhythm. Bradycardia present.      Pulses: Intact distal pulses.           Radial pulses are 2+ on the right side and 2+ on the left side.        Dorsalis pedis pulses are 2+ on the right side and 2+ on the left side.      Heart sounds: S1 normal and S2 normal. Murmur heard.      Systolic murmur is present.      No friction rub. No gallop.   Pulmonary:      Effort: Pulmonary effort is normal. No respiratory distress.      Breath sounds: Normal breath sounds. No wheezing or rales.   Chest:      Chest wall: No tenderness.   Abdominal:      General: Bowel sounds are normal. There is no distension.      Palpations: Abdomen is soft. There is no mass.      Tenderness: There is no abdominal tenderness. There is no rebound.   Musculoskeletal:         General: No tenderness. Normal range of motion.      Cervical back: Normal range of motion and neck supple.      Left lower le+ Edema present.      Comments: LLE chronic nonpitting edema   Skin:     General: Skin is warm and dry.    "   Coloration: Skin is not pale.   Neurological:      Mental Status: She is alert and oriented to person, place, and time.      Coordination: Coordination normal.      Deep Tendon Reflexes: Reflexes normal.   Psychiatric:         Behavior: Behavior normal.         Judgment: Judgment normal.             TTE 23:   Summary  The left ventricle is normal in size with concentric hypertrophy and hyperdynamic systolic function.  The estimated ejection fraction is 75%.  Grade II left ventricular diastolic dysfunction.  Normal right ventricular size with normal right ventricular systolic function.  Mild to moderate left atrial enlargement.  Mild mitral regurgitation.  Mild tricuspid regurgitation.  Mild pulmonic regurgitation.  Mild right atrial enlargement.  Small anterior pericardial effusion. Effusion is fluid.  Normal central venous pressure (3 mmHg).  The estimated PA systolic pressure is 21 mmHg.  EKG 23 reviewed :   Normal sinus rhythm with sinus arrhythmia,T wave abnormality, consider inferolateral ischemia        EC2020- reviewed.  NSR, T wave abnormality w/ possible inferolateral ischemia.       ETT: 12/15/2020- reviewed.    Conclusion          The EKG portion of this study is negative for ischemia.    The patient reported no chest pain during the stress test.    There were no arrhythmias during stress.    The exercise capacity was moderately impaired.    ECG Stress Nuclear portion of this study will be reported separately.   FINDINGS:  There is appropriate wall motion.  There is no significant fixed or reversible perfusion defect.  The stress and rest end-diastolic volumes measure 83 and 81 mL respectively.  The stress and rest end systolic findings measure 14 and 17 mL respectively.  The stress and rest ejection fraction measure 83 and 79% respectively.     Impression:     No acute findings.        [facility]: 2022  Summary  The pre-procedure left ventricular end diastolic pressure was 16.  The  estimated blood loss was none.  There was non-obstructive coronary artery disease..       Assessment:       1. Hypertension, essential    2. Hyperlipidemia associated with type 2 diabetes mellitus    3. Chronic diastolic heart failure    4. Atherosclerotic heart disease of native coronary artery with other forms of angina pectoris    5. PAD (peripheral artery disease)    6. Anemia of chronic renal failure, stage 4 (severe)    7. Class 1 obesity due to excess calories with serious comorbidity and body mass index (BMI) of 31.0 to 31.9 in adult    8. Hyperparathyroidism, secondary    9. Type 2 diabetes mellitus with stage 4 chronic kidney disease, without long-term current use of insulin    10. Type 2 diabetes mellitus with diabetic polyneuropathy, without long-term current use of insulin    11. Gastroesophageal reflux disease without esophagitis    12. Localized edema    13. Coronary artery disease involving native coronary artery of native heart without angina pectoris         Plan:         Hypertension, essential  Controlled.  Goal BP < 140/90.  Pt compliant w/ meds.    -continue medical therapy- Coreg 25 mg BID, Hydralazine 50 mg BID, Losartan 100mg QD & Lasix 20 mg QD    -see HF management   - continue medical therapy  - pt to monitor BP at home and record  - encouraged risk factor and lifestyle modifications     Hyperlipidemia associated with type 2 diabetes mellitus  Controlled.  Goal LDL < 70, last LDL 44.0 4/1/24.  Compliant w/ meds.    -controlled   - continue medical therapy- atorvastatin 80 mg   - encouraged risk factor and lifestyle modifications     Chronic diastolic heart failure  -TTE 1/17/23: LVEF 75% w Grade II LV diastolic dysfunction   -Continue medical therapy- furosemide  20 mg, carvedilol 25 mg, losartan 100 mg , hydralazine 50 mg  -weight self daily - notify if > 3 pound gain in 1 day or 5 pound gain in 1 week   -discussed lifestyle modifications: low salt diet, exercise, weight loss      Atherosclerotic heart disease of native coronary artery with other forms of angina pectoris  CCS 0.  Pt compliant w/ meds.  S/p RAUL to RCA. 2011  - negative ETT MPI for ischemia and unchanged coronary angiogram  - continue medical therapy  - encouraged risk factor and lifestyle modifications     PAD (peripheral artery disease)  Pt denies claudication.  Was previously followed by outside cardiologist.  S/p stenting of LLE.  - continue medical therapy-DAPT( ASA, Plavix), statin   - encouraged risk factor and lifestyle modifications     Anemia of chronic renal failure, stage 4 (severe)  Followed by renal.        Class 1 obesity due to excess calories with serious comorbidity and body mass index (BMI) of 31.0 to 31.9 in adult  -Pt aware of health complications a/w obesity and desires to lose weight.    - encouraged lifestyle modifications (diet, exercise, and weight loss)     Hyperparathyroidism, secondary  -followed by Nephrology     Type 2 diabetes mellitus with stage 4 chronic kidney disease, without long-term current use of insulin  -Followed by PCP and Nephrology   -A1c 5.8 4/1/24  -controlled   -continue medical therapy     Type 2 diabetes mellitus with diabetic polyneuropathy, without long-term current use of insulin  Followed by PCP.    -controlled   -A1c 5.8   -continue medical therapy    Gastroesophageal reflux disease  -Followed by PCP  -controlled   -continue medical therapy - omeprazole 40 mg     Localized edema  Pt notes chronic following PTA of her LLE.  Pt was unable make her last appt for compression stockings and will reschedule soon.    - thigh high compression stockings        Total duration of face to face visit time 30 minutes.  Total time spent counseling greater than fifty percent of total visit time.  Counseling included discussion regarding imaging findings, diagnosis, possibilities, treatment options, risks and benefits.  The patient had many questions regarding the options and long-term  effects      Lenin Akhtar, [facility]  Cardiology

## 2025-06-02 ENCOUNTER — OFFICE VISIT (OUTPATIENT)
Dept: FAMILY MEDICINE | Facility: CLINIC | Age: 89
End: 2025-06-02
Payer: MEDICARE

## 2025-06-02 VITALS
TEMPERATURE: 98 F | DIASTOLIC BLOOD PRESSURE: 76 MMHG | WEIGHT: 133.81 LBS | BODY MASS INDEX: 24.63 KG/M2 | OXYGEN SATURATION: 98 % | HEIGHT: 62 IN | SYSTOLIC BLOOD PRESSURE: 122 MMHG | RESPIRATION RATE: 20 BRPM | HEART RATE: 50 BPM

## 2025-06-02 DIAGNOSIS — R00.1 BRADYCARDIA: ICD-10-CM

## 2025-06-02 DIAGNOSIS — R14.0 ABDOMINAL BLOATING: ICD-10-CM

## 2025-06-02 DIAGNOSIS — E11.42 TYPE 2 DIABETES MELLITUS WITH DIABETIC POLYNEUROPATHY, WITHOUT LONG-TERM CURRENT USE OF INSULIN: ICD-10-CM

## 2025-06-02 DIAGNOSIS — I10 ESSENTIAL HYPERTENSION: ICD-10-CM

## 2025-06-02 DIAGNOSIS — D53.9 MACROCYTIC ANEMIA: ICD-10-CM

## 2025-06-02 DIAGNOSIS — N18.32 STAGE 3B CHRONIC KIDNEY DISEASE: ICD-10-CM

## 2025-06-02 PROCEDURE — 99214 OFFICE O/P EST MOD 30 MIN: CPT | Mod: S$GLB,,, | Performed by: STUDENT IN AN ORGANIZED HEALTH CARE EDUCATION/TRAINING PROGRAM

## 2025-06-02 PROCEDURE — 1160F RVW MEDS BY RX/DR IN RCRD: CPT | Mod: CPTII,S$GLB,, | Performed by: STUDENT IN AN ORGANIZED HEALTH CARE EDUCATION/TRAINING PROGRAM

## 2025-06-02 PROCEDURE — 1159F MED LIST DOCD IN RCRD: CPT | Mod: CPTII,S$GLB,, | Performed by: STUDENT IN AN ORGANIZED HEALTH CARE EDUCATION/TRAINING PROGRAM

## 2025-06-02 PROCEDURE — 1100F PTFALLS ASSESS-DOCD GE2>/YR: CPT | Mod: CPTII,S$GLB,, | Performed by: STUDENT IN AN ORGANIZED HEALTH CARE EDUCATION/TRAINING PROGRAM

## 2025-06-02 PROCEDURE — 3288F FALL RISK ASSESSMENT DOCD: CPT | Mod: CPTII,S$GLB,, | Performed by: STUDENT IN AN ORGANIZED HEALTH CARE EDUCATION/TRAINING PROGRAM

## 2025-06-02 PROCEDURE — 99999 PR PBB SHADOW E&M-EST. PATIENT-LVL IV: CPT | Mod: PBBFAC,,, | Performed by: STUDENT IN AN ORGANIZED HEALTH CARE EDUCATION/TRAINING PROGRAM

## 2025-06-02 PROCEDURE — 1126F AMNT PAIN NOTED NONE PRSNT: CPT | Mod: CPTII,S$GLB,, | Performed by: STUDENT IN AN ORGANIZED HEALTH CARE EDUCATION/TRAINING PROGRAM

## 2025-06-03 DIAGNOSIS — E11.42 TYPE 2 DIABETES MELLITUS WITH DIABETIC POLYNEUROPATHY: ICD-10-CM

## 2025-06-03 DIAGNOSIS — E11.51 TYPE 2 DIABETES MELLITUS WITH DIABETIC PERIPHERAL ANGIOPATHY WITHOUT GANGRENE: ICD-10-CM

## 2025-06-03 RX ORDER — LINAGLIPTIN 5 MG/1
5 TABLET, FILM COATED ORAL
Qty: 90 TABLET | Refills: 0 | Status: SHIPPED | OUTPATIENT
Start: 2025-06-03

## 2025-06-06 ENCOUNTER — RESULTS FOLLOW-UP (OUTPATIENT)
Dept: FAMILY MEDICINE | Facility: CLINIC | Age: 89
End: 2025-06-06

## 2025-06-24 NOTE — TELEPHONE ENCOUNTER
----- Message from Mesha Toledo sent at 5/30/2018 10:27 AM CDT -----  Contact: patient   Patient would like a refill on her  SITagliptin (JANUVIA) 50 MG Tab sent to [facility] in [place].   
details…

## 2025-08-21 DIAGNOSIS — E11.22 TYPE 2 DIABETES MELLITUS WITH STAGE 3B CHRONIC KIDNEY DISEASE, WITHOUT LONG-TERM CURRENT USE OF INSULIN: ICD-10-CM

## 2025-08-21 DIAGNOSIS — N18.32 TYPE 2 DIABETES MELLITUS WITH STAGE 3B CHRONIC KIDNEY DISEASE, WITHOUT LONG-TERM CURRENT USE OF INSULIN: ICD-10-CM

## 2025-08-21 RX ORDER — EMPAGLIFLOZIN 25 MG/1
25 TABLET, FILM COATED ORAL DAILY
Qty: 90 TABLET | Refills: 1 | Status: SHIPPED | OUTPATIENT
Start: 2025-08-21

## 2025-08-31 DIAGNOSIS — I10 ESSENTIAL HYPERTENSION: ICD-10-CM

## 2025-09-02 RX ORDER — HYDRALAZINE HYDROCHLORIDE 50 MG/1
50 TABLET, FILM COATED ORAL
Qty: 270 TABLET | Refills: 3 | Status: SHIPPED | OUTPATIENT
Start: 2025-09-02

## (undated) DEVICE — CATH IMPULSE FL4 5FR 100CM

## (undated) DEVICE — PAD DEFIB CADENCE ADULT R2

## (undated) DEVICE — GUIDEWIRE EMERALD 150CM PTFE

## (undated) DEVICE — GUIDEWIRE WHOLEY STD STR 260CM

## (undated) DEVICE — COVER PROBE US 5.5X58L NON LTX

## (undated) DEVICE — KIT LEFT HEART MANIFOLD CUSTOM

## (undated) DEVICE — KIT INTRODUCER MICROPUNCTR 4F

## (undated) DEVICE — Device

## (undated) DEVICE — SHEATH INTRODUCER 6FR 11CM

## (undated) DEVICE — CATH EXPO FR4 6F

## (undated) DEVICE — VISIPAQUE 320 200ML +PK